# Patient Record
Sex: FEMALE | Race: WHITE | NOT HISPANIC OR LATINO | Employment: OTHER | URBAN - METROPOLITAN AREA
[De-identification: names, ages, dates, MRNs, and addresses within clinical notes are randomized per-mention and may not be internally consistent; named-entity substitution may affect disease eponyms.]

---

## 2017-01-17 ENCOUNTER — MYC MEDICAL ADVICE (OUTPATIENT)
Dept: FAMILY MEDICINE | Facility: CLINIC | Age: 67
End: 2017-01-17

## 2017-01-17 DIAGNOSIS — J98.01 COUGH DUE TO BRONCHOSPASM: Primary | ICD-10-CM

## 2017-01-19 ENCOUNTER — MYC MEDICAL ADVICE (OUTPATIENT)
Dept: FAMILY MEDICINE | Facility: CLINIC | Age: 67
End: 2017-01-19

## 2017-01-19 NOTE — PATIENT INSTRUCTIONS
Hand and Wrist Exercises: Forearm Roll  This exercise is designed to stretch and strengthen your hands and wrists. Before beginning, read through all the instructions. While exercising, breathe normally. If you feel any pain, stop the exercise. If pain persists, inform your healthcare provider.    Grasp a hammer or hand weight in your _____ hand. Place your wrist, palm down, over the end of your knee or on the edge of a table.    Keeping your forearm against your thigh or a table, rotate your hand until your palm is up. Hold for _____ seconds. Then return to starting position.    Repeat _____ times. Do _____ sets a day.    1606-3709 [employer]. [address] [place] [address]. All rights reserved. This information is not intended as a substitute for professional medical care. Always follow your healthcare professional's instructions.        Hand and Wrist Exercises: Wrist Flexion    This exercise is designed to stretch your hands and wrists. Before beginning, read through all the instructions. While exercising, breathe normally. If you feel any pain, stop the exercise. If pain persists, inform your healthcare provider.    Hold your _____ hand in front of you with your palm down and elbow bent.    Grasp the back of that hand with your other hand. Pull back so your fingers point down as you straighten your arm. Feel a stretch in your forearm and wrist. Hold for _____ seconds. Then relax.    Repeat _____ times. Do _____ sets a day.    8583-0855 [employer]. [address] [place] [address]. All rights reserved. This information is not intended as a substitute for professional medical care. Always follow your healthcare professional's instructions.

## 2017-01-31 ENCOUNTER — RADIANT APPOINTMENT (OUTPATIENT)
Dept: MAMMOGRAPHY | Facility: CLINIC | Age: 67
End: 2017-01-31
Attending: FAMILY MEDICINE
Payer: COMMERCIAL

## 2017-01-31 DIAGNOSIS — Z12.39 BREAST CANCER SCREENING: ICD-10-CM

## 2017-01-31 PROCEDURE — G0202 SCR MAMMO BI INCL CAD: HCPCS

## 2017-03-10 ENCOUNTER — MYC MEDICAL ADVICE (OUTPATIENT)
Dept: FAMILY MEDICINE | Facility: CLINIC | Age: 67
End: 2017-03-10

## 2017-03-10 DIAGNOSIS — G89.29 CHRONIC PAIN OF BOTH KNEES: Primary | ICD-10-CM

## 2017-03-10 DIAGNOSIS — M25.562 CHRONIC PAIN OF BOTH KNEES: Primary | ICD-10-CM

## 2017-03-10 DIAGNOSIS — M25.551 HIP PAIN, RIGHT: ICD-10-CM

## 2017-03-10 DIAGNOSIS — M25.561 CHRONIC PAIN OF BOTH KNEES: Primary | ICD-10-CM

## 2017-03-27 ENCOUNTER — PRE VISIT (OUTPATIENT)
Dept: ORTHOPEDICS | Facility: CLINIC | Age: 67
End: 2017-03-27

## 2017-03-27 DIAGNOSIS — M25.562 PAIN IN BOTH KNEES, UNSPECIFIED CHRONICITY: Primary | ICD-10-CM

## 2017-03-27 DIAGNOSIS — M25.561 PAIN IN BOTH KNEES, UNSPECIFIED CHRONICITY: Primary | ICD-10-CM

## 2017-03-27 NOTE — TELEPHONE ENCOUNTER
Pt reports being seen for : Bilateral knee pain, chronic  1. Do you have recent xrays (if not seen in EPIC)? No -  Patient informed that they will have x-rays done before appointment and to arrive at least 30 minutes before MD appointment. Xrays ordered per standing orders.  2. Do you have any MRI's (if not seen in EPIC)?No.   3. Have you had surgery in the past for the same issue. No.  4. Are you being referred by another provider? Yes: Dr. Hurtado  If yes-Records in Epic.  5. Is this work comp or MVA related. No.  Anat Rodriguez RN

## 2017-03-30 ENCOUNTER — RADIANT APPOINTMENT (OUTPATIENT)
Dept: GENERAL RADIOLOGY | Facility: CLINIC | Age: 67
End: 2017-03-30
Attending: ORTHOPAEDIC SURGERY
Payer: COMMERCIAL

## 2017-03-30 ENCOUNTER — OFFICE VISIT (OUTPATIENT)
Dept: ORTHOPEDICS | Facility: CLINIC | Age: 67
End: 2017-03-30
Payer: COMMERCIAL

## 2017-03-30 VITALS
OXYGEN SATURATION: 97 % | SYSTOLIC BLOOD PRESSURE: 145 MMHG | HEART RATE: 88 BPM | BODY MASS INDEX: 25.77 KG/M2 | HEIGHT: 69 IN | WEIGHT: 174 LBS | DIASTOLIC BLOOD PRESSURE: 89 MMHG

## 2017-03-30 DIAGNOSIS — M17.0 ARTHRITIS OF BOTH KNEES: Primary | ICD-10-CM

## 2017-03-30 DIAGNOSIS — M25.562 PAIN IN BOTH KNEES, UNSPECIFIED CHRONICITY: ICD-10-CM

## 2017-03-30 DIAGNOSIS — M25.561 PAIN IN BOTH KNEES, UNSPECIFIED CHRONICITY: ICD-10-CM

## 2017-03-30 PROCEDURE — 73562 X-RAY EXAM OF KNEE 3: CPT | Mod: RT | Performed by: RADIOLOGY

## 2017-03-30 PROCEDURE — 99213 OFFICE O/P EST LOW 20 MIN: CPT | Mod: GC | Performed by: ORTHOPAEDIC SURGERY

## 2017-03-30 ASSESSMENT — PAIN SCALES - GENERAL: PAINLEVEL: NO PAIN (0)

## 2017-03-30 NOTE — PROGRESS NOTES
REASON FOR VISIT:  Bilateral knee pain.      HISTORY OF PRESENT ILLNESS:  Mdadie Lala is a 67-year-old female seen back today in regards to her bilateral knee pain that has been ongoing for approximately 10 years.  There was no specific inciting event.  She has difficulty most notably with going up and down stairs and lunges while exercising.  Her symptoms are relieved with rest.  She describes her pain as aching.  On the left side, she has more medial-sided pain versus on the right which is more anterior knee pain.  Occasionally, she feels like her knee will have a sharp twinge of pain and give way.  She was seen by Dr. Xiong in 2011 at which point she had radiographic evidence of medial compartment left knee arthritis.  She was given a medial  brace, which she has been using and has helped her symptoms tremendously.  She returns today to discuss her current symptoms as well as to provide a new check point in time as she wants to make sure she is not doing any irreversible damage to her knees.  She retired within the last year and has been enjoying a more active lifestyle.  She denies any paresthesias, numbness or weakness in the extremities, although she feels slightly less conditioned with her quadriceps muscles and requests recommendations for exercises and other treatment modalities that she can perform so as to improve her symptoms.      PAST MEDICAL HISTORY:   1.  Allergic rhinitis.   2.  Meniere disease.      PAST SURGICAL HISTORY:  Cataract surgery, cervical cone procedure, C-sections x2.      MEDICATIONS:  Reviewed in the patient's electronic medical record.      ALLERGIES:  Sulfa drugs.      REVIEW OF SYSTEMS:  Reviewed on the patient's intake form including 14-point review of systems.      FAMILY HISTORY:  Reviewed on the patient's intake form.      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  She denies tobacco use.  She  lives at home with her spouse.  She drinks approximately 5 drinks per week.  She enjoys the Veeqo machine, yoga and hiking, specifically a yearly trip to the [facility].        PHYSICAL EXAMINATION:   GENERAL:  No acute distress, well-nourished female.   RESPIRATORY:  Nonlabored respirations on room air.   CARDIOVASCULAR:  Regular rate and rhythm.   EXTREMITIES:     LEFT LOWER EXTREMITY:  The patient is tender to palpation over the medial joint line.  She has knee range of motion from 0-130 degrees of flexion.  She is stable with varus and valgus stress at full extension and 30 degrees of flexion.  She has a grade 1A Lachman.  Negative anterior and posterior drawer.  She has mild crepitus with knee range of motion.  She has 5/5 strength with tib-ant, EHL and gastrocsoleus complex.  She has intact sensation to light touch over the sural, saphenous, deep peroneal, superficial peroneal and tibial nerve distributions.  She has palpable DP pulse.   RIGHT LOWER EXTREMITY:  The patient has no appreciable effusion.  She is tender to palpation with patellar compression.  She has a negative patellar apprehension test.  She has no tenderness to palpation over the medial or lateral joint line.  She has a knee range of motion from 0-130.  She is stable with varus and valgus stress at full extension and 30 degrees of flexion.  She has a grade 1A Lachman.  Negative anterior and posterior drawer.  She has 5/5 strength with tib-ant, EHL and gastrocsoleus complex.  She has sensation intact to sural, saphenous, deep peroneal, superficial peroneal and tibial nerve distributions.  She has palpable DP pulse.      IMAGING:  AP, lateral and sunrise views of bilateral knees obtained in clinic today and reviewed which demonstrate primarily the medial compartment wear of the left knee with osteophyte formation, subchondral sclerosis and joint space narrowing of the medial compartment.  On the right knee, there is evidence of  patellofemoral arthritic changes but relatively well-preserved medial and lateral joint spaces.  Findings consistent with osteoarthritis.      ASSESSMENT:  A 67-year-old female with bilateral knee osteoarthritis, primarily medial compartment on the left and patellofemoral compartment wear on the right.      PLAN:  We had a nice discussion with Maddie Lala today regarding the nature of her symptoms and correlated this with her history, physical and imaging studies.  At this point, we feel that she can continue to work on nonoperative measures to improve her symptoms with things such as physical therapy, icing, taking anti-inflammatories to aid in her arthritis-type symptoms.  Currently, she is not in significant pain with her current activities.  Some days are better than others, but between her medial  brace on her left and being mindful of activities such as avoiding deep lunges, she has been able to do most of her desired activities.  She is reassured by reviewing of her x-rays.  We also discussed that she could be a candidate for corticosteroid injections if she should so desire.  She can receive up to 3-4 of these per year.  At this point, she states she is not having significant enough pain to want an injection.  Should her symptoms progress to the point that she is having more bad days than good and she is unable to perform activities that she desires and if she has tried and failed conservative management, at some point, consideration for joint reconstruction could be considered, but at this point, she has further nonoperative management to maximize.      We wrote for a physical therapy to work on quad strengthening exercises as well as other modalities to aid in her knee symptoms.  She may continue to use ice and anti-inflammatories.  She will call and followup on an as-needed basis in the future.  She may also call if she wishes to schedule an injection, we would be happy to do this for her.       The patient was seen by and discussed with Dr. Xiong who is in agreement with the above assessment and plan.

## 2017-03-30 NOTE — PROGRESS NOTES
Patient seen and examined. Agree with history, physical and imaging as well as Assessment and Plan as detailed by Dr. Palacios.    Assesment: OA knee, Bilateral, medial compartment on Left, PF on Right    Plan: minimally symptomatic, education provided, PT prescription, injection if worsens.    F/u prn    I was present with the resident during the history and exam.  I discussed the case with the resident and agree with the findings as documented in the assessment and plan.

## 2017-03-30 NOTE — MR AVS SNAPSHOT
After Visit Summary   3/30/2017    Maddie Lala    MRN: [id number]           Patient Information     Date Of Birth          1950        Visit Information        Provider Department      3/30/2017 2:45 PM Chaparro Xiong MD [facility]        Today's Diagnoses     Arthritis of both knees    -  1      Care Instructions    Thanks for coming today.  Ortho/Sports Medicine Clinic  [address] [place] [address]    To schedule future appointments in Ortho Clinic, you may call [phone].    To schedule ordered imaging by your Provider: Call [facility] at [phone]    StreamLine Call available online at:   [email or web address]    Please call if any further questions or concerns [phone] and ask for the Orthopedic Department. Clinic hours 8 am to 5 pm.    Return to clinic if symptoms worsen.        Follow-ups after your visit        Additional Services     PHYSICAL THERAPY REFERRAL       *This therapy referral will be filtered to a centralized scheduling office at [facility] and the patient will receive a call to schedule an appointment at a [place] location most convenient for them. *     [facility] provides Physical Therapy evaluation and treatment and many specialty services across the [place] system.  If requesting a specialty program, please choose from the list below.    If you have not heard from the scheduling office within 2 business days, please call [phone] for all locations, with the exception of [place], please call [phone].  Treatment: Evaluation & Treatment  Special Instructions/Modalities: Eval and treat      Please be aware that coverage of these services is subject to the terms and limitations of your health insurance plan.  Call member services at your health plan with any benefit or coverage questions.      **Note to Provider:  If you are referring outside of [place] for the  therapy appointment, please list the name of the location in the  special instructions  above, print the referral and give to the patient to schedule the appointment.                  Your next 10 appointments already scheduled     Apr 04, 2017  1:30 PM CDT   SURINDER Extremity with Lizet Lopes, PT   [facility] Physical Therapy ([facility]  )    [address]  [place] [id number] [phone]              Who to contact     If you have questions or need follow up information about today's clinic visit or your schedule please contact [facility] directly at [phone].  Normal or non-critical lab and imaging results will be communicated to you by Hipcrickethart, letter or phone within 4 business days after the clinic has received the results. If you do not hear from us within 7 days, please contact the clinic through Hipcrickethart or phone. If you have a critical or abnormal lab result, we will notify you by phone as soon as possible.  Submit refill requests through [employer]le or call your pharmacy and they will forward the refill request to us. Please allow 3 business days for your refill to be completed.          Additional Information About Your Visit        HipcricketharJudobaby Information     Nixle gives you secure access to your electronic health record. If you see a primary care provider, you can also send messages to your care team and make appointments. If you have questions, please call your primary care clinic.  If you do not have a primary care provider, please call [phone] and they will assist you.      Nixle is an electronic gateway that provides easy, online access to your medical records. With Nixle, you can request a clinic appointment, read your test results, renew a prescription or communicate with your care team.     To access your existing account, please contact your [facility] or call [phone] for assistance.        Care  "EveryWhere ID     This is your Care EveryWhere ID. This could be used by other organizations to access your [place] medical records  [id number]        Your Vitals Were     Pulse Height Pulse Oximetry BMI (Body Mass Index)          88 1.74 m (5' 8.5\") 97% 26.07 kg/m2         Blood Pressure from Last 3 Encounters:   03/30/17 145/89   11/03/16 134/80   10/19/16 142/89    Weight from Last 3 Encounters:   03/30/17 78.9 kg (174 lb)   11/03/16 78 kg (172 lb)   10/12/16 74.8 kg (165 lb)              We Performed the Following     PHYSICAL THERAPY REFERRAL          Today's Medication Changes          These changes are accurate as of: 3/30/17 11:59 PM.  If you have any questions, ask your nurse or doctor.               These medicines have changed or have updated prescriptions.        Dose/Directions    albuterol 108 (90 BASE) MCG/ACT Inhaler   Commonly known as:  VENTOLIN HFA   This may have changed:  additional instructions   Used for:  Cough due to bronchospasm        INHALE TWO PUFFS BY MOUTH EVERY 6 HOURS AS NEEDED FOR SHORTNESS OF BREATH / DYSPNEA   Quantity:  18 g   Refills:  11       fluticasone 50 MCG/ACT spray   Commonly known as:  FLONASE   This may have changed:  additional instructions   Used for:  Chronic allergic conjunctivitis        USE ONE TO TWO SPRAYS IN EACH NOSTRIL EVERY DAY   Quantity:  48 g   Refills:  3                Primary Care Provider Office Phone # Fax #    Radha Hurtado [phone]       [address]  [facility] [address]        Thank you!     Thank you for choosing [facility]  for your care. Our goal is always to provide you with excellent care. Hearing back from our patients is one way we can continue to improve our services. Please take a few minutes to complete the written survey that you may receive in the mail after your visit with us. Thank you!             Your Updated Medication List - Protect others around you: Learn " how to safely use, store and throw away your medicines at www.disposemymeds.org.          This list is accurate as of: 3/30/17 11:59 PM.  Always use your most recent med list.                   Brand Name Dispense Instructions for use    ADVIL PO      Take 800 mg by mouth every 4 hours as needed for moderate pain       albuterol 108 (90 BASE) MCG/ACT Inhaler    VENTOLIN HFA    18 g    INHALE TWO PUFFS BY MOUTH EVERY 6 HOURS AS NEEDED FOR SHORTNESS OF BREATH / DYSPNEA       beclomethasone 40 MCG/ACT Inhaler    QVAR    3 Inhaler    INHALE 2 PUFFS BY MOUTH INTO THE LUNGS TWO TIMES A DAY       CALCIUM + D PO      One bid       CHLOR-TRIMETON ALLERGY 12 MG Tbcr   Generic drug:  chlorpheniramine maleate      Take  by mouth as needed.       fluocinonide 0.05 % solution    LIDEX    60 mL    Apply to scalp nightly for up to 1 week, then 2-3 times weekly as needed.       fluticasone 50 MCG/ACT spray    FLONASE    48 g    USE ONE TO TWO SPRAYS IN EACH NOSTRIL EVERY DAY       order for DME     1 Units    Equipment being ordered:    left knee brace-medial compartment  for osteoarthritis.       order for DME     1 each    Equipment being ordered: spacer       ranitidine 300 MG tablet    ZANTAC    90 tablet    Take 1 tablet (300 mg) by mouth daily       * SENIOR MULTIVITAMIN PLUS PO      One per day       * OPTIVITE PO      once a day       spironolactone 25 MG tablet    ALDACTONE    90 tablet    Take 1 tablet (25 mg) by mouth daily Prn       * Notice:  This list has 2 medication(s) that are the same as other medications prescribed for you. Read the directions carefully, and ask your doctor or other care provider to review them with you.

## 2017-03-30 NOTE — NURSING NOTE
"Maddie PUTNAM Dai's goals for this visit include: Bilateral knee pain  She requests these members of her care team be copied on today's visit information: yes    PCP: Radha Hurtado    Referring Provider:  No referring provider defined for this encounter.    Chief Complaint   Patient presents with     Knee Pain     Bilateral knee pain       Initial /89  Pulse 88  Ht 1.74 m (5' 8.5\")  Wt 78.9 kg (174 lb)  SpO2 97%  BMI 26.07 kg/m2 Estimated body mass index is 26.07 kg/(m^2) as calculated from the following:    Height as of this encounter: 1.74 m (5' 8.5\").    Weight as of this encounter: 78.9 kg (174 lb).  Medication Reconciliation: complete  "

## 2017-03-30 NOTE — PATIENT INSTRUCTIONS
Thanks for coming today.  Ortho/Sports Medicine Clinic  [address] [place] [address]    To schedule future appointments in Ortho Clinic, you may call [phone].    To schedule ordered imaging by your Provider: Call [facility] at [phone]    "[email or web address]" available online at:   [email or web address]    Please call if any further questions or concerns [phone] and ask for the Orthopedic Department. Clinic hours 8 am to 5 pm.    Return to clinic if symptoms worsen.

## 2017-04-04 ENCOUNTER — THERAPY VISIT (OUTPATIENT)
Dept: PHYSICAL THERAPY | Facility: CLINIC | Age: 67
End: 2017-04-04
Payer: COMMERCIAL

## 2017-04-04 DIAGNOSIS — M25.562 LEFT KNEE PAIN, UNSPECIFIED CHRONICITY: Primary | ICD-10-CM

## 2017-04-04 PROCEDURE — 97110 THERAPEUTIC EXERCISES: CPT | Mod: GP | Performed by: PHYSICAL THERAPIST

## 2017-04-04 PROCEDURE — 97161 PT EVAL LOW COMPLEX 20 MIN: CPT | Mod: GP | Performed by: PHYSICAL THERAPIST

## 2017-04-04 ASSESSMENT — ACTIVITIES OF DAILY LIVING (ADL)
AS_A_RESULT_OF_YOUR_KNEE_INJURY,_HOW_WOULD_YOU_RATE_YOUR_CURRENT_LEVEL_OF_DAILY_ACTIVITY?: NEARLY NORMAL
HOW_WOULD_YOU_RATE_THE_OVERALL_FUNCTION_OF_YOUR_KNEE_DURING_YOUR_USUAL_DAILY_ACTIVITIES?: NEARLY NORMAL
GO UP STAIRS: ACTIVITY IS MINIMALLY DIFFICULT
STAND: ACTIVITY IS NOT DIFFICULT
SWELLING: THE SYMPTOM AFFECTS MY ACTIVITY SLIGHTLY
GIVING WAY, BUCKLING OR SHIFTING OF KNEE: THE SYMPTOM AFFECTS MY ACTIVITY SLIGHTLY
SIT WITH YOUR KNEE BENT: ACTIVITY IS NOT DIFFICULT
PAIN: THE SYMPTOM AFFECTS MY ACTIVITY SLIGHTLY
RISE FROM A CHAIR: ACTIVITY IS NOT DIFFICULT
RAW_SCORE: 53
LIMPING: I HAVE THE SYMPTOM BUT IT DOES NOT AFFECT MY ACTIVITY
SQUAT: ACTIVITY IS SOMEWHAT DIFFICULT
WALK: ACTIVITY IS NOT DIFFICULT
KNEEL ON THE FRONT OF YOUR KNEE: ACTIVITY IS SOMEWHAT DIFFICULT
GO DOWN STAIRS: ACTIVITY IS NOT DIFFICULT
HOW_WOULD_YOU_RATE_THE_CURRENT_FUNCTION_OF_YOUR_KNEE_DURING_YOUR_USUAL_DAILY_ACTIVITIES_ON_A_SCALE_FROM_0_TO_100_WITH_100_BEING_YOUR_LEVEL_OF_KNEE_FUNCTION_PRIOR_TO_YOUR_INJURY_AND_0_BEING_THE_INABILITY_TO_PERFORM_ANY_OF_YOUR_USUAL_DAILY_ACTIVITIES?: 98
WEAKNESS: THE SYMPTOM AFFECTS MY ACTIVITY MODERATELY
STIFFNESS: THE SYMPTOM AFFECTS MY ACTIVITY SLIGHTLY
KNEE_ACTIVITY_OF_DAILY_LIVING_SCORE: 75.71
KNEE_ACTIVITY_OF_DAILY_LIVING_SUM: 53

## 2017-04-04 NOTE — MR AVS SNAPSHOT
After Visit Summary   4/4/2017    Maddie Lala    MRN: [id number]           Patient Information     Date Of Birth          1950        Visit Information        Provider Department      4/4/2017 1:30 PM Lizet Lopes, PT [facility] Physical Therapy        Today's Diagnoses     Left knee pain, unspecified chronicity    -  1       Follow-ups after your visit        Your next 10 appointments already scheduled     Apr 11, 2017 12:50 PM CDT   SURINDER Extremity with Lizet Lopes, PT   [facility] Physical Therapy ([facility]  )    [address]  [facility] [id number] [phone]            Apr 18, 2017  2:40 PM CDT   SURINDER Extremity with Elicia Snyder, PT   [facility] Physical Therapy ([facility]  )    [address]  [facility] [id number] [phone]              Who to contact     If you have questions or need follow up information about today's clinic visit or your schedule please contact [facility] PHYSICAL THERAPY directly at [phone].  Normal or non-critical lab and imaging results will be communicated to you by MyChart, letter or phone within 4 business days after the clinic has received the results. If you do not hear from us within 7 days, please contact the clinic through "[employer]hart or phone. If you have a critical or abnormal lab result, we will notify you by phone as soon as possible.  Submit refill requests through SoSocio or call your pharmacy and they will forward the refill request to us. Please allow 3 business days for your refill to be completed.          Additional Information About Your Visit        MyChart Information     [employer] gives you secure access to your electronic health record. If you see a primary care provider, you can also send messages to your care team and make appointments. If you have questions, please call your primary care clinic.  If you do not have  a primary care provider, please call [phone] and they will assist you.        Care EveryWhere ID     This is your Care EveryWhere ID. This could be used by other organizations to access your [place] medical records  [id number]         Blood Pressure from Last 3 Encounters:   03/30/17 145/89   11/03/16 134/80   10/19/16 142/89    Weight from Last 3 Encounters:   03/30/17 78.9 kg (174 lb)   11/03/16 78 kg (172 lb)   10/12/16 74.8 kg (165 lb)              We Performed the Following     HC PT EVAL, LOW COMPLEXITY     SURINDER INITIAL EVAL REPORT     THERAPEUTIC EXERCISES          Today's Medication Changes          These changes are accurate as of: 4/4/17  3:08 PM.  If you have any questions, ask your nurse or doctor.               These medicines have changed or have updated prescriptions.        Dose/Directions    albuterol 108 (90 BASE) MCG/ACT Inhaler   Commonly known as:  VENTOLIN HFA   This may have changed:  additional instructions   Used for:  Cough due to bronchospasm        INHALE TWO PUFFS BY MOUTH EVERY 6 HOURS AS NEEDED FOR SHORTNESS OF BREATH / DYSPNEA   Quantity:  18 g   Refills:  11       fluticasone 50 MCG/ACT spray   Commonly known as:  FLONASE   This may have changed:  additional instructions   Used for:  Chronic allergic conjunctivitis        USE ONE TO TWO SPRAYS IN EACH NOSTRIL EVERY DAY   Quantity:  48 g   Refills:  3                Primary Care Provider Office Phone # Fax #    Radha Hurtado [phone]       [address]  [facility] [address]        Thank you!     Thank you for choosing [facility] PHYSICAL THERAPY  for your care. Our goal is always to provide you with excellent care. Hearing back from our patients is one way we can continue to improve our services. Please take a few minutes to complete the written survey that you may receive in the mail after your visit with us. Thank you!             Your Updated Medication  List - Protect others around you: Learn how to safely use, store and throw away your medicines at www.disposemymeds.org.          This list is accurate as of: 4/4/17  3:08 PM.  Always use your most recent med list.                   Brand Name Dispense Instructions for use    ADVIL PO      Take 800 mg by mouth every 4 hours as needed for moderate pain       albuterol 108 (90 BASE) MCG/ACT Inhaler    VENTOLIN HFA    18 g    INHALE TWO PUFFS BY MOUTH EVERY 6 HOURS AS NEEDED FOR SHORTNESS OF BREATH / DYSPNEA       beclomethasone 40 MCG/ACT Inhaler    QVAR    3 Inhaler    INHALE 2 PUFFS BY MOUTH INTO THE LUNGS TWO TIMES A DAY       CALCIUM + D PO      One bid       CHLOR-TRIMETON ALLERGY 12 MG Tbcr   Generic drug:  chlorpheniramine maleate      Take  by mouth as needed.       fluocinonide 0.05 % solution    LIDEX    60 mL    Apply to scalp nightly for up to 1 week, then 2-3 times weekly as needed.       fluticasone 50 MCG/ACT spray    FLONASE    48 g    USE ONE TO TWO SPRAYS IN EACH NOSTRIL EVERY DAY       order for DME     1 Units    Equipment being ordered:    left knee brace-medial compartment  for osteoarthritis.       order for DME     1 each    Equipment being ordered: spacer       ranitidine 300 MG tablet    ZANTAC    90 tablet    Take 1 tablet (300 mg) by mouth daily       * SENIOR MULTIVITAMIN PLUS PO      One per day       * OPTIVITE PO      once a day       spironolactone 25 MG tablet    ALDACTONE    90 tablet    Take 1 tablet (25 mg) by mouth daily Prn       * Notice:  This list has 2 medication(s) that are the same as other medications prescribed for you. Read the directions carefully, and ask your doctor or other care provider to review them with you.

## 2017-04-04 NOTE — LETTER
[facility] PHYSICAL THERAPY  [address]  [facility] [address]  [phone]    2017    Re: Maddie Lala   :   1950  MRN:  [id number]   REFERRING PHYSICIAN:   Nash Palacios    [facility] PHYSICAL THERAPY  Date of Initial Evaluation:  17  Visits:  Rxs Used: 1  Reason for Referral:  Left knee pain, unspecified chronicity    EVALUATION SUMMARY    Subjective:  Maddie Lala is a 67 year old female with a left knee condition. Condition occurred with: Degenerative joint disease. Condition occurred: for unknown reasons. This is a chronic condition. Left knee pain has been present for 10+ years. Gradually the knee has become weaker so she is compensating more. Followed up with MD 3/30/17 to discuss knee which does show by x-ray of progressive degenerative changes.  She continues to wear an un  brace.      Patient reports pain:  In the joint. Pain is described as aching and is intermittent and reported as 5/10 (Can be 0/10 at rest). Pain is the same all the time. Symptoms are exacerbated by walking, bending/squatting, ascending stairs and descending stairs and relieved by NSAID's.  Since onset symptoms are unchanged.  Special tests:  X-ray.      General health as reported by patient is good.      Pertinent medical history includes:  Overweight, osteoarthritis and menopausal.  Medical allergies: yes (sulfa).  Other surgeries include:  Other (pelvic sling , cataracts  cancer cervic ).  Current medications:  None as reported by the patient.  Current occupation is Retired.        Barriers include:  None as reported by the patient.    Red flags:  None as reported by the patient.                  Objective:  Hip Evaluation  Hip Strength:    Extension:  Left: 4-/5  Pain:Right: 4-/5    Pain:    Abduction:  Left: 4/5     Pain:Right: 4/5    Pain:       Knee Evaluation:  ROM:  AROM  Hyperextension:  Left:  8    Right: 10  Flexion: Left: 140     Right: 145  Strength:   Extension:  Left: 4-/5   Pain:      Right: 5/5   Pain:  Flexion:  Left: 4-/5   Pain:      Right: 5/5   Pain:      Assessment/Plan:    Patient is a 67 year old female with left side knee complaints.    Patient has the following significant findings with corresponding treatment plan.                  Diagnosis 1:  Left knee pain  Pain -  manual therapy, self management and education  Decreased strength - therapeutic exercise and therapeutic activities  Impaired muscle performance - neuro re-education  Decreased function - therapeutic activities    Therapy Evaluation Codes:   1) History comprised of:   Personal factors that impact the plan of care:      None.    Comorbidity factors that impact the plan of care are:      None.     Medications impacting care: None.  2) Examination of Body Systems comprised of:   Body structures and functions that impact the plan of care:      Knee.   Activity limitations that impact the plan of care are:      Squatting/kneeling.  3) Clinical presentation characteristics are:   Stable/Uncomplicated.  4) Decision-Making    Low complexity using standardized patient assessment instrument and/or measureable assessment of functional outcome.  Cumulative Therapy Evaluation is: Low complexity.    Previous and current functional limitations:  (See Goal Flow Sheet for this information)    Short term and Long term goals: (See Goal Flow Sheet for this information)     Communication ability:  Patient appears to be able to clearly communicate and understand verbal and written communication and follow directions correctly.    Treatment Explanation - The following has been discussed with the patient:   RX ordered/plan of care  Anticipated outcomes  Possible risks and side effects    This patient would benefit from PT intervention to resume normal activities.   Rehab potential is good.  Frequency:  1 X week, once daily  Duration:  for 8 weeks        Discharge Plan:  Achieve all  LTG.  Independent in home treatment program.  Reach maximal therapeutic benefit.    Thank you for your referral.    INQUIRIES  Therapist: Lizet Lopes DPT, Cert. MDT  [facility] PHYSICAL THERAPY  [address]  [facility] [address]  Phone: [phone]  Fax: [phone]

## 2017-04-04 NOTE — PROGRESS NOTES
Subjective:    Maddie Lala is a 67 year old female with a left knee condition.  Condition occurred with:  Degenerative joint disease.  Condition occurred: for unknown reasons.  This is a chronic condition  Left knee pain has been present for 10+ years.  Gradually the knee has become weaker so she is compensating more. Followed up with MD 3/30/17 to discuss knee which does show by x-ray of progressive degenerative changes.  She continues to wear an un  brace..    Patient reports pain:  In the joint.    Pain is described as aching and is intermittent and reported as 5/10 (Can be 0/10 at rest).   Pain is the same all the time.  Symptoms are exacerbated by walking, bending/squatting, ascending stairs and descending stairs and relieved by NSAID's.  Since onset symptoms are unchanged.  Special tests:  X-ray.      General health as reported by patient is good.  Pertinent medical history includes:  Overweight, osteoarthritis and menopausal.  Medical allergies: yes (sulfa).  Other surgeries include:  Other (pelvic sling 2010, cataracts 2010 cancer cervic 1979).  Current medications:  None as reported by the patient.  Current occupation is Retired  .        Barriers include:  None as reported by the patient.    Red flags:  None as reported by the patient.                        Objective:    System                                           Hip Evaluation    Hip Strength:      Extension:  Left: 4-/5  Pain:Right: 4-/5    Pain:    Abduction:  Left: 4/5     Pain:Right: 4/5    Pain:                           Knee Evaluation:  ROM:    AROM    Hyperextension:  Left:  8    Right: 10    Flexion: Left: 140    Right: 145        Strength:     Extension:  Left: 4-/5   Pain:      Right: 5/5   Pain:  Flexion:  Left: 4-/5   Pain:      Right: 5/5   Pain:                        General     ROS    Assessment/Plan:      Patient is a 67 year old female with left side knee complaints.    Patient has the following significant findings with  corresponding treatment plan.                Diagnosis 1:  Left knee pain  Pain -  manual therapy, self management and education  Decreased strength - therapeutic exercise and therapeutic activities  Impaired muscle performance - neuro re-education  Decreased function - therapeutic activities    Therapy Evaluation Codes:   1) History comprised of:   Personal factors that impact the plan of care:      None.    Comorbidity factors that impact the plan of care are:      None.     Medications impacting care: None.  2) Examination of Body Systems comprised of:   Body structures and functions that impact the plan of care:      Knee.   Activity limitations that impact the plan of care are:      Squatting/kneeling.  3) Clinical presentation characteristics are:   Stable/Uncomplicated.  4) Decision-Making    Low complexity using standardized patient assessment instrument and/or measureable assessment of functional outcome.  Cumulative Therapy Evaluation is: Low complexity.    Previous and current functional limitations:  (See Goal Flow Sheet for this information)    Short term and Long term goals: (See Goal Flow Sheet for this information)     Communication ability:  Patient appears to be able to clearly communicate and understand verbal and written communication and follow directions correctly.  Treatment Explanation - The following has been discussed with the patient:   RX ordered/plan of care  Anticipated outcomes  Possible risks and side effects  This patient would benefit from PT intervention to resume normal activities.   Rehab potential is good.    Frequency:  1 X week, once daily  Duration:  for 8 weeks  Discharge Plan:  Achieve all LTG.  Independent in home treatment program.  Reach maximal therapeutic benefit.    Please refer to the daily flowsheet for treatment today, total treatment time and time spent performing 1:1 timed codes.

## 2017-04-11 ENCOUNTER — THERAPY VISIT (OUTPATIENT)
Dept: PHYSICAL THERAPY | Facility: CLINIC | Age: 67
End: 2017-04-11
Payer: COMMERCIAL

## 2017-04-11 DIAGNOSIS — M25.562 LEFT KNEE PAIN, UNSPECIFIED CHRONICITY: ICD-10-CM

## 2017-04-11 PROCEDURE — 97112 NEUROMUSCULAR REEDUCATION: CPT | Mod: GP | Performed by: PHYSICAL THERAPIST

## 2017-04-11 PROCEDURE — 97110 THERAPEUTIC EXERCISES: CPT | Mod: GP | Performed by: PHYSICAL THERAPIST

## 2017-04-18 ENCOUNTER — THERAPY VISIT (OUTPATIENT)
Dept: PHYSICAL THERAPY | Facility: CLINIC | Age: 67
End: 2017-04-18
Payer: COMMERCIAL

## 2017-04-18 DIAGNOSIS — M25.562 LEFT KNEE PAIN, UNSPECIFIED CHRONICITY: ICD-10-CM

## 2017-04-18 PROCEDURE — 97112 NEUROMUSCULAR REEDUCATION: CPT | Mod: GP | Performed by: PHYSICAL THERAPIST

## 2017-04-18 PROCEDURE — 97110 THERAPEUTIC EXERCISES: CPT | Mod: GP | Performed by: PHYSICAL THERAPIST

## 2017-04-24 ENCOUNTER — THERAPY VISIT (OUTPATIENT)
Dept: PHYSICAL THERAPY | Facility: CLINIC | Age: 67
End: 2017-04-24
Payer: COMMERCIAL

## 2017-04-24 DIAGNOSIS — M25.562 LEFT KNEE PAIN, UNSPECIFIED CHRONICITY: ICD-10-CM

## 2017-04-24 PROCEDURE — 97110 THERAPEUTIC EXERCISES: CPT | Mod: GP | Performed by: PHYSICAL THERAPIST

## 2017-04-24 PROCEDURE — 97112 NEUROMUSCULAR REEDUCATION: CPT | Mod: GP | Performed by: PHYSICAL THERAPIST

## 2017-05-01 ENCOUNTER — THERAPY VISIT (OUTPATIENT)
Dept: PHYSICAL THERAPY | Facility: CLINIC | Age: 67
End: 2017-05-01
Payer: COMMERCIAL

## 2017-05-01 DIAGNOSIS — M25.562 LEFT KNEE PAIN, UNSPECIFIED CHRONICITY: ICD-10-CM

## 2017-05-01 PROCEDURE — 97112 NEUROMUSCULAR REEDUCATION: CPT | Mod: GP | Performed by: PHYSICAL THERAPIST

## 2017-05-01 PROCEDURE — 97110 THERAPEUTIC EXERCISES: CPT | Mod: GP | Performed by: PHYSICAL THERAPIST

## 2017-05-13 ENCOUNTER — MYC MEDICAL ADVICE (OUTPATIENT)
Dept: PHYSICAL THERAPY | Facility: CLINIC | Age: 67
End: 2017-05-13

## 2017-05-13 DIAGNOSIS — H10.45 CHRONIC ALLERGIC CONJUNCTIVITIS: ICD-10-CM

## 2017-05-15 NOTE — TELEPHONE ENCOUNTER
fluticasone (FLONASE) 50 MCG/ACT nasal spray       Last Written Prescription Date: 11/3/16  Last Fill Quantity: 48g, # refills: 3    Last Office Visit with [facility] or [facility] prescribing provider:  3/30/17 Dr. Hurtado     Future Office Visit:       Date of Last Asthma Action Plan Letter:   There are no preventive care reminders to display for this patient.   Asthma Control Test: No flowsheet data found.    Date of Last Spirometry Test:   No results found for this or any previous visit.

## 2017-05-16 RX ORDER — FLUTICASONE PROPIONATE 50 MCG
SPRAY, SUSPENSION (ML) NASAL
Qty: 48 G | Refills: 0 | Status: SHIPPED | OUTPATIENT
Start: 2017-05-16 | End: 2017-11-16

## 2017-05-16 NOTE — TELEPHONE ENCOUNTER
Prescription approved per [facility] Refill Protocol.    Isabel Giang RN

## 2017-09-08 ENCOUNTER — MYC MEDICAL ADVICE (OUTPATIENT)
Dept: FAMILY MEDICINE | Facility: CLINIC | Age: 67
End: 2017-09-08

## 2017-09-08 DIAGNOSIS — M17.12 PRIMARY OSTEOARTHRITIS OF LEFT KNEE: Primary | ICD-10-CM

## 2017-10-15 ENCOUNTER — MYC MEDICAL ADVICE (OUTPATIENT)
Dept: FAMILY MEDICINE | Facility: CLINIC | Age: 67
End: 2017-10-15

## 2017-11-16 ENCOUNTER — MEDICAL CORRESPONDENCE (OUTPATIENT)
Dept: HEALTH INFORMATION MANAGEMENT | Facility: CLINIC | Age: 67
End: 2017-11-16

## 2017-11-16 ENCOUNTER — OFFICE VISIT (OUTPATIENT)
Dept: FAMILY MEDICINE | Facility: CLINIC | Age: 67
End: 2017-11-16
Payer: COMMERCIAL

## 2017-11-16 VITALS
BODY MASS INDEX: 26.26 KG/M2 | OXYGEN SATURATION: 100 % | DIASTOLIC BLOOD PRESSURE: 88 MMHG | HEART RATE: 66 BPM | WEIGHT: 167.3 LBS | TEMPERATURE: 98.6 F | SYSTOLIC BLOOD PRESSURE: 142 MMHG | HEIGHT: 67 IN

## 2017-11-16 DIAGNOSIS — Z23 NEED FOR PROPHYLACTIC VACCINATION AND INOCULATION AGAINST INFLUENZA: ICD-10-CM

## 2017-11-16 DIAGNOSIS — Z00.00 ROUTINE GENERAL MEDICAL EXAMINATION AT A HEALTH CARE FACILITY: Primary | ICD-10-CM

## 2017-11-16 DIAGNOSIS — M25.552 HIP PAIN, BILATERAL: ICD-10-CM

## 2017-11-16 DIAGNOSIS — N95.2 ATROPHIC VAGINITIS: ICD-10-CM

## 2017-11-16 DIAGNOSIS — H81.09 MENIERE'S DISEASE, UNSPECIFIED LATERALITY: ICD-10-CM

## 2017-11-16 DIAGNOSIS — L21.9 DERMATITIS, SEBORRHEIC: ICD-10-CM

## 2017-11-16 DIAGNOSIS — M25.551 HIP PAIN, BILATERAL: ICD-10-CM

## 2017-11-16 DIAGNOSIS — R05.3 CHRONIC COUGH: ICD-10-CM

## 2017-11-16 DIAGNOSIS — H10.45 CHRONIC ALLERGIC CONJUNCTIVITIS: ICD-10-CM

## 2017-11-16 DIAGNOSIS — J98.01 COUGH DUE TO BRONCHOSPASM: ICD-10-CM

## 2017-11-16 DIAGNOSIS — I10 BENIGN ESSENTIAL HYPERTENSION: ICD-10-CM

## 2017-11-16 LAB
ALBUMIN SERPL-MCNC: 3.8 G/DL (ref 3.4–5)
ALP SERPL-CCNC: 66 U/L (ref 40–150)
ALT SERPL W P-5'-P-CCNC: 36 U/L (ref 0–50)
ANION GAP SERPL CALCULATED.3IONS-SCNC: 5 MMOL/L (ref 3–14)
AST SERPL W P-5'-P-CCNC: 26 U/L (ref 0–45)
BILIRUB SERPL-MCNC: 0.5 MG/DL (ref 0.2–1.3)
BUN SERPL-MCNC: 13 MG/DL (ref 7–30)
CALCIUM SERPL-MCNC: 8.7 MG/DL (ref 8.5–10.1)
CHLORIDE SERPL-SCNC: 103 MMOL/L (ref 94–109)
CHOLEST SERPL-MCNC: 147 MG/DL
CO2 SERPL-SCNC: 31 MMOL/L (ref 20–32)
CREAT SERPL-MCNC: 0.61 MG/DL (ref 0.52–1.04)
CREAT UR-MCNC: 110 MG/DL
GFR SERPL CREATININE-BSD FRML MDRD: >90 ML/MIN/1.7M2
GLUCOSE SERPL-MCNC: 93 MG/DL (ref 70–99)
HDLC SERPL-MCNC: 75 MG/DL
HGB BLD-MCNC: 12.6 G/DL (ref 11.7–15.7)
LDLC SERPL CALC-MCNC: 63 MG/DL
MICROALBUMIN UR-MCNC: <5 MG/L
MICROALBUMIN/CREAT UR: NORMAL MG/G CR (ref 0–25)
NONHDLC SERPL-MCNC: 72 MG/DL
POTASSIUM SERPL-SCNC: 4.2 MMOL/L (ref 3.4–5.3)
PROT SERPL-MCNC: 6.9 G/DL (ref 6.8–8.8)
SODIUM SERPL-SCNC: 139 MMOL/L (ref 133–144)
TRIGL SERPL-MCNC: 43 MG/DL
TSH SERPL DL<=0.005 MIU/L-ACNC: 2.78 MU/L (ref 0.4–4)

## 2017-11-16 PROCEDURE — 36415 COLL VENOUS BLD VENIPUNCTURE: CPT | Performed by: FAMILY MEDICINE

## 2017-11-16 PROCEDURE — 80053 COMPREHEN METABOLIC PANEL: CPT | Performed by: FAMILY MEDICINE

## 2017-11-16 PROCEDURE — 82043 UR ALBUMIN QUANTITATIVE: CPT | Performed by: FAMILY MEDICINE

## 2017-11-16 PROCEDURE — 85018 HEMOGLOBIN: CPT | Performed by: FAMILY MEDICINE

## 2017-11-16 PROCEDURE — 80061 LIPID PANEL: CPT | Performed by: FAMILY MEDICINE

## 2017-11-16 PROCEDURE — 99213 OFFICE O/P EST LOW 20 MIN: CPT | Mod: 25 | Performed by: FAMILY MEDICINE

## 2017-11-16 PROCEDURE — 84443 ASSAY THYROID STIM HORMONE: CPT | Performed by: FAMILY MEDICINE

## 2017-11-16 PROCEDURE — 99397 PER PM REEVAL EST PAT 65+ YR: CPT | Mod: 25 | Performed by: FAMILY MEDICINE

## 2017-11-16 PROCEDURE — 90662 IIV NO PRSV INCREASED AG IM: CPT | Performed by: FAMILY MEDICINE

## 2017-11-16 PROCEDURE — 90471 IMMUNIZATION ADMIN: CPT | Performed by: FAMILY MEDICINE

## 2017-11-16 RX ORDER — FLUTICASONE PROPIONATE 50 MCG
SPRAY, SUSPENSION (ML) NASAL
Qty: 48 G | Refills: 3 | Status: SHIPPED | OUTPATIENT
Start: 2017-11-16 | End: 2019-02-27

## 2017-11-16 RX ORDER — LISINOPRIL 5 MG/1
5 TABLET ORAL DAILY
Qty: 30 TABLET | Refills: 1 | Status: SHIPPED | OUTPATIENT
Start: 2017-11-16 | End: 2017-12-13

## 2017-11-16 RX ORDER — FLUOCINONIDE TOPICAL SOLUTION USP, 0.05% 0.5 MG/ML
SOLUTION TOPICAL
Qty: 60 ML | Refills: 3 | Status: SHIPPED | OUTPATIENT
Start: 2017-11-16 | End: 2019-03-19

## 2017-11-16 RX ORDER — ALBUTEROL SULFATE 90 UG/1
AEROSOL, METERED RESPIRATORY (INHALATION)
Qty: 18 G | Refills: 11 | Status: SHIPPED | OUTPATIENT
Start: 2017-11-16 | End: 2020-01-30

## 2017-11-16 NOTE — MR AVS SNAPSHOT
After Visit Summary   11/16/2017    Maddie Lala    MRN: [id number]           Patient Information     Date Of Birth          1950        Visit Information        Provider Department      11/16/2017 8:00 AM Radha Hurtado MD [facility]        Today's Diagnoses     Routine general medical examination at a health care facility    -  1    Cough due to bronchospasm        Dermatitis, seborrheic        Chronic allergic conjunctivitis        Chronic cough        Benign essential hypertension        Meniere's disease, unspecified laterality        Hip pain, bilateral          Care Instructions    Flu shot today.     Refills sent.     Trial lisinopril . If coughing symptoms arise, contact me to change to an alternative beta blockers. Update me with blood pressure in three to four weeks.     You may use Replens over the counter for vaginal dryness.     Continue exercise and activity monitoring hip pain. Notify me if worsened and require additional treatment.       Preventive Health Recommendations  Female Ages 65 +    Yearly exam:     See your health care provider every year in order to  o Review health changes.   o Discuss preventive care.    o Review your medicines if your doctor has prescribed any.      You no longer need a yearly Pap test unless you've had an abnormal Pap test in the past 10 years. If you have vaginal symptoms, such as bleeding or discharge, be sure to talk with your provider about a Pap test.      Every 1 to 2 years, have a mammogram.  If you are over 69, talk with your health care provider about whether or not you want to continue having screening mammograms.      Every 10 years, have a colonoscopy. Or, have a yearly FIT test (stool test). These exams will check for colon cancer.       Have a cholesterol test every 5 years, or more often if your doctor advises it.       Have a diabetes test (fasting glucose) every three years. If you are at risk for diabetes, you  should have this test more often.       At age 65, have a bone density scan (DEXA) to check for osteoporosis (brittle bone disease).    Shots:    Get a flu shot each year.    Get a tetanus shot every 10 years.    Talk to your doctor about your pneumonia vaccines. There are now two you should receive - Pneumovax (PPSV 23) and Prevnar (PCV 13).    Talk to your doctor about the shingles vaccine.    Talk to your doctor about the hepatitis B vaccine.    Nutrition:     Eat at least 5 servings of fruits and vegetables each day.      Eat whole-grain bread, whole-wheat pasta and brown rice instead of white grains and rice.      Talk to your provider about Calcium and Vitamin D.     Lifestyle    Exercise at least 150 minutes a week (30 minutes a day, 5 days a week). This will help you control your weight and prevent disease.      Limit alcohol to one drink per day.      No smoking.       Wear sunscreen to prevent skin cancer.       See your dentist twice a year for an exam and cleaning.      See your eye doctor every 1 to 2 years to screen for conditions such as glaucoma, macular degeneration, cataracts, etc           Follow-ups after your visit        Who to contact     If you have questions or need follow up information about today's clinic visit or your schedule please contact [facility] directly at [phone].  Normal or non-critical lab and imaging results will be communicated to you by [employer], letter or phone within 4 business days after the clinic has received the results. If you do not hear from us within 7 days, please contact the clinic through [employer] or phone. If you have a critical or abnormal lab result, we will notify you by phone as soon as possible.  Submit refill requests through [employer] or call your pharmacy and they will forward the refill request to us. Please allow 3 business days for your refill to be completed.          Additional Information About Your Visit        [employer]  "Information     [employer] gives you secure access to your electronic health record. If you see a primary care provider, you can also send messages to your care team and make appointments. If you have questions, please call your primary care clinic.  If you do not have a primary care provider, please call [phone] and they will assist you.        Care EveryWhere ID     This is your Care EveryWhere ID. This could be used by other organizations to access your [facility] medical records  [id number]        Your Vitals Were     Pulse Temperature Height Pulse Oximetry Breastfeeding? BMI (Body Mass Index)    66 98.6  F (37  C) (Oral) 1.702 m (5' 7\") 100% No 26.2 kg/m2       Blood Pressure from Last 3 Encounters:   11/16/17 134/90   03/30/17 145/89   11/03/16 134/80    Weight from Last 3 Encounters:   11/16/17 75.9 kg (167 lb 4.8 oz)   03/30/17 78.9 kg (174 lb)   11/03/16 78 kg (172 lb)              We Performed the Following     Albumin Random Urine Quantitative with Creat Ratio     Comprehensive metabolic panel (BMP + Alb, Alk Phos, ALT, AST, Total. Bili, TP)     Hemoglobin     Lipid panel reflex to direct LDL Fasting     TSH with free T4 reflex          Today's Medication Changes          These changes are accurate as of: 11/16/17  8:52 AM.  If you have any questions, ask your nurse or doctor.               Start taking these medicines.        Dose/Directions    lisinopril 5 MG tablet   Commonly known as:  PRINIVIL/ZESTRIL   Used for:  Benign essential hypertension   Started by:  Radha Hurtado MD        Dose:  5 mg   Take 1 tablet (5 mg) by mouth daily   Quantity:  30 tablet   Refills:  1         These medicines have changed or have updated prescriptions.        Dose/Directions    albuterol 108 (90 BASE) MCG/ACT Inhaler   Commonly known as:  VENTOLIN HFA   This may have changed:  additional instructions   Used for:  Cough due to bronchospasm   Changed by:  Radha Hurtado MD        INHALE TWO PUFFS BY MOUTH EVERY " 6 HOURS AS NEEDED FOR SHORTNESS OF BREATH / DYSPNEA AND AS NEEDED BEFORE EXERCISE   Quantity:  18 g   Refills:  11       fluticasone 50 MCG/ACT spray   Commonly known as:  FLONASE   This may have changed:  See the new instructions.   Used for:  Chronic allergic conjunctivitis   Changed by:  Radha Hurtado MD        USE ONE TO TWO SPRAYS IN EACH NOSTRIL EVERY DAY   Quantity:  48 g   Refills:  3            Where to get your medicines      These medications were sent to [facility] [address]   [address] [facility] , [facility] [address]     Phone:  [phone]     albuterol 108 (90 BASE) MCG/ACT Inhaler    beclomethasone 40 MCG/ACT Inhaler    fluocinonide 0.05 % solution    fluticasone 50 MCG/ACT spray    lisinopril 5 MG tablet                Primary Care Provider Office Phone # Fax #    Radha Hurtado [phone] [address]  [facility] [address]        Equal Access to Services     [facility]: Hadii aad ku hadasho Soomaali, waaxda luqadaha, qaybta kaalmada adeegyada, waxay idiin hayaan adeeg kharash la'aan . So [facility] [phone].    ATENCIÓN: Si habla español, tiene a mayo disposición servicios gratuitos de asistencia lingüística. [facility] [phone].    We comply with applicable federal civil rights laws and [place] laws. We do not discriminate on the basis of race, color, national origin, age, disability, sex, sexual orientation, or gender identity.            Thank you!     Thank you for choosing [facility]  for your care. Our goal is always to provide you with excellent care. Hearing back from our patients is one way we can continue to improve our services. Please take a few minutes to complete the written survey that you may receive in the mail after your visit with us. Thank you!             Your Updated Medication List - Protect others around you: Learn how to safely use, store and throw away your medicines at  www.disposemymeds.org.          This list is accurate as of: 11/16/17  8:52 AM.  Always use your most recent med list.                   Brand Name Dispense Instructions for use Diagnosis    ADVIL PO      Take 800 mg by mouth every 4 hours as needed for moderate pain        albuterol 108 (90 BASE) MCG/ACT Inhaler    VENTOLIN HFA    18 g    INHALE TWO PUFFS BY MOUTH EVERY 6 HOURS AS NEEDED FOR SHORTNESS OF BREATH / DYSPNEA AND AS NEEDED BEFORE EXERCISE    Cough due to bronchospasm       beclomethasone 40 MCG/ACT Inhaler    QVAR    3 Inhaler    INHALE 2 PUFFS BY MOUTH INTO THE LUNGS TWO TIMES A DAY    Chronic cough       CALCIUM + D PO      One bid        CHLOR-TRIMETON ALLERGY 12 MG Tbcr   Generic drug:  chlorpheniramine maleate      Take  by mouth as needed.        fluocinonide 0.05 % solution    LIDEX    60 mL    Apply to scalp nightly for up to 1 week, then 2-3 times weekly as needed.    Dermatitis, seborrheic       fluticasone 50 MCG/ACT spray    FLONASE    48 g    USE ONE TO TWO SPRAYS IN EACH NOSTRIL EVERY DAY    Chronic allergic conjunctivitis       lisinopril 5 MG tablet    PRINIVIL/ZESTRIL    30 tablet    Take 1 tablet (5 mg) by mouth daily    Benign essential hypertension       order for DME     1 Units    Equipment being ordered:    left knee brace-medial compartment  for osteoarthritis.    Primary osteoarthritis of left knee       order for DME     1 each    Equipment being ordered: spacer    Cough due to bronchospasm       ranitidine 300 MG tablet    ZANTAC    90 tablet    Take 1 tablet (300 mg) by mouth daily    Gastroesophageal reflux disease without esophagitis       * SENIOR MULTIVITAMIN PLUS PO      One per day        * OPTIVITE PO      once a day        spironolactone 25 MG tablet    ALDACTONE    90 tablet    Take 1 tablet (25 mg) by mouth daily Prn    Meniere's disease, unspecified laterality       * Notice:  This list has 2 medication(s) that are the same as other medications prescribed  for you. Read the directions carefully, and ask your doctor or other care provider to review them with you.

## 2017-11-16 NOTE — PROGRESS NOTES
Injectable Influenza Immunization Documentation    1.  Is the person to be vaccinated sick today?   No    2. Does the person to be vaccinated have an allergy to a component   of the vaccine?   No  Egg Allergy Algorithm Link    3. Has the person to be vaccinated ever had a serious reaction   to influenza vaccine in the past?   No    4. Has the person to be vaccinated ever had Guillain-Barré syndrome?   No    Form completed by Patient         Answers for HPI/ROS submitted by the patient on 11/14/2017   Annual Exam:  Getting at least 3 servings of Calcium per day:: Yes  Bi-annual eye exam:: Yes  Dental care twice a year:: Yes  Sleep apnea or symptoms of sleep apnea:: None  Diet:: Regular (no restrictions)  Frequency of exercise:: 4-5 days/week  Taking medications regularly:: Yes  Medication side effects:: None  Additional concerns today:: YES  PHQ-2 Score: 0  Duration of exercise:: 45-60 minutes

## 2017-11-16 NOTE — PATIENT INSTRUCTIONS
Flu shot today.     Refills sent.     Trial lisinopril . If coughing symptoms arise, contact me to change to an alternative beta blockers. Update me with blood pressure in three to four weeks.     You may use Replens over the counter for vaginal dryness.     Continue exercise and activity monitoring hip pain. Notify me if worsened and require additional treatment.       Preventive Health Recommendations  Female Ages 65 +    Yearly exam:     See your health care provider every year in order to  o Review health changes.   o Discuss preventive care.    o Review your medicines if your doctor has prescribed any.      You no longer need a yearly Pap test unless you've had an abnormal Pap test in the past 10 years. If you have vaginal symptoms, such as bleeding or discharge, be sure to talk with your provider about a Pap test.      Every 1 to 2 years, have a mammogram.  If you are over 69, talk with your health care provider about whether or not you want to continue having screening mammograms.      Every 10 years, have a colonoscopy. Or, have a yearly FIT test (stool test). These exams will check for colon cancer.       Have a cholesterol test every 5 years, or more often if your doctor advises it.       Have a diabetes test (fasting glucose) every three years. If you are at risk for diabetes, you should have this test more often.       At age 65, have a bone density scan (DEXA) to check for osteoporosis (brittle bone disease).    Shots:    Get a flu shot each year.    Get a tetanus shot every 10 years.    Talk to your doctor about your pneumonia vaccines. There are now two you should receive - Pneumovax (PPSV 23) and Prevnar (PCV 13).    Talk to your doctor about the shingles vaccine.    Talk to your doctor about the hepatitis B vaccine.    Nutrition:     Eat at least 5 servings of fruits and vegetables each day.      Eat whole-grain bread, whole-wheat pasta and brown rice instead of white grains and rice.      Talk to  your provider about Calcium and Vitamin D.     Lifestyle    Exercise at least 150 minutes a week (30 minutes a day, 5 days a week). This will help you control your weight and prevent disease.      Limit alcohol to one drink per day.      No smoking.       Wear sunscreen to prevent skin cancer.       See your dentist twice a year for an exam and cleaning.      See your eye doctor every 1 to 2 years to screen for conditions such as glaucoma, macular degeneration, cataracts, etc

## 2017-11-16 NOTE — PROGRESS NOTES
Answers for HPI/ROS submitted by the patient on 11/14/2017   Annual Exam:  Getting at least 3 servings of Calcium per day:: Yes  Bi-annual eye exam:: Yes  Dental care twice a year:: Yes  Sleep apnea or symptoms of sleep apnea:: None  Diet:: Regular (no restrictions)  Frequency of exercise:: 4-5 days/week  Taking medications regularly:: Yes  Medication side effects:: None  Additional concerns today:: YES  PHQ-2 Score: 0  Duration of exercise:: 45-60 minutes    SUBJECTIVE:   Maddie Lala is a 67 year old female who presents for Preventive Visit.    Are you in the first 12 months of your Medicare Part B coverage?  No    Healthy Habits:    Do you get at least three servings of calcium containing foods daily (dairy, green leafy vegetables, etc.)? yes    Amount of exercise or daily activities, outside of work: 5 day(s) per week around 45-50 minutes a time    Problems taking medications regularly No    Medication side effects: No    Have you had an eye exam in the past two years? yes    Do you see a dentist twice per year? yes    Do you have sleep apnea, excessive snoring or daytime drowsiness?no    COGNITIVE SCREEN  1) Repeat 3 items (Banana, Sunrise, Chair)    2) Clock draw: NORMAL  3) 3 item recall: Recalls 3 objects  Results: 3 items recalled: COGNITIVE IMPAIRMENT LESS LIKELY    Mini-CogTM Copyright S Murtaza. Licensed by the author for use in [facility]; reprinted with permission (jakub@.[facility]). All rights reserved.        Reviewed and updated as needed this visit by clinical staffTobacco  Allergies  Meds  Med Hx  Surg Hx  Fam Hx  Soc Hx        Reviewed and updated as needed this visit by Provider  Tobacco  Allergies  Meds  Problems  Med Hx  Surg Hx  Fam Hx  Soc Hx         Social History   Substance Use Topics     Smoking status: Former Smoker     Packs/day: 1.00     Years: 10.00     Types: Cigarettes     Quit date: 1/1/1981     Smokeless tobacco: Never Used     Alcohol use Yes      Comment:  occ - Glass of white wine per night       The patient does not drink >3 drinks per day nor >7 drinks per week.     Today's PHQ-2 Score:   PHQ-2 ( 1999 [employer]) 11/14/2017 11/3/2016   Q1: Little interest or pleasure in doing things 0 0   Q2: Feeling down, depressed or hopeless 0 0   PHQ-2 Score 0 0   Q1: Little interest or pleasure in doing things Not at all -   Q2: Feeling down, depressed or hopeless Not at all -   PHQ-2 Score 0 -         Do you feel safe in your environment - Yes    Do you have a Health Care Directive?: No: Advance care planning reviewed with patient; information given to patient to review.      Current providers sharing in care for this patient include: Patient Care Team:  Radha Hurtado MD as PCP - General (Family Practice)      Hearing impairment: No    Ability to successfully perform activities of daily living: Yes, no assistance needed     Fall risk:  Fallen 2 or more times in the past year?: No  Any fall with injury in the past year?: No      Home safety:  none identified      The following health maintenance items are reviewed in Epic and correct as of today:Health Maintenance   Topic Date Due     INFLUENZA VACCINE (SYSTEM ASSIGNED)  09/01/2017     FALL RISK ASSESSMENT  11/03/2017     ADVANCE DIRECTIVE PLANNING Q5 YRS  11/21/2017     MAMMO SCREEN Q2 YR (SYSTEM ASSIGNED)  01/31/2019     COLONOSCOPY Q5 YR  10/19/2021     LIPID MONITORING Q5 YEARS  11/03/2021     TETANUS IMMUNIZATION (SYSTEM ASSIGNED)  11/06/2022     DEXA SCAN SCREENING (SYSTEM ASSIGNED)  Completed     PNEUMOCOCCAL  Completed     HEPATITIS C SCREENING  Completed     BP Readings from Last 3 Encounters:   11/16/17 134/90   03/30/17 145/89   11/03/16 134/80    Wt Readings from Last 3 Encounters:   11/16/17 75.9 kg (167 lb 4.8 oz)   03/30/17 78.9 kg (174 lb)   11/03/16 78 kg (172 lb)            Hypertension  Blood pressure has been elevated at home - brings in BP readings over the last 6 months.  frequently >140/90. She monitors  her salt intake and diet. She is not taking spirolactone due to negative side effects of irregularities in sodium levels. She denies associated fatigue.     Shortness of Breath  Patient gets frequent shortness of breath upon climbing stairs or climbing up a hill and uses Qvar for relief.  Uses albuterol for rescue. Denies associated pressure, tightness or pain. The shortness of breath feeling is absent while normal walking. She uses albuterol before physical activity    Knee Pain  Patient has prior history of knee pain bilaterally. She is currently wearing knee brace for relief.  Will likely need TKA on the left in future.      Hip Pain  Patient has pain in the hips bilaterally, more pain on the right then left today. Pain is on the lateral aspect on the hip rather on the groin area. Yoga seemed to help minimize discomfort. In the distant past patient had an injection for right hip bursitis. Current pain is not nearly as severe as previous.     Skin - seborrheic dermatitis scalp  Patient has an intermittent itch located in the back of scalp at hairline and feels scaly. She applied fluocinonide twice with recovery and relief. Requesting refill.    Tinnitus - meniere's   Patient has prior history of constant ringing in the ear, this has not changed since. Denies changes in tinnitus while taking  spirolactone.     Seasonal allergies   Patient uses Flonase with relief.     GERD  Patient has intermittent GERD and uses Zantac for relief. Occasional but intermittent dysphagia upon eating chicken. Not with other meats or breads.  Not consistent symptoms.       Genitalia   Vaginal dryness.      Mammogram Screening: Patient over age 50, mutual decision to screen reflected in health maintenance.      ROS:Constitutional, HEENT, cardiovascular, pulmonary, GI, , musculoskeletal, neuro, skin, endocrine and psych systems are negative, except as otherwise noted.    This document serves as a record of the services and decisions  "personally performed and made by Radha Hurtado MD. It was created on her behalf by Victoriano Díaz, a trained medical scribe. The creation of this document is based on the provider's statements to the medical scribe.  Victoriano Díaz 8:21 AM November 16, 2017    OBJECTIVE:   /90  Pulse 66  Temp 98.6  F (37  C) (Oral)  Ht 1.702 m (5' 7\")  Wt 75.9 kg (167 lb 4.8 oz)  SpO2 100%  Breastfeeding? No  BMI 26.2 kg/m2  Body mass index is 26.2 kg/(m^2).  GENERAL: healthy, alert and no distress  EYES: Eyes grossly normal to inspection, PERRL and conjunctivae and sclerae normal  HENT: ear canals and TM's normal, nose and mouth without ulcers or lesions  NECK: no adenopathy, no asymmetry, masses, or scars and thyroid normal to palpation  RESP: lungs clear to auscultation - no rales, rhonchi or wheezes  BREAST: normal without masses, tenderness or nipple discharge and no palpable axillary masses or adenopathy  CV: regular rate and rhythm, normal S1 S2, no S3 or S4, no murmur, click or rub, no peripheral edema and peripheral pulses strong  ABDOMEN: soft, nontender, no hepatosplenomegaly, no masses and bowel sounds normal   (female): normal female external genitalia, normal urethral meatus,Vaginal atrophy and normal cervix/adnexa/uterus without masses or discharge  MS: full range of motion bilateral hips. Mild tenderness over greater trochanters bilaterally. crepitous left knee with full range maintained.  no gross musculoskeletal defects noted, no edema  SKIN: no suspicious lesions or rashes  NEURO: Normal strength and tone, mentation intact and speech normal  PSYCH: mentation appears normal, affect normal/bright    Diagnostic Test Results:  Results for orders placed or performed in visit on 11/16/17 (from the past 24 hour(s))   Hemoglobin   Result Value Ref Range    Hemoglobin 12.6 11.7 - 15.7 g/dL       ASSESSMENT / PLAN:       BMI:   Estimated body mass index is 26.2 kg/(m^2) as calculated from the " "following:    Height as of this encounter: 1.702 m (5' 7\").    Weight as of this encounter: 75.9 kg (167 lb 4.8 oz).   Weight management plan: Discussed healthy diet and exercise guidelines and patient will follow up in 6 months in clinic to re-evaluate.        1. Routine general medical examination at a health care facility  Labs done today. I will notify patient of results.   - TSH with free T4 reflex  - Lipid panel reflex to direct LDL Fasting  - Hemoglobin  - Albumin Random Urine Quantitative with Creat Ratio  - Comprehensive metabolic panel (BMP + Alb, Alk Phos, ALT, AST, Total. Bili, TP)    2. Cough due to bronchospasm  Patient uses Albuterol before engaging in physical activity with residual shortness of breath upon completion activity.   - albuterol (VENTOLIN HFA) 108 (90 BASE) MCG/ACT Inhaler; INHALE TWO PUFFS BY MOUTH EVERY 6 HOURS AS NEEDED FOR SHORTNESS OF BREATH / DYSPNEA AND AS NEEDED BEFORE EXERCISE  Dispense: 18 g; Refill: 11  Continue QVar preventatively.     3. Dermatitis, seborrheic  Patient has intermittent itch on the scalp. Patient applies Lidex for relief and recovery.    - fluocinonide (LIDEX) 0.05 % solution; Apply to scalp nightly for up to 1 week, then 2-3 times weekly as needed.  Dispense: 60 mL; Refill: 3    4. Chronic allergic conjunctivitis  - fluticasone (FLONASE) 50 MCG/ACT spray; USE ONE TO TWO SPRAYS IN EACH NOSTRIL EVERY DAY  Dispense: 48 g; Refill: 3    5. Chronic cough  Patient uses Qvar for relief.   - beclomethasone (QVAR) 40 MCG/ACT Inhaler; INHALE 2 PUFFS BY MOUTH INTO THE LUNGS TWO TIMES A DAY  Dispense: 3 Inhaler; Refill: 3    6. Benign essential hypertension - new diagnosis  Patient will trial Lisinopril. She will contact me if contact symptoms arise. Patient will also contact me  with blood pressure results as she monitors it at home.   - lisinopril (PRINIVIL/ZESTRIL) 5 MG tablet; Take 1 tablet (5 mg) by mouth daily  Dispense: 30 tablet; Refill: 1    7. Meniere's " "disease, unspecified laterality  Controlled/chronic symptoms.    8. Hip pain, bilateral - exam consistent with muscular cause.  Pain is controlled. Patient has pain in the hips bilaterally, more pain on the right then left today. Yoga seemed to help minimize discomfort. In the distant past patient had an injection for right hip bursitis. Monitor symptoms and follow up if symptoms worsen.  Stretching exercises.      9. Need for prophylactic vaccination and inoculation against influenza  - ADMIN INFLUENZA (For MEDICARE Patients ONLY) []  - FLU VACCINE, INCREASED ANTIGEN, PRESV FREE, AGE 65+ [64614]    10.  Atrophic vaginitis - trial replens.        End of Life Planning:  Patient currently has an advanced directive: No.  I have verified the patient's ablity to prepare an advanced directive/make health care decisions.  Literature was provided to assist patient in preparing an advanced directive.    COUNSELING:  Reviewed preventive health counseling, as reflected in patient instructions    BP Screening:   Last 3 BP Readings:    BP Readings from Last 3 Encounters:   11/16/17 142/88   03/30/17 145/89   11/03/16 134/80       The following was recommended to the patient:  Anti-hypertensive phramacology therapy    Estimated body mass index is 26.07 kg/(m^2) as calculated from the following:    Height as of 3/30/17: 1.74 m (5' 8.5\").    Weight as of 3/30/17: 78.9 kg (174 lb).     reports that she quit smoking about 36 years ago. Her smoking use included Cigarettes. She has a 10.00 pack-year smoking history. She has never used smokeless tobacco.        Appropriate preventive services were discussed with this patient, including applicable screening as appropriate for cardiovascular disease, diabetes, osteopenia/osteoporosis, and glaucoma.  As appropriate for age/gender, discussed screening for colorectal cancer, prostate cancer, breast cancer, and cervical cancer. Checklist reviewing preventive services available has been " given to the patient.    Reviewed patients plan of care and provided an AVS. The Basic Care Plan (routine screening as documented in Health Maintenance) for Maddie meets the Care Plan requirement. This Care Plan has been established and reviewed with the Patient.    Patient Instructions   Flu shot today.     Refills sent.     Trial lisinopril . If coughing symptoms arise, contact me to change to an alternative beta blockers. Update me with blood pressure in three to four weeks.     You may use Replens over the counter for vaginal dryness.     Continue exercise and activity monitoring hip pain. Notify me if worsened and require additional treatment.       Preventive Health Recommendations  Female Ages 65 +    Yearly exam:     See your health care provider every year in order to  o Review health changes.   o Discuss preventive care.    o Review your medicines if your doctor has prescribed any.      You no longer need a yearly Pap test unless you've had an abnormal Pap test in the past 10 years. If you have vaginal symptoms, such as bleeding or discharge, be sure to talk with your provider about a Pap test.      Every 1 to 2 years, have a mammogram.  If you are over 69, talk with your health care provider about whether or not you want to continue having screening mammograms.      Every 10 years, have a colonoscopy. Or, have a yearly FIT test (stool test). These exams will check for colon cancer.       Have a cholesterol test every 5 years, or more often if your doctor advises it.       Have a diabetes test (fasting glucose) every three years. If you are at risk for diabetes, you should have this test more often.       At age 65, have a bone density scan (DEXA) to check for osteoporosis (brittle bone disease).    Shots:    Get a flu shot each year.    Get a tetanus shot every 10 years.    Talk to your doctor about your pneumonia vaccines. There are now two you should receive - Pneumovax (PPSV 23) and Prevnar (PCV  13).    Talk to your doctor about the shingles vaccine.    Talk to your doctor about the hepatitis B vaccine.    Nutrition:     Eat at least 5 servings of fruits and vegetables each day.      Eat whole-grain bread, whole-wheat pasta and brown rice instead of white grains and rice.      Talk to your provider about Calcium and Vitamin D.     Lifestyle    Exercise at least 150 minutes a week (30 minutes a day, 5 days a week). This will help you control your weight and prevent disease.      Limit alcohol to one drink per day.      No smoking.       Wear sunscreen to prevent skin cancer.       See your dentist twice a year for an exam and cleaning.      See your eye doctor every 1 to 2 years to screen for conditions such as glaucoma, macular degeneration, cataracts, etc       Counseling Resources:  ATP IV Guidelines  Pooled Cohorts Equation Calculator  Breast Cancer Risk Calculator  FRAX Risk Assessment  ICSI Preventive Guidelines  Dietary Guidelines for Americans, 2010  USDA's MyPlate  ASA Prophylaxis  Lung CA Screening    The information in this document, created by the medical scribe for me, accurately reflects the services I personally performed and the decisions made by me. I have reviewed and approved this document for accuracy prior to leaving the patient care area.  November 16, 2017 4:58 PM    Radha Hurtado MD  [facility]

## 2017-11-17 NOTE — PROGRESS NOTES
Your urine testing is normal.  Your thyroid testing is normal.   Cholesterol panel is normal as well.  Blood sugar, kidney and liver testing are all normal.  Your hemoglobin is normal.  It was a pleasure seeing you in the office yesterday.  Have a nice Thanksgiving.  Please call or MyChart message me if you have any questions.    PSK

## 2017-12-06 PROBLEM — M25.562 LEFT KNEE PAIN, UNSPECIFIED CHRONICITY: Status: RESOLVED | Noted: 2017-04-04 | Resolved: 2017-12-06

## 2017-12-06 NOTE — PROGRESS NOTES
Subjective:    HPI                    Objective:    System    Physical Exam    General     ROS    Assessment/Plan:      DISCHARGE REPORT    Progress reporting period is from 4-4-17 to 5-13-17.     SUBJECTIVE  Subjective: Patient notes that she was able to walk about for 10-11 hours at the [facility] without knee pain.  Her buttock/hips became tired but no knee pain.  HEP going well.  Does not feel that the pressup exercises are helping much for the R buttock symptoms.  Sitting bothers R hip pain.   Has good days/time of days when stairs feel pretty good.     Current Pain level: 1/10   Initial Pain level: 4/10   Changes in function: Yes, see goal flow sheet for change in function   Adverse reactions: None;   ,     The subjective and objective information are from the last SOAP note on this patient.    OBJECTIVE  Objective: good control with side stepping and hip abd.  Cues needed for prone glute med.   No lasting pain with knee bends nor power tower today      ASSESSMENT/PLAN  Updated problem list and treatment plan: Diagnosis 1:  R Knee pain   STG/LTGs have been met or progress has been made towards goals:  Yes (See Goal flow sheet completed today.)  Assessment of Progress: The patient has not returned to therapy. Current status is unknown.  Self Management Plans:  Patient has been instructed in a home treatment program.  Patient  has been instructed in self management of symptoms.  Maddie continues to require the following intervention to meet STG and LTG's: PT intervention is no longer required to meet STG/LTG.  The patient failed to complete scheduled/ordered appointments so current information is unknown.  We will discharge this patient from PT.    Recommendations:  This patient will be discharged from therapy and continue their home treatment program.    Please refer to the daily flowsheet for treatment today, total treatment time and time spent performing 1:1 timed codes.

## 2017-12-13 ENCOUNTER — MYC MEDICAL ADVICE (OUTPATIENT)
Dept: FAMILY MEDICINE | Facility: CLINIC | Age: 67
End: 2017-12-13

## 2017-12-13 DIAGNOSIS — I10 BENIGN ESSENTIAL HYPERTENSION: ICD-10-CM

## 2017-12-13 RX ORDER — LISINOPRIL 5 MG/1
5 TABLET ORAL DAILY
Qty: 90 TABLET | Refills: 1 | Status: SHIPPED | OUTPATIENT
Start: 2017-12-13 | End: 2018-04-16 | Stop reason: DRUGHIGH

## 2017-12-13 NOTE — TELEPHONE ENCOUNTER
Provider to review the BP readings and advise on the refill.    Lizet Mejía RN, [facility], [place]

## 2018-02-27 NOTE — TELEPHONE ENCOUNTER
APPT INFO    Date /Time: 3/20/18 at 9AM   Reason for Appt: Hearing loss   Ref Provider/Clinic: Self   Are there internal records? Yes/No?  IF YES, list clinic names: [facility]   Are there outside records? Yes/No? No - per appt notes - pt has no outside records   Patient Contact (Y/N) & Call Details: No   Action: Chart reviewed

## 2018-03-19 DIAGNOSIS — H90.8 MIXED HEARING LOSS: Primary | ICD-10-CM

## 2018-03-20 ENCOUNTER — OFFICE VISIT (OUTPATIENT)
Dept: AUDIOLOGY | Facility: CLINIC | Age: 68
End: 2018-03-20
Payer: COMMERCIAL

## 2018-03-20 ENCOUNTER — OFFICE VISIT (OUTPATIENT)
Dept: OTOLARYNGOLOGY | Facility: CLINIC | Age: 68
End: 2018-03-20
Payer: COMMERCIAL

## 2018-03-20 ENCOUNTER — PRE VISIT (OUTPATIENT)
Dept: OTOLARYNGOLOGY | Facility: CLINIC | Age: 68
End: 2018-03-20

## 2018-03-20 DIAGNOSIS — H61.23 EXCESSIVE CERUMEN IN BOTH EAR CANALS: ICD-10-CM

## 2018-03-20 DIAGNOSIS — H90.3 SENSORINEURAL HEARING LOSS, BILATERAL: Primary | ICD-10-CM

## 2018-03-20 DIAGNOSIS — H90.3 BILATERAL SENSORINEURAL HEARING LOSS: Primary | ICD-10-CM

## 2018-03-20 ASSESSMENT — PAIN SCALES - GENERAL: PAINLEVEL: NO PAIN (0)

## 2018-03-20 NOTE — PROGRESS NOTES
AUDIOLOGY REPORT    SUMMARY: Audiology visit completed. See audiogram for results.      RECOMMENDATIONS: Follow-up with ENT.    Ernst Hirsch, [facility]  Licensed Audiologist  [place] License #[id number]

## 2018-03-20 NOTE — PROGRESS NOTES
Dear Radha Salmeron:    I had the pleasure of meeting Maddie Lala in consultation today at the [facility] at your request.    HISTORY OF PRESENT ILLNESS: Patient is a 68-year-old in today for assessment of her years in hearing. She feels her hearing is symmetrical, questions whether there is some loss are not as they've noticed problems around home. She does have bilateral tinnitus. She's been diagnosed with Ménière's in the past, in 2002 and has not had any problems since then. She was on a diuretic for a short time. There is no significant noise exposure. She further denies any dysphagia, hoarseness, facial paresthesias.    ALLERGIES:    Allergies   Allergen Reactions     Sulfa Drugs Rash       HABITS:   Alcohol use Yes   Comment: occ - Glass of white wine per night    History   Smoking Status     Former Smoker     Packs/day: 1.00     Years: 10.00     Types: Cigarettes     Quit date: 1/1/1981   Smokeless Tobacco     Never Used         PAST MEDICAL HISTORY: Please see today's intake form (for the remainder of the PMH) which I reviewed and signed.  Past Medical History:   Diagnosis Date     Allergic rhinitis 1979     Arthritis      Cervical dysplasia with cone in 1979    nl pap ever since      Dyspareunia      Hearing problem 2015     Meniere's disease 2002     Nonsenile cataract      Plantar fasciitis      Stress incontinence      SVT (supraventricular tachycardia) (H)     resolved     Tinnitus 2002       FAMILY HISTORY/SOCIAL HISTORY:   Family History   Problem Relation Age of Onset     HEART DISEASE Mother      older      OSTEOPOROSIS Mother      C.A.D. Mother      60's      Macular Degeneration Mother      Respiratory Father      farmers lung     Alzheimer Disease Maternal Grandmother      HEART DISEASE Maternal Grandfather      HEART DISEASE Brother      Afib     HEART DISEASE Brother      Asthma No family hx of      DIABETES No family hx of      Hypertension No family  hx of      Breast Cancer No family hx of      Cancer - colorectal No family hx of      CANCER No family hx of      Thyroid Disease No family hx of      Glaucoma No family hx of      Colon Cancer No family hx of     Social History     Social History     Marital status:      Spouse name: N/A     Number of children: N/A     Years of education: N/A     Occupational History     Not on file.     Social History Main Topics     Smoking status: Former Smoker     Packs/day: 1.00     Years: 10.00     Types: Cigarettes     Quit date: 1/1/1981     Smokeless tobacco: Never Used     Alcohol use Yes      Comment: occ - Glass of white wine per night     Drug use: No     Sexual activity: Not Currently     Partners: Male     Birth control/ protection: Post-menopausal     Other Topics Concern      Service No     Blood Transfusions No     Caffeine Concern No     Occupational Exposure Yes     Hobby Hazards No     Sleep Concern No     Stress Concern No     Weight Concern Yes     Special Diet No     Back Care Yes     PT     Exercise Yes     Bike Helmet No     Seat Belt Yes     Self-Exams Yes     Parent/Sibling W/ Cabg, Mi Or Angioplasty Before 65f 55m? No     Social History Narrative       REVIEW OF SYSTEMS: Please see today's intake form (for the remainder of the ROS) which I have reviewed and signed.    PHYSICIAL EXAMINATION:  Constitutional: The patient was well-groomed and in no acute distress.   Skin: Warm and pink.  Psychiatric: The patient's affect was calm, cooperative, and appropriate.   Respiratory: Breathing comfortably without stridor or exertion of accessory muscles.  Eyes: Pupils were equal and reactive. Extraocular movement intact.   Head: Normocephalic and atraumatic. No lesions or scars.  Ears: Both ears examined with a microscope for microscopic assessment and cleaning of cerumen. Right side was cleaned with curettes. Following cleaning, came and middle ear looked normal. The opposite ear was cleaned and  examined using a microscope, curet, and similar techniques. TM and middle ear looked normal after cleaning.  Nose: Sinuses were nontender. Anterior rhinoscopy revealed midline septum and absence of purulence or polyps.  Oral Cavity: Normal tongue, floor of moth, buccal mucosa, and palate. No lesions or masses on inspection or palpation. No abnormal lymph tissue in the oropharynx.   Neck: The parotid is soft without masses. Supple with normal laryngeal and tracheal landmarks.   Lymphatic: There is no palpable lymphadenopathy or other masses in the neck.   Neurologic: Alert and oriented x 3. Cranial nerves III-XI within normal limits. Voice quality normal.  Cerebellar Function Tests:  Grossly normal    Audiogram:  Audiogram performed shows a mild high frequency sensorineural hearing loss above 2000 Hz. It is symmetrical. Maintains excellent discrimination at 100% at 96%.    IMPRESSION AND PLAN: Discussed the sensorineural hearing loss she has, as well as her normal exam today. We discussed protecting the ears from noise. Potential for hearing aids if she's having difficulty but she feels at this time we will just watch and monitor. Recommend a return for follow-up in 2 years, sooner if any change or problems.    Thank you very much for the opportunity to participate in the care of your patient.    Rick L Nissen MD

## 2018-03-20 NOTE — MR AVS SNAPSHOT
After Visit Summary   3/20/2018    Maddie Lala    MRN: [id number]           Patient Information     Date Of Birth          1950        Visit Information        Provider Department      3/20/2018 8:00 AM Susy Pepe AuD M [facility] Audiology        Today's Diagnoses     Sensorineural hearing loss, bilateral    -  1       Follow-ups after your visit        Your next 10 appointments already scheduled     Mar 22, 2018  8:00 AM CDT   Adeel Short with Radha Hurtado MD   [facility] ([facility])    [address] [address] [phone]              Who to contact     Please call your clinic at [phone] to:    Ask questions about your health    Make or cancel appointments    Discuss your medicines    Learn about your test results    Speak to your doctor            Additional Information About Your Visit        MyChart Information     [employer] gives you secure access to your electronic health record. If you see a primary care provider, you can also send messages to your care team and make appointments. If you have questions, please call your primary care clinic.  If you do not have a primary care provider, please call [phone] and they will assist you.      [employer] is an electronic gateway that provides easy, online access to your medical records. With [employer], you can request a clinic appointment, read your test results, renew a prescription or communicate with your care team.     To access your existing account, please contact your [facility] or call [phone] for assistance.        Care EveryWhere ID     This is your Care EveryWhere ID. This could be used by other organizations to access your [place] medical records  [id number]         Blood Pressure from Last 3 Encounters:   11/16/17 142/88   03/30/17 145/89   11/03/16 134/80    Weight from Last 3 Encounters:   11/16/17 75.9 kg (167 lb 4.8  oz)   03/30/17 78.9 kg (174 lb)   11/03/16 78 kg (172 lb)              We Performed the Following     AUDIOGRAM/TYMPANOGRAM - INTERFACE     [facility] Eval [facility] Speech Recog (42084)     Tymps / Reflex   ([id number])        Primary Care Provider Office Phone # Fax #    Radha Hurtado [phone] [address]  [place] [address]        Equal Access to Services     [facility]: Hadii aad ku hadasho Soomaali, waaxda luqadaha, qaybta kaalmada adeegyada, waxay idiin hayaan adeeg kharash la'aan . So [facility] [phone].    ATENCIÓN: Si habla español, tiene a mayo disposición servicios gratuitos de asistencia lingüística. [facility] [phone].    We comply with applicable federal civil rights laws and [place] laws. We do not discriminate on the basis of race, color, national origin, age, disability, sex, sexual orientation, or gender identity.            Thank you!     Thank you for choosing [facility] AUDIOLOGY  for your care. Our goal is always to provide you with excellent care. Hearing back from our patients is one way we can continue to improve our services. Please take a few minutes to complete the written survey that you may receive in the mail after your visit with us. Thank you!             Your Updated Medication List - Protect others around you: Learn how to safely use, store and throw away your medicines at www.disposemymeds.org.          This list is accurate as of 3/20/18  3:02 PM.  Always use your most recent med list.                   Brand Name Dispense Instructions for use Diagnosis    ADVIL PO      Take 800 mg by mouth every 4 hours as needed for moderate pain        albuterol 108 (90 BASE) MCG/ACT Inhaler    VENTOLIN HFA    18 g    INHALE TWO PUFFS BY MOUTH EVERY 6 HOURS AS NEEDED FOR SHORTNESS OF BREATH / DYSPNEA AND AS NEEDED BEFORE EXERCISE    Cough due to bronchospasm       beclomethasone 40 MCG/ACT Inhaler    QVAR    3 Inhaler    INHALE 2 PUFFS BY MOUTH INTO  THE LUNGS TWO TIMES A DAY    Chronic cough       CALCIUM + D PO      One bid        CHLOR-TRIMETON ALLERGY 12 MG Tbcr   Generic drug:  chlorpheniramine maleate      Take  by mouth as needed.        fluocinonide 0.05 % solution    LIDEX    60 mL    Apply to scalp nightly for up to 1 week, then 2-3 times weekly as needed.    Dermatitis, seborrheic       fluticasone 50 MCG/ACT spray    FLONASE    48 g    USE ONE TO TWO SPRAYS IN EACH NOSTRIL EVERY DAY    Chronic allergic conjunctivitis       lisinopril 5 MG tablet    PRINIVIL/ZESTRIL    90 tablet    Take 1 tablet (5 mg) by mouth daily    Benign essential hypertension       order for DME     1 Units    Equipment being ordered:    left knee brace-medial compartment  for osteoarthritis.    Primary osteoarthritis of left knee       order for DME     1 each    Equipment being ordered: spacer    Cough due to bronchospasm       ranitidine 300 MG tablet    ZANTAC    90 tablet    Take 1 tablet (300 mg) by mouth daily    Gastroesophageal reflux disease without esophagitis       * SENIOR MULTIVITAMIN PLUS PO      One per day        * OPTIVITE PO      once a day        * Notice:  This list has 2 medication(s) that are the same as other medications prescribed for you. Read the directions carefully, and ask your doctor or other care provider to review them with you.

## 2018-03-20 NOTE — LETTER
3/20/2018       RE: Maddie Lala  [address]  [place] [address]     Dear Colleague,    Thank you for referring your patient, Maddie Lala, to the [facility] EAR NOSE AND THROAT at [facility]. Please see a copy of my visit note below.    Dear Radha Salmeron:    I had the pleasure of meeting Maddie Lala in consultation today at the [facility] at your request.    HISTORY OF PRESENT ILLNESS: Patient is a 68-year-old in today for assessment of her years in hearing. She feels her hearing is symmetrical, questions whether there is some loss are not as they've noticed problems around home. She does have bilateral tinnitus. She's been diagnosed with Ménière's in the past, in 2002 and has not had any problems since then. She was on a diuretic for a short time. There is no significant noise exposure. She further denies any dysphagia, hoarseness, facial paresthesias.    ALLERGIES:    Allergies   Allergen Reactions     Sulfa Drugs Rash       HABITS:   Alcohol use Yes   Comment: occ - Glass of white wine per night    History   Smoking Status     Former Smoker     Packs/day: 1.00     Years: 10.00     Types: Cigarettes     Quit date: 1/1/1981   Smokeless Tobacco     Never Used         PAST MEDICAL HISTORY: Please see today's intake form (for the remainder of the PMH) which I reviewed and signed.  Past Medical History:   Diagnosis Date     Allergic rhinitis 1979     Arthritis      Cervical dysplasia with cone in 1979    nl pap ever since      Dyspareunia      Hearing problem 2015     Meniere's disease 2002     Nonsenile cataract      Plantar fasciitis      Stress incontinence      SVT (supraventricular tachycardia) (H)     resolved     Tinnitus 2002       FAMILY HISTORY/SOCIAL HISTORY:   Family History   Problem Relation Age of Onset     HEART DISEASE Mother      older      OSTEOPOROSIS Mother      C.A.D. Mother      60's      Macular  Degeneration Mother      Respiratory Father      farmers lung     Alzheimer Disease Maternal Grandmother      HEART DISEASE Maternal Grandfather      HEART DISEASE Brother      Afib     HEART DISEASE Brother      Asthma No family hx of      DIABETES No family hx of      Hypertension No family hx of      Breast Cancer No family hx of      Cancer - colorectal No family hx of      CANCER No family hx of      Thyroid Disease No family hx of      Glaucoma No family hx of      Colon Cancer No family hx of     Social History     Social History     Marital status:      Spouse name: N/A     Number of children: N/A     Years of education: N/A     Occupational History     Not on file.     Social History Main Topics     Smoking status: Former Smoker     Packs/day: 1.00     Years: 10.00     Types: Cigarettes     Quit date: 1/1/1981     Smokeless tobacco: Never Used     Alcohol use Yes      Comment: occ - Glass of white wine per night     Drug use: No     Sexual activity: Not Currently     Partners: Male     Birth control/ protection: Post-menopausal     Other Topics Concern      Service No     Blood Transfusions No     Caffeine Concern No     Occupational Exposure Yes     Hobby Hazards No     Sleep Concern No     Stress Concern No     Weight Concern Yes     Special Diet No     Back Care Yes     PT     Exercise Yes     Bike Helmet No     Seat Belt Yes     Self-Exams Yes     Parent/Sibling W/ Cabg, Mi Or Angioplasty Before 65f 55m? No     Social History Narrative       REVIEW OF SYSTEMS: Please see today's intake form (for the remainder of the ROS) which I have reviewed and signed.    PHYSICIAL EXAMINATION:  Constitutional: The patient was well-groomed and in no acute distress.   Skin: Warm and pink.  Psychiatric: The patient's affect was calm, cooperative, and appropriate.   Respiratory: Breathing comfortably without stridor or exertion of accessory muscles.  Eyes: Pupils were equal and reactive. Extraocular  movement intact.   Head: Normocephalic and atraumatic. No lesions or scars.  Ears: Both ears examined with a microscope for microscopic assessment and cleaning of cerumen. Right side was cleaned with curettes. Following cleaning, came and middle ear looked normal. The opposite ear was cleaned and examined using a microscope, curet, and similar techniques. TM and middle ear looked normal after cleaning.  Nose: Sinuses were nontender. Anterior rhinoscopy revealed midline septum and absence of purulence or polyps.  Oral Cavity: Normal tongue, floor of moth, buccal mucosa, and palate. No lesions or masses on inspection or palpation. No abnormal lymph tissue in the oropharynx.   Neck: The parotid is soft without masses. Supple with normal laryngeal and tracheal landmarks.   Lymphatic: There is no palpable lymphadenopathy or other masses in the neck.   Neurologic: Alert and oriented x 3. Cranial nerves III-XI within normal limits. Voice quality normal.  Cerebellar Function Tests:  Grossly normal    Audiogram:  Audiogram performed shows a mild high frequency sensorineural hearing loss above 2000 Hz. It is symmetrical. Maintains excellent discrimination at 100% at 96%.    IMPRESSION AND PLAN: Discussed the sensorineural hearing loss she has, as well as her normal exam today. We discussed protecting the ears from noise. Potential for hearing aids if she's having difficulty but she feels at this time we will just watch and monitor. Recommend a return for follow-up in 2 years, sooner if any change or problems.    Thank you very much for the opportunity to participate in the care of your patient.    Rick L Nissen MD

## 2018-03-20 NOTE — MR AVS SNAPSHOT
After Visit Summary   3/20/2018    Maddie Lala    MRN: [id number]           Patient Information     Date Of Birth          1950        Visit Information        Provider Department      3/20/2018 9:00 AM Nissen, Rick L, MD [facility] Th[facility]        Today's Diagnoses     Bilateral sensorineural hearing loss    -  1    Excessive cerumen in both ear canals           Follow-ups after your visit        Your next 10 appointments already scheduled     Mar 22, 2018  8:00 AM CDT   Adeel Short with Radha Hurtado MD   [facility] ([facility])    [address] [facility] [address] [phone]              Who to contact     Please call your clinic at [phone] to:    Ask questions about your health    Make or cancel appointments    Discuss your medicines    Learn about your test results    Speak to your doctor            Additional Information About Your Visit        MyChart Information     [employer] gives you secure access to your electronic health record. If you see a primary care provider, you can also send messages to your care team and make appointments. If you have questions, please call your primary care clinic.  If you do not have a primary care provider, please call [phone] and they will assist you.      [employer] is an electronic gateway that provides easy, online access to your medical records. With [employer], you can request a clinic appointment, read your test results, renew a prescription or communicate with your care team.     To access your existing account, please contact your [facility] or call [phone] for assistance.        Care EveryWhere ID     This is your Care EveryWhere ID. This could be used by other organizations to access your [place] medical records  [id number]         Blood Pressure from Last 3 Encounters:   11/16/17 142/88   03/30/17 145/89   11/03/16 134/80    Weight from  Last 3 Encounters:   11/16/17 75.9 kg (167 lb 4.8 oz)   03/30/17 78.9 kg (174 lb)   11/03/16 78 kg (172 lb)              We Performed the Following     BINOCULAR MICROSCOPY        Primary Care Provider Office Phone # Fax #    Radha Hurtado [phone] [address]  [place] [address]        Equal Access to Services     [facility]: Hadii aad ku hadasho Soomaali, waaxda luqadaha, qaybta kaalmada adeegyada, waxay idiin hayaan adeeg khsallysh la'aan . So [facility] [phone].    ATENCIÓN: Si dannyla espsheeba, tiene a mayo disposición servicios gratuitos de asistencia lingüística. Llame al [phone].    We comply with applicable federal civil rights laws and [place] laws. We do not discriminate on the basis of race, color, national origin, age, disability, sex, sexual orientation, or gender identity.            Thank you!     Thank you for choosing [facility] EAR NOSE AND THROAT  for your care. Our goal is always to provide you with excellent care. Hearing back from our patients is one way we can continue to improve our services. Please take a few minutes to complete the written survey that you may receive in the mail after your visit with us. Thank you!             Your Updated Medication List - Protect others around you: Learn how to safely use, store and throw away your medicines at www.disposemymeds.org.          This list is accurate as of 3/20/18 12:38 PM.  Always use your most recent med list.                   Brand Name Dispense Instructions for use Diagnosis    ADVIL PO      Take 800 mg by mouth every 4 hours as needed for moderate pain        albuterol 108 (90 BASE) MCG/ACT Inhaler    VENTOLIN HFA    18 g    INHALE TWO PUFFS BY MOUTH EVERY 6 HOURS AS NEEDED FOR SHORTNESS OF BREATH / DYSPNEA AND AS NEEDED BEFORE EXERCISE    Cough due to bronchospasm       beclomethasone 40 MCG/ACT Inhaler    QVAR    3 Inhaler    INHALE 2 PUFFS BY MOUTH INTO THE LUNGS TWO TIMES A DAY    Chronic  cough       CALCIUM + D PO      One bid        CHLOR-TRIMETON ALLERGY 12 MG Tbcr   Generic drug:  chlorpheniramine maleate      Take  by mouth as needed.        fluocinonide 0.05 % solution    LIDEX    60 mL    Apply to scalp nightly for up to 1 week, then 2-3 times weekly as needed.    Dermatitis, seborrheic       fluticasone 50 MCG/ACT spray    FLONASE    48 g    USE ONE TO TWO SPRAYS IN EACH NOSTRIL EVERY DAY    Chronic allergic conjunctivitis       lisinopril 5 MG tablet    PRINIVIL/ZESTRIL    90 tablet    Take 1 tablet (5 mg) by mouth daily    Benign essential hypertension       order for DME     1 Units    Equipment being ordered:    left knee brace-medial compartment  for osteoarthritis.    Primary osteoarthritis of left knee       order for DME     1 each    Equipment being ordered: spacer    Cough due to bronchospasm       ranitidine 300 MG tablet    ZANTAC    90 tablet    Take 1 tablet (300 mg) by mouth daily    Gastroesophageal reflux disease without esophagitis       * SENIOR MULTIVITAMIN PLUS PO      One per day        * OPTIVITE PO      once a day        * Notice:  This list has 2 medication(s) that are the same as other medications prescribed for you. Read the directions carefully, and ask your doctor or other care provider to review them with you.

## 2018-03-20 NOTE — NURSING NOTE
Chief Complaint   Patient presents with     Consult     hearing loss. Declined height and weight.       Ankush Pettit LPN

## 2018-03-22 ENCOUNTER — OFFICE VISIT (OUTPATIENT)
Dept: FAMILY MEDICINE | Facility: CLINIC | Age: 68
End: 2018-03-22
Payer: COMMERCIAL

## 2018-03-22 VITALS
OXYGEN SATURATION: 98 % | HEART RATE: 75 BPM | DIASTOLIC BLOOD PRESSURE: 74 MMHG | SYSTOLIC BLOOD PRESSURE: 130 MMHG | TEMPERATURE: 97.6 F

## 2018-03-22 DIAGNOSIS — M18.12 DEGENERATIVE ARTHRITIS OF THUMB, LEFT: Primary | ICD-10-CM

## 2018-03-22 DIAGNOSIS — L98.9 SKIN LESION: ICD-10-CM

## 2018-03-22 DIAGNOSIS — I10 BENIGN ESSENTIAL HYPERTENSION: ICD-10-CM

## 2018-03-22 PROCEDURE — 99214 OFFICE O/P EST MOD 30 MIN: CPT | Performed by: FAMILY MEDICINE

## 2018-03-22 NOTE — MR AVS SNAPSHOT
After Visit Summary   3/22/2018    Maddie Lala    MRN: [id number]           Patient Information     Date Of Birth          1950        Visit Information        Provider Department      3/22/2018 8:00 AM Radha Hurtado MD [facility]        Today's Diagnoses     Skin lesion    -  1    Degenerative arthritis of thumb, left          Care Instructions    Continue with thumb brace for support. Consider Physical Therapy or hand specialist referral if hand symptoms are not controlled or worsen.     Stress ball is recommended to tone muscles in the hand     Referral for dermatology has been made.  You can call [phone] to schedule this at the [facility] in [place] (formerly the [facility]).             Follow-ups after your visit        Additional Services     DERMATOLOGY REFERRAL       Your provider has referred you to: [facility]: [facility] [phone]   [email or web address]    Please be aware that coverage of these services is subject to the terms and limitations of your health insurance plan.  Call member services at your health plan with any benefit or coverage questions.      Please bring the following with you to your appointment:    (1) Any X-Rays, CTs or MRIs which have been performed.  Contact the facility where they were done to arrange for  prior to your scheduled appointment.    (2) List of current medications  (3) This referral request   (4) Any documents/labs given to you for this referral                  Who to contact     If you have questions or need follow up information about today's clinic visit or your schedule please contact [facility] directly at [phone].  Normal or non-critical lab and imaging results will be communicated to you by MyChart, letter or phone within 4 business days after the clinic has received  the results. If you do not hear from us within 7 days, please contact the clinic through [employer] or phone. If you have a critical or abnormal lab result, we will notify you by phone as soon as possible.  Submit refill requests through [employer] or call your pharmacy and they will forward the refill request to us. Please allow 3 business days for your refill to be completed.          Additional Information About Your Visit        [employer] Information     [employer] gives you secure access to your electronic health record. If you see a primary care provider, you can also send messages to your care team and make appointments. If you have questions, please call your primary care clinic.  If you do not have a primary care provider, please call [phone] and they will assist you.        Care EveryWhere ID     This is your Care EveryWhere ID. This could be used by other organizations to access your [place] medical records  [id number]        Your Vitals Were     Pulse Temperature Pulse Oximetry             75 97.6  F (36.4  C) (Oral) 98%          Blood Pressure from Last 3 Encounters:   03/22/18 130/74   11/16/17 142/88   03/30/17 145/89    Weight from Last 3 Encounters:   11/16/17 75.9 kg (167 lb 4.8 oz)   03/30/17 78.9 kg (174 lb)   11/03/16 78 kg (172 lb)              We Performed the Following     DERMATOLOGY REFERRAL        Primary Care Provider Office Phone # Fax #    Radha Hurtado [phone] [address]  [place] [address]        Equal Access to Services     [facility] : Hadii aad ku hadasho Soomaali, waaxda luqadaha, qaybta kaalmada adeegyada, nereyda talbert . So [facility] [phone].    ATENCIÓN: Si habla español, tiene a mayo disposición servicios gratuitos de asistencia lingüística. Llame al [phone].    We comply with applicable federal civil rights laws and [place] laws. We do not discriminate on the basis of race, color, national origin, age,  disability, sex, sexual orientation, or gender identity.            Thank you!     Thank you for choosing [facility]  for your care. Our goal is always to provide you with excellent care. Hearing back from our patients is one way we can continue to improve our services. Please take a few minutes to complete the written survey that you may receive in the mail after your visit with us. Thank you!             Your Updated Medication List - Protect others around you: Learn how to safely use, store and throw away your medicines at www.disposemymeds.org.          This list is accurate as of 3/22/18  8:29 AM.  Always use your most recent med list.                   Brand Name Dispense Instructions for use Diagnosis    ADVIL PO      Take 800 mg by mouth every 4 hours as needed for moderate pain        albuterol 108 (90 BASE) MCG/ACT Inhaler    VENTOLIN HFA    18 g    INHALE TWO PUFFS BY MOUTH EVERY 6 HOURS AS NEEDED FOR SHORTNESS OF BREATH / DYSPNEA AND AS NEEDED BEFORE EXERCISE    Cough due to bronchospasm       beclomethasone 40 MCG/ACT Inhaler    QVAR    3 Inhaler    INHALE 2 PUFFS BY MOUTH INTO THE LUNGS TWO TIMES A DAY    Chronic cough       CALCIUM + D PO      One bid        CHLOR-TRIMETON ALLERGY 12 MG Tbcr   Generic drug:  chlorpheniramine maleate      Take  by mouth as needed.        fluocinonide 0.05 % solution    LIDEX    60 mL    Apply to scalp nightly for up to 1 week, then 2-3 times weekly as needed.    Dermatitis, seborrheic       fluticasone 50 MCG/ACT spray    FLONASE    48 g    USE ONE TO TWO SPRAYS IN EACH NOSTRIL EVERY DAY    Chronic allergic conjunctivitis       lisinopril 5 MG tablet    PRINIVIL/ZESTRIL    90 tablet    Take 1 tablet (5 mg) by mouth daily    Benign essential hypertension       order for DME     1 Units    Equipment being ordered:    left knee brace-medial compartment  for osteoarthritis.    Primary osteoarthritis of left knee       order for DME     1 each     Equipment being ordered: spacer    Cough due to bronchospasm       ranitidine 300 MG tablet    ZANTAC    90 tablet    Take 1 tablet (300 mg) by mouth daily    Gastroesophageal reflux disease without esophagitis       * SENIOR MULTIVITAMIN PLUS PO      One per day        * OPTIVITE PO      once a day        * Notice:  This list has 2 medication(s) that are the same as other medications prescribed for you. Read the directions carefully, and ask your doctor or other care provider to review them with you.

## 2018-03-22 NOTE — PATIENT INSTRUCTIONS
Continue with thumb brace for support. Consider Physical Therapy or hand specialist referral if hand symptoms are not controlled or worsen.     Stress ball is recommended to tone muscles in the hand     Referral for dermatology has been made.  You can call [phone] to schedule this at the [facility] in [place] (formerly the [facility]).

## 2018-03-22 NOTE — PROGRESS NOTES
SUBJECTIVE:   Maddie Lala is a 68 year old female who presents to clinic today for the following health issues:  Joint Pain    Onset: 6 months intermittent    Description:   Location: left hand 1st digit  Character: Sharp when using that hand, when not using doesn't hurt    Intensity: when using that joint 5/10    Progression of Symptoms: worsened,  intermittent    Accompanying Signs & Symptoms:  Other symptoms: none    History:   Previous similar pain: no       Precipitating factors:   Trauma or overuse: no     Alleviating factors:  Improved by: rest/inactivity, elastic brace  Therapies Tried and outcome: elastic brace, inactivity    Onset of joint pain in the thumb was 6 months ago, with no improvement to symptoms. She wears a brace around the thumb that is supportive and not restrictive that helps relieve pain. Patient lifts weight and does yoga.        Concern: 1 year- red, flat lesion, no pain, not itching  Spot on nose  While other areas of irritation on the face seems to have resolved, a spot located on the nose seems to be lingering. Denies shooting pain. Denies redness or open areas. Nonpruritic.      Problem list and histories reviewed & adjusted, as indicated.  Additional history: as documented    BP Readings from Last 3 Encounters:   03/22/18 130/74   11/16/17 142/88   03/30/17 145/89    Wt Readings from Last 3 Encounters:   11/16/17 75.9 kg (167 lb 4.8 oz)   03/30/17 78.9 kg (174 lb)   11/03/16 78 kg (172 lb)            Blood Pressure   At times blood pressures has been noted to be elevated. She is prescribed lisinopril 5mg. She will send me recordings of blood pressures on MyChart.     Reviewed and updated as needed this visit by clinical staff  Tobacco  Allergies  Meds  Med Hx  Surg Hx  Fam Hx  Soc Hx      Reviewed and updated as needed this visit by Provider  Tobacco  Allergies  Meds  Med Hx  Surg Hx  Fam Hx  Soc Hx        ROS:  Constitutional, HEENT, cardiovascular, pulmonary, GI,  ", musculoskeletal, neuro, skin, endocrine and psych systems are negative, except as otherwise noted.    This document serves as a record of the services and decisions personally performed and made by Radha Hurtado MD. It was created on her behalf by Victoriano Díaz, a trained medical scribe. The creation of this document is based on the provider's statements to the medical scribe.  Victoriano Díaz 8:14 AM March 22, 2018      OBJECTIVE:     /74 (BP Location: Right arm, Patient Position: Chair, Cuff Size: Adult Regular)  Pulse 75  Temp 97.6  F (36.4  C) (Oral)  SpO2 98%  There is no height or weight on file to calculate BMI.  GENERAL: healthy, alert and no distress  NECK: no adenopathy, no asymmetry, masses, or scars and thyroid normal to palpation  RESP: lungs clear to auscultation - no rales, rhonchi or wheezes  CV: regular rate and rhythm, normal S1 S2, no S3 or S4, no murmur, click or rub, no peripheral edema and peripheral pulses strong  MS: LUE exam shows normal strength and muscle mass, no evidence of joint effusion, ROM of all joints is normal, no evidence of joint instability.  There is tenderness mild at the 1st MCP joint. Mild discomfort with moving this joint. No tenderness up in the arm or signs of tendinitis.    SKIN: no suspicious rashes and erythematous patch - bridge of nose running vertically. Slight roughness to touch no advancing margin or skin breakdown noted.     Diagnostic Test Results:  none     ASSESSMENT/PLAN:       Tobacco Cessation:   reports that she quit smoking about 37 years ago. Her smoking use included Cigarettes. She has a 10.00 pack-year smoking history. She has never used smokeless tobacco.      BMI:   Estimated body mass index is 26.2 kg/(m^2) as calculated from the following:    Height as of 11/16/17: 1.702 m (5' 7\").    Weight as of 11/16/17: 75.9 kg (167 lb 4.8 oz).         1. Skin lesion  Dermatology referral made.   - DERMATOLOGY REFERRAL    2. Degenerative " arthritis of thumb, left  Patient will continue with thumb brace for support. Physical Therapy or hand specialist was recommended.       Patient Instructions   Continue with thumb brace for support. Consider Physical Therapy or hand specialist referral if hand symptoms are not controlled or worsen.     Stress ball is recommended to tone muscles in the hand     Referral for dermatology has been made.  You can call [phone] to schedule this at the [facility] in [place] (formerly the [facility]).       The information in this document, created by the medical scribe for me, accurately reflects the services I personally performed and the decisions made by me. I have reviewed and approved this document for accuracy prior to leaving the patient care area.  March 22, 2018 9:05 AM    Radha Hurtado MD  [facility]

## 2018-03-23 PROBLEM — I10 BENIGN ESSENTIAL HYPERTENSION: Status: ACTIVE | Noted: 2018-03-23

## 2018-03-25 DIAGNOSIS — K21.9 GASTROESOPHAGEAL REFLUX DISEASE WITHOUT ESOPHAGITIS: ICD-10-CM

## 2018-03-26 NOTE — TELEPHONE ENCOUNTER
"Requested Prescriptions   Pending Prescriptions Disp Refills     ranitidine (ZANTAC) 300 MG tablet [Pharmacy Med Name: RANITIDINE HCL 300MG TABS] 90 tablet 2     Sig: TAKE ONE TABLET BY MOUTH EVERY DAY    H2 Blockers Protocol Passed    3/25/2018  6:03 PM       Passed - Patient is age 12 or older       Passed - Recent (12 mo) or future (30 days) visit within the authorizing provider's specialty    Patient had office visit in the last 12 months or has a visit in the next 30 days with authorizing provider or within the authorizing provider's specialty.  See \"Patient Info\" tab in inbasket, or \"Choose Columns\" in Meds & Orders section of the refill encounter.            ranitidine (ZANTAC) 300 MG tablet  Last Written Prescription Date:  11/3/16  Last Fill Quantity: 90,  # refills: 2   Last office visit: 3/22/2018 with prescribing provider:  Dr. Hurtado   Future Office Visit:      "

## 2018-03-27 NOTE — TELEPHONE ENCOUNTER
Prescription approved per [facility] Refill Protocol.    Susanne Beaver RN, BSN

## 2018-04-11 ENCOUNTER — MYC MEDICAL ADVICE (OUTPATIENT)
Dept: FAMILY MEDICINE | Facility: CLINIC | Age: 68
End: 2018-04-11

## 2018-04-11 DIAGNOSIS — I10 BENIGN ESSENTIAL HYPERTENSION: ICD-10-CM

## 2018-04-16 RX ORDER — LISINOPRIL 10 MG/1
10 TABLET ORAL DAILY
Qty: 90 TABLET | Refills: 1 | Status: SHIPPED | OUTPATIENT
Start: 2018-04-16 | End: 2018-11-11

## 2018-04-26 ENCOUNTER — MYC MEDICAL ADVICE (OUTPATIENT)
Dept: FAMILY MEDICINE | Facility: CLINIC | Age: 68
End: 2018-04-26

## 2018-04-26 NOTE — TELEPHONE ENCOUNTER
Please call and advise that I have not seen this with increasing dose of lisinopril but certainly be contributory.    2 options-   1.cut back to previous dose of lisinopril and see if symptoms improve or   2. Continue with current dose and follow up with us in clinic next week  If symptoms not improved with the omeprazole.   I feel either option is reasonable.

## 2018-04-26 NOTE — TELEPHONE ENCOUNTER
...Reason for Call:   call back    Detailed comments: Patient was returning a call she said she would like a call back.    Phone Number Patient can be reached at: Cell number on file:    Telephone Information:   Mobile [phone]       Best Time: anytime    Can we leave a detailed message on this number? YES    Call taken on 4/26/2018 at 1:45 PM by Wendy Maciel

## 2018-04-26 NOTE — TELEPHONE ENCOUNTER
Maddie Lala is a 68 year old female who calls with epigastric tenderness/bloating.    NURSING ASSESSMENT:  Description:  Patient reports that since Saturday 4/21/18 she has begun to experience epigastric tenderness and general bloating. Patient wondering if it could be side effects from her Lisinopril dose increase that began on 4/11/18.   Onset/duration:  6 days  Precip. factors:  Potentially increase of Lisinopril dose, patient also does have seasonal allergies.  Associated symptoms:  Epigastric tenderness, primarily in the upper area of her abdomen, general. She denies any pain, just tenderness when pressure applied. General bloating occurring. She is having flatulence.   Improves/worsens symptoms:  Patient tried taking Ranitidine PRN but reports this did not seem to be helping. She then was using omeprazole for the last 3 days and since starting that symptom severity has decreased but symptoms are still present. She uses Flonase daily for seasonal allergies and takes an antihistamine very PRN if allergy symptoms get bad.   Pain scale (0-10)   0/10  Last exam/Treatment:  3/22/18  Allergies:   Allergies   Allergen Reactions     Sulfa Drugs Rash       MEDICATIONS:   Taking medication(s) as prescribed? Yes  Taking over the counter medication(s?) Yes  Any medication side effects? Patient is wondering if Lisinopril dose increase is causing symptoms    Any barriers to taking medication(s) as prescribed?  No  Medication(s) improving/managing symptoms?  N/A  Medication reconciliation completed: Yes      NURSING PLAN: Routed to provider Yes    RECOMMENDED DISPOSITION:  Home care advice - Educated patient on resting, continuing with Omeprazole OTC as this appears to be helping symptoms to some degree, avoid alcohol, caffeine, and greasy/spicy foods. Writer reviewed possible side effects of Lisiniopril in Nursing 2009 Drug Handbook 29th Edition from Aly Kluwer and possible side effects noted were diarrhea, nausea,  dyspepsia. Patient denies any diarrhea/constipation, denies pain, denies fever, denies nausea and vomiting, or any other symptoms. Patient's main concern is whether or not this could be from increased dose of lisinopril and if she should continue with this. Her last two BP readings since dose change have been 4/13: 125/74 pulse 99 (30 min after exercise) and on 4/25: 129/75 pulse 83.   Writer educated patient on signs and symptoms that would warrant an office visit or going to ED for evaluation such as severe pain greater than 2 hours, fever, persisting vomiting/diarrhea, pain that worsens with heat/acitivity, unusually firm/hard abodomen, fainting/lightheadedness, or bloody/black stools or emesis. She states understanding.  Will comply with recommendation: Yes  If further questions/concerns or if symptoms do not improve, worsen or new symptoms develop, call your PCP or [facility] as soon as possible.      Guideline used:  Telephone Triage Protocols for Nurses, Fifth Edition, Brit Peguero    Routing to provider to review and advise on patient symptoms and if patient should continue with Lisinopril dosing.    Saray Watts RN

## 2018-04-26 NOTE — TELEPHONE ENCOUNTER
Noted. Called and spoke with patient to relay provider message. Patient is going to continue with current dose of lisinopril and will schedule an appointment for next week and if symptoms resolve by then she will cancel the appointment. She has no further questions or concerns. Writer instructed patient to call back clinic if questions or new/worsening symptoms arise, she states understanding. Closing encounter.     Saray Watts RN

## 2018-04-26 NOTE — TELEPHONE ENCOUNTER
This writer attempted to contact Maddie on 04/26/18      Reason for call triage symptoms and left message.      If patient calls back:   Patient contacted by a Registered Nurse. Inform patient that someone from the RN group will contact them, document that pt called and route to P DYAD 3 RN POOL [[id number]] [facility] RN IF RED FLAG SYMPTOM        Saray Watts RN    Routing to provider to review and advise, while RN's attempting to contact patient to address in the meantime.

## 2018-05-15 ENCOUNTER — OFFICE VISIT (OUTPATIENT)
Dept: FAMILY MEDICINE | Facility: CLINIC | Age: 68
End: 2018-05-15
Payer: COMMERCIAL

## 2018-05-15 VITALS
OXYGEN SATURATION: 100 % | SYSTOLIC BLOOD PRESSURE: 132 MMHG | RESPIRATION RATE: 18 BRPM | TEMPERATURE: 98.3 F | HEART RATE: 86 BPM | DIASTOLIC BLOOD PRESSURE: 78 MMHG

## 2018-05-15 DIAGNOSIS — N30.00 ACUTE CYSTITIS WITHOUT HEMATURIA: Primary | ICD-10-CM

## 2018-05-15 DIAGNOSIS — I10 BENIGN ESSENTIAL HYPERTENSION: ICD-10-CM

## 2018-05-15 DIAGNOSIS — N95.2 VAGINAL ATROPHY: ICD-10-CM

## 2018-05-15 DIAGNOSIS — R30.0 DYSURIA: ICD-10-CM

## 2018-05-15 LAB
ALBUMIN UR-MCNC: NEGATIVE MG/DL
APPEARANCE UR: CLEAR
BACTERIA #/AREA URNS HPF: ABNORMAL /HPF
BILIRUB UR QL STRIP: NEGATIVE
COLOR UR AUTO: YELLOW
GLUCOSE UR STRIP-MCNC: NEGATIVE MG/DL
HGB UR QL STRIP: NEGATIVE
KETONES UR STRIP-MCNC: NEGATIVE MG/DL
LEUKOCYTE ESTERASE UR QL STRIP: ABNORMAL
NITRATE UR QL: NEGATIVE
NON-SQ EPI CELLS #/AREA URNS LPF: ABNORMAL /LPF
PH UR STRIP: 6 PH (ref 5–7)
RBC #/AREA URNS AUTO: ABNORMAL /HPF
SOURCE: ABNORMAL
SP GR UR STRIP: <=1.005 (ref 1–1.03)
UROBILINOGEN UR STRIP-ACNC: 0.2 EU/DL (ref 0.2–1)
WBC #/AREA URNS AUTO: ABNORMAL /HPF

## 2018-05-15 PROCEDURE — 81001 URINALYSIS AUTO W/SCOPE: CPT | Performed by: PHYSICIAN ASSISTANT

## 2018-05-15 PROCEDURE — 87086 URINE CULTURE/COLONY COUNT: CPT | Performed by: PHYSICIAN ASSISTANT

## 2018-05-15 PROCEDURE — 99213 OFFICE O/P EST LOW 20 MIN: CPT | Performed by: PHYSICIAN ASSISTANT

## 2018-05-15 PROCEDURE — 87088 URINE BACTERIA CULTURE: CPT | Performed by: PHYSICIAN ASSISTANT

## 2018-05-15 PROCEDURE — 87186 SC STD MICRODIL/AGAR DIL: CPT | Performed by: PHYSICIAN ASSISTANT

## 2018-05-15 RX ORDER — ESTRADIOL 10 UG/1
10 INSERT VAGINAL DAILY
Qty: 30 TABLET | Refills: 1 | Status: SHIPPED | OUTPATIENT
Start: 2018-05-15 | End: 2018-09-04

## 2018-05-15 RX ORDER — CEPHALEXIN 500 MG/1
500 CAPSULE ORAL 3 TIMES DAILY
Qty: 30 CAPSULE | Refills: 0 | Status: SHIPPED | OUTPATIENT
Start: 2018-05-15 | End: 2018-08-12

## 2018-05-15 ASSESSMENT — PAIN SCALES - GENERAL: PAINLEVEL: NO PAIN (0)

## 2018-05-15 NOTE — MR AVS SNAPSHOT
After Visit Summary   5/15/2018    Maddie Lala    MRN: [id number]           Patient Information     Date Of Birth          1950        Visit Information        Provider Department      5/15/2018 1:40 PM Elicia Richards PA-C [facility]        Today's Diagnoses     Dysuria    -  1    Benign essential hypertension        Vaginal atrophy        Acute cystitis without hematuria          Care Instructions    Take keflex 500mg three times a day for 10 days  Try vagifem daily for 2 weeks then once a week. Follow up with gynecology if no improvement in symptoms.     At [facility], we strive to deliver an exceptional experience to you, every time we see you.  If you receive a survey in the mail, please send us back your thoughts. We really do value your feedback.    Suggested websites for health information:  Www.[facility] [employer]LXSN : Up to date and easily searchable information on multiple topics.  Www.[employer].gov : medication info, interactive tutorials, watch real surgeries online  Www.familydoctor.org : good info from the Academy of Family Physicians  Www.cdc.gov : public health info, travel advisories, epidemics (H1N1)  Www.aap.org : children's health info, normal development, vaccinations  Www.health.[facility].[place].us : [place] dept of health, public health issues in [place], N1N1    Your care team:     Family Medicine   MAGDALENO Rosas MD Emily Bunt, QUEENIE MENDOZA   S. MD Radha Aquino MD Angela Wermerskirchen, MD         Clinic hours: Monday - Wednesday 7 am-7 pm   Thursdays and Fridays 7 am-5 pm.     [facility]: Monday - Friday 11 am-9 pm,   Saturday and Sunday 9 am-5 pm.    [facility]: Monday -Thursday 8 am-8 pm; Friday 8 am-6 pm; Saturday and Sunday 9 am-5 pm.     [facility]: Monday - Thursday 8 am - 7 pm; Friday 8 am - 6 pm    Clinic: [phone]   [facility]: [phone]   [facility]: [phone]                  Follow-ups after your visit        Your next 10 appointments already scheduled     Jun 28, 2018 12:00 PM CDT   (Arrive by 11:45 AM)   New Patient Visit with Aaron Roy MD   [facility] ([facility] [place])    [address]  [address] [facility] [address] [phone]              Future tests that were ordered for you today     Open Future Orders        Priority Expected Expires Ordered    Basic metabolic panel Routine  5/15/2019 5/15/2018            Who to contact     If you have questions or need follow up information about today's clinic visit or your schedule please contact [facility] directly at [phone].  Normal or non-critical lab and imaging results will be communicated to you by MyChart, letter or phone within 4 business days after the clinic has received the results. If you do not hear from us within 7 days, please contact the clinic through MyChart or phone. If you have a critical or abnormal lab result, we will notify you by phone as soon as possible.  Submit refill requests through [employer] or call your pharmacy and they will forward the refill request to us. Please allow 3 business days for your refill to be completed.          Additional Information About Your Visit        MyChart Information     [employer] gives you secure access to your electronic health record. If you see a primary care provider, you can also send messages to your care team and make appointments. If you have questions, please call your primary care clinic.  If you do not have a primary care provider, please call [phone] and they will assist you.        Care EveryWhere ID     This is your Care EveryWhere ID. This could be used by other organizations to access your [facility] medical records  [id number]        Your Vitals Were     Pulse Temperature Respirations Pulse Oximetry Breastfeeding?        86 98.3  F (36.8  C) (Oral) 18 100% No        Blood Pressure from Last 3 Encounters:   05/15/18 132/78   03/22/18 130/74   11/16/17 142/88    Weight from Last 3 Encounters:   11/16/17 75.9 kg (167 lb 4.8 oz)   03/30/17 78.9 kg (174 lb)   11/03/16 78 kg (172 lb)              We Performed the Following     *UA reflex to Microscopic and Culture ([place] [facility] (except [place] and [place])     Urine Culture Aerobic Bacterial     Urine Microscopic          Today's Medication Changes          These changes are accurate as of 5/15/18  2:02 PM.  If you have any questions, ask your nurse or doctor.               Start taking these medicines.        Dose/Directions    cephALEXin 500 MG capsule   Commonly known as:  KEFLEX   Used for:  Acute cystitis without hematuria   Started by:  Elicia Richards PA-C        Dose:  500 mg   Take 1 capsule (500 mg) by mouth 3 times daily   Quantity:  30 capsule   Refills:  0       estradiol 10 MCG Tabs vaginal tablet   Commonly known as:  VAGIFEM   Used for:  Vaginal atrophy   Started by:  Elicia Richards PA-C        Dose:  10 mcg   Place 1 tablet (10 mcg) vaginally daily   Quantity:  30 tablet   Refills:  1            Where to get your medicines      These medications were sent to [facility] [address]   [address] , [facility] [address]     Phone:  [phone]     cephALEXin 500 MG capsule    estradiol 10 MCG Tabs vaginal tablet                Primary Care Provider Office Phone # Fax #    Radha Hurtado [phone] [address]  [facility] [address]        Equal Access to Services     [facility] AH: Hadii aad ku hadasho Soomaali, waaxda luqadaha, qaybta kaalmada adeegyada, nereyda guy. So [facility] [phone].    ATENCIÓN: Si habla español, tiene a mayo disposición servicios gratuitos de asistencia lingüística. Llame al [phone].    We comply with  applicable federal civil rights laws and [place] laws. We do not discriminate on the basis of race, color, national origin, age, disability, sex, sexual orientation, or gender identity.            Thank you!     Thank you for choosing [facility]  for your care. Our goal is always to provide you with excellent care. Hearing back from our patients is one way we can continue to improve our services. Please take a few minutes to complete the written survey that you may receive in the mail after your visit with us. Thank you!             Your Updated Medication List - Protect others around you: Learn how to safely use, store and throw away your medicines at www.disposemymeds.org.          This list is accurate as of 5/15/18  2:02 PM.  Always use your most recent med list.                   Brand Name Dispense Instructions for use Diagnosis    ADVIL PO      Take 800 mg by mouth every 4 hours as needed for moderate pain        albuterol 108 (90 Base) MCG/ACT Inhaler    VENTOLIN HFA    18 g    INHALE TWO PUFFS BY MOUTH EVERY 6 HOURS AS NEEDED FOR SHORTNESS OF BREATH / DYSPNEA AND AS NEEDED BEFORE EXERCISE    Cough due to bronchospasm       beclomethasone 40 MCG/ACT Inhaler    QVAR    3 Inhaler    INHALE 2 PUFFS BY MOUTH INTO THE LUNGS TWO TIMES A DAY    Chronic cough       CALCIUM + D PO      One bid        cephALEXin 500 MG capsule    KEFLEX    30 capsule    Take 1 capsule (500 mg) by mouth 3 times daily    Acute cystitis without hematuria       CHLOR-TRIMETON ALLERGY 12 MG Tbcr   Generic drug:  chlorpheniramine maleate      Take  by mouth as needed.        estradiol 10 MCG Tabs vaginal tablet    VAGIFEM    30 tablet    Place 1 tablet (10 mcg) vaginally daily    Vaginal atrophy       fluocinonide 0.05 % solution    LIDEX    60 mL    Apply to scalp nightly for up to 1 week, then 2-3 times weekly as needed.    Dermatitis, seborrheic       fluticasone 50 MCG/ACT spray    FLONASE    48 g    USE ONE TO TWO  SPRAYS IN EACH NOSTRIL EVERY DAY    Chronic allergic conjunctivitis       lisinopril 10 MG tablet    PRINIVIL/ZESTRIL    90 tablet    Take 1 tablet (10 mg) by mouth daily    Benign essential hypertension       order for DME     1 Units    Equipment being ordered:    left knee brace-medial compartment  for osteoarthritis.    Primary osteoarthritis of left knee       order for DME     1 each    Equipment being ordered: spacer    Cough due to bronchospasm       ranitidine 300 MG tablet    ZANTAC    90 tablet    TAKE ONE TABLET BY MOUTH EVERY DAY    Gastroesophageal reflux disease without esophagitis       * SENIOR MULTIVITAMIN PLUS PO      One per day        * OPTIVITE PO      once a day        * Notice:  This list has 2 medication(s) that are the same as other medications prescribed for you. Read the directions carefully, and ask your doctor or other care provider to review them with you.

## 2018-05-15 NOTE — Clinical Note
Not sure when you would like her to repeat her basic metabolic panel- future order was placed by me- initially planned to get it at visit but I forgot before discharging her Blood pressure seems controlled on 10 mg lisinopril

## 2018-05-15 NOTE — PROGRESS NOTES
SUBJECTIVE:   Maddie Lala is a 68 year old female who presents to clinic today for the following health issues:      URINARY TRACT SYMPTOMS  Onset: 2 weeks    Description:   Painful urination (Dysuria): YES  Blood in urine (Hematuria): no   Delay in urine (Hesitency): YES    Intensity: mild    Progression of Symptoms:  worsening    Accompanying Signs & Symptoms:  Fever/chills: no   Flank pain no   Nausea and vomiting: no   Any vaginal symptoms: none  Abdominal/Pelvic Pain: no     History:   History of frequent UTI's: no   History of kidney stones: no   Sexually Active: no   Possibility of pregnancy: No    Precipitating factors:   none    Therapies Tried and outcome: pyridium and Increase fluid intake    Had had some bloating which she thought may have have been side effect from lisinopril but took 14 day course of omeprazole and bloating resolved.    constatntly feeling irritaion around urethra and dysuria.  Had thought was from drinking diet pepsi without water and flushed water and better for awhile then shopping one day and didn't drink her usual 1/2 gallon water and symptoms worse again    Severe vaginal dryness- tried Cream and oral preparations without improvement- painful to have intercourse.  No intercourse for about a year  No nausea or vomiting.  No flank pain or hematuria.      Problem list and histories reviewed & adjusted, as indicated.  Additional history: as documented    Patient Active Problem List   Diagnosis     Urinary incontinence     Cataract     CARDIOVASCULAR SCREENING; LDL GOAL LESS THAN 160     Tendonitis of shoulder, right     Chronic cough     Brachial neuritis or radiculitis     Seasonal allergic rhinitis     Chronic allergic conjunctivitis     GERD (gastroesophageal reflux disease)     Latent tuberculosis     Meniere's disease     Menopause     Cervical nerve root impingement     Advanced directives, counseling/discussion     Atrophic vaginitis     Hyponatremia     Neck pain      Muscle spasms of neck     Benign essential hypertension     Past Surgical History:   Procedure Laterality Date     BLADDER SURGERY       CATARACT IOL, RT/LT  2010     COLONOSCOPY WITH CO2 INSUFFLATION N/A 10/19/2016    Procedure: COLONOSCOPY WITH CO2 INSUFFLATION;  Surgeon: Duane, William Charles, MD;  Location: [facility]     GENITOURINARY SURGERY      bladder     Midurethral sling with cystoscopy.  2010     TUBAL LIGATION         Social History   Substance Use Topics     Smoking status: Former Smoker     Packs/day: 1.00     Years: 10.00     Types: Cigarettes     Quit date: 1/1/1981     Smokeless tobacco: Never Used     Alcohol use Yes      Comment: occ - Glass of white wine per night     Family History   Problem Relation Age of Onset     HEART DISEASE Mother      older      OSTEOPOROSIS Mother      C.A.D. Mother      60's      Macular Degeneration Mother      Respiratory Father      farmers lung     Alzheimer Disease Maternal Grandmother      HEART DISEASE Maternal Grandfather      HEART DISEASE Brother      Afib     HEART DISEASE Brother      Asthma No family hx of      DIABETES No family hx of      Hypertension No family hx of      Breast Cancer No family hx of      Cancer - colorectal No family hx of      CANCER No family hx of      Thyroid Disease No family hx of      Glaucoma No family hx of      Colon Cancer No family hx of          Current Outpatient Prescriptions   Medication Sig Dispense Refill     albuterol (VENTOLIN HFA) 108 (90 BASE) MCG/ACT Inhaler INHALE TWO PUFFS BY MOUTH EVERY 6 HOURS AS NEEDED FOR SHORTNESS OF BREATH / DYSPNEA AND AS NEEDED BEFORE EXERCISE 18 g 11     beclomethasone (QVAR) 40 MCG/ACT Inhaler INHALE 2 PUFFS BY MOUTH INTO THE LUNGS TWO TIMES A DAY 3 Inhaler 3     CALCIUM + D OR One bid       cephALEXin (KEFLEX) 500 MG capsule Take 1 capsule (500 mg) by mouth 3 times daily 30 capsule 0     Chlorpheniramine Maleate (CHLOR-TRIMETON ALLERGY) 12 MG TBCR Take  by mouth as needed.        estradiol (VAGIFEM) 10 MCG TABS vaginal tablet Place 1 tablet (10 mcg) vaginally daily 30 tablet 1     fluocinonide (LIDEX) 0.05 % solution Apply to scalp nightly for up to 1 week, then 2-3 times weekly as needed. 60 mL 3     fluticasone (FLONASE) 50 MCG/ACT spray USE ONE TO TWO SPRAYS IN EACH NOSTRIL EVERY DAY 48 g 3     Ibuprofen (ADVIL PO) Take 800 mg by mouth every 4 hours as needed for moderate pain       lisinopril (PRINIVIL/ZESTRIL) 10 MG tablet Take 1 tablet (10 mg) by mouth daily 90 tablet 1     OPTIVITE OR once a day       order for DME Equipment being ordered:    left knee brace-medial compartment  for osteoarthritis. 1 Units 0     order for DME Equipment being ordered: spacer 1 each 0     ranitidine (ZANTAC) 300 MG tablet TAKE ONE TABLET BY MOUTH EVERY DAY 90 tablet 2     SENIOR MULTIVITAMIN PLUS OR One per day       Allergies   Allergen Reactions     Sulfa Drugs Rash       Reviewed and updated as needed this visit by clinical staff  Tobacco  Allergies  Meds  Problems  Med Hx  Surg Hx  Fam Hx  Soc Hx        Reviewed and updated as needed this visit by Provider  Tobacco  Allergies  Meds  Problems  Med Hx  Surg Hx  Fam Hx  Soc Hx          ROS:  Constitutional, HEENT, cardiovascular, pulmonary, gi and gu systems are negative, except as otherwise noted.    OBJECTIVE:     /78 (BP Location: Right arm, Patient Position: Chair, Cuff Size: Adult Regular)  Pulse 86  Temp 98.3  F (36.8  C) (Oral)  Resp 18  SpO2 100%  Breastfeeding? No  There is no height or weight on file to calculate BMI.  GENERAL: healthy, alert and no distress  NECK: no adenopathy, no asymmetry, masses, or scars and thyroid normal to palpation  RESP: lungs clear to auscultation - no rales, rhonchi or wheezes  CV: regular rate and rhythm, normal S1 S2, no S3 or S4, no murmur, click or rub, no peripheral edema and peripheral pulses strong  ABDOMEN: soft, nontender, no hepatosplenomegaly, no masses and bowel sounds  normal  BACK: no CVA tenderness, no paralumbar tenderness    Diagnostic Test Results:  Results for orders placed or performed in visit on 05/15/18   *UA reflex to Microscopic and Culture ([facility], [place], operated [facility] (except [place] and [place])   Result Value Ref Range    Color Urine Yellow     Appearance Urine Clear     Glucose Urine Negative NEG^Negative mg/dL    Bilirubin Urine Negative NEG^Negative    Ketones Urine Negative NEG^Negative mg/dL    Specific Gravity Urine <=1.005 1.003 - 1.035    Blood Urine Negative NEG^Negative    pH Urine 6.0 5.0 - 7.0 pH    Protein Albumin Urine Negative NEG^Negative mg/dL    Urobilinogen Urine 0.2 0.2 - 1.0 EU/dL    Nitrite Urine Negative NEG^Negative    Leukocyte Esterase Urine Moderate (A) NEG^Negative    Source Midstream Urine    Urine Microscopic   Result Value Ref Range    WBC Urine 0 - 5 OTO5^0 - 5 /HPF    RBC Urine O - 2 OTO2^O - 2 /HPF    Squamous Epithelial /LPF Urine Few FEW^Few /LPF    Bacteria Urine Few (A) NEG^Negative /HPF       ASSESSMENT/PLAN:             1. Acute cystitis without hematuria  Given leukocyte esterease will treat with keflex.  Warning signs and symptoms warranting urgent evaluation reviewed.  Awaiting culture  - cephALEXin (KEFLEX) 500 MG capsule; Take 1 capsule (500 mg) by mouth 3 times daily  Dispense: 30 capsule; Refill: 0    2. Vaginal atrophy  Trial of vagifem.  Follow up with gyn if no improvement  - estradiol (VAGIFEM) 10 MCG TABS vaginal tablet; Place 1 tablet (10 mcg) vaginally daily  Dispense: 30 tablet; Refill: 1    3. Benign essential hypertension  Blood pressure at goal.        4. Dysuria    - *UA reflex to Microscopic and Culture ([facility], [place], [facility] (except [place] and [place])  - Urine Microscopic  - Urine Culture Aerobic Bacterial    Patient Instructions     Take keflex 500mg three times a day for 10 days  Try vagifem daily for 2 weeks then once a week. Follow up with gynecology if no improvement in symptoms.     At  [facility], we strive to deliver an exceptional experience to you, every time we see you.  If you receive a survey in the mail, please send us back your thoughts. We really do value your feedback.    Suggested websites for health information:  Www.[place].org : Up to date and easily searchable information on multiple topics.  Www.medlineplus.gov : medication info, interactive tutorials, watch real surgeries online  Www.familydoctor.org : good info from the Academy of Family Physicians  Www.cdc.gov : public health info, travel advisories, epidemics (H1N1)  Www.aap.org : children's health info, normal development, vaccinations  Www.health.[facility].[place].us : [place] dept of health, public health issues in [place], N1N1    Your care team:     Family Medicine   MAGDALENO Rosas MD Emily Bunt, [facility]   S. MD Radha Aquino MD Angela Wermerskirchen, MD         Clinic hours: Monday - Wednesday 7 am-7 pm   Thursdays and Fridays 7 am-5 pm.     [facility]: Monday - Friday 11 am-9 pm,   Saturday and Sunday 9 am-5 pm.    [facility]: Monday -Thursday 8 am-8 pm; Friday 8 am-6 pm; Saturday and Sunday 9 am-5 pm.     [facility]: Monday - Thursday 8 am - 7 pm; Friday 8 am - 6 pm    Clinic: [phone]   [facility]: [phone]   [facility]: [phone]               Elicia Richards PA-C  [facility]

## 2018-05-15 NOTE — PATIENT INSTRUCTIONS
Take keflex 500mg three times a day for 10 days  Try vagifem daily for 2 weeks then once a week. Follow up with gynecology if no improvement in symptoms.     At [facility], we strive to deliver an exceptional experience to you, every time we see you.  If you receive a survey in the mail, please send us back your thoughts. We really do value your feedback.    Suggested websites for health information:  Www.[facility].org : Up to date and easily searchable information on multiple topics.  Www.medlineplus.gov : medication info, interactive tutorials, watch real surgeries online  Www.familydoctor.org : good info from the Academy of Family Physicians  Www.cdc.gov : public health info, travel advisories, epidemics (H1N1)  Www.aap.org : children's health info, normal development, vaccinations  Www.health.[facility].[place].us : [place] dept of health, public health issues in [place], N1N1    Your care team:     Family Medicine   MAGDALENO Rosas MD Emily Bunt, APRN [facility]   S. MD Radha Aquino MD Angela Wermerskirchen, MD         Clinic hours: Monday - Wednesday 7 am-7 pm   Thursdays and Fridays 7 am-5 pm.     [facility]: Monday - Friday 11 am-9 pm,   Saturday and Sunday 9 am-5 pm.    [facility]: Monday -Thursday 8 am-8 pm; Friday 8 am-6 pm; Saturday and Sunday 9 am-5 pm.     [facility]: Monday - Thursday 8 am - 7 pm; Friday 8 am - 6 pm    Clinic: [phone]   [facility]: [phone]   [facility]: [phone]

## 2018-05-16 NOTE — PROGRESS NOTES
Juan David Perkins  Your urine culture is still pending but it does appear to be growing bacteria.  I will let you know the final results as they become available.   Please call or MyChart my office with any questions or concerns.    Elicia Richards, PAC

## 2018-05-17 LAB
BACTERIA SPEC CULT: ABNORMAL
SPECIMEN SOURCE: ABNORMAL

## 2018-05-17 NOTE — PROGRESS NOTES
Juan David Perkins  Your urine culture grew out [phone] colonies of ecoli that looks like it is sensitive to everything.   Follow up with us if symptoms not improving.   Return urgently if any change in symptoms.    Please call or MyChart my office with any questions or concerns.    Elicia Richards, PAC

## 2018-06-22 ENCOUNTER — TELEPHONE (OUTPATIENT)
Dept: DERMATOLOGY | Facility: CLINIC | Age: 68
End: 2018-06-22

## 2018-06-22 NOTE — TELEPHONE ENCOUNTER
Called patient, no answer. LVM reminding patient of appointment date and time.  Libby Bolivar LPN

## 2018-06-28 ENCOUNTER — OFFICE VISIT (OUTPATIENT)
Dept: DERMATOLOGY | Facility: CLINIC | Age: 68
End: 2018-06-28
Payer: COMMERCIAL

## 2018-06-28 DIAGNOSIS — L57.0 AK (ACTINIC KERATOSIS): Primary | ICD-10-CM

## 2018-06-28 DIAGNOSIS — D22.9 MULTIPLE MELANOCYTIC NEVI: ICD-10-CM

## 2018-06-28 DIAGNOSIS — L82.0 SEBORRHEIC KERATOSES, INFLAMED: ICD-10-CM

## 2018-06-28 ASSESSMENT — PAIN SCALES - GENERAL: PAINLEVEL: NO PAIN (0)

## 2018-06-28 NOTE — NURSING NOTE
Dermatology Rooming Note    Maddie Lala's goals for this visit include:   Chief Complaint   Patient presents with     Skin Check     Maddie is here today to be seen for a lesion on her nose- been there for about 6 months.      Yvette Trinh MA

## 2018-06-28 NOTE — MR AVS SNAPSHOT
After Visit Summary   6/28/2018    Maddie Lala    MRN: [id number]           Patient Information     Date Of Birth          1950        Visit Information        Provider Department      6/28/2018 12:00 PM Aaron Roy MD [facility]        Care Instructions    -If you are out in the sun for prolonged periods, use sunscreen SPF30 or greater and reapply if out for >2 hours  -Sun protective clothing is also a great option, Coolibar is a brand that we recommend: https://www.ExactFlat/    Cryotherapy    What is it?    Use of a very cold liquid, such as liquid nitrogen, to freeze and destroy abnormal skin cells that need to be removed    What should I expect?    Tenderness and redness    A small blister that might grow and fill with dark purple blood. There may be crusting.    More than one treatment may be needed if the lesions do not go away.    How do I care for the treated area?    Gently wash the area with your hands when bathing.    Use a thin layer of Vaseline to help with healing. You may use a Band-Aid.     The area should heal within 7-10 days and may leave behind a pink or lighter color.     Do not use an antibiotic or Neosporin ointment.     You may take acetaminophen (Tylenol) for pain.     Call your Doctor if you have:    Severe pain    Signs of infection (warmth, redness, cloudy yellow drainage, and or a bad smell)    Questions or concerns    Who should I call with questions?       [facility]: [phone]       [facility]: [phone]       For urgent needs outside of business hours call the [facility] at [phone]        and ask for the dermatology resident on call          Follow-ups after your visit        Follow-up notes from your care team     Return in about 1 year (around 6/28/2019).      Who to contact     Please call your clinic at [phone] to:    Ask questions about your  health    Make or cancel appointments    Discuss your medicines    Learn about your test results    Speak to your doctor            Additional Information About Your Visit        [employer] Information     [employer] gives you secure access to your electronic health record. If you see a primary care provider, you can also send messages to your care team and make appointments. If you have questions, please call your primary care clinic.  If you do not have a primary care provider, please call [phone] and they will assist you.      [employer] is an electronic gateway that provides easy, online access to your medical records. With [employer], you can request a clinic appointment, read your test results, renew a prescription or communicate with your care team.     To access your existing account, please contact your [facility] or call [phone] for assistance.        Care EveryWhere ID     This is your Care EveryWhere ID. This could be used by other organizations to access your [facility] medical records  [id number]         Blood Pressure from Last 3 Encounters:   05/15/18 132/78   03/22/18 130/74   11/16/17 142/88    Weight from Last 3 Encounters:   11/16/17 75.9 kg (167 lb 4.8 oz)   03/30/17 78.9 kg (174 lb)   11/03/16 78 kg (172 lb)              Today, you had the following     No orders found for display       Primary Care Provider Office Phone # Fax #    Radha Hurtado [phone] [address] [facility] [address]        Equal Access to Services     ALYSON DAVIS : Hadii rizwan ku ronelo Soaidee, waaxda luqadaha, qaybta kaalmada adeegyada, nereyda guy. So [facility] [place] [facility] [phone].    ATENCIÓN: Si habla español, tiene a mayo disposición servicios gratuitos de asistencia lingüística. Llame al [phone].    We comply with applicable federal civil rights laws and [place] laws. We do not discriminate on the basis of race, color, national  origin, age, disability, sex, sexual orientation, or gender identity.            Thank you!     Thank you for choosing [facility]  for your care. Our goal is always to provide you with excellent care. Hearing back from our patients is one way we can continue to improve our services. Please take a few minutes to complete the written survey that you may receive in the mail after your visit with us. Thank you!             Your Updated Medication List - Protect others around you: Learn how to safely use, store and throw away your medicines at www.disposemymeds.org.          This list is accurate as of 6/28/18  1:16 PM.  Always use your most recent med list.                   Brand Name Dispense Instructions for use Diagnosis    ADVIL PO      Take 800 mg by mouth every 4 hours as needed for moderate pain        albuterol 108 (90 Base) MCG/ACT Inhaler    VENTOLIN HFA    18 g    INHALE TWO PUFFS BY MOUTH EVERY 6 HOURS AS NEEDED FOR SHORTNESS OF BREATH / DYSPNEA AND AS NEEDED BEFORE EXERCISE    Cough due to bronchospasm       beclomethasone 40 MCG/ACT Inhaler    QVAR    3 Inhaler    INHALE 2 PUFFS BY MOUTH INTO THE LUNGS TWO TIMES A DAY    Chronic cough       CALCIUM + D PO      One bid        cephALEXin 500 MG capsule    KEFLEX    30 capsule    Take 1 capsule (500 mg) by mouth 3 times daily    Acute cystitis without hematuria       CHLOR-TRIMETON ALLERGY 12 MG Tbcr   Generic drug:  chlorpheniramine maleate      Take  by mouth as needed.        estradiol 10 MCG Tabs vaginal tablet    VAGIFEM    30 tablet    Place 1 tablet (10 mcg) vaginally daily    Vaginal atrophy       fluocinonide 0.05 % solution    LIDEX    60 mL    Apply to scalp nightly for up to 1 week, then 2-3 times weekly as needed.    Dermatitis, seborrheic       fluticasone 50 MCG/ACT spray    FLONASE    48 g    USE ONE TO TWO SPRAYS IN EACH NOSTRIL EVERY DAY    Chronic allergic conjunctivitis       lisinopril 10 MG tablet    PRINIVIL/ZESTRIL    90  tablet    Take 1 tablet (10 mg) by mouth daily    Benign essential hypertension       order for DME     1 Units    Equipment being ordered:    left knee brace-medial compartment  for osteoarthritis.    Primary osteoarthritis of left knee       order for DME     1 each    Equipment being ordered: spacer    Cough due to bronchospasm       ranitidine 300 MG tablet    ZANTAC    90 tablet    TAKE ONE TABLET BY MOUTH EVERY DAY    Gastroesophageal reflux disease without esophagitis       * SENIOR MULTIVITAMIN PLUS PO      One per day        * OPTIVITE PO      once a day        * Notice:  This list has 2 medication(s) that are the same as other medications prescribed for you. Read the directions carefully, and ask your doctor or other care provider to review them with you.

## 2018-06-28 NOTE — LETTER
6/28/2018       RE: Maddie Lala  [address] [place] [id number]     Dear Colleague,    Thank you for referring your patient, Maddie Lala, to the [facility] DERMATOLOGY at [facility]. Please see a copy of my visit note below.    [facility] Dermatology Note      Dermatology Problem List:  1.Actinic keratosis  -S/p cryotherapy 6/28  2.Seborrheic dermatitis  3.Eczematous dermatitis, resolved    Encounter Date: Jun 28, 2018    CC:  Chief Complaint   Patient presents with     Skin Check     Maddie is here today to be seen for a lesion on her nose- been there for about 6 months.          History of Present Illness:  Ms. Maddie Lala is a 68 year old female who presents for evaluation of a spot on the nose. Her PCP noticed it and recommended she have it examined by a dermatologist. It has been present for about 6 months, it is not itchy, painful, bleeding, or otherwise bothersome. She denies other new, itching, changing, bleeding or nonhealing spots. She has not personal history of skin cancer. Does spend a lot of time out in her garden during the summer months and does not usually wear sunscreen. No other concerns today.     Past Medical History:   Patient Active Problem List   Diagnosis     Urinary incontinence     Cataract     CARDIOVASCULAR SCREENING; LDL GOAL LESS THAN 160     Tendonitis of shoulder, right     Chronic cough     Brachial neuritis or radiculitis     Seasonal allergic rhinitis     Chronic allergic conjunctivitis     GERD (gastroesophageal reflux disease)     Latent tuberculosis     Meniere's disease     Menopause     Cervical nerve root impingement     Advanced directives, counseling/discussion     Atrophic vaginitis     Hyponatremia     Neck pain     Muscle spasms of neck     Benign essential hypertension     Past Medical History:   Diagnosis Date     Allergic rhinitis 1979     Arthritis      Benign essential hypertension 3/23/2018      Cervical dysplasia with cone in 1979    nl pap ever since      Dyspareunia      Hearing problem 2015     Meniere's disease 2002     Nonsenile cataract      Plantar fasciitis      Stress incontinence      SVT (supraventricular tachycardia) (H)     resolved     Tinnitus 2002     Past Surgical History:   Procedure Laterality Date     BLADDER SURGERY       CATARACT IOL, RT/LT  2010     COLONOSCOPY WITH CO2 INSUFFLATION N/A 10/19/2016    Procedure: COLONOSCOPY WITH CO2 INSUFFLATION;  Surgeon: Duane, William Charles, MD;  Location: [facility] OR     GENITOURINARY SURGERY      bladder     Midurethral sling with cystoscopy.  2010     TUBAL LIGATION         Social History:  The patient is now retired, worked as a nurse for many years.     Family History:  Father had skin cancer later in life, was a farmer. No other skin cancers or skin problems.     Medications:  Current Outpatient Prescriptions   Medication Sig Dispense Refill     albuterol (VENTOLIN HFA) 108 (90 BASE) MCG/ACT Inhaler INHALE TWO PUFFS BY MOUTH EVERY 6 HOURS AS NEEDED FOR SHORTNESS OF BREATH / DYSPNEA AND AS NEEDED BEFORE EXERCISE 18 g 11     beclomethasone (QVAR) 40 MCG/ACT Inhaler INHALE 2 PUFFS BY MOUTH INTO THE LUNGS TWO TIMES A DAY 3 Inhaler 3     CALCIUM + D OR One bid       cephALEXin (KEFLEX) 500 MG capsule Take 1 capsule (500 mg) by mouth 3 times daily 30 capsule 0     Chlorpheniramine Maleate (CHLOR-TRIMETON ALLERGY) 12 MG TBCR Take  by mouth as needed.       estradiol (VAGIFEM) 10 MCG TABS vaginal tablet Place 1 tablet (10 mcg) vaginally daily 30 tablet 1     fluocinonide (LIDEX) 0.05 % solution Apply to scalp nightly for up to 1 week, then 2-3 times weekly as needed. 60 mL 3     fluticasone (FLONASE) 50 MCG/ACT spray USE ONE TO TWO SPRAYS IN EACH NOSTRIL EVERY DAY 48 g 3     Ibuprofen (ADVIL PO) Take 800 mg by mouth every 4 hours as needed for moderate pain       lisinopril (PRINIVIL/ZESTRIL) 10 MG tablet Take 1 tablet (10 mg) by mouth daily 90  tablet 1     OPTIVITE OR once a day       order for DME Equipment being ordered: spacer 1 each 0     order for DME Equipment being ordered:    left knee brace-medial compartment  for osteoarthritis. 1 Units 0     ranitidine (ZANTAC) 300 MG tablet TAKE ONE TABLET BY MOUTH EVERY DAY 90 tablet 2     SENIOR MULTIVITAMIN PLUS OR One per day       Allergies   Allergen Reactions     Sulfa Drugs Rash         Review of Systems:  -As per HPI  -Constitutional: The patient denies fatigue, fevers, chills, unintended weight loss, and night sweats.  -Skin: As above in HPI. No additional skin concerns.    Physical exam:  Vitals: There were no vitals taken for this visit.  GEN: This is a well developed, well-nourished female in no acute distress, in a pleasant mood.    SKIN: Total skin excluding the undergarment areas was performed. The exam included the head/face, neck, both arms, chest, back, abdomen, both legs, digits and/or nails.   -Sun exposed areas with diffuse tan pigmentation   -5mm pink scaly flat topped papule with mildly verrucous surface on nasal bridge, with 2mm area of looser scale near lateral border   -7mm flesh colored papule with xerotic verrucous surface on R posterior calf   -3-5mm light to mid brown macules and papules on upper back, chest, extremities, dermoscopy without concerning features  -No other lesions of concern on areas examined.     Impression/Plan:  1. Actinic keratosis - Lesion on the nasal bridge is consistent with AK, although there are features of seborrheic keratosis. Will treat with cryotherapy today.     Cryotherapy procedure note (performed by faculty): After verbal consent and discussion of risks and benefits including but no limited to dyspigmentation/scar, blister, infection, recurrence, 1 lesion was(were) treated with 1-2mm freeze border for 2 cycles with liquid nitrogen. Post cryotherapy instructions were provided.     2. Seborrheic keratosis,inflamed/ symptomatic, on R  posterior calf - Reports that this lesion has been treated twice by PCP with cryotherapy, it is occasionally pruritic and bothersome, would like to have it treated today.     Cryotherapy procedure note (performed by faculty): After verbal consent and discussion of risks and benefits including but no limited to dyspigmentation/scar, blister, infection, recurrence,1 lesion was(were) treated with 1-2mm freeze border for 2 cycles with liquid nitrogen. Post cryotherapy instructions were provided.     3. Multiple clinically benign nevi on the upper back, chest, extremities     Sun precaution was advised including the use of sun screens of SPF 30 or higher, sun protective clothing, and avoidance of tanning beds.    Follow-up in 1 year, earlier for new or changing lesions.     Staff Involved:  Scribed by Julissa Feldman, MS4 for Dr. Roy.      The medical student acted as a scribe with respect to this patient.  I have performed all the key elements of the history and physical, as well as all procedures.    Aaron Roy MD  Dermatology Attending

## 2018-06-28 NOTE — PROGRESS NOTES
[facility] Dermatology Note      Dermatology Problem List:  1.Actinic keratosis  -S/p cryotherapy 6/28  2.Seborrheic dermatitis  3.Eczematous dermatitis, resolved    Encounter Date: Jun 28, 2018    CC:  Chief Complaint   Patient presents with     Skin Check     Maddie is here today to be seen for a lesion on her nose- been there for about 6 months.          History of Present Illness:  Ms. Maddie Lala is a 68 year old female who presents for evaluation of a spot on the nose. Her PCP noticed it and recommended she have it examined by a dermatologist. It has been present for about 6 months, it is not itchy, painful, bleeding, or otherwise bothersome. She denies other new, itching, changing, bleeding or nonhealing spots. She has not personal history of skin cancer. Does spend a lot of time out in her garden during the summer months and does not usually wear sunscreen. No other concerns today.     Past Medical History:   Patient Active Problem List   Diagnosis     Urinary incontinence     Cataract     CARDIOVASCULAR SCREENING; LDL GOAL LESS THAN 160     Tendonitis of shoulder, right     Chronic cough     Brachial neuritis or radiculitis     Seasonal allergic rhinitis     Chronic allergic conjunctivitis     GERD (gastroesophageal reflux disease)     Latent tuberculosis     Meniere's disease     Menopause     Cervical nerve root impingement     Advanced directives, counseling/discussion     Atrophic vaginitis     Hyponatremia     Neck pain     Muscle spasms of neck     Benign essential hypertension     Past Medical History:   Diagnosis Date     Allergic rhinitis 1979     Arthritis      Benign essential hypertension 3/23/2018     Cervical dysplasia with cone in 1979    nl pap ever since      Dyspareunia      Hearing problem 2015     Meniere's disease 2002     Nonsenile cataract      Plantar fasciitis      Stress incontinence      SVT (supraventricular tachycardia) (H)     resolved     Tinnitus 2002      Past Surgical History:   Procedure Laterality Date     BLADDER SURGERY       CATARACT IOL, RT/LT  2010     COLONOSCOPY WITH CO2 INSUFFLATION N/A 10/19/2016    Procedure: COLONOSCOPY WITH CO2 INSUFFLATION;  Surgeon: Duane, William Charles, MD;  Location: [facility] OR     GENITOURINARY SURGERY      bladder     Midurethral sling with cystoscopy.  2010     TUBAL LIGATION         Social History:  The patient is now retired, worked as a nurse for many years.     Family History:  Father had skin cancer later in life, was a farmer. No other skin cancers or skin problems.     Medications:  Current Outpatient Prescriptions   Medication Sig Dispense Refill     albuterol (VENTOLIN HFA) 108 (90 BASE) MCG/ACT Inhaler INHALE TWO PUFFS BY MOUTH EVERY 6 HOURS AS NEEDED FOR SHORTNESS OF BREATH / DYSPNEA AND AS NEEDED BEFORE EXERCISE 18 g 11     beclomethasone (QVAR) 40 MCG/ACT Inhaler INHALE 2 PUFFS BY MOUTH INTO THE LUNGS TWO TIMES A DAY 3 Inhaler 3     CALCIUM + D OR One bid       cephALEXin (KEFLEX) 500 MG capsule Take 1 capsule (500 mg) by mouth 3 times daily 30 capsule 0     Chlorpheniramine Maleate (CHLOR-TRIMETON ALLERGY) 12 MG TBCR Take  by mouth as needed.       estradiol (VAGIFEM) 10 MCG TABS vaginal tablet Place 1 tablet (10 mcg) vaginally daily 30 tablet 1     fluocinonide (LIDEX) 0.05 % solution Apply to scalp nightly for up to 1 week, then 2-3 times weekly as needed. 60 mL 3     fluticasone (FLONASE) 50 MCG/ACT spray USE ONE TO TWO SPRAYS IN EACH NOSTRIL EVERY DAY 48 g 3     Ibuprofen (ADVIL PO) Take 800 mg by mouth every 4 hours as needed for moderate pain       lisinopril (PRINIVIL/ZESTRIL) 10 MG tablet Take 1 tablet (10 mg) by mouth daily 90 tablet 1     OPTIVITE OR once a day       order for DME Equipment being ordered: spacer 1 each 0     order for DME Equipment being ordered:    left knee brace-medial compartment  for osteoarthritis. 1 Units 0     ranitidine (ZANTAC) 300 MG tablet TAKE ONE TABLET BY MOUTH  EVERY DAY 90 tablet 2     SENIOR MULTIVITAMIN PLUS OR One per day       Allergies   Allergen Reactions     Sulfa Drugs Rash         Review of Systems:  -As per HPI  -Constitutional: The patient denies fatigue, fevers, chills, unintended weight loss, and night sweats.  -Skin: As above in HPI. No additional skin concerns.    Physical exam:  Vitals: There were no vitals taken for this visit.  GEN: This is a well developed, well-nourished female in no acute distress, in a pleasant mood.    SKIN: Total skin excluding the undergarment areas was performed. The exam included the head/face, neck, both arms, chest, back, abdomen, both legs, digits and/or nails.   -Sun exposed areas with diffuse tan pigmentation   -5mm pink scaly flat topped papule with mildly verrucous surface on nasal bridge, with 2mm area of looser scale near lateral border   -7mm flesh colored papule with xerotic verrucous surface on R posterior calf   -3-5mm light to mid brown macules and papules on upper back, chest, extremities, dermoscopy without concerning features  -No other lesions of concern on areas examined.     Impression/Plan:  1. Actinic keratosis - Lesion on the nasal bridge is consistent with AK, although there are features of seborrheic keratosis. Will treat with cryotherapy today.     Cryotherapy procedure note (performed by faculty): After verbal consent and discussion of risks and benefits including but no limited to dyspigmentation/scar, blister, infection, recurrence, 1 lesion was(were) treated with 1-2mm freeze border for 2 cycles with liquid nitrogen. Post cryotherapy instructions were provided.     2. Seborrheic keratosis,inflamed/ symptomatic, on R posterior calf - Reports that this lesion has been treated twice by PCP with cryotherapy, it is occasionally pruritic and bothersome, would like to have it treated today.     Cryotherapy procedure note (performed by faculty): After verbal consent and discussion of risks and benefits  including but no limited to dyspigmentation/scar, blister, infection, recurrence,1 lesion was(were) treated with 1-2mm freeze border for 2 cycles with liquid nitrogen. Post cryotherapy instructions were provided.     3. Multiple clinically benign nevi on the upper back, chest, extremities     Sun precaution was advised including the use of sun screens of SPF 30 or higher, sun protective clothing, and avoidance of tanning beds.    Follow-up in 1 year, earlier for new or changing lesions.     Staff Involved:  Scribed by Julissa Feldman, MS4 for Dr. Roy.      The medical student acted as a scribe with respect to this patient.  I have performed all the key elements of the history and physical, as well as all procedures.    Aaorn Roy MD  Dermatology Attending

## 2018-06-28 NOTE — PATIENT INSTRUCTIONS
-If you are out in the sun for prolonged periods, use sunscreen SPF30 or greater and reapply if out for >2 hours  -Sun protective clothing is also a great option, Cool[employer]ar is a brand that we recommend: [email or web address].[email or web address]    Cryotherapy    What is it?    Use of a very cold liquid, such as liquid nitrogen, to freeze and destroy abnormal skin cells that need to be removed    What should I expect?    Tenderness and redness    A small blister that might grow and fill with dark purple blood. There may be crusting.    More than one treatment may be needed if the lesions do not go away.    How do I care for the treated area?    Gently wash the area with your hands when bathing.    Use a thin layer of Vaseline to help with healing. You may use a Band-Aid.     The area should heal within 7-10 days and may leave behind a pink or lighter color.     Do not use an antibiotic or Neosporin ointment.     You may take acetaminophen (Tylenol) for pain.     Call your Doctor if you have:    Severe pain    Signs of infection (warmth, redness, cloudy yellow drainage, and or a bad smell)    Questions or concerns    Who should I call with questions?       [facility]: [phone]       [facility]: [phone]       For urgent needs outside of business hours call the [facility] at [phone]        and ask for the dermatology resident on call

## 2018-07-12 PROBLEM — L57.0 AK (ACTINIC KERATOSIS): Status: ACTIVE | Noted: 2018-07-12

## 2018-07-12 PROBLEM — L82.0 SEBORRHEIC KERATOSES, INFLAMED: Status: ACTIVE | Noted: 2018-07-12

## 2018-07-12 PROBLEM — D22.9 MULTIPLE MELANOCYTIC NEVI: Status: ACTIVE | Noted: 2018-07-12

## 2018-08-02 ENCOUNTER — OFFICE VISIT (OUTPATIENT)
Dept: ORTHOPEDICS | Facility: CLINIC | Age: 68
End: 2018-08-02
Payer: COMMERCIAL

## 2018-08-02 VITALS — OXYGEN SATURATION: 96 % | HEART RATE: 90 BPM | DIASTOLIC BLOOD PRESSURE: 80 MMHG | SYSTOLIC BLOOD PRESSURE: 126 MMHG

## 2018-08-02 DIAGNOSIS — I10 BENIGN ESSENTIAL HYPERTENSION: ICD-10-CM

## 2018-08-02 DIAGNOSIS — G89.29 CHRONIC PAIN OF LEFT KNEE: Primary | ICD-10-CM

## 2018-08-02 DIAGNOSIS — M25.562 CHRONIC PAIN OF LEFT KNEE: Primary | ICD-10-CM

## 2018-08-02 LAB
ANION GAP SERPL CALCULATED.3IONS-SCNC: 5 MMOL/L (ref 3–14)
BUN SERPL-MCNC: 13 MG/DL (ref 7–30)
CALCIUM SERPL-MCNC: 8.6 MG/DL (ref 8.5–10.1)
CHLORIDE SERPL-SCNC: 102 MMOL/L (ref 94–109)
CO2 SERPL-SCNC: 29 MMOL/L (ref 20–32)
CREAT SERPL-MCNC: 0.66 MG/DL (ref 0.52–1.04)
GFR SERPL CREATININE-BSD FRML MDRD: 89 ML/MIN/1.7M2
GLUCOSE SERPL-MCNC: 91 MG/DL (ref 70–99)
POTASSIUM SERPL-SCNC: 4.3 MMOL/L (ref 3.4–5.3)
SODIUM SERPL-SCNC: 136 MMOL/L (ref 133–144)

## 2018-08-02 PROCEDURE — 99207 ZZC NO CHARGE LOS: CPT | Performed by: ORTHOPAEDIC SURGERY

## 2018-08-02 PROCEDURE — 20610 DRAIN/INJ JOINT/BURSA W/O US: CPT | Mod: LT | Performed by: ORTHOPAEDIC SURGERY

## 2018-08-02 PROCEDURE — 36415 COLL VENOUS BLD VENIPUNCTURE: CPT | Performed by: PHYSICIAN ASSISTANT

## 2018-08-02 PROCEDURE — 80048 BASIC METABOLIC PNL TOTAL CA: CPT | Performed by: PHYSICIAN ASSISTANT

## 2018-08-02 ASSESSMENT — PAIN SCALES - GENERAL: PAINLEVEL: NO PAIN (0)

## 2018-08-02 NOTE — LETTER
8/2/2018         RE: Maddie Lala  [address] [place] [id number]        Dear Colleague,    Thank you for referring your patient, Maddie Lala, to the [facility]. Please see a copy of my visit note below.    Maddie returns to my clinic today for interval follow-up I did chance to see her in March 2017 that time I diagnosed her with medial compartment osteoarthritis I offered a steroid injection which she declined.  She used an  brace as well as a therapy program this was helpful to her.  Her pain is worsened.  She returns to clinic today interested in discussing injection.    Examination shows medial joint line tenderness about her left knee.  Ligaments stable.  Neurovascularly intact.    Imaging shows medial compartment osteoarthritis.    Clinical assessment osteoarthritis medial compartment left knee    Plan: Long discussion with Maddie this time reviewed with her diagnosis potential treatment options we elected to proceed with a corticosteroid injection.    After written informed consent obtained and signed, after sufficient prepping and sterile technique, 40 mg of kenalog and , 8 cc of 1% lidocaine were injected without complication into the left knee. The patient tolerated the injection well and a sterile dressing was applied.         Again, thank you for allowing me to participate in the care of your patient.        Sincerely,        Chaparro Xiong MD

## 2018-08-02 NOTE — PROGRESS NOTES
Maddie returns to my clinic today for interval follow-up I did chance to see her in March 2017 that time I diagnosed her with medial compartment osteoarthritis I offered a steroid injection which she declined.  She used an  brace as well as a therapy program this was helpful to her.  Her pain is worsened.  She returns to clinic today interested in discussing injection.    Examination shows medial joint line tenderness about her left knee.  Ligaments stable.  Neurovascularly intact.    Imaging shows medial compartment osteoarthritis.    Clinical assessment osteoarthritis medial compartment left knee    Plan: Long discussion with Maddie this time reviewed with her diagnosis potential treatment options we elected to proceed with a corticosteroid injection.    After written informed consent obtained and signed, after sufficient prepping and sterile technique, 40 mg of kenalog and , 8 cc of 1% lidocaine were injected without complication into the left knee. The patient tolerated the injection well and a sterile dressing was applied.

## 2018-08-02 NOTE — PATIENT INSTRUCTIONS
Thanks for coming today.  Ortho/Sports Medicine Clinic  [address] [place] [address]    To schedule future appointments in Ortho Clinic, you may call [phone].    To schedule ordered imaging by your Provider: Call [facility] at [phone]    [email or web address] ([employer]) available online at:   [email or web address]    Please call if any further questions or concerns [phone] and ask for the Orthopedic Department. Clinic hours 8 am to 5 pm.    Return to clinic if symptoms worsen.

## 2018-08-02 NOTE — MR AVS SNAPSHOT
After Visit Summary   8/2/2018    Maddie Lala    MRN: [id number]           Patient Information     Date Of Birth          1950        Visit Information        Provider Department      8/2/2018 1:00 PM Chaparro Xiong MD [facility]        Today's Diagnoses     Chronic pain of left knee    -  1      Care Instructions    Thanks for coming today.  Ortho/[facility] Medicine Clinic  [address] [place] [address]    To schedule future appointments in Ortho Clinic, you may call [phone].    To schedule ordered imaging by your Provider: Call [facility] at [phone]    [facility] available online at:   [employer]    Please call if any further questions or concerns [phone] and ask for the Orthopedic Department. Clinic hours 8 am to 5 pm.    Return to clinic if symptoms worsen.            Follow-ups after your visit        Who to contact     If you have questions or need follow up information about today's clinic visit or your schedule please contact [facility] directly at [phone].  Normal or non-critical lab and imaging results will be communicated to you by [employer], letter or phone within 4 business days after the clinic has received the results. If you do not hear from us within 7 days, please contact the clinic through [employer] or phone. If you have a critical or abnormal lab result, we will notify you by phone as soon as possible.  Submit refill requests through [facility] or call your pharmacy and they will forward the refill request to us. Please allow 3 business days for your refill to be completed.          Additional Information About Your Visit        [facility] Information     [facility] gives you secure access to your electronic health record. If you see a primary care provider, you can also send messages to your care team and make appointments. If you have questions, please call your primary care clinic.  If  you do not have a primary care provider, please call [phone] and they will assist you.      [employer] is an electronic gateway that provides easy, online access to your medical records. With [employer], you can request a clinic appointment, read your test results, renew a prescription or communicate with your care team.     To access your existing account, please contact your [facility] or call [phone] for assistance.        Care EveryWhere ID     This is your Care EveryWhere ID. This could be used by other organizations to access your [facility] records  [id number]        Your Vitals Were     Pulse Pulse Oximetry                90 96%           Blood Pressure from Last 3 Encounters:   08/02/18 126/80   05/15/18 132/78   03/22/18 130/74    Weight from Last 3 Encounters:   11/16/17 75.9 kg (167 lb 4.8 oz)   03/30/17 78.9 kg (174 lb)   11/03/16 78 kg (172 lb)              We Performed the Following     DRAIN/INJECT LARGE JOINT/BURSA     TRIAMCINOLONE ACET INJ NOS        Primary Care Provider Office Phone # Fax #    Radha Hurtado [phone] [address]  [facility] [address]        Equal Access to Services     ALYSON DAVIS : Hadii aad ku hadasho Soomaali, waaxda luqadaha, qaybta kaalmada adeegyada, waxay idiin hayradhan dakota guy. So [facility] [phone].    ATENCIÓN: Si habla español, tiene a mayo disposición servicios gratuitos de asistencia lingüística. Llame al [phone].    We comply with applicable federal civil rights laws and [place] laws. We do not discriminate on the basis of race, color, national origin, age, disability, sex, sexual orientation, or gender identity.            Thank you!     Thank you for choosing [facility]  for your care. Our goal is always to provide you with excellent care. Hearing back from our patients is one way we can continue to improve our services. Please take a few minutes to  complete the written survey that you may receive in the mail after your visit with us. Thank you!             Your Updated Medication List - Protect others around you: Learn how to safely use, store and throw away your medicines at www.disposemymeds.org.          This list is accurate as of 8/2/18  1:34 PM.  Always use your most recent med list.                   Brand Name Dispense Instructions for use Diagnosis    ADVIL PO      Take 800 mg by mouth every 4 hours as needed for moderate pain        albuterol 108 (90 Base) MCG/ACT Inhaler    VENTOLIN HFA    18 g    INHALE TWO PUFFS BY MOUTH EVERY 6 HOURS AS NEEDED FOR SHORTNESS OF BREATH / DYSPNEA AND AS NEEDED BEFORE EXERCISE    Cough due to bronchospasm       beclomethasone 40 MCG/ACT Inhaler    QVAR    3 Inhaler    INHALE 2 PUFFS BY MOUTH INTO THE LUNGS TWO TIMES A DAY    Chronic cough       CALCIUM + D PO      One bid        cephALEXin 500 MG capsule    KEFLEX    30 capsule    Take 1 capsule (500 mg) by mouth 3 times daily    Acute cystitis without hematuria       CHLOR-TRIMETON ALLERGY 12 MG Tbcr   Generic drug:  chlorpheniramine maleate      Take  by mouth as needed.        estradiol 10 MCG Tabs vaginal tablet    VAGIFEM    30 tablet    Place 1 tablet (10 mcg) vaginally daily    Vaginal atrophy       fluocinonide 0.05 % solution    LIDEX    60 mL    Apply to scalp nightly for up to 1 week, then 2-3 times weekly as needed.    Dermatitis, seborrheic       fluticasone 50 MCG/ACT spray    FLONASE    48 g    USE ONE TO TWO SPRAYS IN EACH NOSTRIL EVERY DAY    Chronic allergic conjunctivitis       lisinopril 10 MG tablet    PRINIVIL/ZESTRIL    90 tablet    Take 1 tablet (10 mg) by mouth daily    Benign essential hypertension       order for DME     1 Units    Equipment being ordered:    left knee brace-medial compartment  for osteoarthritis.    Primary osteoarthritis of left knee       order for DME     1 each    Equipment being ordered: spacer    Cough due to  bronchospasm       ranitidine 300 MG tablet    ZANTAC    90 tablet    TAKE ONE TABLET BY MOUTH EVERY DAY    Gastroesophageal reflux disease without esophagitis       * SENIOR MULTIVITAMIN PLUS PO      One per day        * OPTIVITE PO      once a day        * Notice:  This list has 2 medication(s) that are the same as other medications prescribed for you. Read the directions carefully, and ask your doctor or other care provider to review them with you.

## 2018-08-02 NOTE — PROGRESS NOTES
Juan Davdi Perkins  I hope you are doing well.   Your electrolytes, blood sugar, and kidney function were normal.   I will forward this message on to Dr. Hurtado (your PCP)  Please call or MyChart my office with any questions or concerns.    Elicia Richards, PAC

## 2018-08-02 NOTE — NURSING NOTE
Maddie Lala's chief complaint for this visit includes:  Chief Complaint   Patient presents with     RECHECK     left knee follow up     PCP: Radha Hurtado    Referring Provider:  No referring provider defined for this encounter.    /80  Pulse 90  SpO2 96%  Data Unavailable     Do you need any medication refills at today's visit? no

## 2018-08-09 ENCOUNTER — MYC MEDICAL ADVICE (OUTPATIENT)
Dept: ORTHOPEDICS | Facility: CLINIC | Age: 68
End: 2018-08-09

## 2018-08-12 ENCOUNTER — APPOINTMENT (OUTPATIENT)
Dept: CT IMAGING | Facility: CLINIC | Age: 68
End: 2018-08-12
Attending: NURSE PRACTITIONER
Payer: COMMERCIAL

## 2018-08-12 ENCOUNTER — HOSPITAL ENCOUNTER (EMERGENCY)
Facility: CLINIC | Age: 68
Discharge: HOME | End: 2018-08-12
Attending: NURSE PRACTITIONER | Admitting: NURSE PRACTITIONER
Payer: COMMERCIAL

## 2018-08-12 VITALS
TEMPERATURE: 98.1 F | WEIGHT: 177.1 LBS | DIASTOLIC BLOOD PRESSURE: 80 MMHG | OXYGEN SATURATION: 97 % | BODY MASS INDEX: 27.74 KG/M2 | SYSTOLIC BLOOD PRESSURE: 146 MMHG | RESPIRATION RATE: 20 BRPM

## 2018-08-12 DIAGNOSIS — K11.20 SIALOADENITIS OF SUBMANDIBULAR GLAND: ICD-10-CM

## 2018-08-12 LAB
ALBUMIN SERPL-MCNC: 3.5 G/DL (ref 3.4–5)
ALP SERPL-CCNC: 67 U/L (ref 40–150)
ALT SERPL W P-5'-P-CCNC: 34 U/L (ref 0–50)
ANION GAP SERPL CALCULATED.3IONS-SCNC: 5 MMOL/L (ref 3–14)
AST SERPL W P-5'-P-CCNC: 23 U/L (ref 0–45)
BASOPHILS # BLD AUTO: 0 10E9/L (ref 0–0.2)
BASOPHILS NFR BLD AUTO: 0.7 %
BILIRUB SERPL-MCNC: 0.2 MG/DL (ref 0.2–1.3)
BUN SERPL-MCNC: 11 MG/DL (ref 7–30)
CALCIUM SERPL-MCNC: 8.5 MG/DL (ref 8.5–10.1)
CHLORIDE SERPL-SCNC: 100 MMOL/L (ref 94–109)
CO2 SERPL-SCNC: 30 MMOL/L (ref 20–32)
CREAT SERPL-MCNC: 0.79 MG/DL (ref 0.52–1.04)
DIFFERENTIAL METHOD BLD: ABNORMAL
EOSINOPHIL NFR BLD AUTO: 0.9 %
ERYTHROCYTE [DISTWIDTH] IN BLOOD BY AUTOMATED COUNT: 13.1 % (ref 10–15)
GFR SERPL CREATININE-BSD FRML MDRD: 72 ML/MIN/1.7M2
GLUCOSE SERPL-MCNC: 104 MG/DL (ref 70–99)
HCT VFR BLD AUTO: 34.6 % (ref 35–47)
HGB BLD-MCNC: 11.9 G/DL (ref 11.7–15.7)
IMM GRANULOCYTES # BLD: 0 10E9/L (ref 0–0.4)
IMM GRANULOCYTES NFR BLD: 0 %
LYMPHOCYTES # BLD AUTO: 1.7 10E9/L (ref 0.8–5.3)
LYMPHOCYTES NFR BLD AUTO: 28.7 %
MCH RBC QN AUTO: 32.5 PG (ref 26.5–33)
MCHC RBC AUTO-ENTMCNC: 34.4 G/DL (ref 31.5–36.5)
MCV RBC AUTO: 95 FL (ref 78–100)
MONOCYTES # BLD AUTO: 0.5 10E9/L (ref 0–1.3)
MONOCYTES NFR BLD AUTO: 8.9 %
NEUTROPHILS # BLD AUTO: 3.5 10E9/L (ref 1.6–8.3)
NEUTROPHILS NFR BLD AUTO: 60.8 %
NRBC # BLD AUTO: 0 10*3/UL
NRBC BLD AUTO-RTO: 0 /100
PLATELET # BLD AUTO: 299 10E9/L (ref 150–450)
POTASSIUM SERPL-SCNC: 4.4 MMOL/L (ref 3.4–5.3)
PROT SERPL-MCNC: 6.3 G/DL (ref 6.8–8.8)
RBC # BLD AUTO: 3.66 10E12/L (ref 3.8–5.2)
SODIUM SERPL-SCNC: 135 MMOL/L (ref 133–144)
WBC # BLD AUTO: 5.8 10E9/L (ref 4–11)

## 2018-08-12 PROCEDURE — 96361 HYDRATE IV INFUSION ADD-ON: CPT | Performed by: NURSE PRACTITIONER

## 2018-08-12 PROCEDURE — 25000125 ZZHC RX 250: Performed by: NURSE PRACTITIONER

## 2018-08-12 PROCEDURE — 96374 THER/PROPH/DIAG INJ IV PUSH: CPT | Mod: 59 | Performed by: NURSE PRACTITIONER

## 2018-08-12 PROCEDURE — 96372 THER/PROPH/DIAG INJ SC/IM: CPT | Mod: 59 | Performed by: NURSE PRACTITIONER

## 2018-08-12 PROCEDURE — 70491 CT SOFT TISSUE NECK W/DYE: CPT

## 2018-08-12 PROCEDURE — 25000128 H RX IP 250 OP 636: Performed by: NURSE PRACTITIONER

## 2018-08-12 PROCEDURE — 99285 EMERGENCY DEPT VISIT HI MDM: CPT | Mod: 25 | Performed by: NURSE PRACTITIONER

## 2018-08-12 PROCEDURE — 80053 COMPREHEN METABOLIC PANEL: CPT | Performed by: NURSE PRACTITIONER

## 2018-08-12 PROCEDURE — 96375 TX/PRO/DX INJ NEW DRUG ADDON: CPT | Performed by: NURSE PRACTITIONER

## 2018-08-12 PROCEDURE — 85025 COMPLETE CBC W/AUTO DIFF WBC: CPT | Performed by: NURSE PRACTITIONER

## 2018-08-12 PROCEDURE — 99284 EMERGENCY DEPT VISIT MOD MDM: CPT | Mod: Z6 | Performed by: NURSE PRACTITIONER

## 2018-08-12 RX ORDER — EPINEPHRINE 1 MG/ML
0.3 INJECTION, SOLUTION, CONCENTRATE INTRAVENOUS ONCE
Status: COMPLETED | OUTPATIENT
Start: 2018-08-12 | End: 2018-08-12

## 2018-08-12 RX ORDER — IOPAMIDOL 755 MG/ML
500 INJECTION, SOLUTION INTRAVASCULAR ONCE
Status: COMPLETED | OUTPATIENT
Start: 2018-08-12 | End: 2018-08-12

## 2018-08-12 RX ORDER — METHYLPREDNISOLONE SODIUM SUCCINATE 125 MG/2ML
125 INJECTION, POWDER, LYOPHILIZED, FOR SOLUTION INTRAMUSCULAR; INTRAVENOUS ONCE
Status: COMPLETED | OUTPATIENT
Start: 2018-08-12 | End: 2018-08-12

## 2018-08-12 RX ORDER — CEPHALEXIN 500 MG/1
500 CAPSULE ORAL 4 TIMES DAILY
Qty: 40 CAPSULE | Refills: 0 | Status: SHIPPED | OUTPATIENT
Start: 2018-08-12 | End: 2018-08-22

## 2018-08-12 RX ADMIN — SODIUM CHLORIDE 1000 ML: 9 INJECTION, SOLUTION INTRAVENOUS at 12:55

## 2018-08-12 RX ADMIN — EPINEPHRINE 0.3 MG: 1 INJECTION INTRAMUSCULAR; INTRAVENOUS; SUBCUTANEOUS at 12:49

## 2018-08-12 RX ADMIN — METHYLPREDNISOLONE SODIUM SUCCINATE 125 MG: 125 INJECTION, POWDER, FOR SOLUTION INTRAMUSCULAR; INTRAVENOUS at 12:56

## 2018-08-12 RX ADMIN — SODIUM CHLORIDE 68 ML: 9 INJECTION, SOLUTION INTRAVENOUS at 13:59

## 2018-08-12 RX ADMIN — RANITIDINE HYDROCHLORIDE 50 MG: 25 INJECTION INTRAMUSCULAR; INTRAVENOUS at 13:07

## 2018-08-12 RX ADMIN — IOPAMIDOL 80 ML: 755 INJECTION, SOLUTION INTRAVENOUS at 13:59

## 2018-08-12 ASSESSMENT — ENCOUNTER SYMPTOMS: TROUBLE SWALLOWING: 1

## 2018-08-12 NOTE — DISCHARGE INSTRUCTIONS
"  Salivary Gland Infection  Salivary glands make saliva. Saliva is mostly water. It also has minerals and proteins that help break down food and keep the mouth and teeth healthy. There are 3 pairs of salivary glands:    Parotid glands (in front of the ear)    Submandibular glands (below the jaw)    Sublingual glands (below the tongue)  A salivary gland can become infected by bacteria (germs). Things that make this more likely include dehydration and taking medicines that affect saliva flow. Infection is also more likely when the tube (duct) that carries saliva from the gland to the mouth is blocked. It may be blocked by a salivary gland \"stone.\" This is a collection of minerals that forms in the salivary gland.  Signs of infection include fever, severe pain in the gland, and redness and swelling over the gland. It may hurt to open the mouth. Symptoms may be worse when the flow of saliva is stimulated, such as by the smell of food.   Antibiotics are used to treat the infection. Drainage of the infection with a simple surgery may be needed. If you have a salivary gland stone, a procedure may be done to remove it.  Home Care:    Take antibiotics as directed until they are finished. Do this even if you start to feel better after only a few days.    Unless another medicine was prescribed, take over-the-counter medicines, such as acetaminophen or ibuprofen, to help relieve pain.    Moist heat can also help relieve swelling and pain. Wet a cloth with warm water and put it over the sore gland for 10-15 minutes several times a day.    Gently massage the gland a few times a day.     Suck on lemon or other tart hard candies to cause flow of saliva.  To help prevent stones and infections:    Drink 6-8 glasses of fluid per day (such as water, tea, and clear soup) to keep well hydrated.    If you smoke, ask your healthcare provider for help to quit. Smoking makes salivary gland stones more likely.    Keep good dental hygiene. " Brush and floss your teeth daily. See your dentist for regular cleanings.  Follow-up care  Follow up with your healthcare provider or as advised.   When to seek medical advice  Call your healthcare provider if any of the following occur:    Fever over 100.4 F (38 C) after 2 days of taking antibiotics    Symptoms that get worse or don't get better in a few days    Trouble breathing or swallowing  Date Last Reviewed: 10/1/2017    7549-0276 [employer]. [address] [place] [address]. All rights reserved. This information is not intended as a substitute for professional medical care. Always follow your healthcare professional's instructions.

## 2018-08-12 NOTE — ED AVS SNAPSHOT
[facility]    [address] [place] [address]    Phone:  [phone]    Fax:  [phone]                                       Maddie Lala   MRN: [id number]    Department:  [facility]   Date of Visit:  8/12/2018           After Visit Summary Signature Page     I have received my discharge instructions, and my questions have been answered. I have discussed any challenges I see with this plan with the nurse or doctor.    ..........................................................................................................................................  Patient/Patient Representative Signature      ..........................................................................................................................................  Patient Representative Print Name and Relationship to Patient    ..................................................               ................................................  Date                                            Time    ..........................................................................................................................................  Reviewed by Signature/Title    ...................................................              ..............................................  Date                                                            Time

## 2018-08-12 NOTE — ED AVS SNAPSHOT
" [facility]    [address] [place] [address]    Phone:  [phone]    Fax:  [phone]                                       Maddie Lala   MRN: [id number]    Department:  [facility]   Date of Visit:  8/12/2018           Patient Information     Date Of Birth          1950        Your diagnoses for this visit were:     Sialoadenitis of submandibular gland left       You were seen by Cherise Longoria, QUEENIE CNP.      Follow-up Information     Follow up with [facility].    Specialty:  EMERGENCY MEDICINE    Why:  If symptoms worsen    Contact information:    [address] [place] [address] [phone]    Additional information:    From [address]: Exit at [address] on [place] side of [place]. Turn right on [address]. Turn left at stoplight on [address]. [facility] will be in view two blocks ahead        Follow up with Gregory Denny MD.    Specialty:  Otolaryngology    Why:  Call [phone] Specialty Clinic to Schedule on Monday morning.     Contact information:    [address] [place] [address] [phone]          Discharge Instructions         Salivary Gland Infection  Salivary glands make saliva. Saliva is mostly water. It also has minerals and proteins that help break down food and keep the mouth and teeth healthy. There are 3 pairs of salivary glands:    Parotid glands (in front of the ear)    Submandibular glands (below the jaw)    Sublingual glands (below the tongue)  A salivary gland can become infected by bacteria (germs). Things that make this more likely include dehydration and taking medicines that affect saliva flow. Infection is also more likely when the tube (duct) that carries saliva from the gland to the mouth is blocked. It may be blocked by a salivary gland \"stone.\" This is a collection of minerals that forms in the salivary " gland.  Signs of infection include fever, severe pain in the gland, and redness and swelling over the gland. It may hurt to open the mouth. Symptoms may be worse when the flow of saliva is stimulated, such as by the smell of food.   Antibiotics are used to treat the infection. Drainage of the infection with a simple surgery may be needed. If you have a salivary gland stone, a procedure may be done to remove it.  Home Care:    Take antibiotics as directed until they are finished. Do this even if you start to feel better after only a few days.    Unless another medicine was prescribed, take over-the-counter medicines, such as acetaminophen or ibuprofen, to help relieve pain.    Moist heat can also help relieve swelling and pain. Wet a cloth with warm water and put it over the sore gland for 10-15 minutes several times a day.    Gently massage the gland a few times a day.     Suck on lemon or other tart hard candies to cause flow of saliva.  To help prevent stones and infections:    Drink 6-8 glasses of fluid per day (such as water, tea, and clear soup) to keep well hydrated.    If you smoke, ask your healthcare provider for help to quit. Smoking makes salivary gland stones more likely.    Keep good dental hygiene. Brush and floss your teeth daily. See your dentist for regular cleanings.  Follow-up care  Follow up with your healthcare provider or as advised.   When to seek medical advice  Call your healthcare provider if any of the following occur:    Fever over 100.4 F (38 C) after 2 days of taking antibiotics    Symptoms that get worse or don't get better in a few days    Trouble breathing or swallowing  Date Last Reviewed: 10/1/2017    7478-0871 [employer]. [address] [place] [address], [place] [address]. All rights reserved. This information is not intended as a substitute for professional medical care. Always follow your healthcare professional's instructions.          24 Hour Appointment Hotline       To  make an appointment at any [facility] clinic, call [phone] ([phone]). If you don't have a family doctor or clinic, we will help you find one. [facility] clinics are conveniently located to serve the needs of you and your family.          ED Discharge Orders     OTOLARYNGOLOGY REFERRAL       Your provider has referred you to: [facility]  [place].    Please be aware that coverage of these services is subject to the terms and limitations of your health insurance plan.  Call member services at your health plan with any benefit or coverage questions.      Please bring the following with you to your appointment:    (1) Any X-Rays, CTs or MRIs which have been performed.  Contact the facility where they were done to arrange for  prior to your scheduled appointment.   (2) List of current medications  (3) This referral request   (4) Any documents/labs given to you for this referral                     Review of your medicines      START taking        Dose / Directions Last dose taken    cephALEXin 500 MG capsule   Commonly known as:  KEFLEX   Dose:  500 mg   Quantity:  40 capsule        Take 1 capsule (500 mg) by mouth 4 times daily for 10 days   Refills:  0          Our records show that you are taking the medicines listed below. If these are incorrect, please call your family doctor or clinic.        Dose / Directions Last dose taken    ADVIL PO   Dose:  800 mg        Take 800 mg by mouth every 4 hours as needed for moderate pain   Refills:  0        albuterol 108 (90 Base) MCG/ACT inhaler   Commonly known as:  VENTOLIN HFA   Quantity:  18 g        INHALE TWO PUFFS BY MOUTH EVERY 6 HOURS AS NEEDED FOR SHORTNESS OF BREATH / DYSPNEA AND AS NEEDED BEFORE EXERCISE   Refills:  11        beclomethasone 40 MCG/ACT Inhaler   Commonly known as:  QVAR   Quantity:  3 Inhaler        INHALE 2 PUFFS BY MOUTH INTO THE LUNGS TWO TIMES A DAY   Refills:  3        CALCIUM + D PO        One bid   Refills:  0         CHLOR-TRIMETON ALLERGY 12 MG Tbcr   Generic drug:  chlorpheniramine maleate        Take  by mouth as needed.   Refills:  0        estradiol 10 MCG Tabs vaginal tablet   Commonly known as:  VAGIFEM   Dose:  10 mcg   Quantity:  30 tablet        Place 1 tablet (10 mcg) vaginally daily   Refills:  1        fluocinonide 0.05 % solution   Commonly known as:  LIDEX   Quantity:  60 mL        Apply to scalp nightly for up to 1 week, then 2-3 times weekly as needed.   Refills:  3        fluticasone 50 MCG/ACT spray   Commonly known as:  FLONASE   Quantity:  48 g        USE ONE TO TWO SPRAYS IN EACH NOSTRIL EVERY DAY   Refills:  3        lisinopril 10 MG tablet   Commonly known as:  PRINIVIL/ZESTRIL   Dose:  10 mg   Quantity:  90 tablet        Take 1 tablet (10 mg) by mouth daily   Refills:  1        order for DME   Quantity:  1 Units        Equipment being ordered:    left knee brace-medial compartment  for osteoarthritis.   Refills:  0        order for DME   Quantity:  1 each        Equipment being ordered: spacer   Refills:  0        ranitidine 300 MG tablet   Commonly known as:  ZANTAC   Quantity:  90 tablet        TAKE ONE TABLET BY MOUTH EVERY DAY   Refills:  2        * SENIOR MULTIVITAMIN PLUS PO        One per day   Refills:  0        * OPTIVITE PO        once a day   Refills:  0        * Notice:  This list has 2 medication(s) that are the same as other medications prescribed for you. Read the directions carefully, and ask your doctor or other care provider to review them with you.            Prescriptions were sent or printed at these locations (1 Prescription)                   [facility] [place] [address]   [address], [facility] [address]    Telephone:  [phone]   Fax:  [phone]   Hours:                  E-Prescribed (1 of 1)         cephALEXin (KEFLEX) 500 MG capsule                Procedures and tests performed during your visit     CBC with platelets  differential    Comprehensive metabolic panel    Peripheral IV catheter    Soft tissue neck CT w contrast      Orders Needing Specimen Collection     None      Pending Results     No orders found from 8/10/2018 to 8/13/2018.            Pending Culture Results     No orders found from 8/10/2018 to 8/13/2018.            Pending Results Instructions     If you had any lab results that were not finalized at the time of your Discharge, you can call the ED Lab Result RN at [phone]. You will be contacted by this team for any positive Lab results or changes in treatment. The nurses are available 7 days a week from 10A to 6:30P.  You can leave a message 24 hours per day and they will return your call.        Thank you for choosing [facility]       Thank you for choosing [facility] for your care. Our goal is always to provide you with excellent care. Hearing back from our patients is one way we can continue to improve our services. Please take a few minutes to complete the written survey that you may receive in the mail after you visit with us. Thank you!        [employer] Information     [facility] gives you secure access to your electronic health record. If you see a primary care provider, you can also send messages to your care team and make appointments. If you have questions, please call your primary care clinic.  If you do not have a primary care provider, please call [phone] and they will assist you.        Care EveryWhere ID     This is your Care EveryWhere ID. This could be used by other organizations to access your [facility] medical records  [id number]        Equal Access to Services     ALYSON DAVIS : Hadii rizwan Gresham, janet spaulding, qaybta edmondalnereyda serrano . So [facility] [phone].    ATENCIÓN: Si habla español, tiene a mayo disposición servicios gratuitos de asistencia lingüística. Llame al [phone].    We comply with applicable federal civil rights laws  and [place] laws. We do not discriminate on the basis of race, color, national origin, age, disability, sex, sexual orientation, or gender identity.            After Visit Summary       This is your record. Keep this with you and show to your community pharmacist(s) and doctor(s) at your next visit.

## 2018-08-12 NOTE — ED TRIAGE NOTES
Started having swelling in her left side of her neck and felt like it was going into her throat. States had bbq and stir saleh before this came on which she made at home. Took benadryl 50mg at 1315

## 2018-08-13 ENCOUNTER — TELEPHONE (OUTPATIENT)
Dept: OTOLARYNGOLOGY | Facility: OTHER | Age: 68
End: 2018-08-13

## 2018-08-13 ENCOUNTER — MYC MEDICAL ADVICE (OUTPATIENT)
Dept: ORTHOPEDICS | Facility: CLINIC | Age: 68
End: 2018-08-13

## 2018-08-13 NOTE — TELEPHONE ENCOUNTER
Reason for Call:  Same Day Appointment, Requested Provider:   Gregory Denny    PCP: Radha Hurtado    Reason for visit: Patient was seen in the ER and was told to be seen with Eduin this week sometime for a salivary infection.. Patient says she is doing well now since she is on medications, but she went to the ER because it was hard to swallow and difficult to breathe. Again, she is feeling better now. Should she be worked in on Wednesday? Please call her    Duration of symptoms: yesterday    Have you been treated for this in the past? Yes    Additional comments: none    Can we leave a detailed message on this number? YES    Phone number patient can be reached at: Home number on file [phone] (home)    Best Time: any    Call taken on 8/13/2018 at 12:51 PM by Katina Roberts

## 2018-08-15 ENCOUNTER — OFFICE VISIT (OUTPATIENT)
Dept: OTOLARYNGOLOGY | Facility: OTHER | Age: 68
End: 2018-08-15
Payer: COMMERCIAL

## 2018-08-15 VITALS
DIASTOLIC BLOOD PRESSURE: 76 MMHG | OXYGEN SATURATION: 96 % | TEMPERATURE: 97.2 F | HEART RATE: 80 BPM | WEIGHT: 177 LBS | BODY MASS INDEX: 26.83 KG/M2 | SYSTOLIC BLOOD PRESSURE: 130 MMHG | HEIGHT: 68 IN

## 2018-08-15 DIAGNOSIS — K11.20 SIALADENITIS: Primary | ICD-10-CM

## 2018-08-15 PROCEDURE — 99203 OFFICE O/P NEW LOW 30 MIN: CPT | Performed by: OTOLARYNGOLOGY

## 2018-08-15 ASSESSMENT — PAIN SCALES - GENERAL: PAINLEVEL: NO PAIN (0)

## 2018-08-15 NOTE — LETTER
8/15/2018         RE: Maddie Lala  [address] [place] [id number]        Dear Colleague,    Thank you for referring your patient, Maddie Lala, to the [facility]. Please see a copy of my visit note below.    ENT Consultation    Maddie Lala who is a 68 year old female seen in consultation at the request of self.      History of Present Illness - Maddie Lala is a 68 year old female who presents to the clinic after being seen in the emergency department by JONATHAN Bourgeois. Patient was diagnosed with sialadenitis of the left submandibular gland. She was placed on Keflex 500 mg 4 times daily for 10 days. Patient reports that she was eating and started experiencing swelling of the left submandibular area right away.  Was tender but there was no fever no chills no redness.  She was started on Keflex and told to drink a lot of liquids and also suck on sour candy to produce flow saliva.  Heat application locally was not discussed.  She is feeling significantly better now.  She never had any issues like this in the past.        CT SCAN OF THE NECK WITH CONTRAST August 12, 2018 2:10 PM      HISTORY: Swelling on left side of neck, lymphadenopathy, sudden onset.        TECHNIQUE: Axial CT images of the neck following administration of  intravenous contrast with reformations. Radiation dose for this scan  was reduced using automated exposure control, adjustment of the mA  and/or kV according to patient size, or iterative reconstruction  technique. 80 mL Isovue 370  IV.      COMPARISON: None.      FINDINGS: A marker is placed over the left submandibular region. There  is moderate inflammation and fat stranding in the anterior left neck  centered around the submandibular gland. The left submandibular gland  appears enlarged and demonstrates increased enhancement. The  submandibular gland ducts are also moderately enlarged. No stone is  identified within the submandibular gland or  along the course of  Eamon's duct. No rim-enhancing fluid collection or abscess is  appreciated.     Right submandibular gland has a normal appearance by CT. The parotid  glands are relatively symmetric and unremarkable by CT. No abnormal  thyroid gland nodules.     No other suspicious masses within the neck. No suspicious cervical  lymph nodes.     Visualized lung apices are clear. Multilevel degenerative changes in  the spine.         IMPRESSION: Sialadenitis of the left submandibular gland which is  enlarged and demonstrates ductal dilatation. Moderate surrounding  inflammation in the anterior left neck in this region. No loculated  fluid collection or abscess appreciated. No submandibular gland stone  is identified.            CAITLIN BRYAN MD      Body mass index is 26.91 kg/(m^2).    Weight management plan: Patient was referred to their PCP to discuss a diet and exercise plan.    BP Readings from Last 1 Encounters:   08/15/18 130/76     BP noted to be well controlled today in office.     Maddie IS NOT a smoker/uses chewing tobacco.     Past Medical History -   Past Medical History:   Diagnosis Date     Allergic rhinitis 1979     Arthritis      Benign essential hypertension 3/23/2018     Cervical dysplasia with cone in 1979    nl pap ever since      Dyspareunia      Hearing problem 2015     Meniere's disease 2002     Nonsenile cataract      Plantar fasciitis      Stress incontinence      SVT (supraventricular tachycardia) (H)     resolved     Tinnitus 2002       Current Medications -   Current Outpatient Prescriptions:      albuterol (VENTOLIN HFA) 108 (90 BASE) MCG/ACT Inhaler, INHALE TWO PUFFS BY MOUTH EVERY 6 HOURS AS NEEDED FOR SHORTNESS OF BREATH / DYSPNEA AND AS NEEDED BEFORE EXERCISE, Disp: 18 g, Rfl: 11     beclomethasone (QVAR) 40 MCG/ACT Inhaler, INHALE 2 PUFFS BY MOUTH INTO THE LUNGS TWO TIMES A DAY, Disp: 3 Inhaler, Rfl: 3     CALCIUM + D OR, One bid, Disp: , Rfl:      cephALEXin (KEFLEX) 500 MG  capsule, Take 1 capsule (500 mg) by mouth 4 times daily for 10 days, Disp: 40 capsule, Rfl: 0     Chlorpheniramine Maleate (CHLOR-TRIMETON ALLERGY) 12 MG TBCR, Take  by mouth as needed., Disp: , Rfl:      estradiol (VAGIFEM) 10 MCG TABS vaginal tablet, Place 1 tablet (10 mcg) vaginally daily, Disp: 30 tablet, Rfl: 1     fluocinonide (LIDEX) 0.05 % solution, Apply to scalp nightly for up to 1 week, then 2-3 times weekly as needed., Disp: 60 mL, Rfl: 3     fluticasone (FLONASE) 50 MCG/ACT spray, USE ONE TO TWO SPRAYS IN EACH NOSTRIL EVERY DAY, Disp: 48 g, Rfl: 3     Ibuprofen (ADVIL PO), Take 800 mg by mouth every 4 hours as needed for moderate pain, Disp: , Rfl:      lisinopril (PRINIVIL/ZESTRIL) 10 MG tablet, Take 1 tablet (10 mg) by mouth daily, Disp: 90 tablet, Rfl: 1     OPTIVITE OR, once a day, Disp: , Rfl:      order for DME, Equipment being ordered: spacer, Disp: 1 each, Rfl: 0     order for DME, Equipment being ordered:    left knee brace-medial compartment  for osteoarthritis., Disp: 1 Units, Rfl: 0     ranitidine (ZANTAC) 300 MG tablet, TAKE ONE TABLET BY MOUTH EVERY DAY, Disp: 90 tablet, Rfl: 2     SENIOR MULTIVITAMIN PLUS OR, One per day, Disp: , Rfl:     Allergies -   Allergies   Allergen Reactions     Sulfa Drugs Rash       Social History -   Social History     Social History     Marital status:      Spouse name: N/A     Number of children: N/A     Years of education: N/A     Social History Main Topics     Smoking status: Former Smoker     Packs/day: 1.00     Years: 10.00     Types: Cigarettes     Quit date: 1/1/1981     Smokeless tobacco: Never Used     Alcohol use Yes      Comment: occ - Glass of white wine per night     Drug use: No     Sexual activity: Not Currently     Partners: Male     Birth control/ protection: Post-menopausal     Other Topics Concern      Service No     Blood Transfusions No     Caffeine Concern No     Occupational Exposure Yes     Hobby Hazards No      "Sleep Concern No     Stress Concern No     Weight Concern Yes     Special Diet No     Back Care Yes     PT     Exercise Yes     Bike Helmet No     Seat Belt Yes     Self-Exams Yes     Parent/Sibling W/ Cabg, Mi Or Angioplasty Before 65f 55m? No     Social History Narrative       Family History -   Family History   Problem Relation Age of Onset     HEART DISEASE Mother      older      Osteoperosis Mother      C.A.D. Mother      60's      Macular Degeneration Mother      Respiratory Father      farmers lung     Skin Cancer Father      Alzheimer Disease Maternal Grandmother      HEART DISEASE Maternal Grandfather      HEART DISEASE Brother      Afib     HEART DISEASE Brother      Asthma No family hx of      Diabetes No family hx of      Hypertension No family hx of      Breast Cancer No family hx of      Cancer - colorectal No family hx of      Cancer No family hx of      Thyroid Disease No family hx of      Glaucoma No family hx of      Colon Cancer No family hx of      Melanoma No family hx of        Review of Systems - As per HPI and PMHx, otherwise review of system review of the head and neck negative.    Physical Exam  /76 (BP Location: Right arm, Patient Position: Sitting, Cuff Size: Adult Regular)  Pulse 80  Temp 97.2  F (36.2  C) (Temporal)  Ht 1.727 m (5' 8\")  Wt 80.3 kg (177 lb)  SpO2 96%  BMI 26.91 kg/m2  BMI: Body mass index is 26.91 kg/(m^2).    General - The patient is well nourished and well developed, and appears to have good nutritional status.  Alert and oriented to person and place, answers questions and cooperates with examination appropriately.    SKIN - No suspicious lesions or rashes.  Respiration - No respiratory distress.  Head and Face - Normocephalic and atraumatic, with no gross asymmetry noted of the contour of the facial features.  The facial nerve is intact, with strong symmetric movements.    Voice and Breathing - The patient was breathing comfortably without the use of " accessory muscles. The patients voice was clear and strong, and had appropriate pitch and quality.    Ears - Bilateral pinna and EACs with normal appearing overlying skin. Tympanic membrane intact with good mobility on pneumatic otoscopy bilaterally. Bony landmarks of the ossicular chain are normal. The tympanic membranes are normal in appearance. No retraction, perforation, or masses.  No fluid or purulence was seen in the external canal or the middle ear.     Eyes - Extraocular movements intact.  Sclera were not icteric or injected, conjunctiva were pink and moist.    Mouth - Examination of the oral cavity showed pink, healthy oral mucosa. No lesions or ulcerations noted.  The tongue was mobile and midline, and the dentition were in good condition.      Throat - The walls of the oropharynx were smooth, pink, moist, symmetric, and had no lesions or ulcerations.  The tonsillar pillars and soft palate were symmetric.  The uvula was midline on elevation.    Neck - Normal midline excursion of the laryngotracheal complex during swallowing.  Full range of motion on passive movement.  Palpation of the occipital, submental, internal jugular chain, and supraclavicular nodes did not demonstrate any abnormal lymph nodes or masses.  The carotid pulse was palpable bilaterally.  Palpation of the thyroid was soft and smooth, with no nodules or goiter appreciated.  The trachea was mobile and midline. Slightly enlarged submandibular gland on the left that is tender to palpation.     Nose - External contour is symmetric, no gross deflection or scars.  Nasal mucosa is pink and moist with no abnormal mucus.  The septum was midline and non-obstructive, turbinates of normal size and position.  No polyps, masses, or purulence noted on examination.    Neuro - Nonfocal neuro exam is normal, CN 2 through 12 intact, normal gait and muscle tone.      Performed in clinic today:  No procedures preformed in clinic today      A/P - Maddie Lala  is a 68 year old female with acute submandibular gland sialoadenitis that has appears to be resolving.  At this point patient continue conservative therapy initial course of antibiotics.  She will start using self-retaining topical heat massage and ibuprofen if it starts again if this keeps on recurring feels disappear she will see us back and we shall address other potential options.      Gregory Denny MD      Again, thank you for allowing me to participate in the care of your patient.        Sincerely,        Gregory Denny MD, MD

## 2018-08-15 NOTE — MR AVS SNAPSHOT
After Visit Summary   8/15/2018    Maddie Lala    MRN: [id number]           Patient Information     Date Of Birth          1950        Visit Information        Provider Department      8/15/2018 10:15 AM Gregory Denny MD [facility]        Today's Diagnoses     Sialadenitis    -  1       Follow-ups after your visit        Your next 10 appointments already scheduled     Aug 21, 2018  3:00 PM CDT   New Visit with James Amaya MD   [facility]    [address] [place] [id number] [phone]              Who to contact     If you have questions or need follow up information about today's clinic visit or your schedule please contact [facility] directly at [phone].  Normal or non-critical lab and imaging results will be communicated to you by MyChart, letter or phone within 4 business days after the clinic has received the results. If you do not hear from us within 7 days, please contact the clinic through MyChart or phone. If you have a critical or abnormal lab result, we will notify you by phone as soon as possible.  Submit refill requests through [employer] or call your pharmacy and they will forward the refill request to us. Please allow 3 business days for your refill to be completed.          Additional Information About Your Visit        MyChart Information     [employer] gives you secure access to your electronic health record. If you see a primary care provider, you can also send messages to your care team and make appointments. If you have questions, please call your primary care clinic.  If you do not have a primary care provider, please call [phone] and they will assist you.        Care EveryWhere ID     This is your Care EveryWhere ID. This could be used by other organizations to access your [facility] medical records  [id number]        Your Vitals Were     Pulse Temperature Height  "Pulse Oximetry BMI (Body Mass Index)       80 97.2  F (36.2  C) (Temporal) 1.727 m (5' 8\") 96% 26.91 kg/m2        Blood Pressure from Last 3 Encounters:   08/15/18 130/76   08/12/18 146/80   08/02/18 126/80    Weight from Last 3 Encounters:   08/15/18 80.3 kg (177 lb)   08/12/18 80.3 kg (177 lb 1.6 oz)   11/16/17 75.9 kg (167 lb 4.8 oz)              Today, you had the following     No orders found for display       Primary Care Provider Office Phone # Fax #    Radha Hurtado [phone] [address]  [facility] [address]        Equal Access to Services     [facility] SUSAN : Hadpamela johnson Soaidee, waaxda luqadaha, qaybta kaalmada adeegyada, nereyda talbert . So [facility] [phone].    ATENCIÓN: Si habla español, tiene a mayo disposición servicios gratuitos de asistencia lingüística. Dav[place] al [phone].    We comply with applicable federal civil rights laws and [place] laws. We do not discriminate on the basis of race, color, national origin, age, disability, sex, sexual orientation, or gender identity.            Thank you!     Thank you for choosing [facility]  for your care. Our goal is always to provide you with excellent care. Hearing back from our patients is one way we can continue to improve our services. Please take a few minutes to complete the written survey that you may receive in the mail after your visit with us. Thank you!             Your Updated Medication List - Protect others around you: Learn how to safely use, store and throw away your medicines at www.disposemymeds.org.          This list is accurate as of 8/15/18  3:14 PM.  Always use your most recent med list.                   Brand Name Dispense Instructions for use Diagnosis    ADVIL PO      Take 800 mg by mouth every 4 hours as needed for moderate pain        albuterol 108 (90 Base) MCG/ACT inhaler    VENTOLIN HFA    18 g    INHALE TWO PUFFS BY MOUTH EVERY 6 HOURS " AS NEEDED FOR SHORTNESS OF BREATH / DYSPNEA AND AS NEEDED BEFORE EXERCISE    Cough due to bronchospasm       beclomethasone 40 MCG/ACT Inhaler    QVAR    3 Inhaler    INHALE 2 PUFFS BY MOUTH INTO THE LUNGS TWO TIMES A DAY    Chronic cough       CALCIUM + D PO      One bid        cephALEXin 500 MG capsule    KEFLEX    40 capsule    Take 1 capsule (500 mg) by mouth 4 times daily for 10 days        CHLOR-TRIMETON ALLERGY 12 MG Tbcr   Generic drug:  chlorpheniramine maleate      Take  by mouth as needed.        estradiol 10 MCG Tabs vaginal tablet    VAGIFEM    30 tablet    Place 1 tablet (10 mcg) vaginally daily    Vaginal atrophy       fluocinonide 0.05 % solution    LIDEX    60 mL    Apply to scalp nightly for up to 1 week, then 2-3 times weekly as needed.    Dermatitis, seborrheic       fluticasone 50 MCG/ACT spray    FLONASE    48 g    USE ONE TO TWO SPRAYS IN EACH NOSTRIL EVERY DAY    Chronic allergic conjunctivitis       lisinopril 10 MG tablet    PRINIVIL/ZESTRIL    90 tablet    Take 1 tablet (10 mg) by mouth daily    Benign essential hypertension       order for DME     1 Units    Equipment being ordered:    left knee brace-medial compartment  for osteoarthritis.    Primary osteoarthritis of left knee       order for DME     1 each    Equipment being ordered: spacer    Cough due to bronchospasm       ranitidine 300 MG tablet    ZANTAC    90 tablet    TAKE ONE TABLET BY MOUTH EVERY DAY    Gastroesophageal reflux disease without esophagitis       * SENIOR MULTIVITAMIN PLUS PO      One per day        * OPTIVITE PO      once a day        * Notice:  This list has 2 medication(s) that are the same as other medications prescribed for you. Read the directions carefully, and ask your doctor or other care provider to review them with you.

## 2018-08-15 NOTE — PROGRESS NOTES
ENT Consultation    Maddie Lala who is a 68 year old female seen in consultation at the request of self.      History of Present Illness - Maddie Lala is a 68 year old female who presents to the clinic after being seen in the emergency department by JONATHAN Bourgeois. Patient was diagnosed with sialadenitis of the left submandibular gland. She was placed on Keflex 500 mg 4 times daily for 10 days. Patient reports that she was eating and started experiencing swelling of the left submandibular area right away.  Was tender but there was no fever no chills no redness.  She was started on Keflex and told to drink a lot of liquids and also suck on sour candy to produce flow saliva.  Heat application locally was not discussed.  She is feeling significantly better now.  She never had any issues like this in the past.        CT SCAN OF THE NECK WITH CONTRAST August 12, 2018 2:10 PM      HISTORY: Swelling on left side of neck, lymphadenopathy, sudden onset.        TECHNIQUE: Axial CT images of the neck following administration of  intravenous contrast with reformations. Radiation dose for this scan  was reduced using automated exposure control, adjustment of the mA  and/or kV according to patient size, or iterative reconstruction  technique. 80 mL Isovue 370  IV.      COMPARISON: None.      FINDINGS: A marker is placed over the left submandibular region. There  is moderate inflammation and fat stranding in the anterior left neck  centered around the submandibular gland. The left submandibular gland  appears enlarged and demonstrates increased enhancement. The  submandibular gland ducts are also moderately enlarged. No stone is  identified within the submandibular gland or along the course of  [place]'s duct. No rim-enhancing fluid collection or abscess is  appreciated.     Right submandibular gland has a normal appearance by CT. The parotid  glands are relatively symmetric and unremarkable by CT. No  abnormal  thyroid gland nodules.     No other suspicious masses within the neck. No suspicious cervical  lymph nodes.     Visualized lung apices are clear. Multilevel degenerative changes in  the spine.         IMPRESSION: Sialadenitis of the left submandibular gland which is  enlarged and demonstrates ductal dilatation. Moderate surrounding  inflammation in the anterior left neck in this region. No loculated  fluid collection or abscess appreciated. No submandibular gland stone  is identified.            CAITLIN BRYAN MD      Body mass index is 26.91 kg/(m^2).    Weight management plan: Patient was referred to their PCP to discuss a diet and exercise plan.    BP Readings from Last 1 Encounters:   08/15/18 130/76     BP noted to be well controlled today in office.     Maddie IS NOT a smoker/uses chewing tobacco.     Past Medical History -   Past Medical History:   Diagnosis Date     Allergic rhinitis 1979     Arthritis      Benign essential hypertension 3/23/2018     Cervical dysplasia with cone in 1979    nl pap ever since      Dyspareunia      Hearing problem 2015     Meniere's disease 2002     Nonsenile cataract      Plantar fasciitis      Stress incontinence      SVT (supraventricular tachycardia) (H)     resolved     Tinnitus 2002       Current Medications -   Current Outpatient Prescriptions:      albuterol (VENTOLIN HFA) 108 (90 BASE) MCG/ACT Inhaler, INHALE TWO PUFFS BY MOUTH EVERY 6 HOURS AS NEEDED FOR SHORTNESS OF BREATH / DYSPNEA AND AS NEEDED BEFORE EXERCISE, Disp: 18 g, Rfl: 11     beclomethasone (QVAR) 40 MCG/ACT Inhaler, INHALE 2 PUFFS BY MOUTH INTO THE LUNGS TWO TIMES A DAY, Disp: 3 Inhaler, Rfl: 3     CALCIUM + D OR, One bid, Disp: , Rfl:      cephALEXin (KEFLEX) 500 MG capsule, Take 1 capsule (500 mg) by mouth 4 times daily for 10 days, Disp: 40 capsule, Rfl: 0     Chlorpheniramine Maleate (CHLOR-TRIMETON ALLERGY) 12 MG TBCR, Take  by mouth as needed., Disp: , Rfl:      estradiol (VAGIFEM) 10 MCG  TABS vaginal tablet, Place 1 tablet (10 mcg) vaginally daily, Disp: 30 tablet, Rfl: 1     fluocinonide (LIDEX) 0.05 % solution, Apply to scalp nightly for up to 1 week, then 2-3 times weekly as needed., Disp: 60 mL, Rfl: 3     fluticasone (FLONASE) 50 MCG/ACT spray, USE ONE TO TWO SPRAYS IN EACH NOSTRIL EVERY DAY, Disp: 48 g, Rfl: 3     Ibuprofen (ADVIL PO), Take 800 mg by mouth every 4 hours as needed for moderate pain, Disp: , Rfl:      lisinopril (PRINIVIL/ZESTRIL) 10 MG tablet, Take 1 tablet (10 mg) by mouth daily, Disp: 90 tablet, Rfl: 1     OPTIVITE OR, once a day, Disp: , Rfl:      order for DME, Equipment being ordered: spacer, Disp: 1 each, Rfl: 0     order for DME, Equipment being ordered:    left knee brace-medial compartment  for osteoarthritis., Disp: 1 Units, Rfl: 0     ranitidine (ZANTAC) 300 MG tablet, TAKE ONE TABLET BY MOUTH EVERY DAY, Disp: 90 tablet, Rfl: 2     SENIOR MULTIVITAMIN PLUS OR, One per day, Disp: , Rfl:     Allergies -   Allergies   Allergen Reactions     Sulfa Drugs Rash       Social History -   Social History     Social History     Marital status:      Spouse name: N/A     Number of children: N/A     Years of education: N/A     Social History Main Topics     Smoking status: Former Smoker     Packs/day: 1.00     Years: 10.00     Types: Cigarettes     Quit date: 1/1/1981     Smokeless tobacco: Never Used     Alcohol use Yes      Comment: occ - Glass of white wine per night     Drug use: No     Sexual activity: Not Currently     Partners: Male     Birth control/ protection: Post-menopausal     Other Topics Concern      Service No     Blood Transfusions No     Caffeine Concern No     Occupational Exposure Yes     Hobby Hazards No     Sleep Concern No     Stress Concern No     Weight Concern Yes     Special Diet No     Back Care Yes     PT     Exercise Yes     Bike Helmet No     Seat Belt Yes     Self-Exams Yes     Parent/Sibling W/ Cabg, Mi Or Angioplasty Before  "65f 55m? No     Social History Narrative       Family History -   Family History   Problem Relation Age of Onset     HEART DISEASE Mother      older      Osteoperosis Mother      C.A.D. Mother      60's      Macular Degeneration Mother      Respiratory Father      farmers lung     Skin Cancer Father      Alzheimer Disease Maternal Grandmother      HEART DISEASE Maternal Grandfather      HEART DISEASE Brother      Afib     HEART DISEASE Brother      Asthma No family hx of      Diabetes No family hx of      Hypertension No family hx of      Breast Cancer No family hx of      Cancer - colorectal No family hx of      Cancer No family hx of      Thyroid Disease No family hx of      Glaucoma No family hx of      Colon Cancer No family hx of      Melanoma No family hx of        Review of Systems - As per HPI and PMHx, otherwise review of system review of the head and neck negative.    Physical Exam  /76 (BP Location: Right arm, Patient Position: Sitting, Cuff Size: Adult Regular)  Pulse 80  Temp 97.2  F (36.2  C) (Temporal)  Ht 1.727 m (5' 8\")  Wt 80.3 kg (177 lb)  SpO2 96%  BMI 26.91 kg/m2  BMI: Body mass index is 26.91 kg/(m^2).    General - The patient is well nourished and well developed, and appears to have good nutritional status.  Alert and oriented to person and place, answers questions and cooperates with examination appropriately.    SKIN - No suspicious lesions or rashes.  Respiration - No respiratory distress.  Head and Face - Normocephalic and atraumatic, with no gross asymmetry noted of the contour of the facial features.  The facial nerve is intact, with strong symmetric movements.    Voice and Breathing - The patient was breathing comfortably without the use of accessory muscles. The patients voice was clear and strong, and had appropriate pitch and quality.    Ears - Bilateral pinna and EACs with normal appearing overlying skin. Tympanic membrane intact with good mobility on pneumatic otoscopy " bilaterally. Bony landmarks of the ossicular chain are normal. The tympanic membranes are normal in appearance. No retraction, perforation, or masses.  No fluid or purulence was seen in the external canal or the middle ear.     Eyes - Extraocular movements intact.  Sclera were not icteric or injected, conjunctiva were pink and moist.    Mouth - Examination of the oral cavity showed pink, healthy oral mucosa. No lesions or ulcerations noted.  The tongue was mobile and midline, and the dentition were in good condition.      Throat - The walls of the oropharynx were smooth, pink, moist, symmetric, and had no lesions or ulcerations.  The tonsillar pillars and soft palate were symmetric.  The uvula was midline on elevation.    Neck - Normal midline excursion of the laryngotracheal complex during swallowing.  Full range of motion on passive movement.  Palpation of the occipital, submental, internal jugular chain, and supraclavicular nodes did not demonstrate any abnormal lymph nodes or masses.  The carotid pulse was palpable bilaterally.  Palpation of the thyroid was soft and smooth, with no nodules or goiter appreciated.  The trachea was mobile and midline. Slightly enlarged submandibular gland on the left that is tender to palpation.     Nose - External contour is symmetric, no gross deflection or scars.  Nasal mucosa is pink and moist with no abnormal mucus.  The septum was midline and non-obstructive, turbinates of normal size and position.  No polyps, masses, or purulence noted on examination.    Neuro - Nonfocal neuro exam is normal, CN 2 through 12 intact, normal gait and muscle tone.      Performed in clinic today:  No procedures preformed in clinic today      A/P - Maddie Lala is a 68 year old female with acute submandibular gland sialoadenitis that has appears to be resolving.  At this point patient continue conservative therapy initial course of antibiotics.  She will start using self-retaining topical heat  massage and ibuprofen if it starts again if this keeps on recurring feels disappear she will see us back and we shall address other potential options.      Gregory Denny MD

## 2018-08-21 ENCOUNTER — OFFICE VISIT (OUTPATIENT)
Dept: ORTHOPEDICS | Facility: CLINIC | Age: 68
End: 2018-08-21
Payer: COMMERCIAL

## 2018-08-21 VITALS — SYSTOLIC BLOOD PRESSURE: 126 MMHG | OXYGEN SATURATION: 97 % | HEART RATE: 84 BPM | DIASTOLIC BLOOD PRESSURE: 78 MMHG

## 2018-08-21 DIAGNOSIS — G89.29 CHRONIC PAIN OF LEFT KNEE: Primary | ICD-10-CM

## 2018-08-21 DIAGNOSIS — M25.562 CHRONIC PAIN OF LEFT KNEE: Primary | ICD-10-CM

## 2018-08-21 PROCEDURE — 99213 OFFICE O/P EST LOW 20 MIN: CPT | Performed by: ORTHOPAEDIC SURGERY

## 2018-08-21 NOTE — PATIENT INSTRUCTIONS
[facility]  [address] [place] [id number]  Phone [phone]  Fax [phone]    SURGICAL INFORMATION & INSTRUCTIONS  Dr. James Amaya  Name of Surgery: Left total knee arthroplasty    Date of Surgery:     If you need to reschedule/schedule your surgery, please contact Riri, our surgery scheduler at [facility], at [phone].    Arrival Time:     Time of Surgery:      Please arrive early so that we can prepare you for surgery. If you arrive later than your scheduled arrival time, your surgery may be cancelled.  Please note that scheduled times may change, but you will always be notified if there is a change.     Location of Surgery:     ? [facility], [facility]  [address], 3rd floor for check-in  Phone [phone]  Fax [phone]  Bring parking ticket with you for validation and reduced parking rate  [email or web address]    Prior to surgery    ? Call your insurance company  Ask if you need pre-approval for your surgery.  If pre-approval is needed, please call our surgery scheduler for assistance with the pre-approval process.   If you do not have insurance, please let us know.     ? [place] for Surgery (if on [facility])  10 days before surgery, call [phone] to register with the surgery center. Have your insurance card ready.     Total Joint Replacement:  - Joint Replacement Class:  If you are scheduled to have a joint replacement, you (and any family/friends that will be helping to care for you) are expected to attend an educational class at least two weeks prior to your surgery.  To register for the class please call the Patient Learning Center at [phone] or register online at [email or web address]. Classes are held at multiple locations as well as online.  You must know the date of your surgery before you can register for a class  (approximate date OK). If registering online, please select hip or knee as surgery type as shoulder has not been made an option at this time.     o This is also a good opportunity to think about where you would like to have physical therapy after your surgery. You may also be able to set up appointments in advance so that everything is ready to go after surgery.    - Antibiotics Prophylaxis:  Certain procedures such as dental cleaning/work, colonoscopies and other surgeries can cause bacteria to enter the bloodstream.  These bacteria can attach to joint replacement devices.  To reduce this risk, you will need to take antibiotics before you have invasive procedures or dental work.  This is known as antibiotic prophylaxis.    - Dental Visit:  You may want to schedule an appointment with your dentist for a cleaning or needed work before your surgery.  We advise no dental work or cleaning until 6 months after your surgery with implants to hip(s), knee(s), or shoulder(s).  Once you are 6 months past your surgery, you will need to take prophylactic (preventative) antibiotics for the rest of your life before having any dental cleaning or work.  If dental work is needed before surgery, make sure everything is completely healed prior to surgery.  It may cause delays or cancellation of surgery if not healed completely. This is also for any other invasive procedures you may have such as a colonoscopy.  Please notify the clinic if you have any questions.    ? Schedule an appointment with a Primary Care Provider for a Pre-Op Physical.  This should be done within 30 days of surgery  If you do not have a primary care provider, you may call [facility] at [phone], for an appointment.  Please have your office note and any labs or tests faxed to the facility where you are having surgery. Please be sure to request a copy of your pre-op physical and bring it with you on the day of surgery.      Tell your provider if you  have any of the following:   - IF you have a pacemaker or an ICD (implanted cardiac defibrillator). If you do, please bring the ID card with you on the day of surgery  - IF you're a smoker. People who smoke have a higher risk of infection after surgery. Ask your provider how you can quit smoking.  - If you have diabetes, work with your provider to control your blood sugar. If its not well-controlled, we may need to delay surgery (or you may have problems healing afterward).  - If your surgeon asks you to see your dentist: You'll need to complete any dental work before surgery. Your dentist must send a letter to your surgery center saying it's ok to do the surgery.    ? Blood Bank Pre-Admission order (total joint replacement only):    You will need to have a Blood Type and Screen drawn at a [facility] location before your surgery.  Please check your form included in your packet to determine if it needs to be done 1-30 days before surgery or 1-3 days before surgery.    ? Pre-Op Phone Call  You will receive a pre-op phone call 1-3 days before surgery to review your eating and drinking restrictions, review medications, and confirm surgery times.      ? 7-10 days BEFORE surgery  ? Stop taking aspirin, Plavix or aspirin products 10 days before surgery or as directed by your doctor.  ? Stop taking non-steroidal anti-inflammatory medications (naproxen/Aleve, ibuprofen/Advil/Motrin, celecoxib/Celebrex, meloxicam/Mobic) 1 week before surgery or as directed by your surgeon.  This will reduce the risk of bleeding during surgery.  ? Stop taking fish oil (Omega-3-fatty acid) 1 week before surgery.  ? It is OK to take acetaminophen (Tylenol) up until 2 hours prior to surgery.  ? Take prescription medications as directed by your doctor.  Discuss which medications to take or hold prior to your surgery, with your primary care doctor.   ? If you have diabetes, ask your primary care doctor or endocrinologist how you should  take your medication(s).    ? 24 hours BEFORE surgery  Stop drinking alcohol (beer, wine, liquor) at least 24-hours before and after your surgery.     ? Evening BEFORE surgery  - You may eat a normal meal the night before surgery, but eat nothing after midnight.     - Take a shower - to help wash away bacteria (germs) from your skin.  It s normal to have bacteria on your skin and skin protects us from these germs.  When you have surgery, we cut the skin.  Sometimes germs get into the cuts and cause infection (illness caused by germs).  By following the showering instructions and using the special soap, you will lower the number of germs on your skin.  This decreases your chance of an infection.    - Buy or get 8 ounces of antiseptic surgical soap called 4% CHG.  Common brands of this soap are Hibiclens and Exidine.    - You can find it this soap at your local pharmacy, clinic or retail store.  If you have trouble finding it, ask your pharmacist to help you find the right substitute.    - Do not shave within 12 inches of your incision (surgical cut) area for at least 3 days before surgery.  Shaving can make small cuts in the skin. This puts you at a higher risk of infection.    Items you will need for each shower:   - Newly washed towel  - 4 ounces of one of the recommended soaps    Follow these instructions, the evening before surgery  - Wash your hair and body with your regular shampoo and soap. Make sure you rinse the shampoo and soap from your hair and body.  - Using clean hands, apply about 2 ounces of soap gently on your skin from the neck to your toes. Use on your groin area last. Do not use this soap on your face or head. If you get any soap in your eyes, ears or mouth, rinse right away.  - Once the soap has been on your skin for at least 1 minute, rinse off completely and repeat washing with the surgical soap one more time.  - Rinse well and dry off using a clean towel.  If you feel any tingling, itching or  other irritation, rinse right away. It is normal to feel some coolness on the skin after using the antiseptic soap. Your skin may feel a bit dry after the shower, but do not use any lotions, creams or moisturizers. Do not use hair spray or other products in your hair.  - Dress in freshly washed clothes or pajamas. Use fresh pillowcases and sheets on your bed.    ? Day of Surgery  - You may drink clear liquids up to 2 hours before surgery or as directed by your surgeon.  Clear liquids include: Water, Pedialyte, Gatorade, apple juice and liquids you can read through. Please avoid liquids that are red or orange in color.   - Do NOT drink: milk, coffee, liquids that have pulp, orange juice, and lemonade or tomato juice.   - Do NOT chew gum, chew tobacco or suck on hard candy.    - Take another shower          Follow these instructions:      - Wash your hair and body with your regular shampoo and soap. Make sure you rinse the shampoo and soap from your hair and body.  - Using clean hands, apply about 2 ounces of soap gently on your skin from the neck to your toes. Use on your groin area last. Do not use this soap on your face or head. If you get any soap in your eyes, ears or mouth, rinse right away.  - Once the soap has been on your skin for at least 1 minute, rinse off completely and repeat washing with the surgical soap one more time.  - Rinse well and dry off using a clean towel.  If you feel any tingling, itching or other irritation, rinse right away. It is normal to feel some coolness on the skin after using the antiseptic soap. Your skin may feel a bit dry after the shower, but do not use any lotions, creams or moisturizers. Do not use hair spray or other products in your hair.  - Dress in freshly washed clothes.  - Do not wear deodorant, cologne, lotion, makeup, nail polish or jewelry to surgery.    ? If there is any change in your health PRIOR to your surgery, please contact your surgeon's office.  Such as a  fever, body aches, fatigue, any flu-like symptoms, rash, or any new injury to related body part.    ? Arrange for someone to drive you home after discharge.    will need to be a responsible adult (18 years or older) that will provide transportation to and from surgery and stay in the waiting room during your surgery. You may not drive yourself or take public transportation to and from surgery.    ? Arrange for someone to stay with you for 24 hours after you go home.   This person must be a responsible adult (18 years or older).    ? Bring these items to the hospital/surgery center:   ? Insurance card(s)  ? Money for parking and co-pays, if needed  ? A list of all the medications you take, including vitamins, minerals, herbs and over the counter medications.    ? A copy of your Advance Health Care Directive, if you have one.  This tells us what treatment(s) you would or would not want, and who would make health care decisions, if you could no longer speak for yourself.    ? A case for glasses, contact lenses, hearing aids or dentures.   ? Your inhaler or CPAP machine, if you use these at home    ? Leave extra cash, jewelry and other valuables at home.       ? Other information:   Sleep Apnea: Let your nurse know if you have a history of sleep apnea, only if you are having surgery at the [facility].    When you arrive for surgery  When you get to the surgery center/hospital, you will:  - Check in. If you are under age 18, you must be with a parent or legal guardian.  - Sign consent forms, if you haven t already. These forms state that you know the risks and benefits of surgery. When you sign the forms, you give us permission to do the surgery. Do not sign them unless you understand what will happen during and after your surgery. If you have any questions about your surgery, ask to speak with your doctor before you sign the forms. If you don t understand the answers, ask again.  - Receive a copy  of the Patient s Bill of Rights. If you do not receive a copy, please ask for one.  - Change into hospital clothes. Your belongings will be placed in a bag. We will return them to you after surgery.  - Meet with the anesthesia provider. He or she will tell you what kind of anesthesia (medicine) will be used to keep you comfortable during surgery.  - Remember: it s okay to remind doctors and nurses to wash their hands before touching you.  - In most cases, your surgeon will use a marker to write his or her initials on the surgery site. This ensures that the exact site is operated on.  - For safety reasons, we will ask you the same questions many times. For example, we may ask your name and birth date over and over again.  - Friends and family can stay with you until it s time for surgery. While you re in surgery, they will be in the waiting area. Please note that cell phones are not allowed in some patient care areas.  - If you have questions about what will happen in the operating room, talk to your care team.  - You will meet with an anesthesiologist, before your surgery.  He or she may reference types of anesthesia commonly used for surgeries:   o General:  This involves the use of an IV for injection of medication and anesthesia. You are put into a sleep and have a breathing tube to assist you with breathing.  o Sedation:  You are asleep, but not so deply that you need a breathing tube.   o Local or Regional: a nerve is injected to numb the surgical area.  o Spinal: you are numbed from the waist down with medicine injected into your back.  o Femoral Nerve Block:  Anesthesia injected into the groin of leg which you are having surgery on.      After surgery  We will move you to a recovery room, where we will watch you closely. If you have any pain or discomfort, tell your nurse. He or she will try to make you comfortable.    - We will move you to your hospital room after you are awake and stable. If you having any  pain or discomfort, please let your nursing staff know.  - Please wash your hands every time you touch the wound or change bandages or dressings.  - Do not submerge the wound in water.  You may not use a bathtub, hot tub, pool, or lake until 3 months after surgery. Any open area can be a source of incoming bacteria, which can lead to infection. Keep all dressings clean and dry.   - Elevate your leg(s) as much as possible to help reduce swelling. You will also receive compression stockings for the surgical leg. Please wear these during the day and fully remove them at night. Continue to wear these for approximately 4 weeks after surgery or until your swelling has resolved.  - If you had a total knee replacement, you may be discharged with a Continuous Passive Motion (CPM) machine. You will use this for 2-6 weeks at home.   - You may put ice on the surgical area for comfort, keeping ice on area for up to 20 minutes then off for 40 minutes.  You may do this the first few days after surgery to help reduce pain and swelling.    - Drink at least 8-10 glasses of liquid each day to help your body heal.  - Keep your lungs clear by coughing and taking deep breaths every couple hours.  This is especially important the first 48 hours after surgery.  - Typically, you will be able to start driving once you are fully off narcotics and able to do a the quick stop test (slamming on the brake) with your right leg without experiencing much pain.  - You will start physical therapy while in the hospital. Once you leave the hospital, you will need to continue going to physical therapy to maintain and improve your range of motion and strength. Please call the physical therapy clinic of your choice to set up an appointment within 1 week of being discharged from the hospital.    - Notify your doctor if you have any of the following:   o Fever of 101 F or higher  o Numbness and/or tingling  o Increased pain, redness or swelling  o Drainage  from wound  o Prolonged or uncontrolled bleeding  o Difficulty with movement    Follow-up Appointments, in Clinic  If you don't already have an appointment scheduled, please call to set up an appointment at [phone].      ? Nurse Only Appointment (2 weeks post op)  ? Post-Op appointments with provider (6 weeks post op)    Dealing with pain  A nurse will check your comfort level often during your stay. He or she will work with you to manage your pain.  It s expected that you will have pain after surgery.  Our goal is to reduce or minimize pain by:   - Remember pain is real. There are many ways to control pain. We can help you decide what works best for you.  - Ask for pain medicine when you need it.  Don t try to  tough it out --this can make you feel worse. Always take your medicine as ordered.  - Medicine doesn t work the same for everyone. If your medicine isn t working, tell your nurse. There may be other medicines or treatments we can try.  - Medication Refills.  If you need refills on your pain medication, please call the clinic as soon as possible.  It may take 72-business hours to obtain a refill.  Refills must be picked up at check-in 2, [facility] or mailed to a pharmacy of your choice.    - It is expected that you will wean off the pain medications in a timely manner.   - Constipation is a common side effect of pain medication, decreased activity and anesthesia from surgery.  Take a stool softener as prescribed by your doctor at the time of discharge.  You may also use over the counter medications as needed.  Be sure to increase your fiber (fruits and vegetables) and your water intake.      Please call the clinic at [phone], if you experience any problems or have questions.  If you are having an emergency, always call 201 or seek immediate evaluation at the Emergency Room.    Thank you for selecting the [facility] for your  care!  ---------------------------------------------

## 2018-08-21 NOTE — MR AVS SNAPSHOT
After Visit Summary   8/21/2018    Maddie Lala    MRN: [id number]           Patient Information     Date Of Birth          1950        Visit Information        Provider Department      8/21/2018 3:00 PM James Amaya MD [facility]        Care Instructions      [facility]  [address] [place] [id number]  Phone [phone]  Fax [phone]    SURGICAL INFORMATION & INSTRUCTIONS  Dr. James Amaya  Name of Surgery: Left total knee arthroplasty    Date of Surgery:     If you need to reschedule/schedule your surgery, please contact Riri, our surgery scheduler at [place], at [phone].    Arrival Time:     Time of Surgery:      Please arrive early so that we can prepare you for surgery. If you arrive later than your scheduled arrival time, your surgery may be cancelled.  Please note that scheduled times may change, but you will always be notified if there is a change.     Location of Surgery:     ? [facility], [address]. [address], 3rd floor for check-in  Phone [phone]  Fax [phone]  Bring parking ticket with you for validation and reduced parking rate  [email or web address]    Prior to surgery    ? Call your insurance company  Ask if you need pre-approval for your surgery.  If pre-approval is needed, please call our surgery scheduler for assistance with the pre-approval process.   If you do not have insurance, please let us know.     ? [place] for Surgery (if on [facility])  10 days before surgery, call [phone] to register with the surgery center. Have your insurance card ready.     Total Joint Replacement:  - Joint Replacement Class:  If you are scheduled to have a joint replacement, you (and any family/friends that will be helping to care for you) are expected to attend an educational class at least two  weeks prior to your surgery.  To register for the class please call the Patient Learning Center at [phone] or register online at [email or web address]. Classes are held at multiple locations as well as online.  You must know the date of your surgery before you can register for a class (approximate date OK). If registering online, please select hip or knee as surgery type as shoulder has not been made an option at this time.     o This is also a good opportunity to think about where you would like to have physical therapy after your surgery. You may also be able to set up appointments in advance so that everything is ready to go after surgery.    - Antibiotics Prophylaxis:  Certain procedures such as dental cleaning/work, colonoscopies and other surgeries can cause bacteria to enter the bloodstream.  These bacteria can attach to joint replacement devices.  To reduce this risk, you will need to take antibiotics before you have invasive procedures or dental work.  This is known as antibiotic prophylaxis.    - Dental Visit:  You may want to schedule an appointment with your dentist for a cleaning or needed work before your surgery.  We advise no dental work or cleaning until 6 months after your surgery with implants to hip(s), knee(s), or shoulder(s).  Once you are 6 months past your surgery, you will need to take prophylactic (preventative) antibiotics for the rest of your life before having any dental cleaning or work.  If dental work is needed before surgery, make sure everything is completely healed prior to surgery.  It may cause delays or cancellation of surgery if not healed completely. This is also for any other invasive procedures you may have such as a colonoscopy.  Please notify the clinic if you have any questions.    ? Schedule an appointment with a Primary Care Provider for a Pre-Op Physical.  This should be done within 30 days of surgery  If you do not have a primary care provider, you may  call [facility] at [phone], for an appointment.  Please have your office note and any labs or tests faxed to the facility where you are having surgery. Please be sure to request a copy of your pre-op physical and bring it with you on the day of surgery.      Tell your provider if you have any of the following:   - IF you have a pacemaker or an ICD (implanted cardiac defibrillator). If you do, please bring the ID card with you on the day of surgery  - IF you're a smoker. People who smoke have a higher risk of infection after surgery. Ask your provider how you can quit smoking.  - If you have diabetes, work with your provider to control your blood sugar. If its not well-controlled, we may need to delay surgery (or you may have problems healing afterward).  - If your surgeon asks you to see your dentist: You'll need to complete any dental work before surgery. Your dentist must send a letter to your surgery center saying it's ok to do the surgery.    ? Blood Bank Pre-Admission order (total joint replacement only):    You will need to have a Blood Type and Screen drawn at a [facility] location before your surgery.  Please check your form included in your packet to determine if it needs to be done 1-30 days before surgery or 1-3 days before surgery.    ? Pre-Op Phone Call  You will receive a pre-op phone call 1-3 days before surgery to review your eating and drinking restrictions, review medications, and confirm surgery times.      ? 7-10 days BEFORE surgery  ? Stop taking aspirin, Plavix or aspirin products 10 days before surgery or as directed by your doctor.  ? Stop taking non-steroidal anti-inflammatory medications (naproxen/Aleve, ibuprofen/Advil/Motrin, celecoxib/Celebrex, meloxicam/Mobic) 1 week before surgery or as directed by your surgeon.  This will reduce the risk of bleeding during surgery.  ? Stop taking fish oil (Omega-3-fatty acid) 1 week before surgery.  ? It is OK to take  acetaminophen (Tylenol) up until 2 hours prior to surgery.  ? Take prescription medications as directed by your doctor.  Discuss which medications to take or hold prior to your surgery, with your primary care doctor.   ? If you have diabetes, ask your primary care doctor or endocrinologist how you should take your medication(s).    ? 24 hours BEFORE surgery  Stop drinking alcohol (beer, wine, liquor) at least 24-hours before and after your surgery.     ? Evening BEFORE surgery  - You may eat a normal meal the night before surgery, but eat nothing after midnight.     - Take a shower - to help wash away bacteria (germs) from your skin.  It s normal to have bacteria on your skin and skin protects us from these germs.  When you have surgery, we cut the skin.  Sometimes germs get into the cuts and cause infection (illness caused by germs).  By following the showering instructions and using the special soap, you will lower the number of germs on your skin.  This decreases your chance of an infection.    - Buy or get 8 ounces of antiseptic surgical soap called 4% CHG.  Common brands of this soap are Hibiclens and Exidine.    - You can find it this soap at your local pharmacy, clinic or retail store.  If you have trouble finding it, ask your pharmacist to help you find the right substitute.    - Do not shave within 12 inches of your incision (surgical cut) area for at least 3 days before surgery.  Shaving can make small cuts in the skin. This puts you at a higher risk of infection.    Items you will need for each shower:   - Newly washed towel  - 4 ounces of one of the recommended soaps    Follow these instructions, the evening before surgery  - Wash your hair and body with your regular shampoo and soap. Make sure you rinse the shampoo and soap from your hair and body.  - Using clean hands, apply about 2 ounces of soap gently on your skin from the neck to your toes. Use on your groin area last. Do not use this soap on your  face or head. If you get any soap in your eyes, ears or mouth, rinse right away.  - Once the soap has been on your skin for at least 1 minute, rinse off completely and repeat washing with the surgical soap one more time.  - Rinse well and dry off using a clean towel.  If you feel any tingling, itching or other irritation, rinse right away. It is normal to feel some coolness on the skin after using the antiseptic soap. Your skin may feel a bit dry after the shower, but do not use any lotions, creams or moisturizers. Do not use hair spray or other products in your hair.  - Dress in freshly washed clothes or pajamas. Use fresh pillowcases and sheets on your bed.    ? Day of Surgery  - You may drink clear liquids up to 2 hours before surgery or as directed by your surgeon.  Clear liquids include: Water, Pedialyte, Gatorade, apple juice and liquids you can read through. Please avoid liquids that are red or orange in color.   - Do NOT drink: milk, coffee, liquids that have pulp, orange juice, and lemonade or tomato juice.   - Do NOT chew gum, chew tobacco or suck on hard candy.    - Take another shower          Follow these instructions:      - Wash your hair and body with your regular shampoo and soap. Make sure you rinse the shampoo and soap from your hair and body.  - Using clean hands, apply about 2 ounces of soap gently on your skin from the neck to your toes. Use on your groin area last. Do not use this soap on your face or head. If you get any soap in your eyes, ears or mouth, rinse right away.  - Once the soap has been on your skin for at least 1 minute, rinse off completely and repeat washing with the surgical soap one more time.  - Rinse well and dry off using a clean towel.  If you feel any tingling, itching or other irritation, rinse right away. It is normal to feel some coolness on the skin after using the antiseptic soap. Your skin may feel a bit dry after the shower, but do not use any lotions, creams or  moisturizers. Do not use hair spray or other products in your hair.  - Dress in freshly washed clothes.  - Do not wear deodorant, cologne, lotion, makeup, nail polish or jewelry to surgery.    ? If there is any change in your health PRIOR to your surgery, please contact your surgeon's office.  Such as a fever, body aches, fatigue, any flu-like symptoms, rash, or any new injury to related body part.    ? Arrange for someone to drive you home after discharge.    will need to be a responsible adult (18 years or older) that will provide transportation to and from surgery and stay in the waiting room during your surgery. You may not drive yourself or take public transportation to and from surgery.    ? Arrange for someone to stay with you for 24 hours after you go home.   This person must be a responsible adult (18 years or older).    ? Bring these items to the hospital/surgery center:   ? Insurance card(s)  ? Money for parking and co-pays, if needed  ? A list of all the medications you take, including vitamins, minerals, herbs and over the counter medications.    ? A copy of your Advance Health Care Directive, if you have one.  This tells us what treatment(s) you would or would not want, and who would make health care decisions, if you could no longer speak for yourself.    ? A case for glasses, contact lenses, hearing aids or dentures.   ? Your inhaler or CPAP machine, if you use these at home    ? Leave extra cash, jewelry and other valuables at home.       ? Other information:   Sleep Apnea: Let your nurse know if you have a history of sleep apnea, only if you are having surgery at the [place].    When you arrive for surgery  When you get to the surgery center/hospital, you will:  - Check in. If you are under age 18, you must be with a parent or legal guardian.  - Sign consent forms, if you haven t already. These forms state that you know the risks and benefits of surgery. When you sign the  forms, you give us permission to do the surgery. Do not sign them unless you understand what will happen during and after your surgery. If you have any questions about your surgery, ask to speak with your doctor before you sign the forms. If you don t understand the answers, ask again.  - Receive a copy of the Patient s Bill of Rights. If you do not receive a copy, please ask for one.  - Change into hospital clothes. Your belongings will be placed in a bag. We will return them to you after surgery.  - Meet with the anesthesia provider. He or she will tell you what kind of anesthesia (medicine) will be used to keep you comfortable during surgery.  - Remember: it s okay to remind doctors and nurses to wash their hands before touching you.  - In most cases, your surgeon will use a marker to write his or her initials on the surgery site. This ensures that the exact site is operated on.  - For safety reasons, we will ask you the same questions many times. For example, we may ask your name and birth date over and over again.  - Friends and family can stay with you until it s time for surgery. While you re in surgery, they will be in the waiting area. Please note that cell phones are not allowed in some patient care areas.  - If you have questions about what will happen in the operating room, talk to your care team.  - You will meet with an anesthesiologist, before your surgery.  He or she may reference types of anesthesia commonly used for surgeries:   o General:  This involves the use of an IV for injection of medication and anesthesia. You are put into a sleep and have a breathing tube to assist you with breathing.  o Sedation:  You are asleep, but not so deply that you need a breathing tube.   o Local or Regional: a nerve is injected to numb the surgical area.  o Spinal: you are numbed from the waist down with medicine injected into your back.  o Femoral Nerve Block:  Anesthesia injected into the groin of leg which you  are having surgery on.      After surgery  We will move you to a recovery room, where we will watch you closely. If you have any pain or discomfort, tell your nurse. He or she will try to make you comfortable.    - We will move you to your hospital room after you are awake and stable. If you having any pain or discomfort, please let your nursing staff know.  - Please wash your hands every time you touch the wound or change bandages or dressings.  - Do not submerge the wound in water.  You may not use a bathtub, hot tub, pool, or lake until 3 months after surgery. Any open area can be a source of incoming bacteria, which can lead to infection. Keep all dressings clean and dry.   - Elevate your leg(s) as much as possible to help reduce swelling. You will also receive compression stockings for the surgical leg. Please wear these during the day and fully remove them at night. Continue to wear these for approximately 4 weeks after surgery or until your swelling has resolved.  - If you had a total knee replacement, you may be discharged with a Continuous Passive Motion (CPM) machine. You will use this for 2-6 weeks at home.   - You may put ice on the surgical area for comfort, keeping ice on area for up to 20 minutes then off for 40 minutes.  You may do this the first few days after surgery to help reduce pain and swelling.    - Drink at least 8-10 glasses of liquid each day to help your body heal.  - Keep your lungs clear by coughing and taking deep breaths every couple hours.  This is especially important the first 48 hours after surgery.  - Typically, you will be able to start driving once you are fully off narcotics and able to do a the quick stop test (slamming on the brake) with your right leg without experiencing much pain.  - You will start physical therapy while in the hospital. Once you leave the hospital, you will need to continue going to physical therapy to maintain and improve your range of motion and  strength. Please call the physical therapy clinic of your choice to set up an appointment within 1 week of being discharged from the hospital.    - Notify your doctor if you have any of the following:   o Fever of 101 F or higher  o Numbness and/or tingling  o Increased pain, redness or swelling  o Drainage from wound  o Prolonged or uncontrolled bleeding  o Difficulty with movement    Follow-up Appointments, in Clinic  If you don't already have an appointment scheduled, please call to set up an appointment at [phone].      ? Nurse Only Appointment (2 weeks post op)  ? Post-Op appointments with provider (6 weeks post op)    Dealing with pain  A nurse will check your comfort level often during your stay. He or she will work with you to manage your pain.  It s expected that you will have pain after surgery.  Our goal is to reduce or minimize pain by:   - Remember pain is real. There are many ways to control pain. We can help you decide what works best for you.  - Ask for pain medicine when you need it.  Don t try to  tough it out --this can make you feel worse. Always take your medicine as ordered.  - Medicine doesn t work the same for everyone. If your medicine isn t working, tell your nurse. There may be other medicines or treatments we can try.  - Medication Refills.  If you need refills on your pain medication, please call the clinic as soon as possible.  It may take 72-business hours to obtain a refill.  Refills must be picked up at check-in 2, [facility] or mailed to a pharmacy of your choice.    - It is expected that you will wean off the pain medications in a timely manner.   - Constipation is a common side effect of pain medication, decreased activity and anesthesia from surgery.  Take a stool softener as prescribed by your doctor at the time of discharge.  You may also use over the counter medications as needed.  Be sure to increase your fiber (fruits and vegetables) and your water  intake.      Please call the clinic at [phone], if you experience any problems or have questions.  If you are having an emergency, always call 911 or seek immediate evaluation at the Emergency Room.    Thank you for selecting the [facility] for your care!  ---------------------------------------------            Follow-ups after your visit        Who to contact     If you have questions or need follow up information about today's clinic visit or your schedule please contact [facility] directly at [phone].  Normal or non-critical lab and imaging results will be communicated to you by Spazzleshart, letter or phone within 4 business days after the clinic has received the results. If you do not hear from us within 7 days, please contact the clinic through Spazzleshart or phone. If you have a critical or abnormal lab result, we will notify you by phone as soon as possible.  Submit refill requests through [employer] or call your pharmacy and they will forward the refill request to us. Please allow 3 business days for your refill to be completed.          Additional Information About Your Visit        Spazzleshart Information     M[employer]ovivo gives you secure access to your electronic health record. If you see a primary care provider, you can also send messages to your care team and make appointments. If you have questions, please call your primary care clinic.  If you do not have a primary care provider, please call [phone] and they will assist you.      Mobovivo is an electronic gateway that provides easy, online access to your medical records. With Mobovivo, you can request a clinic appointment, read your test results, renew a prescription or communicate with your care team.     To access your existing account, please contact your [facility] or call [phone] for assistance.        Care EveryWhere ID     This is your Care EveryWhere ID. This could be used by  other organizations to access your [facility] medical records  [id number]        Your Vitals Were     Pulse Pulse Oximetry                84 97%           Blood Pressure from Last 3 Encounters:   08/21/18 126/78   08/15/18 130/76   08/12/18 146/80    Weight from Last 3 Encounters:   08/21/18 (P) 80.3 kg (177 lb)   08/15/18 80.3 kg (177 lb)   08/12/18 80.3 kg (177 lb 1.6 oz)              Today, you had the following     No orders found for display       Primary Care Provider Office Phone # Fax #    Radha Hurtado [phone] [address]  [facility] [address]        Equal Access to Services     ALYSON DAVIS : Hadii rizwan ventura hadasho Soedithali, waaxda luqadaha, qaybta kaalmada adeegyada, nereyda talbert . So [facility] [phone].    ATENCIÓN: Si habla español, tiene a mayo disposición servicios gratuitos de asistencia lingüística. [facility] [phone].    We comply with applicable federal civil rights laws and [place] laws. We do not discriminate on the basis of race, color, national origin, age, disability, sex, sexual orientation, or gender identity.            Thank you!     Thank you for choosing [facility]  for your care. Our goal is always to provide you with excellent care. Hearing back from our patients is one way we can continue to improve our services. Please take a few minutes to complete the written survey that you may receive in the mail after your visit with us. Thank you!             Your Updated Medication List - Protect others around you: Learn how to safely use, store and throw away your medicines at www.disposemymeds.org.          This list is accurate as of 8/21/18  3:30 PM.  Always use your most recent med list.                   Brand Name Dispense Instructions for use Diagnosis    ADVIL PO      Take 800 mg by mouth every 4 hours as needed for moderate pain        albuterol 108 (90 Base) MCG/ACT inhaler    VENTOLIN HFA    18 g     INHALE TWO PUFFS BY MOUTH EVERY 6 HOURS AS NEEDED FOR SHORTNESS OF BREATH / DYSPNEA AND AS NEEDED BEFORE EXERCISE    Cough due to bronchospasm       beclomethasone 40 MCG/ACT Inhaler    QVAR    3 Inhaler    INHALE 2 PUFFS BY MOUTH INTO THE LUNGS TWO TIMES A DAY    Chronic cough       CALCIUM + D PO      One bid        cephALEXin 500 MG capsule    KEFLEX    40 capsule    Take 1 capsule (500 mg) by mouth 4 times daily for 10 days        CHLOR-TRIMETON ALLERGY 12 MG Tbcr   Generic drug:  chlorpheniramine maleate      Take  by mouth as needed.        estradiol 10 MCG Tabs vaginal tablet    VAGIFEM    30 tablet    Place 1 tablet (10 mcg) vaginally daily    Vaginal atrophy       fluocinonide 0.05 % solution    LIDEX    60 mL    Apply to scalp nightly for up to 1 week, then 2-3 times weekly as needed.    Dermatitis, seborrheic       fluticasone 50 MCG/ACT spray    FLONASE    48 g    USE ONE TO TWO SPRAYS IN EACH NOSTRIL EVERY DAY    Chronic allergic conjunctivitis       lisinopril 10 MG tablet    PRINIVIL/ZESTRIL    90 tablet    Take 1 tablet (10 mg) by mouth daily    Benign essential hypertension       order for DME     1 Units    Equipment being ordered:    left knee brace-medial compartment  for osteoarthritis.    Primary osteoarthritis of left knee       order for DME     1 each    Equipment being ordered: spacer    Cough due to bronchospasm       ranitidine 300 MG tablet    ZANTAC    90 tablet    TAKE ONE TABLET BY MOUTH EVERY DAY    Gastroesophageal reflux disease without esophagitis       * SENIOR MULTIVITAMIN PLUS PO      One per day        * OPTIVITE PO      once a day        * Notice:  This list has 2 medication(s) that are the same as other medications prescribed for you. Read the directions carefully, and ask your doctor or other care provider to review them with you.

## 2018-08-21 NOTE — LETTER
8/21/2018         RE: Maddie Lala  [address] [place] [id number]        Dear Colleague,    Thank you for referring your patient, Maddie Lala, to the [facility]. Please see a copy of my visit note below.    Chief Complaint: Pain of the Left Knee    Physician:  No ref. provider found    HPI: Maddie Lala is a 68 year old female who presents today for evaluation of her left knee     Symptom Profile  Location of symptoms:  Anterior knee and joint line medial greater then lateral   Onset: insidious  Duration of symptoms: 7-8 years   Quality of symptoms: aching and burning  Severity: moderate to severe   Alleviate:activity modification   Exacerbating:  Activities, walking, elliptical machine  Previous Treatments: Previous treatments include activity modification, oral pain medication, physical therapy, intra-articular injection,  brace.     Current Status:  Results of the patient s Hip Disability and Osteoarthritis Outcome Score (HOOS)  are as follows (0-100 scales with 100 being the theoretical best):  Pain: 78  Symptoms: 54  ADLs:88  Sports/Recreation: 45  Quality of Life:50  (http://koos.nu/)  [facility] Activity Score: 7    MEDICAL HISTORY:   Past Medical History:   Diagnosis Date     Allergic rhinitis 1979     Arthritis      Benign essential hypertension 3/23/2018     Cervical dysplasia with cone in 1979    nl pap ever since      Dyspareunia      Hearing problem 2015     Meniere's disease 2002     Nonsenile cataract      Plantar fasciitis      Stress incontinence      SVT (supraventricular tachycardia) (H)     resolved     Tinnitus 2002       Pertinent negatives:  Patient has no history of DVT or PE. Discussed risk factors.    Medications:     Current Outpatient Prescriptions:      albuterol (VENTOLIN HFA) 108 (90 BASE) MCG/ACT Inhaler, INHALE TWO PUFFS BY MOUTH EVERY 6 HOURS AS NEEDED FOR SHORTNESS OF BREATH / DYSPNEA AND AS NEEDED BEFORE EXERCISE, Disp: 18 g, Rfl: 11      beclomethasone (QVAR) 40 MCG/ACT Inhaler, INHALE 2 PUFFS BY MOUTH INTO THE LUNGS TWO TIMES A DAY, Disp: 3 Inhaler, Rfl: 3     CALCIUM + D OR, One bid, Disp: , Rfl:      cephALEXin (KEFLEX) 500 MG capsule, Take 1 capsule (500 mg) by mouth 4 times daily for 10 days, Disp: 40 capsule, Rfl: 0     Chlorpheniramine Maleate (CHLOR-TRIMETON ALLERGY) 12 MG TBCR, Take  by mouth as needed., Disp: , Rfl:      estradiol (VAGIFEM) 10 MCG TABS vaginal tablet, Place 1 tablet (10 mcg) vaginally daily, Disp: 30 tablet, Rfl: 1     fluocinonide (LIDEX) 0.05 % solution, Apply to scalp nightly for up to 1 week, then 2-3 times weekly as needed., Disp: 60 mL, Rfl: 3     fluticasone (FLONASE) 50 MCG/ACT spray, USE ONE TO TWO SPRAYS IN EACH NOSTRIL EVERY DAY, Disp: 48 g, Rfl: 3     Ibuprofen (ADVIL PO), Take 800 mg by mouth every 4 hours as needed for moderate pain, Disp: , Rfl:      lisinopril (PRINIVIL/ZESTRIL) 10 MG tablet, Take 1 tablet (10 mg) by mouth daily, Disp: 90 tablet, Rfl: 1     OPTIVITE OR, once a day, Disp: , Rfl:      order for DME, Equipment being ordered: spacer, Disp: 1 each, Rfl: 0     order for DME, Equipment being ordered:    left knee brace-medial compartment  for osteoarthritis., Disp: 1 Units, Rfl: 0     ranitidine (ZANTAC) 300 MG tablet, TAKE ONE TABLET BY MOUTH EVERY DAY, Disp: 90 tablet, Rfl: 2     SENIOR MULTIVITAMIN PLUS OR, One per day, Disp: , Rfl:     Allergies: Sulfa drugs    SURGICAL HISTORY:   Past Surgical History:   Procedure Laterality Date     BLADDER SURGERY       CATARACT IOL, RT/LT  2010     COLONOSCOPY WITH CO2 INSUFFLATION N/A 10/19/2016    Procedure: COLONOSCOPY WITH CO2 INSUFFLATION;  Surgeon: Duane, William Charles, MD;  Location: [facility] OR     GENITOURINARY SURGERY      bladder     Midurethral sling with cystoscopy.  2010     TUBAL LIGATION         FAMILY HISTORY:   Family History   Problem Relation Age of Onset     HEART DISEASE Mother      older      Osteoperosis Mother      C.A.D.  "Mother      60's      Macular Degeneration Mother      Respiratory Father      farmers lung     Skin Cancer Father      Alzheimer Disease Maternal Grandmother      HEART DISEASE Maternal Grandfather      HEART DISEASE Brother      Afib     HEART DISEASE Brother      Asthma No family hx of      Diabetes No family hx of      Hypertension No family hx of      Breast Cancer No family hx of      Cancer - colorectal No family hx of      Cancer No family hx of      Thyroid Disease No family hx of      Glaucoma No family hx of      Colon Cancer No family hx of      Melanoma No family hx of        SOCIAL HISTORY:   Social History   Substance Use Topics     Smoking status: Former Smoker     Packs/day: 1.00     Years: 10.00     Types: Cigarettes     Quit date: 1/1/1981     Smokeless tobacco: Never Used     Alcohol use Yes      Comment: occ - Glass of white wine per night     REVIEW OF SYSTEMS:  The comprehensive review of systems from the intake form was reviewed with the patient.  No fever, weight change or fatigue. No dry eyes. No oral ulcers, sore throat or voice change. No palpitations, syncope, angina or edema.  No chest pain, excessive sleepiness, shortness of breath or hemoptysis.   No abdominal pain, nausea, vomiting, diarrhea or heartburn.  No skin rash. No focal weakness or numbness. No bleeding or lymphadenopathy. No rhinitis or hives.     Exam:  On physical examination the patient appears the stated age, is in no acute distress, affectThe is appropriate, and breathing is non-labored.  Vitals are documented in the EMR and have been reviewed:    /78  Pulse 84  Ht (P) 1.727 m (5' 8\")  Wt (P) 80.3 kg (177 lb)  SpO2 97%  BMI (P) 26.91 kg/m2  Data Unavailable  Body mass index is 26.91 kg/(m^2) (pended).    Rises from chair: easily   Gait: moderate limp favoring the left knee     LEFT knee subjective   ROM is a little irritated, symptoms are reproduced with palpation at the medial > lateral joint line. ROM " shows a 10 degree flexion contracture and a further 115 degrees of flexion. Ligament examination is grossly intact within the confines of examining an arthritic knee.     Distally, the circulatory, motor, and sensation exam is intact with 5/5 EHL, gastroc-soleus, and tibialis anterior.  Sensation to light touch is intact.  Dorsalis pedis and posterior tibialis pulses are palpable.  There are no sores on the feet, no bruising, and no lymphedema.    X-rays:   PACS Images   Show images for XR Knee Bilateral 3 Views   Study Result   EXAMINATION: XR KNEE BILATERAL 3 VW  3/30/2017 2:24 PM      CLINICAL HISTORY:  Pain in right knee, Pain in left knee      COMPARISON: 2/1/2011     FINDINGS: AP, lateral and axial views of bilateral knees were  obtained. Radiographs were compared to the left knee dated 2/1/2011.     Left knee: There is moderate to high-grade narrowing of the medial  femoral tibial joint compartment that has progressed since the prior  examination. Is patella baja in the patella is mildly laterally  subluxed.     Right knee: The medial and lateral joint spaces appear preserved.  There is patella baja. The patella is mildly laterally subluxed.         IMPRESSION:   1. Progressed moderate to high-grade medial femorotibial joint  compartment narrowing on the left.  2. Mildly subluxed medial and lateral patellae with associated patella  baja.  3. Preserved medial and lateral femorotibial joint compartments on the  right.     JUTTA ELLERMANN, MD     Assessment: This is a 68 year old with severe left knee pain despite comprehensive non-operative management. We discussed the treatment options including activity modification, oral pain medication, intra-articular injection, and the use of an  . She reports that this is no longer adequate. We discussed total knee. We spent approximately 20 minutes discussing the risks and benefits of total knee arthroplasty.  We reviewed the proposed surgical plan.  The  discussion included but was not limited to the following:  Blood clots, blood clots to the lungs, injury to blood vessels and nerves, the literature as it pertains to known problems with leg length discrepancy, stiffness, anterior knee pain, and infection. We discussed the post-operative recovery for the typical patient, return to driving, and return to normal activities.  All the patient's questions were answered to the best of my ability and would like to proceed.    Plan:  Left total knee      Again, thank you for allowing me to participate in the care of your patient.        Sincerely,        James Amaya MD

## 2018-08-21 NOTE — NURSING NOTE
"Maddie Lala's chief complaint for this visit includes:  Chief Complaint   Patient presents with     Left Knee - Pain     PCP: Radha Hurtado    Referring Provider:  No referring provider defined for this encounter.    /78  Pulse 84  Ht (P) 1.727 m (5' 8\")  Wt (P) 80.3 kg (177 lb)  SpO2 97%  BMI (P) 26.91 kg/m2  Data Unavailable     Do you need any medication refills at today's visit? no      "

## 2018-08-22 ENCOUNTER — TELEPHONE (OUTPATIENT)
Dept: ORTHOPEDICS | Facility: CLINIC | Age: 68
End: 2018-08-22

## 2018-08-22 DIAGNOSIS — M25.562 LEFT KNEE PAIN, UNSPECIFIED CHRONICITY: Primary | ICD-10-CM

## 2018-08-22 NOTE — PROGRESS NOTES
Chief Complaint: Pain of the Left Knee    Physician:  No ref. provider found    HPI: Maddie Lala is a 68 year old female who presents today for evaluation of her left knee     Symptom Profile  Location of symptoms:  Anterior knee and joint line medial greater then lateral   Onset: insidious  Duration of symptoms: 7-8 years   Quality of symptoms: aching and burning  Severity: moderate to severe   Alleviate:activity modification   Exacerbating:  Activities, walking, elliptical machine  Previous Treatments: Previous treatments include activity modification, oral pain medication, physical therapy, intra-articular injection,  brace.     Current Status:  Results of the patient s Hip Disability and Osteoarthritis Outcome Score (HOOS)  are as follows (0-100 scales with 100 being the theoretical best):  Pain: 78  Symptoms: 54  ADLs:88  Sports/Recreation: 45  Quality of Life:50  ([email or web address].nu/)  [facility] Activity Score: 7    MEDICAL HISTORY:   Past Medical History:   Diagnosis Date     Allergic rhinitis 1979     Arthritis      Benign essential hypertension 3/23/2018     Cervical dysplasia with cone in 1979    nl pap ever since      Dyspareunia      Hearing problem 2015     Meniere's disease 2002     Nonsenile cataract      Plantar fasciitis      Stress incontinence      SVT (supraventricular tachycardia) (H)     resolved     Tinnitus 2002       Pertinent negatives:  Patient has no history of DVT or PE. Discussed risk factors.    Medications:     Current Outpatient Prescriptions:      albuterol (VENTOLIN HFA) 108 (90 BASE) MCG/ACT Inhaler, INHALE TWO PUFFS BY MOUTH EVERY 6 HOURS AS NEEDED FOR SHORTNESS OF BREATH / DYSPNEA AND AS NEEDED BEFORE EXERCISE, Disp: 18 g, Rfl: 11     beclomethasone (QVAR) 40 MCG/ACT Inhaler, INHALE 2 PUFFS BY MOUTH INTO THE LUNGS TWO TIMES A DAY, Disp: 3 Inhaler, Rfl: 3     CALCIUM + D OR, One bid, Disp: , Rfl:      cephALEXin (KEFLEX) 500 MG capsule, Take 1 capsule (500 mg) by mouth 4 times  daily for 10 days, Disp: 40 capsule, Rfl: 0     Chlorpheniramine Maleate (CHLOR-TRIMETON ALLERGY) 12 MG TBCR, Take  by mouth as needed., Disp: , Rfl:      estradiol (VAGIFEM) 10 MCG TABS vaginal tablet, Place 1 tablet (10 mcg) vaginally daily, Disp: 30 tablet, Rfl: 1     fluocinonide (LIDEX) 0.05 % solution, Apply to scalp nightly for up to 1 week, then 2-3 times weekly as needed., Disp: 60 mL, Rfl: 3     fluticasone (FLONASE) 50 MCG/ACT spray, USE ONE TO TWO SPRAYS IN EACH NOSTRIL EVERY DAY, Disp: 48 g, Rfl: 3     Ibuprofen (ADVIL PO), Take 800 mg by mouth every 4 hours as needed for moderate pain, Disp: , Rfl:      lisinopril (PRINIVIL/ZESTRIL) 10 MG tablet, Take 1 tablet (10 mg) by mouth daily, Disp: 90 tablet, Rfl: 1     OPTIVITE OR, once a day, Disp: , Rfl:      order for DME, Equipment being ordered: spacer, Disp: 1 each, Rfl: 0     order for DME, Equipment being ordered:    left knee brace-medial compartment  for osteoarthritis., Disp: 1 Units, Rfl: 0     ranitidine (ZANTAC) 300 MG tablet, TAKE ONE TABLET BY MOUTH EVERY DAY, Disp: 90 tablet, Rfl: 2     SENIOR MULTIVITAMIN PLUS OR, One per day, Disp: , Rfl:     Allergies: Sulfa drugs    SURGICAL HISTORY:   Past Surgical History:   Procedure Laterality Date     BLADDER SURGERY       CATARACT IOL, RT/LT  2010     COLONOSCOPY WITH CO2 INSUFFLATION N/A 10/19/2016    Procedure: COLONOSCOPY WITH CO2 INSUFFLATION;  Surgeon: Duane, William Charles, MD;  Location: [facility] OR     GENITOURINARY SURGERY      bladder     Midurethral sling with cystoscopy.  2010     TUBAL LIGATION         FAMILY HISTORY:   Family History   Problem Relation Age of Onset     HEART DISEASE Mother      older      Osteoperosis Mother      C.A.D. Mother      60's      Macular Degeneration Mother      Respiratory Father      farmers lung     Skin Cancer Father      Alzheimer Disease Maternal Grandmother      HEART DISEASE Maternal Grandfather      HEART DISEASE Brother      Afib     HEART  "DISEASE Brother      Asthma No family hx of      Diabetes No family hx of      Hypertension No family hx of      Breast Cancer No family hx of      Cancer - colorectal No family hx of      Cancer No family hx of      Thyroid Disease No family hx of      Glaucoma No family hx of      Colon Cancer No family hx of      Melanoma No family hx of        SOCIAL HISTORY:   Social History   Substance Use Topics     Smoking status: Former Smoker     Packs/day: 1.00     Years: 10.00     Types: Cigarettes     Quit date: 1/1/1981     Smokeless tobacco: Never Used     Alcohol use Yes      Comment: occ - Glass of white wine per night     REVIEW OF SYSTEMS:  The comprehensive review of systems from the intake form was reviewed with the patient.  No fever, weight change or fatigue. No dry eyes. No oral ulcers, sore throat or voice change. No palpitations, syncope, angina or edema.  No chest pain, excessive sleepiness, shortness of breath or hemoptysis.   No abdominal pain, nausea, vomiting, diarrhea or heartburn.  No skin rash. No focal weakness or numbness. No bleeding or lymphadenopathy. No rhinitis or hives.     Exam:  On physical examination the patient appears the stated age, is in no acute distress, affectThe is appropriate, and breathing is non-labored.  Vitals are documented in the EMR and have been reviewed:    /78  Pulse 84  Ht (P) 1.727 m (5' 8\")  Wt (P) 80.3 kg (177 lb)  SpO2 97%  BMI (P) 26.91 kg/m2  Data Unavailable  Body mass index is 26.91 kg/(m^2) (pended).    Rises from chair: easily   Gait: moderate limp favoring the left knee     LEFT knee subjective   ROM is a little irritated, symptoms are reproduced with palpation at the medial > lateral joint line. ROM shows a 10 degree flexion contracture and a further 115 degrees of flexion. Ligament examination is grossly intact within the confines of examining an arthritic knee.     Distally, the circulatory, motor, and sensation exam is intact with 5/5 EHL, " gastroc-soleus, and tibialis anterior.  Sensation to light touch is intact.  Dorsalis pedis and posterior tibialis pulses are palpable.  There are no sores on the feet, no bruising, and no lymphedema.    X-rays:   PACS Images   Show images for XR Knee Bilateral 3 Views   Study Result   EXAMINATION: XR KNEE BILATERAL 3 VW  3/30/2017 2:24 PM      CLINICAL HISTORY:  Pain in right knee, Pain in left knee      COMPARISON: 2/1/2011     FINDINGS: AP, lateral and axial views of bilateral knees were  obtained. Radiographs were compared to the left knee dated 2/1/2011.     Left knee: There is moderate to high-grade narrowing of the medial  femoral tibial joint compartment that has progressed since the prior  examination. Is patella baja in the patella is mildly laterally  subluxed.     Right knee: The medial and lateral joint spaces appear preserved.  There is patella baja. The patella is mildly laterally subluxed.         IMPRESSION:   1. Progressed moderate to high-grade medial femorotibial joint  compartment narrowing on the left.  2. Mildly subluxed medial and lateral patellae with associated patella  baja.  3. Preserved medial and lateral femorotibial joint compartments on the  right.     JUTTA ELLERMANN, MD     Assessment: This is a 68 year old with severe left knee pain despite comprehensive non-operative management. We discussed the treatment options including activity modification, oral pain medication, intra-articular injection, and the use of an  . She reports that this is no longer adequate. We discussed total knee. We spent approximately 20 minutes discussing the risks and benefits of total knee arthroplasty.  We reviewed the proposed surgical plan.  The discussion included but was not limited to the following:  Blood clots, blood clots to the lungs, injury to blood vessels and nerves, the literature as it pertains to known problems with leg length discrepancy, stiffness, anterior knee pain, and  infection. We discussed the post-operative recovery for the typical patient, return to driving, and return to normal activities.  All the patient's questions were answered to the best of my ability and would like to proceed.    Plan:  Left total knee

## 2018-08-22 NOTE — TELEPHONE ENCOUNTER
Procedure: Left knee pain  Facility: [facility]  Length: 2 hour(s)  Surgeon: Jose Luis RITCHIE  Anesthesia: Choice  BMI: There is no height or weight on file to calculate BMI. (If over 43 for general or 45 for MAC cannot be scheduled at [facility])   Pre-op Appointments needed: H&P within 30 days of surgery,   Post-op appointments needed: 2 weeks nurse only, 6 weeks provider only   needed:  No  Surgery packet/instructions given to patient?  Yes     Anat Rodriguez RN

## 2018-08-23 NOTE — TELEPHONE ENCOUNTER
Date Scheduled: 10-10-18  Facility: [facility]  Surgeon: Dr. Amaya   Post-op appointment scheduled:   Next 5 appointments (look out 90 days)     Oct 23, 2018  8:30 AM CDT   Nurse Only with [facility] NURSE ONLY ORTHO   [facility])    [id number] [place] [id number] [phone]            Nov 13, 2018  8:30 AM CST   (Arrive by 8:00 AM)   Return Visit with James Amaya MD   [facility] ([facility])    [address] [place] [id number] [phone]                   scheduled?: No  Surgery packet/instructions confirmed received?  Yes  Special Considerations:   Riri Wilson, Surgery Scheduling Coordinator

## 2018-08-27 DIAGNOSIS — Z96.652 STATUS POST TOTAL LEFT KNEE REPLACEMENT: Primary | ICD-10-CM

## 2018-09-04 DIAGNOSIS — N95.2 VAGINAL ATROPHY: ICD-10-CM

## 2018-09-04 NOTE — TELEPHONE ENCOUNTER
"Requested Prescriptions   Pending Prescriptions Disp Refills     estradiol (VAGIFEM) 10 MCG TABS vaginal tablet 30 tablet 1     Sig: Place 1 tablet (10 mcg) vaginally daily    Hormone Replacement Therapy Passed    9/4/2018 12:40 PM       Passed - Blood pressure under 140/90 in past 12 months    BP Readings from Last 3 Encounters:   08/21/18 126/78   08/15/18 130/76   08/12/18 146/80                Passed - Recent (12 mo) or future (30 days) visit within the authorizing provider's specialty    Patient had office visit in the last 12 months or has a visit in the next 30 days with authorizing provider or within the authorizing provider's specialty.  See \"Patient Info\" tab in inbasket, or \"Choose Columns\" in Meds & Orders section of the refill encounter.           Passed - Patient has mammogram in past 2 years on file if age 50-75       Passed - Patient is 18 years of age or older       Passed - No active pregnancy on record       Passed - No positive pregnancy test on record in past 12 months        Last Written Prescription Date:  5/15/18  Last Fill Quantity: 30,  # refills: 1   Last office visit: 5/15/2018 with prescribing provider:  Dr. Hurtado   Future Office Visit:   Next 5 appointments (look out 90 days)     Sep 27, 2018  1:20 PM CDT   Dejat Cruz with Radha Hurtado MD   [facility]    [address] [place] [id number]   [phone]            Oct 23, 2018  8:30 AM CDT   Nurse Only with [facility] NURSE ONLY ORTHO   [facility])    [address] [place] [id number] [phone]            Nov 13, 2018  8:30 AM CST   (Arrive by 8:00 AM)   Return Visit with James Amaya MD   [facility] ([facility])    [address] [place] [id number] [phone]                   "

## 2018-09-10 RX ORDER — ESTRADIOL 10 UG/1
10 INSERT VAGINAL
Qty: 24 TABLET | Refills: 1 | Status: SHIPPED | OUTPATIENT
Start: 2018-09-10 | End: 2019-03-19

## 2018-09-10 NOTE — TELEPHONE ENCOUNTER
Routing refill request to provider for review/approval because:  Warning: dose exceeds recommended amount  Provider to please review    Maira Longo RN  [facility]

## 2018-09-20 NOTE — ED PROVIDER NOTES
History   No chief complaint on file.    The history is provided by the patient.     Maddie Lala is a 68 year old female who presents to the ED complaining of neck swelling. Patient states she was eating a stir saleh 25 minutes ago when she noticed that the left side of her anterior neck started to swell. Patient confirms that the area of swelling is tender. She reports she is just now started to have difficulties swallowing. Her breathing is ok. Something like this has never happened before. She has eaten that type of stir saleh with the same ingredients in the past. Patient took Benadryl 20 minutes ago. She denies ear or dental pain. She has had an allergic reaction to Sulfa where she then developed a rash. Patient points out she has intermittent left knee pain and is using a brace. Maddie admits she is due for a knee replacement. She had a steroid injection to the knee 2 weeks ago. Patient was recently diagnosed with hypertension but has no other issues medically.    Problem List:    Patient Active Problem List    Diagnosis Date Noted     AK (actinic keratosis) 07/12/2018     Priority: Medium     Multiple melanocytic nevi 07/12/2018     Priority: Medium     Benign essential hypertension 03/23/2018     Priority: Medium     Neck pain 02/18/2016     Priority: Medium     Muscle spasms of neck 02/18/2016     Priority: Medium     Hyponatremia 01/29/2015     Priority: Medium     Atrophic vaginitis 11/22/2013     Priority: Medium     Advanced directives, counseling/discussion 11/21/2012     Priority: Medium     Discussed advance care planning with patient; information given to patient to review. 11/21/2012          Cervical nerve root impingement 03/28/2012     Priority: Medium     Seasonal allergic rhinitis 05/13/2011     Priority: Medium     Chronic allergic conjunctivitis 05/13/2011     Priority: Medium     GERD (gastroesophageal reflux disease) 05/13/2011     Priority: Medium     Latent tuberculosis 05/13/2011      Priority: Medium     Meniere's disease 05/13/2011     Priority: Medium     Menopause 05/13/2011     Priority: Medium     Tendonitis of shoulder, right 02/01/2011     Priority: Medium     Chronic cough 02/01/2011     Priority: Medium     Brachial neuritis or radiculitis 02/01/2011     Priority: Medium     Problem list name updated by automated process. Provider to review       CARDIOVASCULAR SCREENING; LDL GOAL LESS THAN 160 10/31/2010     Priority: Medium     Urinary incontinence 01/21/2010     Priority: Medium     Cataract 01/21/2010     Priority: Medium     Utility update for deleted IMO code  Imo Update utility          Past Medical History:    Past Medical History:   Diagnosis Date     Allergic rhinitis 1979     Arthritis      Benign essential hypertension 3/23/2018     Cervical dysplasia with cone in 1979     Dyspareunia      Hearing problem 2015     Meniere's disease 2002     Nonsenile cataract      Plantar fasciitis      Stress incontinence      SVT (supraventricular tachycardia) (H)      Tinnitus 2002       Past Surgical History:    Past Surgical History:   Procedure Laterality Date     BLADDER SURGERY       CATARACT IOL, RT/LT  2010     COLONOSCOPY WITH CO2 INSUFFLATION N/A 10/19/2016    Procedure: COLONOSCOPY WITH CO2 INSUFFLATION;  Surgeon: Duane, William Charles, MD;  Location: [facility] OR     GENITOURINARY SURGERY      bladder     Midurethral sling with cystoscopy.  2010     TUBAL LIGATION         Family History:    Family History   Problem Relation Age of Onset     HEART DISEASE Mother      older      Osteoperosis Mother      C.A.D. Mother      60's      Macular Degeneration Mother      Respiratory Father      farmers lung     Skin Cancer Father      Alzheimer Disease Maternal Grandmother      HEART DISEASE Maternal Grandfather      HEART DISEASE Brother      Afib     HEART DISEASE Brother      Asthma No family hx of      Diabetes No family hx of      Hypertension No family hx of      Breast Cancer No family  hx of      Cancer - colorectal No family hx of      Cancer No family hx of      Thyroid Disease No family hx of      Glaucoma No family hx of      Colon Cancer No family hx of      Melanoma No family hx of        Social History:  Marital Status:   [2]  Social History   Substance Use Topics     Smoking status: Former Smoker     Packs/day: 1.00     Years: 10.00     Types: Cigarettes     Quit date: 1/1/1981     Smokeless tobacco: Never Used     Alcohol use Yes      Comment: occ - Glass of white wine per night        Medications:      albuterol (VENTOLIN HFA) 108 (90 BASE) MCG/ACT Inhaler   beclomethasone (QVAR) 40 MCG/ACT Inhaler   CALCIUM + D OR   Chlorpheniramine Maleate (CHLOR-TRIMETON ALLERGY) 12 MG TBCR   estradiol (VAGIFEM) 10 MCG TABS vaginal tablet   fluocinonide (LIDEX) 0.05 % solution   fluticasone (FLONASE) 50 MCG/ACT spray   Ibuprofen (ADVIL PO)   lisinopril (PRINIVIL/ZESTRIL) 10 MG tablet   OPTIVITE OR   ranitidine (ZANTAC) 300 MG tablet   SENIOR MULTIVITAMIN PLUS OR   order for DME   order for DME         Review of Systems   HENT: Positive for trouble swallowing.         Left anterior neck swelling and tenderness.   Musculoskeletal:        Left knee pain.   All other systems reviewed and are negative.      Physical Exam   BP: (!) 165/96  Heart Rate: 78  Temp: 98.1  F (36.7  C)  Resp: 20  Weight: 80.3 kg (177 lb 1.6 oz)  SpO2: 98 %      Physical Exam   Constitutional: No distress.   HENT:   Head: Atraumatic.   Mouth/Throat: No posterior oropharyngeal edema.   Eyes: Pupils are equal, round, and reactive to light. No scleral icterus.   Neck: No tracheal deviation present.   Left lateral neck swelling. Left neck lymphadenopathy +3.   Cardiovascular: Normal heart sounds and intact distal pulses.    Pulmonary/Chest: Breath sounds normal. No stridor. No respiratory distress.   Abdominal: Soft. Bowel sounds are normal. There is no tenderness.   Musculoskeletal: She exhibits no edema or tenderness.   Skin:  Skin is warm. No rash noted. She is not diaphoretic.       ED Course     ED Course     Procedures      Results for orders placed or performed during the hospital encounter of 08/12/18 (from the past 24 hour(s))   CBC with platelets differential   Result Value Ref Range    WBC 5.8 4.0 - 11.0 10e9/L    RBC Count 3.66 (L) 3.8 - 5.2 10e12/L    Hemoglobin 11.9 11.7 - 15.7 g/dL    Hematocrit 34.6 (L) 35.0 - 47.0 %    MCV 95 78 - 100 fl    MCH 32.5 26.5 - 33.0 pg    MCHC 34.4 31.5 - 36.5 g/dL    RDW 13.1 10.0 - 15.0 %    Platelet Count 299 150 - 450 10e9/L    Diff Method Automated Method     % Neutrophils 60.8 %    % Lymphocytes 28.7 %    % Monocytes 8.9 %    % Eosinophils 0.9 %    % Basophils 0.7 %    % Immature Granulocytes 0.0 %    Nucleated RBCs 0 0 /100    Absolute Neutrophil 3.5 1.6 - 8.3 10e9/L    Absolute Lymphocytes 1.7 0.8 - 5.3 10e9/L    Absolute Monocytes 0.5 0.0 - 1.3 10e9/L    Absolute Basophils 0.0 0.0 - 0.2 10e9/L    Abs Immature Granulocytes 0.0 0 - 0.4 10e9/L    Absolute Nucleated RBC 0.0    Comprehensive metabolic panel   Result Value Ref Range    Sodium 135 133 - 144 mmol/L    Potassium 4.4 3.4 - 5.3 mmol/L    Chloride 100 94 - 109 mmol/L    Carbon Dioxide 30 20 - 32 mmol/L    Anion Gap 5 3 - 14 mmol/L    Glucose 104 (H) 70 - 99 mg/dL    Urea Nitrogen 11 7 - 30 mg/dL    Creatinine 0.79 0.52 - 1.04 mg/dL    GFR Estimate 72 >60 mL/min/1.7m2    GFR Estimate If Black 87 >60 mL/min/1.7m2    Calcium 8.5 8.5 - 10.1 mg/dL    Bilirubin Total 0.2 0.2 - 1.3 mg/dL    Albumin 3.5 3.4 - 5.0 g/dL    Protein Total 6.3 (L) 6.8 - 8.8 g/dL    Alkaline Phosphatase 67 40 - 150 U/L    ALT 34 0 - 50 U/L    AST 23 0 - 45 U/L   Soft tissue neck CT w contrast    Narrative    CT SCAN OF THE NECK WITH CONTRAST August 12, 2018 2:10 PM     HISTORY: Swelling on left side of neck, lymphadenopathy, sudden onset.      TECHNIQUE: Axial CT images of the neck following administration of  intravenous contrast with reformations. Radiation  dose for this scan  was reduced using automated exposure control, adjustment of the mA  and/or kV according to patient size, or iterative reconstruction  technique. 80 mL Isovue 370  IV.     COMPARISON: None.     FINDINGS: A marker is placed over the left submandibular region. There  is moderate inflammation and fat stranding in the anterior left neck  centered around the submandibular gland. The left submandibular gland  appears enlarged and demonstrates increased enhancement. The  submandibular gland ducts are also moderately enlarged. No stone is  identified within the submandibular gland or along the course of  [place]'s duct. No rim-enhancing fluid collection or abscess is  appreciated.    Right submandibular gland has a normal appearance by CT. The parotid  glands are relatively symmetric and unremarkable by CT. No abnormal  thyroid gland nodules.    No other suspicious masses within the neck. No suspicious cervical  lymph nodes.    Visualized lung apices are clear. Multilevel degenerative changes in  the spine.      Impression    IMPRESSION: Sialadenitis of the left submandibular gland which is  enlarged and demonstrates ductal dilatation. Moderate surrounding  inflammation in the anterior left neck in this region. No loculated  fluid collection or abscess appreciated. No submandibular gland stone  is identified.         CAITLIN BRYAN MD       Medications   0.9% sodium chloride BOLUS (0 mLs Intravenous Stopped 8/12/18 1416)   methylPREDNISolone sodium succinate (solu-MEDROL) injection 125 mg (125 mg Intravenous Given 8/12/18 1256)   EPINEPHrine PF (ADRENALIN) injection 0.3 mg (0.3 mg Intramuscular Given 8/12/18 1249)   ranitidine (ZANTAC) 50 mg in sodium chloride 0.9 % intermittent infusion (50 mg Intravenous Given 8/12/18 1307)   sodium chloride (PF) 0.9% PF flush 3 mL (3 mLs Intravenous Given 8/12/18 1359)   Saline Bag 100mL  CT  flush use only (68 mLs Intravenous Given 8/12/18 1359)   iopamidol  (ISOVUE-370) solution 500 mL (80 mLs Intravenous Given 8/12/18 5703)       Assessments & Plan (with Medical Decision Making)  Maddie Lala is a 68 year old female who presents to the ED with concerns of left anterior throat swelling and tenderness. This event occurred 25 minutes ago. She took an oral Benadryl 20 minutes ago but has now noticed that it takes more effort to swallow. Breathing is ok. Upon examination, she shows left lateral neck swelling with left neck lymphadenopathy (+3). No stridor or tracheal deviation is present. No posterior oropharyngeal edema. A CBC and CMP was obtained and are essentially unremarkable.  Patient's presentation excludes her parotid gland/face.  ? Sialadenitis of left submandibular gland.  Possibly an allergic reaction.  Given the location of the swelling and patient's reports of having to concentrate on swallowing initially treated this as an allergic reaction and she was given subcu epi, IV Solu-Medrol, Zantac, and a liter of fluid.  Patient swelling did improve with this.  She was feeling better, although there was still significant swelling on the left side around the submandibular gland.  CT scan of neck was obtained and does confirm a sialoadenitis of submandibular gland.  There is ductal dilatation surrounding inflammation in the anterior left neck with no loculated fluid collection or abscess appreciated, there is no evidence of a stone.  I discussed findings of today's exam and test results with patient, she continues to feel better here.  At this time patient has no evidence of an acute infection, her white count is normal, she has no fever, there is no redness or warmth to the area of swelling, however, given the proximity to her airway I did elect to treat her with Keflex.  Patient was encouraged to suck on lemon drops or other hard candy that would produce increased salivary gland production.  ENT referral was placed for follow-up next week.  Patient was encouraged  to avoid medications that would decrease mucus production such as Benadryl, Claritin, Zyrtec if she is able to do so as this may prolong the healing process.  Warm packs were encouraged.  Reasons to return to the emergency department were discussed in detail.  I did discuss with patient that given the location of this next to her airway I do have a low threshold for her return.  Patient verbalizes understanding of plan of care and is agreeable.  Patient was discharged home in stable condition with her  driving.     I have reviewed the nursing notes.    I have reviewed the findings, diagnosis, plan and need for follow up with the patient.    Discharge Medication List as of 8/12/2018  2:54 PM      START taking these medications    Details   cephALEXin (KEFLEX) 500 MG capsule Take 1 capsule (500 mg) by mouth 4 times daily for 10 days, Disp-40 capsule, R-0, E-Prescribe             Final diagnoses:   Sialoadenitis of submandibular gland - left     This document serves as a record of services personally performed by Cherise Longoria AP-NP. It was created on their behalf by Vikas Blair, a trained medical scribe. The creation of this record is based on the provider's personal observations and the statements of the patient. This document has been checked and approved by the attending provider.    Note: Chart documentation done in part with Dragon Voice Recognition software. Although reviewed after completion, some word and grammatical errors may remain.    8/12/2018   [facility] EMERGENCY DEPARTMENT     Cherise Longoria APRN CNP  08/12/18 2864     38 yo female presents to the ER s/p mechanical fall while rollerblading 4 days ago on Sunday afternoon. Pt denies syncopal episode and states I fell with my right hand straight out.  I thought the pain would get better but it has not". Pt reports dull moderate pain to right wrist, forearm and elbow with soft tissue swelling to wrist. Skin intact. Pt denies neck and back pain and denies head injury.

## 2018-09-24 ENCOUNTER — MYC MEDICAL ADVICE (OUTPATIENT)
Dept: ORTHOPEDICS | Facility: CLINIC | Age: 68
End: 2018-09-24

## 2018-09-25 ENCOUNTER — E-VISIT (OUTPATIENT)
Dept: FAMILY MEDICINE | Facility: CLINIC | Age: 68
End: 2018-09-25
Payer: COMMERCIAL

## 2018-09-25 DIAGNOSIS — R30.0 DYSURIA: ICD-10-CM

## 2018-09-25 DIAGNOSIS — R30.0 DYSURIA: Primary | ICD-10-CM

## 2018-09-25 LAB
ALBUMIN UR-MCNC: NEGATIVE MG/DL
APPEARANCE UR: CLEAR
BACTERIA #/AREA URNS HPF: ABNORMAL /HPF
BILIRUB UR QL STRIP: NEGATIVE
COLOR UR AUTO: ABNORMAL
GLUCOSE UR STRIP-MCNC: NEGATIVE MG/DL
HGB UR QL STRIP: NEGATIVE
KETONES UR STRIP-MCNC: NEGATIVE MG/DL
LEUKOCYTE ESTERASE UR QL STRIP: NEGATIVE
MUCOUS THREADS #/AREA URNS LPF: PRESENT /LPF
NITRATE UR QL: POSITIVE
PH UR STRIP: 5 PH (ref 5–7)
RBC #/AREA URNS AUTO: 3 /HPF (ref 0–2)
SOURCE: ABNORMAL
SP GR UR STRIP: 1 (ref 1–1.03)
UROBILINOGEN UR STRIP-MCNC: 4 MG/DL (ref 0–2)
WBC #/AREA URNS AUTO: 1 /HPF (ref 0–5)

## 2018-09-25 PROCEDURE — 87088 URINE BACTERIA CULTURE: CPT | Performed by: FAMILY MEDICINE

## 2018-09-25 PROCEDURE — 99444 ZZC PHYSICIAN ONLINE EVALUATION & MANAGEMENT SERVICE: CPT | Performed by: PHYSICIAN ASSISTANT

## 2018-09-25 PROCEDURE — 87186 SC STD MICRODIL/AGAR DIL: CPT | Performed by: FAMILY MEDICINE

## 2018-09-25 PROCEDURE — 81001 URINALYSIS AUTO W/SCOPE: CPT | Performed by: PHYSICIAN ASSISTANT

## 2018-09-25 PROCEDURE — 87086 URINE CULTURE/COLONY COUNT: CPT | Performed by: PHYSICIAN ASSISTANT

## 2018-09-25 RX ORDER — CIPROFLOXACIN 250 MG/1
250 TABLET, FILM COATED ORAL 2 TIMES DAILY
Qty: 14 TABLET | Refills: 0 | Status: SHIPPED | OUTPATIENT
Start: 2018-09-25 | End: 2019-03-19

## 2018-09-25 NOTE — PROGRESS NOTES
Juan David Perkins  I hope you received my message.   I sent over a prescription for cipro  Let us know if symptoms not improving or if you do not hear back from us in the next 3 days  Return urgently if any change in symptoms.    Please call or MyChart my office with any questions or concerns.    Elicia Richards, PAC

## 2018-09-25 NOTE — MR AVS SNAPSHOT
After Visit Summary   9/25/2018    Maddie Lala    MRN: [id number]           Patient Information     Date Of Birth          1950        Visit Information        Provider Department      9/25/2018 8:37 AM Elicia Richards PA-C [facility]        Today's Diagnoses     Dysuria    -  1      Care Instructions      Thank you for choosing us for your care. Given your symptoms, I would like you to do a lab-only visit to determine what is causing them.  I have placed the orders.  Please schedule an appointment with the lab [facility], or call [phone].  I will let you know when the results are back and next steps to take.          Follow-ups after your visit        Your next 10 appointments already scheduled     Sep 27, 2018  1:20 PM CDT   Adele Arroyo with Radha Hurtado MD   [facility] ([facility])    [address] [place] [id number] [phone]            Oct 10, 2018   Procedure with James Amaya MD   [facility], Same Day Surgery (--)    [address]  [place] [address] [phone]            Oct 23, 2018  8:30 AM CDT   Nurse Only with [facility] NURSE ONLY ORTHO   [facility])    [address] [place] [id number] [phone]            Nov 13, 2018  8:00 AM CST   XR KNEE LEFT 1/2 VIEWS with MGXR2   [facility])    [address] [place] [id number] [phone]           How do I prepare for my exam? (Food and drink instructions) No Food and Drink Restrictions.  How do I prepare for my exam? (Other instructions) You do not need to do anything special for this exam.  What should I wear: Wear comfortable clothes.  How long does the exam take: Most scans take less than 5 minutes.  What should I bring: Bring a list of your medicines, including vitamins, minerals and over-the-counter  drugs. It is safest to leave personal items at home.  Do I need a :  No  is needed.  What do I need to tell my doctor: Tell your doctor if there s any chance you are pregnant.  What should I do after the exam: No restrictions, You may resume normal activities.  What is this test: An image of a specific body part shown in shades of black and white.  Who should I call with questions: If you have any questions, please call the Imaging Department where you will have your exam. Directions, parking instructions, and other information is available on our website, [email or web address].            Nov 13, 2018  8:30 AM CST   (Arrive by 8:00 AM)   Return Visit with James Amaya MD   [facility] ([facility])    [address] [place] [id number] [phone]              Who to contact     If you have questions or need follow up information about today's clinic visit or your schedule please contact [facility] directly at [phone].  Normal or non-critical lab and imaging results will be communicated to you by Home-Acc[employer]art, letter or phone within 4 business days after the clinic has received the results. If you do not hear from us within 7 days, please contact the clinic through Home-Acc[employer] or phone. If you have a critical or abnormal lab result, we will notify you by phone as soon as possible.  Submit refill requests through Xspand or call your pharmacy and they will forward the refill request to us. Please allow 3 business days for your refill to be completed.          Additional Information About Your Visit        Xspand Information     Xspand gives you secure access to your electronic health record. If you see a primary care provider, you can also send messages to your care team and make appointments. If you have questions, please call your primary care clinic.  If you do not have a primary care provider, please call [phone] and they will  assist you.        Care EveryWhere ID     This is your Care EveryWhere ID. This could be used by other organizations to access your [facility] medical records  [id number]         Blood Pressure from Last 3 Encounters:   08/21/18 126/78   08/15/18 130/76   08/12/18 146/80    Weight from Last 3 Encounters:   08/21/18 (P) 80.3 kg (177 lb)   08/15/18 80.3 kg (177 lb)   08/12/18 80.3 kg (177 lb 1.6 oz)                 Today's Medication Changes          These changes are accurate as of 9/25/18  2:31 PM.  If you have any questions, ask your nurse or doctor.               Start taking these medicines.        Dose/Directions    ciprofloxacin 250 MG tablet   Commonly known as:  CIPRO   Used for:  Dysuria   Started by:  Elicia Richards PA-C        Dose:  250 mg   Take 1 tablet (250 mg) by mouth 2 times daily for 7 days   Quantity:  14 tablet   Refills:  0            Where to get your medicines      These medications were sent to [facility] [address]   [address] [place] [address] , [facility] [address]     Phone:  [phone]     ciprofloxacin 250 MG tablet                Primary Care Provider Office Phone # Fax #    Radha Hurtado [phone] [address]  [facility] [address]        Equal Access to Services     ALYSON DAVIS AH: Hadii rizwan ku hadasho Soomaali, waaxda luqadaha, qaybta kaalmada adeegyada, nereyda guy. So [facility] [phone].    ATENCIÓN: Si habla español, tiene a mayo disposición servicios gratuitos de asistencia lingüística. Davie nguyen [phone].    We comply with applicable federal civil rights laws and [place] laws. We do not discriminate on the basis of race, color, national origin, age, disability, sex, sexual orientation, or gender identity.            Thank you!     Thank you for choosing [facility]  for your care. Our goal is always to provide you with excellent care. Hearing back from our patients  is one way we can continue to improve our services. Please take a few minutes to complete the written survey that you may receive in the mail after your visit with us. Thank you!             Your Updated Medication List - Protect others around you: Learn how to safely use, store and throw away your medicines at www.disposemymeds.org.          This list is accurate as of 9/25/18  2:31 PM.  Always use your most recent med list.                   Brand Name Dispense Instructions for use Diagnosis    ADVIL PO      Take 800 mg by mouth every 4 hours as needed for moderate pain        albuterol 108 (90 Base) MCG/ACT inhaler    VENTOLIN HFA    18 g    INHALE TWO PUFFS BY MOUTH EVERY 6 HOURS AS NEEDED FOR SHORTNESS OF BREATH / DYSPNEA AND AS NEEDED BEFORE EXERCISE    Cough due to bronchospasm       beclomethasone 40 MCG/ACT Inhaler    QVAR    3 Inhaler    INHALE 2 PUFFS BY MOUTH INTO THE LUNGS TWO TIMES A DAY    Chronic cough       CALCIUM + D PO      One bid        CHLOR-TRIMETON ALLERGY 12 MG Tbcr   Generic drug:  chlorpheniramine maleate      Take  by mouth as needed.        ciprofloxacin 250 MG tablet    CIPRO    14 tablet    Take 1 tablet (250 mg) by mouth 2 times daily for 7 days    Dysuria       estradiol 10 MCG Tabs vaginal tablet    VAGIFEM    24 tablet    Place 1 tablet (10 mcg) vaginally twice a week    Vaginal atrophy       fluocinonide 0.05 % solution    LIDEX    60 mL    Apply to scalp nightly for up to 1 week, then 2-3 times weekly as needed.    Dermatitis, seborrheic       fluticasone 50 MCG/ACT spray    FLONASE    48 g    USE ONE TO TWO SPRAYS IN EACH NOSTRIL EVERY DAY    Chronic allergic conjunctivitis       lisinopril 10 MG tablet    PRINIVIL/ZESTRIL    90 tablet    Take 1 tablet (10 mg) by mouth daily    Benign essential hypertension       order for DME     1 Units    Equipment being ordered:    left knee brace-medial compartment  for osteoarthritis.    Primary osteoarthritis of left knee        order for DME     1 each    Equipment being ordered: spacer    Cough due to bronchospasm       ranitidine 300 MG tablet    ZANTAC    90 tablet    TAKE ONE TABLET BY MOUTH EVERY DAY    Gastroesophageal reflux disease without esophagitis       * SENIOR MULTIVITAMIN PLUS PO      One per day        * OPTIVITE PO      once a day        * Notice:  This list has 2 medication(s) that are the same as other medications prescribed for you. Read the directions carefully, and ask your doctor or other care provider to review them with you.

## 2018-09-25 NOTE — PATIENT INSTRUCTIONS
Thank you for choosing us for your care. Given your symptoms, I would like you to do a lab-only visit to determine what is causing them.  I have placed the orders.  Please schedule an appointment with the lab right here in [facility], or call [phone].  I will let you know when the results are back and next steps to take.

## 2018-09-26 LAB
BACTERIA SPEC CULT: ABNORMAL
Lab: ABNORMAL
SPECIMEN SOURCE: ABNORMAL

## 2018-09-27 ENCOUNTER — OFFICE VISIT (OUTPATIENT)
Dept: FAMILY MEDICINE | Facility: CLINIC | Age: 68
End: 2018-09-27
Payer: COMMERCIAL

## 2018-09-27 VITALS
TEMPERATURE: 97.9 F | OXYGEN SATURATION: 97 % | RESPIRATION RATE: 18 BRPM | WEIGHT: 176.2 LBS | HEIGHT: 68 IN | SYSTOLIC BLOOD PRESSURE: 124 MMHG | HEART RATE: 99 BPM | BODY MASS INDEX: 26.7 KG/M2 | DIASTOLIC BLOOD PRESSURE: 76 MMHG

## 2018-09-27 DIAGNOSIS — I10 BENIGN ESSENTIAL HYPERTENSION: ICD-10-CM

## 2018-09-27 DIAGNOSIS — M17.12 PRIMARY OSTEOARTHRITIS OF LEFT KNEE: ICD-10-CM

## 2018-09-27 DIAGNOSIS — R06.02 SOB (SHORTNESS OF BREATH): ICD-10-CM

## 2018-09-27 DIAGNOSIS — Z23 NEED FOR PROPHYLACTIC VACCINATION AND INOCULATION AGAINST INFLUENZA: ICD-10-CM

## 2018-09-27 DIAGNOSIS — Z01.818 PREOP GENERAL PHYSICAL EXAM: Primary | ICD-10-CM

## 2018-09-27 DIAGNOSIS — M25.562 LEFT KNEE PAIN, UNSPECIFIED CHRONICITY: ICD-10-CM

## 2018-09-27 LAB
ALBUMIN SERPL-MCNC: 3.7 G/DL (ref 3.4–5)
ALP SERPL-CCNC: 76 U/L (ref 40–150)
ALT SERPL W P-5'-P-CCNC: 41 U/L (ref 0–50)
ANION GAP SERPL CALCULATED.3IONS-SCNC: 6 MMOL/L (ref 3–14)
AST SERPL W P-5'-P-CCNC: 30 U/L (ref 0–45)
BILIRUB SERPL-MCNC: 0.2 MG/DL (ref 0.2–1.3)
BUN SERPL-MCNC: 13 MG/DL (ref 7–30)
CALCIUM SERPL-MCNC: 8.9 MG/DL (ref 8.5–10.1)
CHLORIDE SERPL-SCNC: 101 MMOL/L (ref 94–109)
CO2 SERPL-SCNC: 31 MMOL/L (ref 20–32)
CREAT SERPL-MCNC: 0.61 MG/DL (ref 0.52–1.04)
ERYTHROCYTE [DISTWIDTH] IN BLOOD BY AUTOMATED COUNT: 12.6 % (ref 10–15)
GFR SERPL CREATININE-BSD FRML MDRD: >90 ML/MIN/1.7M2
GLUCOSE SERPL-MCNC: 87 MG/DL (ref 70–99)
HCT VFR BLD AUTO: 35.6 % (ref 35–47)
HGB BLD-MCNC: 12.5 G/DL (ref 11.7–15.7)
INR PPP: 0.95 (ref 0.86–1.14)
MCH RBC QN AUTO: 32.9 PG (ref 26.5–33)
MCHC RBC AUTO-ENTMCNC: 35.1 G/DL (ref 31.5–36.5)
MCV RBC AUTO: 94 FL (ref 78–100)
PLATELET # BLD AUTO: 309 10E9/L (ref 150–450)
POTASSIUM SERPL-SCNC: 4.2 MMOL/L (ref 3.4–5.3)
PROT SERPL-MCNC: 7 G/DL (ref 6.8–8.8)
RBC # BLD AUTO: 3.8 10E12/L (ref 3.8–5.2)
SODIUM SERPL-SCNC: 138 MMOL/L (ref 133–144)
WBC # BLD AUTO: 5.9 10E9/L (ref 4–11)

## 2018-09-27 PROCEDURE — 90686 IIV4 VACC NO PRSV 0.5 ML IM: CPT | Performed by: FAMILY MEDICINE

## 2018-09-27 PROCEDURE — 85027 COMPLETE CBC AUTOMATED: CPT | Performed by: FAMILY MEDICINE

## 2018-09-27 PROCEDURE — 86901 BLOOD TYPING SEROLOGIC RH(D): CPT | Performed by: ORTHOPAEDIC SURGERY

## 2018-09-27 PROCEDURE — 36415 COLL VENOUS BLD VENIPUNCTURE: CPT | Performed by: FAMILY MEDICINE

## 2018-09-27 PROCEDURE — 86850 RBC ANTIBODY SCREEN: CPT | Performed by: ORTHOPAEDIC SURGERY

## 2018-09-27 PROCEDURE — 80053 COMPREHEN METABOLIC PANEL: CPT | Performed by: FAMILY MEDICINE

## 2018-09-27 PROCEDURE — 99214 OFFICE O/P EST MOD 30 MIN: CPT | Mod: 25 | Performed by: FAMILY MEDICINE

## 2018-09-27 PROCEDURE — 90471 IMMUNIZATION ADMIN: CPT | Performed by: FAMILY MEDICINE

## 2018-09-27 PROCEDURE — 85610 PROTHROMBIN TIME: CPT | Performed by: FAMILY MEDICINE

## 2018-09-27 PROCEDURE — 86900 BLOOD TYPING SEROLOGIC ABO: CPT | Performed by: ORTHOPAEDIC SURGERY

## 2018-09-27 PROCEDURE — 93000 ELECTROCARDIOGRAM COMPLETE: CPT | Performed by: FAMILY MEDICINE

## 2018-09-27 ASSESSMENT — PAIN SCALES - GENERAL: PAINLEVEL: NO PAIN (0)

## 2018-09-27 NOTE — NURSING NOTE
Huddle with provider of the Injections of Flu. Instead of High dose give Quad. Pt notify also of the injection. Per Huddle with Camryn ESPARZA, should be fine.    Meredith Pearl MA

## 2018-09-27 NOTE — PATIENT INSTRUCTIONS
Hold Ibuprofen three days before procedure.   Labs today. I will notify you of results when I receive them.   Repeat EKG today.   Please complete pulmonary function testing.  You can call [phone] to schedule this at the [facility] in [place] (formerly the [facility]).    Flu shot today.   Complete antibiotic course for UTI.     Do not take lisinopril on the day before or day of surgery.          Before Your Surgery      Call your surgeon if there is any change in your health. This includes signs of a cold or flu (such as a sore throat, runny nose, cough, rash or fever).    Do not smoke, drink alcohol or take over the counter medicine (unless your surgeon or primary care doctor tells you to) for the 24 hours before and after surgery.    If you take prescribed drugs: Follow your doctor s orders about which medicines to take and which to stop until after surgery.    Eating and drinking prior to surgery: follow the instructions from your surgeon    Take a shower or bath the night before surgery. Use the soap your surgeon gave you to gently clean your skin. If you do not have soap from your surgeon, use your regular soap. Do not shave or scrub the surgery site.  Wear clean pajamas and have clean sheets on your bed.

## 2018-09-27 NOTE — Clinical Note
Can you make sure patient is only billed for 1 EKG instead of 2, I am unable to cancel on my end.  Thanks,PSK

## 2018-09-27 NOTE — PROGRESS NOTES
[address] [address]  [phone]  Dept: [phone]    PRE-OP EVALUATION:  Today's date: 2018    Maddie Lala (: 1950) presents for pre-operative evaluation assessment as requested by James Ramirez MD.  She requires evaluation and anesthesia risk assessment prior to undergoing surgery/procedure for treatment of ARTHROPLASTY KNEE .    Proposed Surgery/ Procedure: Left Total Knee Arthroplasty     Date of Surgery/ Procedure: 10/10/2018  Time of Surgery/ Procedure: 10:55 AM  Hospital/Surgical Facility: [facility]  Fax number for surgical facility: n/a  Primary Physician: Radha Hurtado  Type of Anesthesia Anticipated: General    Patient has a Health Care Directive or Living Will:  NO    1. NO - Do you have a history of heart attack, stroke, stent, bypass or surgery on an artery in the head, neck, heart or legs?  2. NO - Do you ever have any pain or discomfort in your chest?  3. NO - Do you have a history of  Heart Failure?  4. NO - Are you troubled by shortness of breath when: walking on the level, up a slight hill or at night?  5. NO - Do you currently have a cold, bronchitis or other respiratory infection?  6. NO - Do you have a cough, shortness of breath or wheezing?  7. NO - Do you sometimes get pains in the calves of your legs when you walk?  8. NO - Do you or anyone in your family have previous history of blood clots?  9. NO - Do you or does anyone in your family have a serious bleeding problem such as prolonged bleeding following surgeries or cuts?  10. NO - Have you ever had problems with anemia or been told to take iron pills?  11. NO - Have you had any abnormal blood loss such as black, tarry or bloody stools, or abnormal vaginal bleeding?  12. NO - Have you ever had a blood transfusion?  13. NO - Have you or any of your relatives ever had problems with anesthesia?  14. NO - Do you have sleep apnea,  excessive snoring or daytime drowsiness?  15. NO - Do you have any prosthetic heart valves?  16. NO - Do you have prosthetic joints?  17. NO - Is there any chance that you may be pregnant?      HPI:     HPI related to upcoming procedure: patient is present to the clinic today for procedure clearance for left total knee arthroplasty.  Has long standing arthritis of the left knee that has failed conservative measures.  Plan to proceed with TKA.  Takes ibuprofen for pain intermittently and wears left knee brace constantly for relief to pain.         See problem list for active medical problems.  Problems all longstanding and stable, except as noted/documented.  See ROS for pertinent symptoms related to these conditions.                                                                                                                                                          .    MEDICAL HISTORY:     Patient Active Problem List    Diagnosis Date Noted     AK (actinic keratosis) 07/12/2018     Priority: Medium     Multiple melanocytic nevi 07/12/2018     Priority: Medium     Benign essential hypertension 03/23/2018     Priority: Medium     Neck pain 02/18/2016     Priority: Medium     Muscle spasms of neck 02/18/2016     Priority: Medium     Hyponatremia 01/29/2015     Priority: Medium     Atrophic vaginitis 11/22/2013     Priority: Medium     Advanced directives, counseling/discussion 11/21/2012     Priority: Medium     Discussed advance care planning with patient; information given to patient to review. 11/21/2012          Cervical nerve root impingement 03/28/2012     Priority: Medium     Seasonal allergic rhinitis 05/13/2011     Priority: Medium     Chronic allergic conjunctivitis 05/13/2011     Priority: Medium     GERD (gastroesophageal reflux disease) 05/13/2011     Priority: Medium     Latent tuberculosis 05/13/2011     Priority: Medium     Meniere's disease 05/13/2011     Priority: Medium     Menopause 05/13/2011      Priority: Medium     Tendonitis of shoulder, right 02/01/2011     Priority: Medium     Chronic cough 02/01/2011     Priority: Medium     Brachial neuritis or radiculitis 02/01/2011     Priority: Medium     Problem list name updated by automated process. Provider to review       CARDIOVASCULAR SCREENING; LDL GOAL LESS THAN 160 10/31/2010     Priority: Medium     Urinary incontinence 01/21/2010     Priority: Medium     Cataract 01/21/2010     Priority: Medium     Utility update for deleted IMO code  Imo Update utility        Past Medical History:   Diagnosis Date     Allergic rhinitis 1979     Arthritis      Benign essential hypertension 3/23/2018     Cervical dysplasia with cone in 1979    nl pap ever since      Dyspareunia      Hearing problem 2015     Meniere's disease 2002     Nonsenile cataract      Plantar fasciitis      Stress incontinence      SVT (supraventricular tachycardia) (H)     resolved     Tinnitus 2002     Past Surgical History:   Procedure Laterality Date     BLADDER SURGERY       CATARACT IOL, RT/LT  2010     COLONOSCOPY WITH CO2 INSUFFLATION N/A 10/19/2016    Procedure: COLONOSCOPY WITH CO2 INSUFFLATION;  Surgeon: Duane, William Charles, MD;  Location: [facility] OR     GENITOURINARY SURGERY      bladder     Midurethral sling with cystoscopy.  2010     TUBAL LIGATION       Current Outpatient Prescriptions   Medication Sig Dispense Refill     albuterol (VENTOLIN HFA) 108 (90 BASE) MCG/ACT Inhaler INHALE TWO PUFFS BY MOUTH EVERY 6 HOURS AS NEEDED FOR SHORTNESS OF BREATH / DYSPNEA AND AS NEEDED BEFORE EXERCISE 18 g 11     beclomethasone (QVAR) 40 MCG/ACT Inhaler INHALE 2 PUFFS BY MOUTH INTO THE LUNGS TWO TIMES A DAY 3 Inhaler 3     CALCIUM + D OR One bid       Chlorpheniramine Maleate (CHLOR-TRIMETON ALLERGY) 12 MG TBCR Take  by mouth as needed.       ciprofloxacin (CIPRO) 250 MG tablet Take 1 tablet (250 mg) by mouth 2 times daily for 7 days 14 tablet 0     estradiol (VAGIFEM) 10 MCG TABS vaginal tablet  "Place 1 tablet (10 mcg) vaginally twice a week 24 tablet 1     fluocinonide (LIDEX) 0.05 % solution Apply to scalp nightly for up to 1 week, then 2-3 times weekly as needed. 60 mL 3     fluticasone (FLONASE) 50 MCG/ACT spray USE ONE TO TWO SPRAYS IN EACH NOSTRIL EVERY DAY 48 g 3     Ibuprofen (ADVIL PO) Take 800 mg by mouth every 4 hours as needed for moderate pain       lisinopril (PRINIVIL/ZESTRIL) 10 MG tablet Take 1 tablet (10 mg) by mouth daily 90 tablet 1     OPTIVITE OR once a day       order for DME Equipment being ordered: spacer 1 each 0     order for DME Equipment being ordered:    left knee brace-medial compartment  for osteoarthritis. 1 Units 0     ranitidine (ZANTAC) 300 MG tablet TAKE ONE TABLET BY MOUTH EVERY DAY 90 tablet 2     SENIOR MULTIVITAMIN PLUS OR One per day       OTC products: None, except as noted above    Allergies   Allergen Reactions     Sulfa Drugs Rash      Latex Allergy: NO    Social History   Substance Use Topics     Smoking status: Former Smoker     Packs/day: 1.00     Years: 10.00     Types: Cigarettes     Quit date: 1/1/1981     Smokeless tobacco: Never Used     Alcohol use Yes      Comment: occ - Glass of white wine per night     History   Drug Use No       REVIEW OF SYSTEMS:   10 point ROS of systems including Constitutional, Eyes, Respiratory, Cardiovascular, Gastroenterology, Genitourinary, Integumentary, Muscularskeletal, Psychiatric were all negative except for pertinent positives noted in my HPI.    EYE: Vision changes attributed to \"age\". Follows eye care yearly.     RESP/ Coughs- She attributes a little cough to lisinopril, and not infectious type coughs.    She reports that Ventolin is not helping and as a result tries not not use it. She reported that she experiences shortness of breath with exercise and feels winded the whole time without no discomfort with it. She thinks it maybe due to history of smoking. she uses elliptical.   She is not using Qvar " "regularly, but wants to start to take regularly.    Spirometry completed on 1/30/13.     GI: GERD- Takes Zantac as needed.     ENT- Uses Flonase seasonally. And tries to not to use too much and has cut back due to loss in smell. However Flonase is noted to work well however.    She reports that she only takes chlorpheniramine for as needed use and tries not  to take it due to experiencing a ruptured salivary gland and went to the Emergency Room.   Tinnitus once in awhile due to meniere's disease. Not worsening.           -She reports that she never had UTI's but all the suddenly had two UTI's since Spring. She cannot recall if most current UTI episode had more pronounced symptoms than last episode in May.   She stated that she was doing hormone therapy a couple times for brief periods and was doing vagifem suppository daily for three months. She reports that she is not sure if she is emptying bladder completely and will monitor this.  Denies constipation.    HEME: She reports that she has a history of \"spontaneous blood vessels\" in the right hand that \"ruptures\". Recent episode was couple weeks ago and this is noted to be different with respect to occurring with respect to the whole finger. Prior episode was noted to be over one year ago.  Her finger was noted to turn blue and black more back of finger than front of finger. Also appeared to be swollen. No associated pain with symptoms.   OF NOTE she also reports that she bruises and bleeds easily with trauma.        This document serves as a record of the services and decisions personally performed and made by Radha Hurtado MD. It was created on her behalf by Victoriano Díaz, a trained medical scribe. The creation of this document is based on the provider's statements to the medical scribe.  Victoriano Díaz September 27, 2018 1:43 PM      EXAM:   /76  Pulse 99  Temp 97.9  F (36.6  C) (Oral)  Resp 18  Ht 1.727 m (5' 8\")  Wt 79.9 kg (176 lb 3.2 oz)  SpO2 " 97%  Breastfeeding? No  BMI 26.79 kg/m2    GENERAL APPEARANCE: healthy, alert, active and no distress     EYES: EOMI, PERRL     HENT: ear canals and TM's normal and nose and mouth without ulcers or lesions     NECK: no adenopathy, no asymmetry, masses, or scars and thyroid normal to palpation     RESP: lungs clear to auscultation - no rales, rhonchi or wheezes     CV: regular rates and rhythm, normal S1 S2, no S3 or S4 and no murmur, click or rub     ABDOMEN:  soft, nontender, no HSM or masses and bowel sounds normal     MS: crepitous left knee without swelling. Pain with flexion and extension. Tenderness along medial joint line.     SKIN: no suspicious lesions or rashes     NEURO: Normal strength and tone, sensory exam grossly normal, mentation intact and speech normal     PSYCH: mentation appears normal. and affect normal/bright     LYMPHATICS: No cervical adenopathy    DIAGNOSTICS:     EKG: appears normal, NSR, R wave in V1, normal intervals, no acute ST/T changes c/w ischemia, no LVH by voltage criteria  Labs Resulted Today:   Results for orders placed or performed in visit on 09/27/18   CBC with platelets   Result Value Ref Range    WBC 5.9 4.0 - 11.0 10e9/L    RBC Count 3.80 3.8 - 5.2 10e12/L    Hemoglobin 12.5 11.7 - 15.7 g/dL    Hematocrit 35.6 35.0 - 47.0 %    MCV 94 78 - 100 fl    MCH 32.9 26.5 - 33.0 pg    MCHC 35.1 31.5 - 36.5 g/dL    RDW 12.6 10.0 - 15.0 %    Platelet Count 309 150 - 450 10e9/L   INR   Result Value Ref Range    INR 0.95 0.86 - 1.14   Comprehensive metabolic panel   Result Value Ref Range    Sodium 138 133 - 144 mmol/L    Potassium 4.2 3.4 - 5.3 mmol/L    Chloride 101 94 - 109 mmol/L    Carbon Dioxide 31 20 - 32 mmol/L    Anion Gap 6 3 - 14 mmol/L    Glucose 87 70 - 99 mg/dL    Urea Nitrogen 13 7 - 30 mg/dL    Creatinine 0.61 0.52 - 1.04 mg/dL    GFR Estimate >90 >60 mL/min/1.7m2    GFR Estimate If Black >90 >60 mL/min/1.7m2    Calcium 8.9 8.5 - 10.1 mg/dL    Bilirubin Total 0.2 0.2 -  1.3 mg/dL    Albumin 3.7 3.4 - 5.0 g/dL    Protein Total 7.0 6.8 - 8.8 g/dL    Alkaline Phosphatase 76 40 - 150 U/L    ALT 41 0 - 50 U/L    AST 30 0 - 45 U/L       Recent Labs   Lab Test  08/12/18   1255  08/02/18   1234  11/16/17   0859   03/04/15   1014   HGB  11.9   --   12.6   < >  13.2   PLT  299   --    --    --   263   NA  135  136  139   < >  131*   POTASSIUM  4.4  4.3  4.2   < >  4.2   CR  0.79  0.66  0.61   < >  0.68    < > = values in this interval not displayed.        IMPRESSION:   Reason for surgery/procedure: left knee osteoarthritis// left total knee arthroplasty  Diagnosis/reason for consult: procedure clearance.     The proposed surgical procedure is considered INTERMEDIATE risk.    REVISED CARDIAC RISK INDEX  The patient has the following serious cardiovascular risks for perioperative complications such as (MI, PE, VFib and 3  AV Block):  No serious cardiac risks  INTERPRETATION: 0 risks: Class I (very low risk - 0.4% complication rate)    The patient has the following additional risks for perioperative complications:  No identified additional risks      ICD-10-CM    1. Preop general physical exam Z01.818 EKG 12-lead complete w/read - Clinics     CBC with platelets     INR     Comprehensive metabolic panel     EKG 12-lead complete w/read - Clinics   2. Primary osteoarthritis of left knee M17.12    3. SOB (shortness of breath) R06.02 General PFT Lab (Please always keep checked)     Pulmonary Function Test   4. Benign essential hypertension I10    5. Need for prophylactic vaccination and inoculation against influenza Z23 Vaccine Administration, Initial [96366]     FLU VAC, SPLIT VIRUS IM > 3 YO (QUADRIVALENT) [31431]     CANCELED: FLU VACCINE, INCREASED ANTIGEN, PRESV FREE, AGE 65+ [99619]       RECOMMENDATIONS:     --Consult hospital rounder / IM to assist post-op medical management    Pulmonary Risk  PFT's prior to surgery planned.  Scheduled for 10/3/18.  Optimize lung treatment based on this  evaluation.  Resume Qvar planned.  As needed albuterol inhaler use.      --Patient is to take all scheduled medications on the day of surgery EXCEPT for modifications listed below.    Anticoagulant or Antiplatelet Medication Use  NSAIDS: Ibuprofen (Motrin):         Stop one - three days prior to surgery        ACE Inhibitor or Angiotensin Receptor Blocker (ARB) Use  Ace inhibitor or Angiotensin Receptor Blocker (ARB) and should HOLD this medication for the 24 hours prior to surgery.      APPROVAL GIVEN to proceed with proposed procedure, without further diagnostic evaluation.  Pulmonary function testing to optimize lung management.       The information in this document, created by the medical scribe for me, accurately reflects the services I personally performed and the decisions made by me. I have reviewed and approved this document for accuracy prior to leaving the patient care area.  September 27, 2018 2:52 PM      Signed Electronically by: Radha Hurtado MD    Copy of this evaluation report is provided to requesting physician.    [place] Preop Guidelines    Revised Cardiac Risk Index

## 2018-09-27 NOTE — MR AVS SNAPSHOT
After Visit Summary   9/27/2018    Maddie Lala    MRN: [id number]           Patient Information     Date Of Birth          1950        Visit Information        Provider Department      9/27/2018 1:20 PM Radha Hurtado MD [facility]        Today's Diagnoses     Preop general physical exam    -  1    Primary osteoarthritis of left knee        Need for prophylactic vaccination and inoculation against influenza        Benign essential hypertension        SOB (shortness of breath)          Care Instructions    Hold Ibuprofen three days before procedure.   Labs today. I will notify you of results when I receive them.   Repeat EKG today.   Please complete pulmonary function testing.  You can call [phone] to schedule this at the [facility] in [place] (formerly the [facility]).    Flu shot today.   Complete antibiotic course for UTI.     Do not take lisinopril on the day before or day of surgery.          Before Your Surgery      Call your surgeon if there is any change in your health. This includes signs of a cold or flu (such as a sore throat, runny nose, cough, rash or fever).    Do not smoke, drink alcohol or take over the counter medicine (unless your surgeon or primary care doctor tells you to) for the 24 hours before and after surgery.    If you take prescribed drugs: Follow your doctor s orders about which medicines to take and which to stop until after surgery.    Eating and drinking prior to surgery: follow the instructions from your surgeon    Take a shower or bath the night before surgery. Use the soap your surgeon gave you to gently clean your skin. If you do not have soap from your surgeon, use your regular soap. Do not shave or scrub the surgery site.  Wear clean pajamas and have clean sheets on your bed.           Follow-ups after your visit        Your next 10 appointments already scheduled     Oct 10, 2018   Procedure  with James Amaya MD   [facility], [place], Same Day Surgery (--)    [address]  [place] [address]   [phone]            Oct 23, 2018  8:30 AM CDT   Nurse Only with MG NURSE ONLY ORTHO   [facility])    [address] [place] [id number] [phone]            Nov 13, 2018  8:00 AM CST   XR KNEE LEFT 1/2 VIEWS with MGXR2   [facility])    [address] [place] [id number] [phone]           How do I prepare for my exam? (Food and drink instructions) No Food and Drink Restrictions.  How do I prepare for my exam? (Other instructions) You do not need to do anything special for this exam.  What should I wear: Wear comfortable clothes.  How long does the exam take: Most scans take less than 5 minutes.  What should I bring: Bring a list of your medicines, including vitamins, minerals and over-the-counter drugs. It is safest to leave personal items at home.  Do I need a :  No  is needed.  What do I need to tell my doctor: Tell your doctor if there s any chance you are pregnant.  What should I do after the exam: No restrictions, You may resume normal activities.  What is this test: An image of a specific body part shown in shades of black and white.  Who should I call with questions: If you have any questions, please call the Imaging Department where you will have your exam. Directions, parking instructions, and other information is available on our website, [email or web address].            Nov 13, 2018  8:30 AM CST   (Arrive by 8:00 AM)   Return Visit with James Amaya MD   [facility])    [address] [place] [id number] [phone]              Future tests that were ordered for you today     Open Future Orders        Priority Expected Expires Ordered    General PFT Lab (Please always keep checked)  "Routine  9/27/2019 9/27/2018    Pulmonary Function Test Routine  9/27/2019 9/27/2018            Who to contact     If you have questions or need follow up information about today's clinic visit or your schedule please contact [facility] directly at [phone].  Normal or non-critical lab and imaging results will be communicated to you by MyChart, letter or phone within 4 business days after the clinic has received the results. If you do not hear from us within 7 days, please contact the clinic through [employer] or phone. If you have a critical or abnormal lab result, we will notify you by phone as soon as possible.  Submit refill requests through [employer] or call your pharmacy and they will forward the refill request to us. Please allow 3 business days for your refill to be completed.          Additional Information About Your Visit        MyChart Information     [employer] gives you secure access to your electronic health record. If you see a primary care provider, you can also send messages to your care team and make appointments. If you have questions, please call your primary care clinic.  If you do not have a primary care provider, please call [phone] and they will assist you.        Care EveryWhere ID     This is your Care EveryWhere ID. This could be used by other organizations to access your [facility] medical records  [id number]        Your Vitals Were     Pulse Temperature Respirations Height Pulse Oximetry Breastfeeding?    99 97.9  F (36.6  C) (Oral) 18 1.727 m (5' 8\") 97% No    BMI (Body Mass Index)                   26.79 kg/m2            Blood Pressure from Last 3 Encounters:   09/27/18 124/76   08/21/18 126/78   08/15/18 130/76    Weight from Last 3 Encounters:   09/27/18 79.9 kg (176 lb 3.2 oz)   08/21/18 (P) 80.3 kg (177 lb)   08/15/18 80.3 kg (177 lb)              We Performed the Following     CBC with platelets     Comprehensive metabolic panel     EKG 12-lead complete w/read - " Clinics     EKG 12-lead complete w/read - Clinics     FLU VAC, SPLIT VIRUS IM > 3 YO (QUADRIVALENT) [46613]     INR     Vaccine Administration, Initial [66582]        Primary Care Provider Office Phone # Fax #    Radha Hurtado [phone] [address]  [facility] [address]        Equal Access to Services     [facility]: Hadii aad ku hadasho Soomaali, waaxda luqadaha, qaybta kaalmada adeegyada, waxay idiin hayaan adeeg kharash la'aan ah. So [facility] [phone].    ATENCIÓN: Si habla español, tiene a mayo disposición servicios gratuitos de asistencia lingüística. Davie al [phone].    We comply with applicable federal civil rights laws and [place] laws. We do not discriminate on the basis of race, color, national origin, age, disability, sex, sexual orientation, or gender identity.            Thank you!     Thank you for choosing [facility]  for your care. Our goal is always to provide you with excellent care. Hearing back from our patients is one way we can continue to improve our services. Please take a few minutes to complete the written survey that you may receive in the mail after your visit with us. Thank you!             Your Updated Medication List - Protect others around you: Learn how to safely use, store and throw away your medicines at www.disposemymeds.org.          This list is accurate as of 9/27/18  2:50 PM.  Always use your most recent med list.                   Brand Name Dispense Instructions for use Diagnosis    ADVIL PO      Take 800 mg by mouth every 4 hours as needed for moderate pain        albuterol 108 (90 Base) MCG/ACT inhaler    VENTOLIN HFA    18 g    INHALE TWO PUFFS BY MOUTH EVERY 6 HOURS AS NEEDED FOR SHORTNESS OF BREATH / DYSPNEA AND AS NEEDED BEFORE EXERCISE    Cough due to bronchospasm       beclomethasone 40 MCG/ACT Inhaler    QVAR    3 Inhaler    INHALE 2 PUFFS BY MOUTH INTO THE LUNGS TWO TIMES A DAY    Chronic cough        CALCIUM + D PO      One bid        CHLOR-TRIMETON ALLERGY 12 MG Tbcr   Generic drug:  chlorpheniramine maleate      Take  by mouth as needed.        ciprofloxacin 250 MG tablet    CIPRO    14 tablet    Take 1 tablet (250 mg) by mouth 2 times daily for 7 days    Dysuria       estradiol 10 MCG Tabs vaginal tablet    VAGIFEM    24 tablet    Place 1 tablet (10 mcg) vaginally twice a week    Vaginal atrophy       fluocinonide 0.05 % solution    LIDEX    60 mL    Apply to scalp nightly for up to 1 week, then 2-3 times weekly as needed.    Dermatitis, seborrheic       fluticasone 50 MCG/ACT spray    FLONASE    48 g    USE ONE TO TWO SPRAYS IN EACH NOSTRIL EVERY DAY    Chronic allergic conjunctivitis       lisinopril 10 MG tablet    PRINIVIL/ZESTRIL    90 tablet    Take 1 tablet (10 mg) by mouth daily    Benign essential hypertension       order for DME     1 Units    Equipment being ordered:    left knee brace-medial compartment  for osteoarthritis.    Primary osteoarthritis of left knee       order for DME     1 each    Equipment being ordered: spacer    Cough due to bronchospasm       ranitidine 300 MG tablet    ZANTAC    90 tablet    TAKE ONE TABLET BY MOUTH EVERY DAY    Gastroesophageal reflux disease without esophagitis       * SENIOR MULTIVITAMIN PLUS PO      One per day        * OPTIVITE PO      once a day        * Notice:  This list has 2 medication(s) that are the same as other medications prescribed for you. Read the directions carefully, and ask your doctor or other care provider to review them with you.

## 2018-09-28 LAB
ABO + RH BLD: NORMAL
ABO + RH BLD: NORMAL
BLD GP AB SCN SERPL QL: NORMAL
BLOOD BANK CMNT PATIENT-IMP: NORMAL
SPECIMEN EXP DATE BLD: NORMAL

## 2018-09-29 NOTE — PROGRESS NOTES
Injectable Influenza Immunization Documentation    1.  Is the person to be vaccinated sick today?   No    2. Does the person to be vaccinated have an allergy to a component   of the vaccine?   No  Egg Allergy Algorithm Link    3. Has the person to be vaccinated ever had a serious reaction   to influenza vaccine in the past?   No    4. Has the person to be vaccinated ever had Guillain-Barré syndrome?   No    Form completed by Meredith Pearl MA    Prior to injection verified patient identity using patient's name and date of birth.  Due to injection administration, patient instructed to remain in clinic for 15 minutes  afterwards, and to report any adverse reaction to me immediately.

## 2018-09-29 NOTE — PROGRESS NOTES
Your blood sugar, kidney and liver testing are all normal.   The blood clotting testing (INR) is normal.  Your blood cell counts are normal with normal platelets as well.  I see you have an appointment for the pulmonary function testing as discussed.  Please call or MyChart message me if you have any questions.    CHRISTINA

## 2018-10-03 ENCOUNTER — OFFICE VISIT (OUTPATIENT)
Dept: NURSING | Facility: CLINIC | Age: 68
End: 2018-10-03
Attending: FAMILY MEDICINE
Payer: COMMERCIAL

## 2018-10-03 DIAGNOSIS — R06.02 SOB (SHORTNESS OF BREATH): ICD-10-CM

## 2018-10-03 LAB
DLCOUNC-%PRED-PRE: 104 %
DLCOUNC-PRE: 23.31 ML/MIN/MMHG
DLCOUNC-PRED: 22.29 ML/MIN/MMHG
ERV-%PRED-PRE: 170 %
ERV-PRE: 1.35 L
ERV-PRED: 0.79 L
EXPTIME-PRE: 6.63 SEC
FEF2575-%PRED-PRE: 105 %
FEF2575-PRE: 2.18 L/SEC
FEF2575-PRED: 2.06 L/SEC
FEFMAX-%PRED-PRE: 107 %
FEFMAX-PRE: 6.83 L/SEC
FEFMAX-PRED: 6.35 L/SEC
FEV1-%PRED-PRE: 100 %
FEV1-PRE: 2.54 L
FEV1FEV6-PRE: 78 %
FEV1FEV6-PRED: 79 %
FEV1FVC-PRE: 78 %
FEV1FVC-PRED: 76 %
FEV1SVC-PRE: 72 %
FEV1SVC-PRED: 70 %
FIFMAX-PRE: 5.11 L/SEC
FRCPLETH-%PRED-PRE: 101 %
FRCPLETH-PRE: 3 L
FRCPLETH-PRED: 2.95 L
FVC-%PRED-PRE: 99 %
FVC-PRE: 3.28 L
FVC-PRED: 3.31 L
IC-%PRED-PRE: 78 %
IC-PRE: 2.21 L
IC-PRED: 2.82 L
RVPLETH-%PRED-PRE: 73 %
RVPLETH-PRE: 1.65 L
RVPLETH-PRED: 2.25 L
TLCPLETH-%PRED-PRE: 92 %
TLCPLETH-PRE: 5.21 L
TLCPLETH-PRED: 5.65 L
VA-%PRED-PRE: 89 %
VA-PRE: 5.17 L
VC-%PRED-PRE: 98 %
VC-PRE: 3.55 L
VC-PRED: 3.61 L

## 2018-10-03 PROCEDURE — 94726 PLETHYSMOGRAPHY LUNG VOLUMES: CPT | Performed by: INTERNAL MEDICINE

## 2018-10-03 PROCEDURE — 99207 ZZC DROP WITH A PROCEDURE: CPT | Performed by: INTERNAL MEDICINE

## 2018-10-03 PROCEDURE — 94375 RESPIRATORY FLOW VOLUME LOOP: CPT | Performed by: INTERNAL MEDICINE

## 2018-10-03 PROCEDURE — 94729 DIFFUSING CAPACITY: CPT | Performed by: INTERNAL MEDICINE

## 2018-10-03 NOTE — MR AVS SNAPSHOT
After Visit Summary   10/3/2018    Maddie Lala    MRN: [id number]           Patient Information     Date Of Birth          1950        Visit Information        Provider Department      10/3/2018 1:00 PM PFT LAB [facility]        Today's Diagnoses     SOB (shortness of breath)           Follow-ups after your visit        Your next 10 appointments already scheduled     Oct 10, 2018   Procedure with James Amaya MD   [facility], [place], Same Day Surgery (--)    [address]  [place] [address] [phone]            Oct 23, 2018  8:30 AM CDT   Nurse Only with MG NURSE ONLY ORTHO   [facility]    [address] [place] [address] [phone]            Nov 13, 2018  8:00 AM CST   XR KNEE LEFT 1/2 VIEWS with MGXR2   [facility] ([facility])    [address] [place] [address] [phone]           How do I prepare for my exam? (Food and drink instructions) No Food and Drink Restrictions.  How do I prepare for my exam? (Other instructions) You do not need to do anything special for this exam.  What should I wear: Wear comfortable clothes.  How long does the exam take: Most scans take less than 5 minutes.  What should I bring: Bring a list of your medicines, including vitamins, minerals and over-the-counter drugs. It is safest to leave personal items at home.  Do I need a :  No  is needed.  What do I need to tell my doctor: Tell your doctor if there s any chance you are pregnant.  What should I do after the exam: No restrictions, You may resume normal activities.  What is this test: An image of a specific body part shown in shades of black and white.  Who should I call with questions: If you have any questions, please call the Imaging Department where you will have your exam. Directions, parking instructions, and other information is available on  our website, [email or web address].            Nov 13, 2018  8:30 AM CST   (Arrive by 8:00 AM)   Return Visit with James Am[facility] MD   [facility]    [address] [place] [address] [phone]              Who to contact     If you have questions or need follow up information about today's clinic visit or your schedule please contact [facility] directly at [phone].  Normal or non-critical lab and imaging results will be communicated to you by [employer]hart, letter or phone within 4 business days after the clinic has received the results. If you do not hear from us within 7 days, please contact the clinic through [employer] or phone. If you have a critical or abnormal lab result, we will notify you by phone as soon as possible.  Submit refill requests through [employer] or call your pharmacy and they will forward the refill request to us. Please allow 3 business days for your refill to be completed.          Additional Information About Your Visit        [employer] Information     [employer] gives you secure access to your electronic health record. If you see a primary care provider, you can also send messages to your care team and make appointments. If you have questions, please call your primary care clinic.  If you do not have a primary care provider, please call [phone] and they will assist you.      [employer] is an electronic gateway that provides easy, online access to your medical records. With [employer], you can request a clinic appointment, read your test results, renew a prescription or communicate with your care team.     To access your existing account, please contact your [facility] or call [phone] for assistance.        Care EveryWhere ID     This is your Care EveryWhere ID. This could be used by other organizations to access your [place] medical records  [id number]         Blood Pressure  from Last 3 Encounters:   09/27/18 124/76   08/21/18 126/78   08/15/18 130/76    Weight from Last 3 Encounters:   09/27/18 79.9 kg (176 lb 3.2 oz)   08/21/18 (P) 80.3 kg (177 lb)   08/15/18 80.3 kg (177 lb)              We Performed the Following     General PFT Lab (Please always keep checked)     HC DIFFUSING CAPACITY     HC PLETHYSMOGRAPHY LUNG VOLUMES W/WO AIRWAY RESIST     Pulmonary Function Test     RESPIRATORY FLOW VOLUME LOOP        Primary Care Provider Office Phone # Fax #    Radha Hurtado [phone] [address]  [facility] [address]        Equal Access to Services     ALYSON DAVIS : Hadii rizwan rodneyo Soaidee, waaxda luqadaha, qaybta kaalmada adeegyada, nereyda guy. So [facility] [phone].    ATENCIÓN: Si habla español, tiene a mayo disposición servicios gratuitos de asistencia lingüística. Llame al [phone].    We comply with applicable federal civil rights laws and [place] laws. We do not discriminate on the basis of race, color, national origin, age, disability, sex, sexual orientation, or gender identity.            Thank you!     Thank you for choosing [facility]  for your care. Our goal is always to provide you with excellent care. Hearing back from our patients is one way we can continue to improve our services. Please take a few minutes to complete the written survey that you may receive in the mail after your visit with us. Thank you!             Your Updated Medication List - Protect others around you: Learn how to safely use, store and throw away your medicines at www.disposemymeds.org.          This list is accurate as of 10/3/18  1:42 PM.  Always use your most recent med list.                   Brand Name Dispense Instructions for use Diagnosis    ADVIL PO      Take 800 mg by mouth every 4 hours as needed for moderate pain        albuterol 108 (90 Base) MCG/ACT inhaler    VENTOLIN HFA    18 g    INHALE TWO  PUFFS BY MOUTH EVERY 6 HOURS AS NEEDED FOR SHORTNESS OF BREATH / DYSPNEA AND AS NEEDED BEFORE EXERCISE    Cough due to bronchospasm       beclomethasone 40 MCG/ACT Inhaler    QVAR    3 Inhaler    INHALE 2 PUFFS BY MOUTH INTO THE LUNGS TWO TIMES A DAY    Chronic cough       CALCIUM + D PO      One bid        CHLOR-TRIMETON ALLERGY 12 MG Tbcr   Generic drug:  chlorpheniramine maleate      Take  by mouth as needed.        estradiol 10 MCG Tabs vaginal tablet    VAGIFEM    24 tablet    Place 1 tablet (10 mcg) vaginally twice a week    Vaginal atrophy       fluocinonide 0.05 % solution    LIDEX    60 mL    Apply to scalp nightly for up to 1 week, then 2-3 times weekly as needed.    Dermatitis, seborrheic       fluticasone 50 MCG/ACT spray    FLONASE    48 g    USE ONE TO TWO SPRAYS IN EACH NOSTRIL EVERY DAY    Chronic allergic conjunctivitis       lisinopril 10 MG tablet    PRINIVIL/ZESTRIL    90 tablet    Take 1 tablet (10 mg) by mouth daily    Benign essential hypertension       order for DME     1 Units    Equipment being ordered:    left knee brace-medial compartment  for osteoarthritis.    Primary osteoarthritis of left knee       order for DME     1 each    Equipment being ordered: spacer    Cough due to bronchospasm       ranitidine 300 MG tablet    ZANTAC    90 tablet    TAKE ONE TABLET BY MOUTH EVERY DAY    Gastroesophageal reflux disease without esophagitis       * SENIOR MULTIVITAMIN PLUS PO      One per day        * OPTIVITE PO      once a day        * Notice:  This list has 2 medication(s) that are the same as other medications prescribed for you. Read the directions carefully, and ask your doctor or other care provider to review them with you.

## 2018-10-03 NOTE — Clinical Note
Just finished this patient's PFT but I wanted to let you know that we do not do Methacholine challenges here, so that is why one wasn't done.  Thanks, Rosana Goodwin RRT

## 2018-10-05 ENCOUNTER — MYC MEDICAL ADVICE (OUTPATIENT)
Dept: ORTHOPEDICS | Facility: CLINIC | Age: 68
End: 2018-10-05

## 2018-10-09 ENCOUNTER — ANESTHESIA EVENT (OUTPATIENT)
Dept: SURGERY | Facility: CLINIC | Age: 68
DRG: 470 | End: 2018-10-09
Payer: COMMERCIAL

## 2018-10-10 ENCOUNTER — ANESTHESIA (OUTPATIENT)
Dept: SURGERY | Facility: CLINIC | Age: 68
DRG: 470 | End: 2018-10-10
Payer: COMMERCIAL

## 2018-10-10 ENCOUNTER — HOSPITAL ENCOUNTER (INPATIENT)
Facility: CLINIC | Age: 68
LOS: 2 days | Discharge: HOME | DRG: 470 | End: 2018-10-12
Attending: ORTHOPAEDIC SURGERY | Admitting: ORTHOPAEDIC SURGERY
Payer: COMMERCIAL

## 2018-10-10 ENCOUNTER — APPOINTMENT (OUTPATIENT)
Dept: GENERAL RADIOLOGY | Facility: CLINIC | Age: 68
DRG: 470 | End: 2018-10-10
Attending: STUDENT IN AN ORGANIZED HEALTH CARE EDUCATION/TRAINING PROGRAM
Payer: COMMERCIAL

## 2018-10-10 DIAGNOSIS — Z98.890 STATUS POST KNEE SURGERY: Primary | ICD-10-CM

## 2018-10-10 PROBLEM — J42 CHRONIC BRONCHITIS (H): Chronic | Status: ACTIVE | Noted: 2018-10-10

## 2018-10-10 LAB
ABO + RH BLD: NORMAL
ABO + RH BLD: NORMAL
ALBUMIN UR-MCNC: NEGATIVE MG/DL
APPEARANCE UR: CLEAR
BACTERIA #/AREA URNS HPF: ABNORMAL /HPF
BILIRUB UR QL STRIP: NEGATIVE
BLD GP AB SCN SERPL QL: NORMAL
BLOOD BANK CMNT PATIENT-IMP: NORMAL
COLOR UR AUTO: ABNORMAL
CREAT SERPL-MCNC: 0.59 MG/DL (ref 0.52–1.04)
CREAT SERPL-MCNC: 0.62 MG/DL (ref 0.52–1.04)
GFR SERPL CREATININE-BSD FRML MDRD: >90 ML/MIN/1.7M2
GFR SERPL CREATININE-BSD FRML MDRD: >90 ML/MIN/1.7M2
GLUCOSE BLDC GLUCOMTR-MCNC: 97 MG/DL (ref 70–99)
GLUCOSE UR STRIP-MCNC: NEGATIVE MG/DL
HGB UR QL STRIP: NEGATIVE
KETONES UR STRIP-MCNC: NEGATIVE MG/DL
LEUKOCYTE ESTERASE UR QL STRIP: NEGATIVE
NITRATE UR QL: NEGATIVE
PH UR STRIP: 6.5 PH (ref 5–7)
PLATELET # BLD AUTO: 257 10E9/L (ref 150–450)
POTASSIUM SERPL-SCNC: 4.4 MMOL/L (ref 3.4–5.3)
RBC #/AREA URNS AUTO: <1 /HPF (ref 0–2)
SOURCE: ABNORMAL
SP GR UR STRIP: 1 (ref 1–1.03)
SPECIMEN EXP DATE BLD: NORMAL
UROBILINOGEN UR STRIP-MCNC: NORMAL MG/DL (ref 0–2)
WBC #/AREA URNS AUTO: <1 /HPF (ref 0–5)

## 2018-10-10 PROCEDURE — 82565 ASSAY OF CREATININE: CPT | Performed by: ANESTHESIOLOGY

## 2018-10-10 PROCEDURE — 86850 RBC ANTIBODY SCREEN: CPT | Performed by: ORTHOPAEDIC SURGERY

## 2018-10-10 PROCEDURE — 25000128 H RX IP 250 OP 636: Performed by: ANESTHESIOLOGY

## 2018-10-10 PROCEDURE — 40000986 XR KNEE PORT LT 1/2 VW: Mod: LT

## 2018-10-10 PROCEDURE — 37000008 ZZH ANESTHESIA TECHNICAL FEE, 1ST 30 MIN: Performed by: ORTHOPAEDIC SURGERY

## 2018-10-10 PROCEDURE — 25000128 H RX IP 250 OP 636: Performed by: ORTHOPAEDIC SURGERY

## 2018-10-10 PROCEDURE — 25000125 ZZHC RX 250: Performed by: NURSE ANESTHETIST, CERTIFIED REGISTERED

## 2018-10-10 PROCEDURE — 36000064 ZZH SURGERY LEVEL 4 EA 15 ADDTL MIN - UMMC: Performed by: ORTHOPAEDIC SURGERY

## 2018-10-10 PROCEDURE — 86901 BLOOD TYPING SEROLOGIC RH(D): CPT | Performed by: ORTHOPAEDIC SURGERY

## 2018-10-10 PROCEDURE — 25000125 ZZHC RX 250: Performed by: ORTHOPAEDIC SURGERY

## 2018-10-10 PROCEDURE — 71000015 ZZH RECOVERY PHASE 1 LEVEL 2 EA ADDTL HR: Performed by: ORTHOPAEDIC SURGERY

## 2018-10-10 PROCEDURE — 25800025 ZZH RX 258: Performed by: ORTHOPAEDIC SURGERY

## 2018-10-10 PROCEDURE — 71000014 ZZH RECOVERY PHASE 1 LEVEL 2 FIRST HR: Performed by: ORTHOPAEDIC SURGERY

## 2018-10-10 PROCEDURE — 36415 COLL VENOUS BLD VENIPUNCTURE: CPT | Performed by: ORTHOPAEDIC SURGERY

## 2018-10-10 PROCEDURE — 36415 COLL VENOUS BLD VENIPUNCTURE: CPT | Performed by: STUDENT IN AN ORGANIZED HEALTH CARE EDUCATION/TRAINING PROGRAM

## 2018-10-10 PROCEDURE — 81001 URINALYSIS AUTO W/SCOPE: CPT | Performed by: ORTHOPAEDIC SURGERY

## 2018-10-10 PROCEDURE — 84132 ASSAY OF SERUM POTASSIUM: CPT | Performed by: ANESTHESIOLOGY

## 2018-10-10 PROCEDURE — 25000128 H RX IP 250 OP 636: Performed by: NURSE ANESTHETIST, CERTIFIED REGISTERED

## 2018-10-10 PROCEDURE — 12000001 ZZH R&B MED SURG/OB UMMC

## 2018-10-10 PROCEDURE — 00000146 ZZHCL STATISTIC GLUCOSE BY METER IP

## 2018-10-10 PROCEDURE — 25000132 ZZH RX MED GY IP 250 OP 250 PS 637: Performed by: ANESTHESIOLOGY

## 2018-10-10 PROCEDURE — 86900 BLOOD TYPING SEROLOGIC ABO: CPT | Performed by: ORTHOPAEDIC SURGERY

## 2018-10-10 PROCEDURE — C9290 INJ, BUPIVACAINE LIPOSOME: HCPCS | Performed by: ANESTHESIOLOGY

## 2018-10-10 PROCEDURE — C1776 JOINT DEVICE (IMPLANTABLE): HCPCS | Performed by: ORTHOPAEDIC SURGERY

## 2018-10-10 PROCEDURE — 27210794 ZZH OR GENERAL SUPPLY STERILE: Performed by: ORTHOPAEDIC SURGERY

## 2018-10-10 PROCEDURE — 25000131 ZZH RX MED GY IP 250 OP 636 PS 637: Performed by: STUDENT IN AN ORGANIZED HEALTH CARE EDUCATION/TRAINING PROGRAM

## 2018-10-10 PROCEDURE — 25000132 ZZH RX MED GY IP 250 OP 250 PS 637: Performed by: STUDENT IN AN ORGANIZED HEALTH CARE EDUCATION/TRAINING PROGRAM

## 2018-10-10 PROCEDURE — 82565 ASSAY OF CREATININE: CPT | Performed by: STUDENT IN AN ORGANIZED HEALTH CARE EDUCATION/TRAINING PROGRAM

## 2018-10-10 PROCEDURE — 0SRD0J9 REPLACEMENT OF LEFT KNEE JOINT WITH SYNTHETIC SUBSTITUTE, CEMENTED, OPEN APPROACH: ICD-10-PCS | Performed by: ORTHOPAEDIC SURGERY

## 2018-10-10 PROCEDURE — 25000128 H RX IP 250 OP 636: Performed by: STUDENT IN AN ORGANIZED HEALTH CARE EDUCATION/TRAINING PROGRAM

## 2018-10-10 PROCEDURE — 36000062 ZZH SURGERY LEVEL 4 1ST 30 MIN - UMMC: Performed by: ORTHOPAEDIC SURGERY

## 2018-10-10 PROCEDURE — 25000125 ZZHC RX 250: Performed by: ANESTHESIOLOGY

## 2018-10-10 PROCEDURE — 85049 AUTOMATED PLATELET COUNT: CPT | Performed by: STUDENT IN AN ORGANIZED HEALTH CARE EDUCATION/TRAINING PROGRAM

## 2018-10-10 PROCEDURE — 40000170 ZZH STATISTIC PRE-PROCEDURE ASSESSMENT II: Performed by: ORTHOPAEDIC SURGERY

## 2018-10-10 PROCEDURE — 27810169 ZZH OR IMPLANT GENERAL: Performed by: ORTHOPAEDIC SURGERY

## 2018-10-10 PROCEDURE — 37000009 ZZH ANESTHESIA TECHNICAL FEE, EACH ADDTL 15 MIN: Performed by: ORTHOPAEDIC SURGERY

## 2018-10-10 PROCEDURE — 25000132 ZZH RX MED GY IP 250 OP 250 PS 637: Performed by: ORTHOPAEDIC SURGERY

## 2018-10-10 DEVICE — IMP INSERT ARTICULAR S&N LGN CR XLPE SZ5-6 9MM 71453121: Type: IMPLANTABLE DEVICE | Site: KNEE | Status: FUNCTIONAL

## 2018-10-10 DEVICE — IMPLANTABLE DEVICE: Type: IMPLANTABLE DEVICE | Site: KNEE | Status: FUNCTIONAL

## 2018-10-10 DEVICE — BONE CEMENT SIMPLEX W/TOBRAMYCIN 6197-9-001: Type: IMPLANTABLE DEVICE | Site: KNEE | Status: FUNCTIONAL

## 2018-10-10 DEVICE — IMP BASEPLATE TIBIAL GENESIS II SZ 5 LT TI 71420168: Type: IMPLANTABLE DEVICE | Site: KNEE | Status: FUNCTIONAL

## 2018-10-10 RX ORDER — OXYCODONE HYDROCHLORIDE 5 MG/1
5-10 TABLET ORAL EVERY 4 HOURS PRN
Status: DISCONTINUED | OUTPATIENT
Start: 2018-10-10 | End: 2018-10-11

## 2018-10-10 RX ORDER — ACETAMINOPHEN 325 MG/1
975 TABLET ORAL ONCE
Status: DISCONTINUED | OUTPATIENT
Start: 2018-10-10 | End: 2018-10-10 | Stop reason: HOSPADM

## 2018-10-10 RX ORDER — LIDOCAINE 40 MG/G
CREAM TOPICAL
Status: DISCONTINUED | OUTPATIENT
Start: 2018-10-10 | End: 2018-10-12 | Stop reason: HOSPADM

## 2018-10-10 RX ORDER — DEXAMETHASONE SODIUM PHOSPHATE 4 MG/ML
INJECTION, SOLUTION INTRA-ARTICULAR; INTRALESIONAL; INTRAMUSCULAR; INTRAVENOUS; SOFT TISSUE PRN
Status: DISCONTINUED | OUTPATIENT
Start: 2018-10-10 | End: 2018-10-10

## 2018-10-10 RX ORDER — AMOXICILLIN 250 MG
1 CAPSULE ORAL 2 TIMES DAILY
Status: DISCONTINUED | OUTPATIENT
Start: 2018-10-10 | End: 2018-10-12 | Stop reason: HOSPADM

## 2018-10-10 RX ORDER — FENTANYL CITRATE 50 UG/ML
25-50 INJECTION, SOLUTION INTRAMUSCULAR; INTRAVENOUS
Status: DISCONTINUED | OUTPATIENT
Start: 2018-10-10 | End: 2018-10-10 | Stop reason: HOSPADM

## 2018-10-10 RX ORDER — CEFAZOLIN SODIUM 1 G/3ML
1 INJECTION, POWDER, FOR SOLUTION INTRAMUSCULAR; INTRAVENOUS SEE ADMIN INSTRUCTIONS
Status: DISCONTINUED | OUTPATIENT
Start: 2018-10-10 | End: 2018-10-10 | Stop reason: HOSPADM

## 2018-10-10 RX ORDER — BUPIVACAINE HYDROCHLORIDE AND EPINEPHRINE 2.5; 5 MG/ML; UG/ML
INJECTION, SOLUTION INFILTRATION; PERINEURAL PRN
Status: DISCONTINUED | OUTPATIENT
Start: 2018-10-10 | End: 2018-10-10

## 2018-10-10 RX ORDER — ONDANSETRON 2 MG/ML
4 INJECTION INTRAMUSCULAR; INTRAVENOUS EVERY 6 HOURS PRN
Status: DISCONTINUED | OUTPATIENT
Start: 2018-10-10 | End: 2018-10-12 | Stop reason: HOSPADM

## 2018-10-10 RX ORDER — NALOXONE HYDROCHLORIDE 0.4 MG/ML
.1-.4 INJECTION, SOLUTION INTRAMUSCULAR; INTRAVENOUS; SUBCUTANEOUS
Status: DISCONTINUED | OUTPATIENT
Start: 2018-10-10 | End: 2018-10-10 | Stop reason: HOSPADM

## 2018-10-10 RX ORDER — ONDANSETRON 4 MG/1
4 TABLET, ORALLY DISINTEGRATING ORAL EVERY 6 HOURS PRN
Status: DISCONTINUED | OUTPATIENT
Start: 2018-10-10 | End: 2018-10-12 | Stop reason: HOSPADM

## 2018-10-10 RX ORDER — HYDROMORPHONE HYDROCHLORIDE 1 MG/ML
.3-.5 INJECTION, SOLUTION INTRAMUSCULAR; INTRAVENOUS; SUBCUTANEOUS
Status: DISCONTINUED | OUTPATIENT
Start: 2018-10-10 | End: 2018-10-12 | Stop reason: HOSPADM

## 2018-10-10 RX ORDER — ACETAMINOPHEN 325 MG/1
975 TABLET ORAL EVERY 8 HOURS
Status: DISCONTINUED | OUTPATIENT
Start: 2018-10-10 | End: 2018-10-12 | Stop reason: HOSPADM

## 2018-10-10 RX ORDER — MAGNESIUM HYDROXIDE 1200 MG/15ML
LIQUID ORAL PRN
Status: DISCONTINUED | OUTPATIENT
Start: 2018-10-10 | End: 2018-10-10 | Stop reason: HOSPADM

## 2018-10-10 RX ORDER — CEFAZOLIN SODIUM 1 G/3ML
1 INJECTION, POWDER, FOR SOLUTION INTRAMUSCULAR; INTRAVENOUS EVERY 8 HOURS
Status: COMPLETED | OUTPATIENT
Start: 2018-10-10 | End: 2018-10-11

## 2018-10-10 RX ORDER — CEFAZOLIN SODIUM 2 G/100ML
2 INJECTION, SOLUTION INTRAVENOUS
Status: COMPLETED | OUTPATIENT
Start: 2018-10-10 | End: 2018-10-10

## 2018-10-10 RX ORDER — SODIUM CHLORIDE, SODIUM LACTATE, POTASSIUM CHLORIDE, CALCIUM CHLORIDE 600; 310; 30; 20 MG/100ML; MG/100ML; MG/100ML; MG/100ML
INJECTION, SOLUTION INTRAVENOUS CONTINUOUS
Status: DISCONTINUED | OUTPATIENT
Start: 2018-10-10 | End: 2018-10-12 | Stop reason: HOSPADM

## 2018-10-10 RX ORDER — SODIUM CHLORIDE, SODIUM LACTATE, POTASSIUM CHLORIDE, CALCIUM CHLORIDE 600; 310; 30; 20 MG/100ML; MG/100ML; MG/100ML; MG/100ML
INJECTION, SOLUTION INTRAVENOUS CONTINUOUS PRN
Status: DISCONTINUED | OUTPATIENT
Start: 2018-10-10 | End: 2018-10-10

## 2018-10-10 RX ORDER — KETOROLAC TROMETHAMINE 30 MG/ML
15 INJECTION, SOLUTION INTRAMUSCULAR; INTRAVENOUS EVERY 6 HOURS
Status: COMPLETED | OUTPATIENT
Start: 2018-10-10 | End: 2018-10-11

## 2018-10-10 RX ORDER — FLUMAZENIL 0.1 MG/ML
0.2 INJECTION, SOLUTION INTRAVENOUS
Status: DISCONTINUED | OUTPATIENT
Start: 2018-10-10 | End: 2018-10-10 | Stop reason: HOSPADM

## 2018-10-10 RX ORDER — NALOXONE HYDROCHLORIDE 0.4 MG/ML
.1-.4 INJECTION, SOLUTION INTRAMUSCULAR; INTRAVENOUS; SUBCUTANEOUS
Status: DISCONTINUED | OUTPATIENT
Start: 2018-10-10 | End: 2018-10-12 | Stop reason: HOSPADM

## 2018-10-10 RX ORDER — LISINOPRIL 10 MG/1
10 TABLET ORAL DAILY
Status: DISCONTINUED | OUTPATIENT
Start: 2018-10-11 | End: 2018-10-11

## 2018-10-10 RX ORDER — ALBUTEROL SULFATE 90 UG/1
2 AEROSOL, METERED RESPIRATORY (INHALATION) EVERY 6 HOURS PRN
Status: DISCONTINUED | OUTPATIENT
Start: 2018-10-10 | End: 2018-10-12 | Stop reason: HOSPADM

## 2018-10-10 RX ORDER — ONDANSETRON 2 MG/ML
INJECTION INTRAMUSCULAR; INTRAVENOUS PRN
Status: DISCONTINUED | OUTPATIENT
Start: 2018-10-10 | End: 2018-10-10

## 2018-10-10 RX ORDER — FLUTICASONE PROPIONATE 50 MCG
2 SPRAY, SUSPENSION (ML) NASAL DAILY PRN
Status: DISCONTINUED | OUTPATIENT
Start: 2018-10-10 | End: 2018-10-12 | Stop reason: HOSPADM

## 2018-10-10 RX ORDER — KETOROLAC TROMETHAMINE 30 MG/ML
INJECTION, SOLUTION INTRAMUSCULAR; INTRAVENOUS PRN
Status: DISCONTINUED | OUTPATIENT
Start: 2018-10-10 | End: 2018-10-10

## 2018-10-10 RX ORDER — ACETAMINOPHEN 325 MG/1
975 TABLET ORAL ONCE
Status: COMPLETED | OUTPATIENT
Start: 2018-10-10 | End: 2018-10-10

## 2018-10-10 RX ORDER — DIPHENHYDRAMINE HCL 12.5MG/5ML
12.5 LIQUID (ML) ORAL EVERY 6 HOURS PRN
Status: DISCONTINUED | OUTPATIENT
Start: 2018-10-10 | End: 2018-10-12 | Stop reason: HOSPADM

## 2018-10-10 RX ORDER — DIPHENHYDRAMINE HYDROCHLORIDE 50 MG/ML
12.5 INJECTION INTRAMUSCULAR; INTRAVENOUS EVERY 6 HOURS PRN
Status: DISCONTINUED | OUTPATIENT
Start: 2018-10-10 | End: 2018-10-12 | Stop reason: HOSPADM

## 2018-10-10 RX ORDER — GABAPENTIN 100 MG/1
100 CAPSULE ORAL
Status: COMPLETED | OUTPATIENT
Start: 2018-10-10 | End: 2018-10-10

## 2018-10-10 RX ORDER — ACETAMINOPHEN 325 MG/1
650 TABLET ORAL EVERY 4 HOURS PRN
Status: DISCONTINUED | OUTPATIENT
Start: 2018-10-13 | End: 2018-10-12 | Stop reason: HOSPADM

## 2018-10-10 RX ORDER — BISACODYL 10 MG
10 SUPPOSITORY, RECTAL RECTAL DAILY
Status: DISCONTINUED | OUTPATIENT
Start: 2018-10-11 | End: 2018-10-12 | Stop reason: HOSPADM

## 2018-10-10 RX ORDER — CELECOXIB 200 MG/1
200 CAPSULE ORAL ONCE
Status: COMPLETED | OUTPATIENT
Start: 2018-10-10 | End: 2018-10-10

## 2018-10-10 RX ORDER — AMOXICILLIN 250 MG
2 CAPSULE ORAL 2 TIMES DAILY
Status: DISCONTINUED | OUTPATIENT
Start: 2018-10-10 | End: 2018-10-12 | Stop reason: HOSPADM

## 2018-10-10 RX ORDER — PROPOFOL 10 MG/ML
INJECTION, EMULSION INTRAVENOUS CONTINUOUS PRN
Status: DISCONTINUED | OUTPATIENT
Start: 2018-10-10 | End: 2018-10-10

## 2018-10-10 RX ORDER — SODIUM CHLORIDE 9 MG/ML
INJECTION, SOLUTION INTRAVENOUS CONTINUOUS
Status: DISCONTINUED | OUTPATIENT
Start: 2018-10-10 | End: 2018-10-12 | Stop reason: HOSPADM

## 2018-10-10 RX ORDER — BUPIVACAINE HYDROCHLORIDE 7.5 MG/ML
INJECTION, SOLUTION INTRASPINAL PRN
Status: DISCONTINUED | OUTPATIENT
Start: 2018-10-10 | End: 2018-10-10

## 2018-10-10 RX ADMIN — FENTANYL CITRATE 50 MCG: 50 INJECTION, SOLUTION INTRAMUSCULAR; INTRAVENOUS at 10:11

## 2018-10-10 RX ADMIN — HYDROMORPHONE HYDROCHLORIDE 0.25 MG: 1 INJECTION, SOLUTION INTRAMUSCULAR; INTRAVENOUS; SUBCUTANEOUS at 14:50

## 2018-10-10 RX ADMIN — FENTANYL CITRATE 50 MCG: 50 INJECTION INTRAMUSCULAR; INTRAVENOUS at 15:10

## 2018-10-10 RX ADMIN — PHENYLEPHRINE HYDROCHLORIDE 100 MCG: 10 INJECTION, SOLUTION INTRAMUSCULAR; INTRAVENOUS; SUBCUTANEOUS at 12:58

## 2018-10-10 RX ADMIN — CEFAZOLIN 1 G: 1 INJECTION, POWDER, FOR SOLUTION INTRAMUSCULAR; INTRAVENOUS at 14:15

## 2018-10-10 RX ADMIN — HYDROMORPHONE HYDROCHLORIDE 0.25 MG: 1 INJECTION, SOLUTION INTRAMUSCULAR; INTRAVENOUS; SUBCUTANEOUS at 14:43

## 2018-10-10 RX ADMIN — CEFAZOLIN SODIUM 2 G: 2 INJECTION, SOLUTION INTRAVENOUS at 12:15

## 2018-10-10 RX ADMIN — SODIUM CHLORIDE, POTASSIUM CHLORIDE, SODIUM LACTATE AND CALCIUM CHLORIDE: 600; 310; 30; 20 INJECTION, SOLUTION INTRAVENOUS at 11:44

## 2018-10-10 RX ADMIN — MIDAZOLAM HYDROCHLORIDE 1 MG: 1 INJECTION, SOLUTION INTRAMUSCULAR; INTRAVENOUS at 10:12

## 2018-10-10 RX ADMIN — CELECOXIB 200 MG: 200 CAPSULE ORAL at 09:27

## 2018-10-10 RX ADMIN — FAMOTIDINE 20 MG: 10 INJECTION, SOLUTION INTRAVENOUS at 14:08

## 2018-10-10 RX ADMIN — ONDANSETRON 4 MG: 2 INJECTION INTRAMUSCULAR; INTRAVENOUS at 13:47

## 2018-10-10 RX ADMIN — FENTANYL CITRATE 25 MCG: 50 INJECTION, SOLUTION INTRAMUSCULAR; INTRAVENOUS at 14:00

## 2018-10-10 RX ADMIN — KETOROLAC TROMETHAMINE 15 MG: 30 INJECTION, SOLUTION INTRAMUSCULAR at 14:12

## 2018-10-10 RX ADMIN — ACETAMINOPHEN 975 MG: 325 TABLET, FILM COATED ORAL at 09:28

## 2018-10-10 RX ADMIN — SODIUM CHLORIDE 1 G: 9 INJECTION, SOLUTION INTRAVENOUS at 14:07

## 2018-10-10 RX ADMIN — PHENYLEPHRINE HYDROCHLORIDE 100 MCG: 10 INJECTION, SOLUTION INTRAMUSCULAR; INTRAVENOUS; SUBCUTANEOUS at 13:09

## 2018-10-10 RX ADMIN — KETOROLAC TROMETHAMINE 15 MG: 30 INJECTION, SOLUTION INTRAMUSCULAR at 21:07

## 2018-10-10 RX ADMIN — BUPIVACAINE HYDROCHLORIDE IN DEXTROSE 1.8 ML: 7.5 INJECTION, SOLUTION SUBARACHNOID at 11:52

## 2018-10-10 RX ADMIN — PROPOFOL 100 MCG/KG/MIN: 10 INJECTION, EMULSION INTRAVENOUS at 11:52

## 2018-10-10 RX ADMIN — SODIUM CHLORIDE 1 G: 9 INJECTION, SOLUTION INTRAVENOUS at 12:00

## 2018-10-10 RX ADMIN — MIDAZOLAM 1 MG: 1 INJECTION INTRAMUSCULAR; INTRAVENOUS at 11:44

## 2018-10-10 RX ADMIN — SENNOSIDES AND DOCUSATE SODIUM 2 TABLET: 8.6; 5 TABLET ORAL at 21:07

## 2018-10-10 RX ADMIN — CEFAZOLIN 1 G: 1 INJECTION, POWDER, FOR SOLUTION INTRAMUSCULAR; INTRAVENOUS at 21:08

## 2018-10-10 RX ADMIN — MIDAZOLAM 1 MG: 1 INJECTION INTRAMUSCULAR; INTRAVENOUS at 11:50

## 2018-10-10 RX ADMIN — DEXAMETHASONE SODIUM PHOSPHATE 8 MG: 4 INJECTION, SOLUTION INTRAMUSCULAR; INTRAVENOUS at 12:05

## 2018-10-10 RX ADMIN — ACETAMINOPHEN 975 MG: 325 TABLET, FILM COATED ORAL at 17:42

## 2018-10-10 RX ADMIN — ONDANSETRON 4 MG: 4 TABLET, ORALLY DISINTEGRATING ORAL at 16:31

## 2018-10-10 RX ADMIN — FENTANYL CITRATE 25 MCG: 50 INJECTION, SOLUTION INTRAMUSCULAR; INTRAVENOUS at 14:15

## 2018-10-10 RX ADMIN — PHENYLEPHRINE HYDROCHLORIDE 100 MCG: 10 INJECTION, SOLUTION INTRAMUSCULAR; INTRAVENOUS; SUBCUTANEOUS at 12:49

## 2018-10-10 RX ADMIN — BUPIVACAINE 10 ML: 13.3 INJECTION, SUSPENSION, LIPOSOMAL INFILTRATION at 10:16

## 2018-10-10 RX ADMIN — OXYCODONE HYDROCHLORIDE 5 MG: 5 TABLET ORAL at 16:32

## 2018-10-10 RX ADMIN — FENTANYL CITRATE 50 MCG: 50 INJECTION, SOLUTION INTRAMUSCULAR; INTRAVENOUS at 14:40

## 2018-10-10 RX ADMIN — GABAPENTIN 100 MG: 100 CAPSULE ORAL at 09:27

## 2018-10-10 RX ADMIN — BUPIVACAINE HYDROCHLORIDE AND EPINEPHRINE BITARTRATE 20 ML: 2.5; .005 INJECTION, SOLUTION INFILTRATION; PERINEURAL at 10:16

## 2018-10-10 ASSESSMENT — ACTIVITIES OF DAILY LIVING (ADL)
ADLS_ACUITY_SCORE: 10
RETIRED_EATING: 0-->INDEPENDENT

## 2018-10-10 ASSESSMENT — COPD QUESTIONNAIRES: COPD: 0

## 2018-10-10 ASSESSMENT — LIFESTYLE VARIABLES: TOBACCO_USE: 1

## 2018-10-10 NOTE — IP AVS SNAPSHOT
[facility]    [address]    [place] [address]    Phone:  [phone]                                       After Visit Summary   10/10/2018    Maddie Lala    MRN: [id number]           After Visit Summary Signature Page     I have received my discharge instructions, and my questions have been answered. I have discussed any challenges I see with this plan with the nurse or doctor.    ..........................................................................................................................................  Patient/Patient Representative Signature      ..........................................................................................................................................  Patient Representative Print Name and Relationship to Patient    ..................................................               ................................................  Date                                   Time    ..........................................................................................................................................  Reviewed by Signature/Title    ...................................................              ..............................................  Date                                               Time          22EPIC Rev 08/18

## 2018-10-10 NOTE — IP AVS SNAPSHOT
MRN:[id number]                      After Visit Summary   10/10/2018    Maddie Lala    MRN: [id number]           Thank you!     Thank you for choosing [facility] for your care. Our goal is always to provide you with excellent care. Hearing back from our patients is one way we can continue to improve our services. Please take a few minutes to complete the written survey that you may receive in the mail after you visit with us. Thank you!        Patient Information     Date Of Birth          1950        Designated Caregiver       Most Recent Value    Caregiver    Will someone help with your care after discharge? yes    Name of designated caregiver Sam -     Phone number of caregiver [phone]    Caregiver address Same as pt      About your hospital stay     You were admitted on:  October 10, 2018 You last received care in the:  [facility] 8A    You were discharged on:  October 12, 2018        Reason for your hospital stay       You were admitted to the hospital following your total knee arthroplasty.                  Who to Call     For medical emergencies, please call 911.  For non-urgent questions about your medical care, please call your primary care provider or clinic, [phone]  For questions related to your surgery, please call your surgery clinic        Attending Provider     Provider Specialty    James Amaay MD Orthopedics       Primary Care Provider Office Phone # Fax #    Radha Hurtado [phone]      After Care Instructions     Activity       Your activity upon discharge will be as tolerated. Continue to perform knee ROM exercises, focusing on hyperextension to 90 degrees (or greater) of knee flexion.            Diet       You may return to your regular, pre-surgery diet unless instructed otherwise by your provider.            Discharge Instructions       TOTAL KNEE ARTHROPLASTY POST OPERATIVE DISCHARGE INSTRUCTIONS    FOLLOW UP APPOINTMENT  You are  scheduled for a post operative wound check with Dr. Amaya s nurse approximately two weeks after surgery. At approximately six weeks after surgery, you will see Dr. Amaya in clinic. During this visit, repeat X rays of your operative hip will be performed.      Your follow up appointments will be at the location that you regularly see Dr. Amaya:    [facility]  [address] [place] [address]  [phone]    [address] [address] [phone]    [facility]  [address], [place] [address] [phone]    Physical therapy:   A prescription for physical therapy will be provided at the time of discharge.       ACTIVITY  Weight bearing status:   You may bear weight on your operative extremity as tolerated, using assistive devices (walker, cane) as needed. As you begin to feel more comfortable ambulating, you may gradually transition from a walker to a cane. Eventually, you should wean from all assistive devices. Although we would like you to discontinue use of assistive devices as soon as possible, do not transition until you have worked with your physical therapist to achieve safe balance and comfort.     Continuous passive motion machine:   Perform CPM exercises for six to eight hours per day for the first four weeks after surgery. The CPM should be set at 0 degrees to flexion tolerance with a goal of 90 degrees. Advance the CPM settings aggressively in increments of 5 degrees every 30 minutes until goal is achieved.     Exercises:   Perform the following exercises at least three times per day for the first four weeks after surgery to prevent complications, such as blood clots in your legs:  1) Point and flex your feet  2) Move your ankle around in big circles  3) Wiggle your toes   Also, perform thigh muscle tightening exercises for 10 to 15 minutes  at least three times per day for the first four weeks after surgery.    Athletic Activities:  Activities such as swimming, bicycling, jogging, running, and stop-and-go sports should be avoided until permitted by your provider.    Driving:  Driving is not permitted until directed by your provider. Under no circumstance are you permitted to drive while using narcotic pain medications.    Return to Work:  You may return to work when directed by your provider.       COMFORT AND PAIN MANAGEMENT  Elevation:   During times of inactivity throughout the first two weeks after surgery, make an effort to decrease swelling by elevating your operative extremity. This is most effectively done by lying down and placing several pillows lengthwise under your thigh and calf to raise your toes above the level of your nose. To ensure that you knee remains in full extension, do not place pillows directly under your knee.     Icing:  An ice pack will be provided to control swelling and discomfort after surgery. Place a thin towel on your skin and apply the ice pack overtop. You may apply ice for 20 minutes as often as two times per hour.    Pain Medications:  You will be discharged with acetaminophen (Tylenol) and a narcotic medication for pain management after surgery. Acetaminophen can help to effectively manage pain when used as prescribed, however, do not exceed the maximum daily dose of 3000 mg. The narcotic pain medication should be reserved for severe, breakthrough pain. Take the narcotic medication as prescribed and use only as often as necessary.       ANTICOAGULATION  Take enoxaparin (Lovenox) as prescribed for a total of two weeks after surgery. After you complete your enoxaparin regimen, take one aspirin 325mg daily for the next two weeks.       WOUND CARE AND SHOWERING  Wound care:  You have a clean Aquacel dressing on your surgical wound. This dressing should stay in place for seven days to allow the incision to heal. After  seven days, you may change the dressing. Please use sterile 4x4 gauze dressings with tape over top to secure the dressing. If the Aquacel dressing becomes wet or saturated with wound drainage, it is appropriate to change the dressing before seven days. If drainage persists from the incision site to the extent that it is frequently saturating the dressing, please contact Dr. Amaya s clinic.    Showering:  You may shower 48 hours after surgery, provided the Aquacel dressing is in place. You may allow water to run over the dressing, but do not to soak or submerge your wound underwater. The steri-strips (small white tape that is directly on the incision areas) should be left on until they fall off or are removed at your first office visit.    Tub Bathing:  Tub bathing, swimming, or any other activities that cause your incision to be submerged should be avoided until allowed by your provider. Typically, patients are allowed to return to these activities four weeks after surgery.      CONTACTING YOUR PHYSICIAN:  You may experience symptoms that require follow-up before your scheduled two week appointment. Please contact Dr. Amaya's office if you experience:  1) Pain in your knee that persists or worsens in the first few days after surgery  2) Excessive redness or drainage of cloudy or bloody material from the wounds (Clear red tinted fluid and some mild drainage should be expected) or drainage of any kind five days after surgery  3) A temperature elevation greater than 101.5    4) Pain, swelling or redness in your calf  5) Numbness or weakness in your leg or foot      Regular business hours (Monday - Friday, 8am - 5pm):  [facility] [place]: [phone]  [facility]: [phone]  [facility]: [phone]    After hours and weekends:  [facility] on call Orthopedic resident: [phone]                  Follow-up  Appointments     [facility] Follow-up and recommended labs and tests       You are to return to clinic in 2 weeks for wound check with the clinic nurse. Approximately 6 weeks after your surgery, you will be scheduled for an appointment with Dr. Amaya. At this time, AP and lateral knee imaging will be obtained.    If you need to schedule your 2 and 6 week postoperative visits or haven't received confirmation regarding these visits, please call one of the following numbers within 7 days of discharge.    For patients that see Dr. Amaya at:   -[facility]: [phone]  -[facility] [place]: [phone]  -[facility]: [phone]                  Your next 10 appointments already scheduled     Oct 23, 2018  8:30 AM CDT   Nurse Only with [facility] NURSE [facility]    [address] [place] [address] [phone]            Nov 13, 2018  8:00 AM CST   XR KNEE LEFT 1/2 VIEWS with MGXR2   [facility])    [address] [place] [address] [phone]           How do I prepare for my exam? (Food and drink instructions) No Food and Drink Restrictions.  How do I prepare for my exam? (Other instructions) You do not need to do anything special for this exam.  What should I wear: Wear comfortable clothes.  How long does the exam take: Most scans take less than 5 minutes.  What should I bring: Bring a list of your medicines, including vitamins, minerals and over-the-counter drugs. It is safest to leave personal items at home.  Do I need a :  No  is needed.  What do I need to tell my doctor: Tell your doctor if there s any chance you are pregnant.  What should I do after the exam: No restrictions, You may resume normal activities.  What is this test: An image of a specific body part shown in shades of black and white.  Who  should I call with questions: If you have any questions, please call the Imaging Department where you will have your exam. Directions, parking instructions, and other information is available on our website, [email or web address].            Nov 13, 2018  8:30 AM CST   (Arrive by 8:00 AM)   [facility] Visit with James [facility] MD   [facility]    [address] [place] [address] [phone]            Jan 03, 2019  1:15 PM CST   (Arrive by 1:00 PM)   Return Visit with Aaron Roy MD   [facility] ([facility] [place])    [address]  3rd Floor  [facility] [address] [phone]              Additional Services     Physical Therapy Referral       Patient prefers  OP PT in [facility], would like to be able to start on Monday 10/15/18     PT to eval and treat        If you have not heard from the scheduling office within 2 business days, please call [phone] for all locations, with the exception of [place], please call [phone] and [facility], please call [phone].    Please be aware that coverage of these services is subject to the terms and limitations of your health insurance plan.  Call member services at your health plan with any benefit or coverage questions.                  Future tests that were ordered for you     Assign Questionnaire Series to Patient                 Pending Results     Date and Time Order Name Status Description    10/10/2018 0902 POC US Guidance Needle Placement In process             Statement of Approval     Ordered          10/12/18 0657  I have reviewed and agree with all the recommendations and orders detailed in this document.  EFFECTIVE NOW     Approved and electronically signed by:  Diane Hobson MD             Admission Information     Date & Time Provider Department Dept. Phone    10/10/2018 James Amaya MD [facility] 8A  "[phone]      Your Vitals Were     Blood Pressure Temperature Respirations Height Weight Pulse Oximetry    125/69 (BP Location: Right arm) 96.9  F (36.1  C) (Oral) 16 1.727 m (5' 8\") 78.3 kg (172 lb 9.9 oz) 99%    BMI (Body Mass Index)                   26.25 kg/m2           [facility] Information     [employer] gives you secure access to your electronic health record. If you see a primary care provider, you can also send messages to your care team and make appointments. If you have questions, please call your primary care clinic.  If you do not have a primary care provider, please call [phone] and they will assist you.        Care EveryWhere ID     This is your Care EveryWhere ID. This could be used by other organizations to access your [facility] medical records  [id number]        Equal Access to Services     ALYSON DAVIS : Damaris Gresham, janet spaulding, nereyda kim. So [facility] [phone].    ATENCIÓN: Si habla español, tiene a mayo disposición servicios gratuitos de asistencia lingüística. Llame al [phone].    We comply with applicable federal civil rights laws and [place] laws. We do not discriminate on the basis of race, color, national origin, age, disability, sex, sexual orientation, or gender identity.               Review of your medicines      START taking        Dose / Directions    aspirin 325 MG tablet        Dose:  325 mg   Start taking on:  10/25/2018   Take 1 tablet (325 mg) by mouth daily   Quantity:  14 tablet   Refills:  0       enoxaparin 40 MG/0.4ML injection   Commonly known as:  LOVENOX        Dose:  40 mg   Inject 0.4 mLs (40 mg) Subcutaneous every 24 hours   Quantity:  13 Syringe   Refills:  0       oxyCODONE IR 5 MG tablet   Commonly known as:  ROXICODONE        Dose:  5-10 mg   Take 1-2 tablets (5-10 mg) by mouth every 4 hours as needed for severe pain   Quantity:  40 tablet   Refills:  0       senna-docusate " 8.6-50 MG per tablet   Commonly known as:  SENOKOT-S;PERICOLACE        Dose:  1-2 tablet   Take 1-2 tablets by mouth 2 times daily as needed for constipation   Quantity:  50 tablet   Refills:  0         CONTINUE these medicines which may have CHANGED, or have new prescriptions. If we are uncertain of the size of tablets/capsules you have at home, strength may be listed as something that might have changed.        Dose / Directions    acetaminophen 325 MG tablet   Commonly known as:  TYLENOL   This may have changed:    - medication strength  - how much to take  - reasons to take this  - additional instructions  - These instructions start on 10/13/2018. If you are unsure what to do until then, ask your doctor or other care provider.        Dose:  325-650 mg   Start taking on:  10/13/2018   Take 1-2 tablets (325-650 mg) by mouth every 4 hours as needed for other or pain   Quantity:  100 tablet   Refills:  0         CONTINUE these medicines which have NOT CHANGED        Dose / Directions    ADVIL PO        Dose:  800 mg   Take 800 mg by mouth every 4 hours as needed for moderate pain   Refills:  0       albuterol 108 (90 Base) MCG/ACT inhaler   Commonly known as:  VENTOLIN HFA   Used for:  Cough due to bronchospasm        INHALE TWO PUFFS BY MOUTH EVERY 6 HOURS AS NEEDED FOR SHORTNESS OF BREATH / DYSPNEA AND AS NEEDED BEFORE EXERCISE   Quantity:  18 g   Refills:  11       beclomethasone 40 MCG/ACT Inhaler   Commonly known as:  QVAR   Used for:  Chronic cough        INHALE 2 PUFFS BY MOUTH INTO THE LUNGS TWO TIMES A DAY   Quantity:  3 Inhaler   Refills:  3       CALCIUM + D PO        One bid   Refills:  0       CHLOR-TRIMETON ALLERGY 12 MG Tbcr   Generic drug:  chlorpheniramine maleate        Take  by mouth as needed.   Refills:  0       estradiol 10 MCG Tabs vaginal tablet   Commonly known as:  VAGIFEM   Used for:  Vaginal atrophy        Dose:  10 mcg   Place 1 tablet (10 mcg) vaginally twice a week   Quantity:  24  tablet   Refills:  1       fluocinonide 0.05 % solution   Commonly known as:  LIDEX   Used for:  Dermatitis, seborrheic        Apply to scalp nightly for up to 1 week, then 2-3 times weekly as needed.   Quantity:  60 mL   Refills:  3       fluticasone 50 MCG/ACT spray   Commonly known as:  FLONASE   Used for:  Chronic allergic conjunctivitis        USE ONE TO TWO SPRAYS IN EACH NOSTRIL EVERY DAY   Quantity:  48 g   Refills:  3       lisinopril 10 MG tablet   Commonly known as:  PRINIVIL/ZESTRIL   Used for:  Benign essential hypertension        Dose:  10 mg   Take 1 tablet (10 mg) by mouth daily   Quantity:  90 tablet   Refills:  1       OPTIVITE PO        once a day   Refills:  0       order for DME   Used for:  Primary osteoarthritis of left knee        Equipment being ordered:    left knee brace-medial compartment  for osteoarthritis.   Quantity:  1 Units   Refills:  0       order for DME   Used for:  Cough due to bronchospasm        Equipment being ordered: spacer   Quantity:  1 each   Refills:  0       ranitidine 300 MG tablet   Commonly known as:  ZANTAC   Used for:  Gastroesophageal reflux disease without esophagitis        TAKE ONE TABLET BY MOUTH EVERY DAY   Quantity:  90 tablet   Refills:  2            Where to get your medicines      These medications were sent to [facility] [address]  [address] [address]     Phone:  [phone]     acetaminophen 325 MG tablet    aspirin 325 MG tablet    enoxaparin 40 MG/0.4ML injection    senna-docusate 8.6-50 MG per tablet         Some of these will need a paper prescription and others can be bought over the counter. Ask your nurse if you have questions.     Bring a paper prescription for each of these medications     oxyCODONE IR 5 MG tablet                Protect others around you: Learn how to safely use, store and throw away your medicines at www.disposemymeds.org.        Information about  "your nerve block     Today you received a block to numb the nerves near your surgery site.    This is a block using local anesthetic or \"numbing\" medication injected around the nerves to anesthetize or \"numb\" the area supplied by those nerves. This block is injected into the muscle layer near your surgical site. The type of anesthesia (Exparel) your anesthesia team used to numb your abdomen may give you relief for up to 72 hours.     Diet: There are no diet restrictions, but you should drink plenty of fluids, unless you are on a fluid-restricted diet.     Activity: If your surgical site is an arm or leg you should be careful with your affected limb, since it is possible to injure your limb without being aware of it due to the numbing. Until full feeling returns, you should guard against bumping or hitting your limb, and avoid extreme hot or cold temperatures on the skin.    Pain Medication: As the block wears off, the feeling will return as a tingling or prickly sensation near your surgical site. You will experience more discomfort from your incisions as the feeling returns. You may want to take a pain pill (a narcotic or Tylenol if this was prescribed by your surgeon) when you start to experience mild pain, before the pain becomes more severe. If your pain medications do not control your pain, you should notify your surgeon. If you are taking narcotics for pain management, do not drink alcohol, drive a car, or perform hazardous activities.  If you have questions or concerns you may call your surgeon at the number provided with your discharge instructions.     Call your surgeon if you experience blurry vision, ringing in the ears or metallic taste in your mouth.         Information about OPIOIDS     PRESCRIPTION OPIOIDS: WHAT YOU NEED TO KNOW   We gave you an opioid (narcotic) pain medicine. It is important to manage your pain, but opioids are not always the best choice. You should first try all the other options " your care team gave you. Take this medicine for as short a time (and as few doses) as possible.    Some activities can increase your pain, such as bandage changes or therapy sessions. It may help to take your pain medicine 30 to 60 minutes before these activities. Reduce your stress by getting enough sleep, working on hobbies you enjoy and practicing relaxation or meditation. Talk to your care team about ways to manage your pain beyond prescription opioids.    These medicines have risks:    DO NOT drive when on new or higher doses of pain medicine. These medicines can affect your alertness and reaction times, and you could be arrested for driving under the influence (DUI). If you need to use opioids long-term, talk to your care team about driving.    DO NOT operate heavy machinery    DO NOT do any other dangerous activities while taking these medicines.    DO NOT drink any alcohol while taking these medicines.     If the opioid prescribed includes acetaminophen, DO NOT take with any other medicines that contain acetaminophen. Read all labels carefully. Look for the word  acetaminophen  or  Tylenol.  Ask your pharmacist if you have questions or are unsure.    You can get addicted to pain medicines, especially if you have a history of addiction (chemical, alcohol or substance dependence). Talk to your care team about ways to reduce this risk.    All opioids tend to cause constipation. Drink plenty of water and eat foods that have a lot of fiber, such as fruits, vegetables, prune juice, apple juice and high-fiber cereal. Take a laxative (Miralax, milk of magnesia, Colace, Senna) if you don t move your bowels at least every other day. Other side effects include upset stomach, sleepiness, dizziness, throwing up, tolerance (needing more of the medicine to have the same effect), physical dependence and slowed breathing.    Store your pills in a secure place, locked if possible. We will not replace any lost or stolen  medicine. If you don t finish your medicine, please throw away (dispose) as directed by your pharmacist. The [place] Pollution Control Agency has more information about safe disposal: https://www.pca.state.[place].us/living-green/managing-unwanted-medications             Medication List: This is a list of all your medications and when to take them. Check marks below indicate your daily home schedule. Keep this list as a reference.      Medications           Morning Afternoon Evening Bedtime As Needed    acetaminophen 325 MG tablet   Commonly known as:  TYLENOL   Take 1-2 tablets (325-650 mg) by mouth every 4 hours as needed for other or pain   Start taking on:  10/13/2018   Last time this was given:  975 mg on 10/12/2018  3:25 AM                                ADVIL PO   Take 800 mg by mouth every 4 hours as needed for moderate pain                                albuterol 108 (90 Base) MCG/ACT inhaler   Commonly known as:  VENTOLIN HFA   INHALE TWO PUFFS BY MOUTH EVERY 6 HOURS AS NEEDED FOR SHORTNESS OF BREATH / DYSPNEA AND AS NEEDED BEFORE EXERCISE                                aspirin 325 MG tablet   Take 1 tablet (325 mg) by mouth daily   Start taking on:  10/25/2018                                beclomethasone 40 MCG/ACT Inhaler   Commonly known as:  QVAR   INHALE 2 PUFFS BY MOUTH INTO THE LUNGS TWO TIMES A DAY                                CALCIUM + D PO   One bid                                CHLOR-TRIMETON ALLERGY 12 MG Tbcr   Take  by mouth as needed.   Generic drug:  chlorpheniramine maleate                                enoxaparin 40 MG/0.4ML injection   Commonly known as:  LOVENOX   Inject 0.4 mLs (40 mg) Subcutaneous every 24 hours   Last time this was given:  40 mg on 10/12/2018  8:22 AM                                estradiol 10 MCG Tabs vaginal tablet   Commonly known as:  VAGIFEM   Place 1 tablet (10 mcg) vaginally twice a week                                fluocinonide 0.05 % solution    Commonly known as:  LIDEX   Apply to scalp nightly for up to 1 week, then 2-3 times weekly as needed.                                fluticasone 50 MCG/ACT spray   Commonly known as:  FLONASE   USE ONE TO TWO SPRAYS IN EACH NOSTRIL EVERY DAY                                lisinopril 10 MG tablet   Commonly known as:  PRINIVIL/ZESTRIL   Take 1 tablet (10 mg) by mouth daily   Last time this was given:  10 mg on 10/11/2018  8:20 AM                                OPTIVITE PO   once a day                                order for DME   Equipment being ordered:    left knee brace-medial compartment  for osteoarthritis.                                order for DME   Equipment being ordered: spacer                                oxyCODONE IR 5 MG tablet   Commonly known as:  ROXICODONE   Take 1-2 tablets (5-10 mg) by mouth every 4 hours as needed for severe pain   Last time this was given:  5 mg on 10/12/2018  8:31 AM                                ranitidine 300 MG tablet   Commonly known as:  ZANTAC   TAKE ONE TABLET BY MOUTH EVERY DAY   Last time this was given:  300 mg on 10/11/2018  8:20 AM                                senna-docusate 8.6-50 MG per tablet   Commonly known as:  SENOKOT-S;PERICOLACE   Take 1-2 tablets by mouth 2 times daily as needed for constipation   Last time this was given:  1 tablet on 10/12/2018  8:23 AM

## 2018-10-10 NOTE — BRIEF OP NOTE
Orthopaedic Surgery Brief Op-Note      Patient: Maddie Lala  : 1950  Date of Service: 10/10/2018 3:01 PM    Pre-operative Diagnosis: Osteoarthritis Left Knee, Chronic Pain Of Left Knee   Post-operative Diagnosis: same    Procedure(s) Performed: Procedure(s):  Left Total Knee Arthroplasty     - Wound Class: I-Clean    Staff: Dr. Amaya  Assistants:   Diane Hobson MD    Anesthesia: General  EBL: 50 cc  Tourniquet Time: 90 minutes    Implants:     Implant Name Type Inv. Item Serial No.  Lot No. LRB No. Used   IMP COMP FEMORAL SNN GEN II CR SZ 7 LT [id number] Total Joint Component/Insert IMP COMP FEMORAL SNN GEN II CR SZ 7 LT [id number]  [place]  [id number] Left 1   IMP BASEPLATE TIBIAL GEORGE II SZ 5 LT TI [id number] Total Joint Component/Insert IMP BASEPLATE TIBIAL GEORGE II SZ 5 LT TI [id number]  [place]  [id number] Left 1   BONE CEMENT SIMPLEX W/TOBRAMYCIN 6197-9-001 [employer], Bone BONE CEMENT SIMPLEX W/TOBRAMYCIN 6197-9-001  [employer] [id number] Left 2   IMP INSERT ARTICULAR S&N LGN CR XLPE SZ5-6 9MM [id number] Total Joint Component/Insert IMP INSERT ARTICULAR S&N LGN CR XLPE SZ5-6 9MM [id number]   [place]  [id number] Left 1     Drains: none  Intra-op Labs/Cxs/Specimens: None  Complications: No apparent complications during procedure  Findings: Please see dictated operative note for details    Disposition: Stable to PACU, then admit to Orthopaedics.    Post-Op Plan:  Assessment/Plan: Maddie Lala is a 68 year old female s/p Procedure(s):  Left Total Knee Arthroplasty     - Wound Class: I-Clean on 10/10/2018 with Dr. Amaya.    Activity: Up with assist and assistive devices as needed until independent. Knee ROM as tolerated. Advance CPM as tolerated to goal of 0 to 90 degrees.  Weight bearing status: WBAT    Antibiotics: Cefazolin x 24 hours   Diet: Begin with clear fluids and progress diet as tolerated. Bowel regimen. Anti-emetics PRN.    DVT prophylaxis:  Mechanical while in hospital with Lovenox x  2 weeks, followed by aspirin x 2 weeks  Elevation: Elevate heels off of bed on pillows, no pillows behind the knee at any time    Wound Care: Aquacel x 5-7 days    Pain management: Orals PRN, IV for breakthrough only  X-rays: AP/Lat left knee XR in PACU.  Physical Therapy: Mobilization, ROM, ADL's  Occupational Therapy: ADL's  Labs: Trend Hgb on PODs #1 & 2  Cultures: None  Consults: PT, OT. Hospitalist, appreciate assistance in caring for this patient throughout the perioperative period    Future Appointments  Date Time Provider Department Center   10/11/2018 8:30 AM Zoe Downey, PT URPT [place]   10/11/2018 12:30 PM Betina Morris, NED UROT [place]   10/11/2018 2:30 PM Zoe Downey PT URPT [place]   10/23/2018 8:30 AM [facility] [place]   11/13/2018 8:00 AM [facility] [place]   11/13/2018 8:30 AM James Amaya MD [facility] [place]   1/3/2019 1:15 PM Aaron Roy MD [facility]       Disposition: Pending progress with therapies, pain control on orals, and medical stability, anticipate discharge to Home on POD #1-2    Diane Reed PGY4  [phone]

## 2018-10-10 NOTE — ANESTHESIA PROCEDURE NOTES
Peripheral Nerve Block Procedure Note    Staff:     Anesthesiologist:  MECHE REED    Resident/CRNA:  CORNELIA GIL    Block performed by resident/CRNA in the presence of a teaching physician    Procedure Start/Stop TImes:      10/10/2018 10:11 AM     10/10/2018 10:15 AM    patient identified, IV checked, site marked, risks and benefits discussed, informed consent, monitors and equipment checked, pre-op evaluation, at physician/surgeon's request and post-op pain management      Correct Patient: Yes      Correct Position: Yes      Correct Site: Yes      Correct Procedure: Yes      Correct Laterality:  Yes    Site Marked:  Yes  Procedure details:     Procedure:  Adductor canal    ASA:  2    Diagnosis:  Post operative pain control    Laterality:  Left    Position:  Supine    Sterile Prep: chloraprep, mask and sterile gloves      Local skin infiltration:  1% lidocaine    amount (mL):  3    Needle:  Short bevel    Needle gauge:  21    Needle length (mm):  100    Ultrasound: Yes      Ultrasound used to identify targeted nerve, plexus, or vascular structure and placed a needle adjacent to it      Permanent Image entered into patiient's record      Abnormal pain on injection: No      Blood Aspirated: No      Paresthesias:  No    Bleeding at site: No      Bolus via:  Needle    Infusion Method:  Single Shot  Assessment/Narrative:     Injection made incrementally with aspirations every (mL):  5

## 2018-10-10 NOTE — OP NOTE
OPERATIVE REPORT    DATE OF SERVICE:   10/10/2018  SURGEON: James Amaya MD.    ASSISTANT(S):  Diane Hobson MD    PREOPERATIVE DIAGNOSIS:  Osteoarthritis    POSTOPERATIVE DIAGNOSIS:  Osteoarthritis    OPERATION PERFORMED:  Left total knee arthroplasty    IMPLANTS:  [employer]    Implant Name Type Inv. Item Serial No.  Lot No. LRB No. Used   IMP COMP FEMORAL SNN GEN II CR SZ 7 LT [id number] Total Joint Component/Insert IMP COMP FEMORAL SNN GEN II CR SZ 7 LT [id number]  [place]  [id number] Left 1   IMP BASEPLATE TIBIAL GEORGE II SZ 5 LT TI [id number] Total Joint Component/Insert IMP BASEPLATE TIBIAL GEORGE II SZ 5 LT TI [id number]  [place]  [id number] Left 1   BONE CEMENT SIMPLEX W/TOBRAMYCIN 6197-9-001 [employer], Bone BONE CEMENT SIMPLEX W/TOBRAMYCIN 6197-9-001  [employer] [id number] Left 2   IMP INSERT ARTICULAR S&N LGN CR XLPE SZ5-6 9MM [id number] Total Joint Component/Insert IMP INSERT ARTICULAR S&N LGN CR XLPE SZ5-6 9MM [id number]   [place]  [id number] Left 1       ANESTHETIC: General     OPERATIVE FINDINGS:  End stage arthrosis of the knee. Patella appropriate for patellar retention.     BLOOD LOSS: abou50 ml     TOURNIQUET TIME: about 90 minutes     COMPLICATIONS:  None apparent    OPERATIVE INDICATIONS:  The patient has a long history of debilitating pain secondary to ostearthritis of the knee.  Despite comprehensive non-operative management these symptoms continued to interfere with activities of daily living.  After discussion of further treatment options including the risks and benefits that patient elected to proceed with a total knee.    DESCRIPTION OF THE PROCEDURE:  The patient was identified in the preoperative holding area.  The consent form including the risks and benefits were reviewed with the patient.  The operative limb was identified and marked.  The patient was brought back to the operating room and placed supine on the operating table.  An anesthetic was induced by the anesthesia  team.   The patient was prepped and draped in the normal standard fashion for a knee replacement.  A time-out was called.  Antibiotics were given.The tourniquet was inflated.  We utilized an approximately 15 cm curvilinear incision, centered on the patella ridge, and performed a standard parapatellar approach to the knee. There was normal appearing joint fluid seen upon arthrotomy. A modest medial release was performed. The patella was everted. Medial and lateral retractors were placed. The knee was brought into flexion. The AP axis of the knee was marked and an intra-medullary femoral guide nichelle was placed as templated. The epicondylar axis was then marked. The anterior femoral cutting guide was placed and the epicondylar and AP axes were used to set the rotation of the jig. A stylus was then used to set the depth of the resection. Retractors were used to protect the skin and the anterior cut was made. The distal femoral cutting guide was then placed and pinned. The resection was set to remove the cartilage from the intra-condylar notch. The femoral sizing guide was then placed; the knee sized well to a 7. The four-in-one femoral cutting guide was then placed and pinned. The cuts were made. The trial component was well fit. Attention was then turned to the tibia. A PCL retractor was then placed and used to bring the tibia anterior. Medial and lateral retractors were placed. A tibial intra-medullary guide nichelle was placed. The tibial cutting guide was then assembled on the guide nichelle. The slope was set to nearly mirror the anatomic slope. The tibial cut was first checked with a two-and-nine guide and the cut was then made. A lamina  was then used to remove the necessary bone from the posterior condyles and then the remaining meniscus was removed. The tibia and was sized and it sized well to a 5. The trial components were then assembled celena  9 mm trial poly was used for trialing. The knee came out into full  extension and the knee had greater than 120 degrees of flexion. It was well balanced in the coronal plane with varus and valgus stress at 0 and 30 degrees of flexion. Happy with the reconstruction the tibial rotation was marked, the trial components were removed, and all cancellous surfaces were cleaned with a pulse lavage and then dried. The components were cemented into place, a trial poly placed, the knee brought into extension, and the cement was allowed to dry. With the cement dry the knee was lavaged. The tourniquet was let down and hemostasis was achieved. The knee was then brought into extension and the final poly was placed. It was seen to lock into place. The soft tissues were then injected with analgesic. The capsule was closed over a drain. The capsule was closed with interrupted Vicryl, the dermis with interrupted Vicryl, and skin with running monocryl, Dermabond and steri-strips.  At the end of the procedure the sponge and needle counts were correct times two.  The patient tolerated the procedure well and returned to the PAR extubated and stable.    POSTOPERATIVE PLAN:  1. Weight bearing as tolerated  2. DVT prophylaxis with two weeks of lovenox followed by two weeks of ECASA  4. 24 hours of prophylactic antibiotics  5. Follow-up:  Wound clinic in 2 weeks and with Jose Luis in clinic in 6 weeks for x-rays and a rehabilitation check.

## 2018-10-10 NOTE — OR NURSING
PACU to Inpatient Nursing Handoff    Patient Maddie Lala is a 68 year old female who speaks English.   Procedure Procedure(s):  Left Total Knee Arthroplasty     - Wound Class: I-Clean   Surgeon(s) Primary: James Amaya MD  Resident - Assisting: Diane Hobson MD     Allergies   Allergen Reactions     Sulfa Drugs Rash       Isolation  [unfilled]     Past Medical History   has a past medical history of Allergic rhinitis (1979); Arthritis; Benign essential hypertension (3/23/2018); Cervical dysplasia (with cone in 1979); Dyspareunia; Hearing problem (2015); Meniere's disease (2002); Nonsenile cataract; Plantar fasciitis; Stress incontinence; SVT (supraventricular tachycardia) (H); and Tinnitus (2002). She also has no past medical history of Amblyopia; Diabetes (H); Diabetic retinopathy (H); Glaucoma; Macular degeneration; Retinal detachment; Strabismus; or Uveitis.    Anesthesia Spinal   Dermatome Level Dermatomes Left: S1  Dermatomes Right: T12   Preop Meds acetaminophen (Tylenol) - time given: 0928  celecoxib (Celebrex) - time given: 0927  gabapentin (Neurontin) - time given: 0927   Nerve block Adductor canal.  Location:left. Med:bupivacaine and Exparel (liposomal bupivacaine). Time given: 1016  Femoral (popliteal).  Location:left. Med:bupivacaine and Exparel (liposomal bupivacaine). Time given: Spinal Intrathecal as well at 1152   Intraop Meds dexamethasone (Decadron)  famotidine (Pepcid): last given at 1408  fentanyl (Sublimaze): 100 mcg total  hydromorphone (Dilaudid): .5 mg total  ketorolac (Toradol): last given at 1412  ondansetron (Zofran): last given at 1347   Local Meds Yes - Local Cocktail (morphine, ropivacaine, epinephrine, Toradol)   Antibiotics cefazolin (Ancef) - last given at 1415     Pain Patient Currently in Pain: yes  Comfort: comfortably manageable   PACU meds  fentanyl (Sublimaze): 50 mcg (total dose) last given at 1510   oxycodone (Roxicodone): 5 mg (total dose) last  given at 1625    PCA / epidural No   Capnography     Telemetry ECG Rhythm: Sinus rhythm   Inpatient Telemetry Monitor Ordered? No        Labs Glucose Lab Results   Component Value Date    GLC 87 09/27/2018       Hgb Lab Results   Component Value Date    HGB 12.5 09/27/2018       INR Lab Results   Component Value Date    INR 0.95 09/27/2018      PACU Imaging Completed     Wound/Incision Incision/Surgical Site 10/10/18 Left Knee (Active)   Isabel-Incision Assessment UTV 10/10/2018  4:15 PM   Closure Adhesive strip(s) 10/10/2018  2:49 PM   Incision Drainage Amount None 10/10/2018  4:15 PM   Incision Care Ice applied 10/10/2018  3:00 PM   Dressing Intervention Clean, dry, intact 10/10/2018  4:15 PM   Number of days:0      CMS Peripheral Neurovascular WDL:  WDL except;sensation (Group) (10/10/18 1500)  All Extremities Temperature: warm (10/10/18 1500)  All Extremities Color: no discoloration (10/10/18 1500)  All Extremities Sensation: no numbness;no tingling (10/10/18 0922)  LLE Sensation: tingling present (10/10/18 1615)  RLE Sensation: tingling present (10/10/18 1615)   Equipment ice pack and continuous passive motion machine (CPM)   Other LDA       IV Access Peripheral IV 10/10/18 Left Lower forearm (Active)   Site Assessment WDL 10/10/2018  4:14 PM   Line Status Saline locked 10/10/2018  4:14 PM   Phlebitis Scale 0-->no symptoms 10/10/2018  4:14 PM   Dressing Intervention New dressing  10/10/2018  9:58 AM   Number of days:0       Peripheral IV 10/10/18 Right Hand (Active)   Site Assessment WDL 10/10/2018  4:14 PM   Line Status Infusing 10/10/2018  4:14 PM   Phlebitis Scale 0-->no symptoms 10/10/2018  4:14 PM   Number of days:0      Blood Products Not applicable EBL 75 mL   Intake/Output Date 10/10/18 0700 - 10/11/18 0659   Shift 0246-7427 6377-7047 3446-5520 24 Hour Total   I  N  T  A  K  E   P.O.  110  110    I.V.  900  900    Shift Total  (mL/kg)  1010  (12.9)  1010  (12.9)   O  U  T  P  U  T   Urine  900  900     Blood 75   75    Shift Total  (mL/kg) 75  (0.96) 900  (11.49)  975  (12.45)   Weight (kg) 78.3 78.3 78.3 78.3        Drains / Leung     Time of void PreOp Void Prior to Procedure: 0900 (10/10/18 0922)    PostOp Straight cath: 900 mL (10/10/18 1550)    Diapered? No   Bladder Scan Bladder Scan Volume (mL): 780 ml (10/10/18 1530)   PO 60 mL (10/10/18 1614)  tolerating sips and crackers     Vitals    B/P: 141/81  T: 98.6  F (37  C)    Temp src: Oral  P:       Heart Rate: 87 (10/10/18 1600)     R: 16  O2:  SpO2: 98 %    O2 Device: Nasal cannula (10/10/18 1530)    Oxygen Delivery: 3 LPM (10/10/18 1530)         Family/support present significant other   Patient belongings Patient Belongings: clothing;shoes;cell phone/electronics;glasses  Disposition of Belongings: Other (see comment) (labeled and secured )   Patient transported on bed   DC meds/scripts (obs/outpt) Not applicable   Inpatient Pain Meds Released? Yes       Special needs/considerations None   Tasks needing completion None       Fatou Freitas RN  ASCOM [id number]

## 2018-10-10 NOTE — OR NURSING
VSS  Pt awake and alert   MDA in meeting pt meets pacu discharge criteria  To room via bed  Report and transfer of care to   Lupe Adams RN

## 2018-10-10 NOTE — ANESTHESIA PROCEDURE NOTES
Peripheral Nerve Block Procedure Note    Staff:     Anesthesiologist:  MECHE REED    Resident/CRNA:  CORNELIA GIL    Block performed by resident/CRNA in the presence of a teaching physician    Procedure Start/Stop TImes:      10/10/2018 10:16 AM     10/10/2018 10:20 AM    patient identified, IV checked, site marked, risks and benefits discussed, informed consent, monitors and equipment checked, pre-op evaluation, at physician/surgeon's request and post-op pain management      Correct Patient: Yes      Correct Position: Yes      Correct Site: Yes      Correct Procedure: Yes      Correct Laterality:  Yes    Site Marked:  Yes  Procedure details:     Procedure:  Other (IPACK)    ASA:  2    Diagnosis:  Post operative pain control    Laterality:  Left    Position:  Supine    Sterile Prep: chloraprep, mask and sterile gloves      Local skin infiltration:  1% lidocaine    amount (mL):  3    Needle:  Short bevel    Needle gauge:  21    Needle length (mm):  100    Abnormal pain on injection: No      Blood Aspirated: No      Paresthesias:  No    Bleeding at site: No      Bolus via:  Needle    Infusion Method:  Single Shot  Assessment/Narrative:     Injection made incrementally with aspirations every (mL):  5

## 2018-10-10 NOTE — ANESTHESIA CARE TRANSFER NOTE
Patient: Maddie Lala    Procedure(s):  Left Total Knee Arthroplasty     - Wound Class: I-Clean    Diagnosis: Osteoarthritis Left Knee, Chronic Pain Of Left Knee   Diagnosis Additional Information: No value filed.    Anesthesia Type:   Spinal     Note:  Airway :Room Air  Patient transferred to:PACU  Comments: Stable, awake, comfortable, report to pacu, rn.Handoff Report: Identifed the Patient, Identified the Reponsible Provider, Reviewed the pertinent medical history, Discussed the surgical course, Reviewed Intra-OP anesthesia mangement and issues during anesthesia, Set expectations for post-procedure period and Allowed opportunity for questions and acknowledgement of understanding      Vitals: (Last set prior to Anesthesia Care Transfer)    CRNA VITALS  10/10/2018 1427 - 10/10/2018 1504      10/10/2018             Resp Rate (observed): (!)  1                Electronically Signed By: QUEENIE Garnett CRNA  October 10, 2018  3:04 PM

## 2018-10-10 NOTE — ANESTHESIA PREPROCEDURE EVALUATION
Anesthesia Evaluation     . Pt has had prior anesthetic. Type: Regional and General    History of anesthetic complications (after spinal for c/s in past)   - spinal headache        ROS/MED HX    ENT/Pulmonary:     (+)tobacco use, Past use , . Other pulmonary disease chronic cough 2/2 lisinopril, latent TB.   (-) asthma and COPD   Neurologic:      (-) CVA, TIA and Neuropathy (hx of arm numbness 2/2 work overuse, now resolved)   Cardiovascular:     (+) hypertension----. : . . . :. .      (-) CAD, irregular heartbeat/palpitations and stent   METS/Exercise Tolerance:     Hematologic:        (-) anemia   Musculoskeletal:         GI/Hepatic:     (+) GERD Asymptomatic on medication,      (-) liver disease   Renal/Genitourinary:      (-) renal disease   Endo:      (-) Type I DM, Type II DM and thyroid disease   Psychiatric:         Infectious Disease:  - neg infectious disease ROS       Malignancy:         Other:                     Physical Exam  Normal systems: cardiovascular, pulmonary and dental    Airway   Mallampati: II  TM distance: >3 FB  Neck ROM: full    Dental     Cardiovascular   Rhythm and rate: regular and normal      Pulmonary    breath sounds clear to auscultation                    Anesthesia Plan      History & Physical Review  History and physical reviewed and following examination; no interval change.    ASA Status:  2 .        Plan for Spinal   PONV prophylaxis:  Ondansetron (or other 5HT-3) and Dexamethasone or Solumedrol       Postoperative Care  Postoperative pain management:  Oral pain medications, Multi-modal analgesia and IV analgesics.      Consents  Anesthetic plan, risks, benefits and alternatives discussed with:  Patient or representative and Patient.  Use of blood products discussed: Yes.   Use of blood products discussed with Patient.  Consented to blood products.  .          Joaquin Rojas MD  Staff Anesthesiologist  9:17 AM October 10, 2018                   .

## 2018-10-11 ENCOUNTER — APPOINTMENT (OUTPATIENT)
Dept: PHYSICAL THERAPY | Facility: CLINIC | Age: 68
DRG: 470 | End: 2018-10-11
Attending: STUDENT IN AN ORGANIZED HEALTH CARE EDUCATION/TRAINING PROGRAM
Payer: COMMERCIAL

## 2018-10-11 ENCOUNTER — APPOINTMENT (OUTPATIENT)
Dept: PHYSICAL THERAPY | Facility: CLINIC | Age: 68
DRG: 470 | End: 2018-10-11
Attending: ORTHOPAEDIC SURGERY
Payer: COMMERCIAL

## 2018-10-11 ENCOUNTER — APPOINTMENT (OUTPATIENT)
Dept: OCCUPATIONAL THERAPY | Facility: CLINIC | Age: 68
DRG: 470 | End: 2018-10-11
Attending: STUDENT IN AN ORGANIZED HEALTH CARE EDUCATION/TRAINING PROGRAM
Payer: COMMERCIAL

## 2018-10-11 LAB
ANION GAP SERPL CALCULATED.3IONS-SCNC: 4 MMOL/L (ref 3–14)
BUN SERPL-MCNC: 14 MG/DL (ref 7–30)
CALCIUM SERPL-MCNC: 8.3 MG/DL (ref 8.5–10.1)
CHLORIDE SERPL-SCNC: 100 MMOL/L (ref 94–109)
CO2 SERPL-SCNC: 31 MMOL/L (ref 20–32)
CREAT SERPL-MCNC: 0.53 MG/DL (ref 0.52–1.04)
GFR SERPL CREATININE-BSD FRML MDRD: >90 ML/MIN/1.7M2
GLUCOSE SERPL-MCNC: 115 MG/DL (ref 70–99)
HGB BLD-MCNC: 10.3 G/DL (ref 11.7–15.7)
POTASSIUM SERPL-SCNC: 4.4 MMOL/L (ref 3.4–5.3)
SODIUM SERPL-SCNC: 135 MMOL/L (ref 133–144)

## 2018-10-11 PROCEDURE — 97535 SELF CARE MNGMENT TRAINING: CPT | Mod: GO

## 2018-10-11 PROCEDURE — 97530 THERAPEUTIC ACTIVITIES: CPT | Mod: GO

## 2018-10-11 PROCEDURE — 25000132 ZZH RX MED GY IP 250 OP 250 PS 637: Performed by: INTERNAL MEDICINE

## 2018-10-11 PROCEDURE — 99231 SBSQ HOSP IP/OBS SF/LOW 25: CPT | Performed by: INTERNAL MEDICINE

## 2018-10-11 PROCEDURE — 97116 GAIT TRAINING THERAPY: CPT | Mod: GP | Performed by: PHYSICAL THERAPIST

## 2018-10-11 PROCEDURE — 80048 BASIC METABOLIC PNL TOTAL CA: CPT | Performed by: INTERNAL MEDICINE

## 2018-10-11 PROCEDURE — 40000133 ZZH STATISTIC OT WARD VISIT

## 2018-10-11 PROCEDURE — 36415 COLL VENOUS BLD VENIPUNCTURE: CPT | Performed by: INTERNAL MEDICINE

## 2018-10-11 PROCEDURE — 97165 OT EVAL LOW COMPLEX 30 MIN: CPT | Mod: GO

## 2018-10-11 PROCEDURE — 97116 GAIT TRAINING THERAPY: CPT | Mod: GP

## 2018-10-11 PROCEDURE — 97161 PT EVAL LOW COMPLEX 20 MIN: CPT | Mod: GP

## 2018-10-11 PROCEDURE — 40000193 ZZH STATISTIC PT WARD VISIT

## 2018-10-11 PROCEDURE — 97530 THERAPEUTIC ACTIVITIES: CPT | Mod: GP | Performed by: PHYSICAL THERAPIST

## 2018-10-11 PROCEDURE — 25000128 H RX IP 250 OP 636: Performed by: STUDENT IN AN ORGANIZED HEALTH CARE EDUCATION/TRAINING PROGRAM

## 2018-10-11 PROCEDURE — 25000132 ZZH RX MED GY IP 250 OP 250 PS 637: Performed by: STUDENT IN AN ORGANIZED HEALTH CARE EDUCATION/TRAINING PROGRAM

## 2018-10-11 PROCEDURE — 97110 THERAPEUTIC EXERCISES: CPT | Mod: GP

## 2018-10-11 PROCEDURE — 40000193 ZZH STATISTIC PT WARD VISIT: Performed by: PHYSICAL THERAPIST

## 2018-10-11 PROCEDURE — 97110 THERAPEUTIC EXERCISES: CPT | Mod: GP | Performed by: PHYSICAL THERAPIST

## 2018-10-11 PROCEDURE — 36415 COLL VENOUS BLD VENIPUNCTURE: CPT | Performed by: STUDENT IN AN ORGANIZED HEALTH CARE EDUCATION/TRAINING PROGRAM

## 2018-10-11 PROCEDURE — 12000001 ZZH R&B MED SURG/OB UMMC

## 2018-10-11 PROCEDURE — 85018 HEMOGLOBIN: CPT | Performed by: STUDENT IN AN ORGANIZED HEALTH CARE EDUCATION/TRAINING PROGRAM

## 2018-10-11 RX ORDER — OXYCODONE HYDROCHLORIDE 5 MG/1
5-10 TABLET ORAL
Status: DISCONTINUED | OUTPATIENT
Start: 2018-10-11 | End: 2018-10-12 | Stop reason: HOSPADM

## 2018-10-11 RX ORDER — ACETAMINOPHEN 325 MG/1
325-650 TABLET ORAL EVERY 4 HOURS PRN
Qty: 100 TABLET | Refills: 0 | Status: SHIPPED | OUTPATIENT
Start: 2018-10-13 | End: 2019-03-19

## 2018-10-11 RX ORDER — SIMETHICONE 80 MG
80 TABLET,CHEWABLE ORAL 4 TIMES DAILY PRN
Status: DISCONTINUED | OUTPATIENT
Start: 2018-10-11 | End: 2018-10-12 | Stop reason: HOSPADM

## 2018-10-11 RX ORDER — ASPIRIN 325 MG
325 TABLET ORAL DAILY
Qty: 14 TABLET | Refills: 0 | Status: SHIPPED | OUTPATIENT
Start: 2018-10-25 | End: 2020-01-30

## 2018-10-11 RX ORDER — OXYCODONE HYDROCHLORIDE 5 MG/1
5-10 TABLET ORAL EVERY 4 HOURS PRN
Qty: 40 TABLET | Refills: 0 | Status: SHIPPED | OUTPATIENT
Start: 2018-10-11 | End: 2018-10-23

## 2018-10-11 RX ORDER — AMOXICILLIN 250 MG
1-2 CAPSULE ORAL 2 TIMES DAILY PRN
Qty: 50 TABLET | Refills: 0 | Status: SHIPPED | OUTPATIENT
Start: 2018-10-11 | End: 2018-10-29

## 2018-10-11 RX ADMIN — ACETAMINOPHEN 975 MG: 325 TABLET, FILM COATED ORAL at 01:28

## 2018-10-11 RX ADMIN — LISINOPRIL 10 MG: 10 TABLET ORAL at 08:20

## 2018-10-11 RX ADMIN — SIMETHICONE CHEW TAB 80 MG 80 MG: 80 TABLET ORAL at 15:02

## 2018-10-11 RX ADMIN — KETOROLAC TROMETHAMINE 15 MG: 30 INJECTION, SOLUTION INTRAMUSCULAR at 14:21

## 2018-10-11 RX ADMIN — ENOXAPARIN SODIUM 40 MG: 40 INJECTION SUBCUTANEOUS at 15:02

## 2018-10-11 RX ADMIN — KETOROLAC TROMETHAMINE 15 MG: 30 INJECTION, SOLUTION INTRAMUSCULAR at 01:28

## 2018-10-11 RX ADMIN — ACETAMINOPHEN 975 MG: 325 TABLET, FILM COATED ORAL at 10:18

## 2018-10-11 RX ADMIN — OXYCODONE HYDROCHLORIDE 5 MG: 5 TABLET ORAL at 17:19

## 2018-10-11 RX ADMIN — RANITIDINE 300 MG: 150 TABLET ORAL at 08:20

## 2018-10-11 RX ADMIN — ACETAMINOPHEN 975 MG: 325 TABLET, FILM COATED ORAL at 18:42

## 2018-10-11 RX ADMIN — CEFAZOLIN 1 G: 1 INJECTION, POWDER, FOR SOLUTION INTRAMUSCULAR; INTRAVENOUS at 03:48

## 2018-10-11 RX ADMIN — KETOROLAC TROMETHAMINE 15 MG: 30 INJECTION, SOLUTION INTRAMUSCULAR at 08:21

## 2018-10-11 ASSESSMENT — ACTIVITIES OF DAILY LIVING (ADL)
ADLS_ACUITY_SCORE: 10

## 2018-10-11 NOTE — PLAN OF CARE
Problem: PT General Care Plan  Goal: Transfer (PT)  PT Transfer  Patient will transfer Leanne from sit<>stand with FWW.

## 2018-10-11 NOTE — PROGRESS NOTES
"Orthopaedic Surgery Progress Note   October 11, 2018    Subjective: No acute events overnight. Pain well controlled. Patient is trying to avoid narcotics if she is able, but does want them available at home for breakthrough pain. Tolerating diet. Voiding spontaneously. Hoping for discharge home today.    Objective: /72 (BP Location: Left arm)  Temp 97.1  F (36.2  C) (Oral)  Resp 12  Ht 1.727 m (5' 8\")  Wt 78.3 kg (172 lb 9.9 oz)  SpO2 98%  BMI 26.25 kg/m2    General: NAD, alert and oriented, cooperative with exam.   Cardio: RRR, extremities wwp.   Respiratory: Non-labored breathing.  MSK: Focused examination of LLE: dressing c/d/i. SILT s/s/dp/sp/tib nerve. Fires EHL. FHL, GSC, TA. 2+ pedal pulse. Toes warm and well perfused    Labs:   Lab Results   Component Value Date    WBC 5.9 09/27/2018     Lab Results   Component Value Date    RBC 3.80 09/27/2018     Lab Results   Component Value Date    HGB 12.5 09/27/2018     Lab Results   Component Value Date    HCT 35.6 09/27/2018     No components found for: MCT  Lab Results   Component Value Date    MCV 94 09/27/2018     Lab Results   Component Value Date    MCH 32.9 09/27/2018     Lab Results   Component Value Date    MCHC 35.1 09/27/2018     Lab Results   Component Value Date    RDW 12.6 09/27/2018     Lab Results   Component Value Date     10/10/2018         Imaging: post-op XR complete with stable position of implants. No fracture    Assessment and Plan: Maddie Lala is a 68 year old female s/p Procedure(s):  Left Total Knee Arthroplasty  on 10/10/2018 with Dr. Amaya.     Activity: Up with assist and assistive devices as needed until independent. Knee ROM as tolerated. Advance CPM as tolerated to goal of 0 to 90 degrees.  Weight bearing status: WBAT    Antibiotics: Cefazolin x 24 hours   Diet: Begin with clear fluids and progress diet as tolerated. Bowel regimen. Anti-emetics PRN.    DVT prophylaxis:  Mechanical while in hospital with Lovenox x 2 " weeks, followed by aspirin x 2 weeks  Elevation: Elevate heels off of bed on pillows, no pillows behind the knee at any time    Wound Care: Aquacel x 5-7 days    Pain management: Orals PRN, IV for breakthrough only  X-rays: AP/Lat left knee XR in PACU.  Physical Therapy: Mobilization, ROM, ADL's  Occupational Therapy: ADL's  Labs: Trend Hgb on PODs #1 & 2  Cultures: None  Consults: PT, OT. Hospitalist, appreciate assistance in caring for this patient throughout the perioperative period     Future Appointments  Date Time Provider Department Center   10/11/2018 8:30 AM Zoe Downey PT URPT [place]   10/11/2018 12:30 PM Betina Morris OT UROT [place]   10/11/2018 2:30 PM Zoe Downey PT URPT [place]   10/23/2018 8:30 AM [facility]   11/13/2018 8:00 AM MGXR2 [facility] [place]   11/13/2018 8:30 AM James Amaya MD [facility] [place]   1/3/2019 1:15 PM Aaron Roy MD [facility]         Disposition: Pending progress with therapies, pain control on orals, and medical stability, anticipate discharge to Home today   Diane Reed PGY4  [phone]

## 2018-10-11 NOTE — PLAN OF CARE
Problem: PT General Care Plan  Goal: PT Frequency  Patient will participate in PT BID until discharge in order to improve strength, ROM and activity tolerance necessary for safe mobility at home.

## 2018-10-11 NOTE — PROGRESS NOTES
Post Op check    S: No acute events since surgery. Feeling very good. Up in chair for dinner. No nausea/vomiting. No chest pain or shortness of breath    O:  No acute distress  Alert and oriented  Nonlabored breathing  LLE: dressing in place, silt s/s/dp/sp/tib nerve. Fires EHL, FHL,GSC, TA. 2+ pedal pulse. Toes warm and well perfused    A: doing well s/p left TKA today    P: per brief op note    Diane Hobson, PGY4  [phone]

## 2018-10-11 NOTE — PLAN OF CARE
Problem: Patient Care Overview  Goal: Plan of Care/Patient Progress Review  Outcome: Improving  A/Ox's 4. Pt rated pain as tolerable. Tylenol, Toradol and Ice packs given for pain control. Pt does not want to take any narcotics. Unable to wean oxygen via NC. Pt would have times that her O2 would drop to 87 overnight on room air. Dressing CDI. CMS intact. Tolerated regular diet. Denied any nausea, CP, SOB, lightheadedness or dizziness. Voiding without pain or difficulty. Up with SBA. Encouraged increased/continued IS use. Bilateral heels elevated off bed. CPM to 60 degrees flexion. Resting in bed at this time with call light in reach. Able to make needs known. Continue to monitor.

## 2018-10-11 NOTE — PROGRESS NOTES
10/11/18 0830   Quick Adds   Type of Visit Initial PT Evaluation   Living Environment   Lives With spouse   Living Arrangements house   Home Accessibility stairs (1 railing present)   Number of Stairs to Enter Home 2   Number of Stairs Within Home 14   Stair Railings at Home outside, present on left side;inside, present on right side   Transportation Available car   Living Environment Comment PT:Pt lives at home with her .  will be home to help her at all times. Stairs to enter and stairs inside, both with at least one railing.   Self-Care   Dominant Hand right   Usual Activity Tolerance good   Current Activity Tolerance moderate   Regular Exercise yes   Activity/Exercise Type other (see comments)   Exercise Amount/Frequency 3-5 times/wk   Equipment Currently Used at Home cane, straight;walker, rolling   Activity/Exercise/Self-Care Comment Lifts/ upper body, yoga 2x per week and eliptical    Functional Level Prior   Ambulation 0-->independent   Transferring 0-->independent   Toileting 0-->independent   Bathing 0-->independent   Dressing 0-->independent   Eating 0-->independent   Communication 0-->understands/communicates without difficulty   Swallowing 0-->swallows foods/liquids without difficulty   Cognition 0 - no cognition issues reported   Fall history within last six months no   Which of the above functional risks had a recent onset or change? ambulation;transferring;dressing   Prior Functional Level Comment PT:Prior to surgery, able to do everything IND, had to reduce level of exercise leading up to surgery d/t pain.   General Information   Onset of Illness/Injury or Date of Surgery - Date 10/10/18   Referring Physician Diane Hobson MD and Surgeon: Dr. Serna   Patient/Family Goals Statement To walk and get home.   Pertinent History of Current Problem (include personal factors and/or comorbidities that impact the POC) POD #1 L TKA, PMH: Allergic rhinitis (1979); Arthritis; Benign  essential hypertension (3/23/2018); Cervical dysplasia (with cone in 1979); Dyspareunia; Hearing problem (2015); Meniere's disease (2002); Nonsenile cataract; Plantar fasciitis; Stress incontinence; SVT (supraventricular tachycardia) (H); and Tinnitus (2002).    Weight-Bearing Status - LLE weight-bearing as tolerated   Heart Disease Risk Factors High blood pressure;Age   General Observations Patient Ox4, pain 2/10 at rest. Denies N/V, dizziness or lightheadedness. Patient has L LE in CPM and pneumoboots.    Cognitive Status Examination   Orientation orientation to person, place and time   Level of Consciousness alert   Follows Commands and Answers Questions 100% of the time;able to follow multistep instructions   Personal Safety and Judgment intact   Memory intact   Cognitive Comment No concerns   Pain Assessment   Patient Currently in Pain Yes, see Vital Sign flowsheet   Integumentary/Edema   Integumentary/Edema Comments Unable to assess incision d/t full length ace wrap on L LE   Posture    Posture Not impaired   Range of Motion (ROM)   ROM Comment Bilateral LE ROM is WFL for bed mobility, transfers and gait. L Knee ROM is 95 degrees    Strength   Strength Comments Bilateral LE strength WFL and at least 3+/5 for DF/PF   Bed Mobility   Bed Mobility Comments Patient transfered supine<>sit SBA    Transfer Skills   Transfer Comments Transfers sit<>stand SBA.   Gait   Gait Comments Patient ambulated 125 feet with FWW, SBA and step through pattern. Pt reported slight pain increase during ambulation.   Balance   Balance Comments No deficits were identified with sitting balance. Standing  balance with a FWW is stable.    Sensory Examination   Sensory Perception no deficits were identified   Modality Interventions   Planned Modality Interventions Cryotherapy   General Therapy Interventions   Planned Therapy Interventions gait training;neuromuscular re-education;strengthening;ROM;stretching;home program guidelines  "  Intervention Comments PT: PT will continue to work on improving activity tolerance, strength and functional mobility.   Clinical Impression   Criteria for Skilled Therapeutic Intervention yes, treatment indicated   PT Diagnosis Impaired functional mobility s/p L TKA   Influenced by the following impairments pain, decreased strength, decreased ROM, decreased functional mobility.   Functional limitations due to impairments ambulation, ADLs    Clinical Presentation Stable/Uncomplicated   Clinical Presentation Rationale Patient demonstrates safety during mobility tasks, knee flexion of 95 degrees and ambulates safely with FWW and SBA.   Clinical Decision Making (Complexity) Low complexity   Therapy Frequency` 2 times/day   Predicted Duration of Therapy Intervention (days/wks) 2 days   Anticipated Discharge Disposition Home with Outpatient Therapy   Risk & Benefits of therapy have been explained Yes   Patient, Family & other staff in agreement with plan of care Yes   Clinical Impression Comments PT: Pt. particicipated well in therapy session this AM. She is very motivated to use her CPM, manage her pain, and to get moving. Bed mobility is IND and safe. Transfers in her room are IND. Patient was given clearance to be up in her room, IND with FWW. Not permitted to ambulate outside of her room without medical supervision.   [facility] [employer]-PAC TM \"6 Clicks\"   2016, Trustees of [facility], under license to [employer].  All rights reserved.   6 Clicks Short Forms Basic Mobility Inpatient Short Form   [facility] AM-PAC  \"6 Clicks\" V.2 Basic Mobility Inpatient Short Form   1. Turning from your back to your side while in a flat bed without using bedrails? 3 - A Little   2. Moving from lying on your back to sitting on the side of a flat bed without using bedrails? 3 - A Little   3. Moving to and from a bed to a chair (including a wheelchair)? 3 - A Little   4. Standing up from a chair using your arms (e.g., " wheelchair, or bedside chair)? 3 - A Little   5. To walk in hospital room? 3 - A Little   6. Climbing 3-5 steps with a railing? 2 - A Lot   Basic Mobility Raw Score (Score out of 24.Lower scores equate to lower levels of function) 17   Total Evaluation Time   Total Evaluation Time (Minutes) 15  (inlcudes 10 minutes evaluation)

## 2018-10-11 NOTE — PROGRESS NOTES
Pt seen, case reviewed with team  IM consult from last night reviewed    Pt feels well, has no pain this am   No chest pain or SOB    VSS  /  HR 60s    Alert, fully oriented, appears well   Lungs clear  CV rrr  Abd soft  No calf edema    Hgb 10.3  BMP pending      Assessment    S/p L TKA. Pt is doing well. Block remains in effect    HTN, controlled, on lisinopril    Acute BL anemia, mild, asymptomatic    Plan  Therapies  Recheck BMP  Continue lisinopril if BMP ok  Hgb tomorrow  Discharge today or tomorrow  Lovenox for DVT proph

## 2018-10-11 NOTE — CONSULTS
[facility], [place]    Hospitalist Consultation    Date of Admission:  10/10/2018    Assessment & Plan   Maddie Lala is a 68 year old female who was admitted on 10/10/2018. I was asked to see the patient for postop medical management.      Status post knee surgery    Assessment: Patient had her left total knee arthroplasty done on October 10, 2018 with Dr. Amaya.    Plan:     Postsurgical management per primary      GERD (gastroesophageal reflux disease)    Assessment: Patient has been on Zantac    Plan:     Continue home dose and      Benign essential hypertension     Assessment: Patient has been using lisinopril for blood pressure control    Plan:    Resume home dose lisinopril with hold parameter      Chronic bronchitis (H)    Assessment: Patient has been using Qvar and albuterol as needed inhaler with Flonase for preventing allergy inducing bronchitis    Plan:    Continue home dose of inhalers and Flonase.    DVT Prophylaxis: Enoxaparin (Lovenox) SQ  Code Status: Full Code    Disposition: Per primary    Tyrell Duong MD    Reason for Consult   Reason for consult: I was asked by Dr. Hobson to evaluate this patient for postop medical management.    Primary Care Physician   *Radha Hurtado    Chief Complaint   Left knee pain    History is obtained from the patient    History of Present Illness   Maddie Lala is a 68 year old female with past medical history significant for seasonal allergy, chronic bronchitis, hypertension and GERD who has been having chronic pain on her left knee due to osteoarthritis.  Patient is here and is status post left total knee arthroplasty with Dr. Amaya on October 10, 2018.  Postoperatively patient is doing well.  She is tolerating oral diet with no nausea or vomiting.  She feels she may be slightly more sleepy from the medications.  She denies any chest pain or shortness of breath.  She has no fever, chills, sore throat or cough.  She denies any  recent dysuria or diarrhea.  Pain is very minimal at the left knee area.  Patient still able to move her left lower ankle and toes.    Past Medical History    I have reviewed this patient's medical history and updated it with pertinent information if needed.   Past Medical History:   Diagnosis Date     Allergic rhinitis 1979     Arthritis      Benign essential hypertension 3/23/2018     Cervical dysplasia with cone in 1979    nl pap ever since      Dyspareunia      Hearing problem 2015     Meniere's disease 2002     Nonsenile cataract      Plantar fasciitis      Stress incontinence      SVT (supraventricular tachycardia) (H)     resolved     Tinnitus 2002       Past Surgical History   I have reviewed this patient's surgical history and updated it with pertinent information if needed.  Past Surgical History:   Procedure Laterality Date     BLADDER SURGERY       CATARACT IOL, RT/LT  2010     COLONOSCOPY WITH CO2 INSUFFLATION N/A 10/19/2016    Procedure: COLONOSCOPY WITH CO2 INSUFFLATION;  Surgeon: Duane, William Charles, MD;  Location: [facility] OR     GENITOURINARY SURGERY      bladder     Midurethral sling with cystoscopy.  2010     TUBAL LIGATION         Prior to Admission Medications   Prior to Admission Medications   Prescriptions Last Dose Informant Patient Reported? Taking?   Acetaminophen (TYLENOL PO) 10/9/2018 at Unknown time  Yes Yes   Sig: Take 500 mg by mouth every 4 hours as needed for mild pain or fever Pt takes 2 - 500 mg tablets PRN for pain   CALCIUM + D OR 10/9/2018 at Unknown time  Yes Yes   Sig: One bid   Chlorpheniramine Maleate (CHLOR-TRIMETON ALLERGY) 12 MG TBCR 10/9/2018 at Unknown time  Yes Yes   Sig: Take  by mouth as needed.   Ibuprofen (ADVIL PO) Past Week at Unknown time  Yes Yes   Sig: Take 800 mg by mouth every 4 hours as needed for moderate pain   OPTIVITE OR 10/9/2018 at Unknown time  Yes Yes   Sig: once a day   albuterol (VENTOLIN HFA) 108 (90 BASE) MCG/ACT Inhaler More than a month at  Unknown time  No No   Sig: INHALE TWO PUFFS BY MOUTH EVERY 6 HOURS AS NEEDED FOR SHORTNESS OF BREATH / DYSPNEA AND AS NEEDED BEFORE EXERCISE   beclomethasone (QVAR) 40 MCG/ACT Inhaler 10/10/2018 at Unknown time  No Yes   Sig: INHALE 2 PUFFS BY MOUTH INTO THE LUNGS TWO TIMES A DAY   estradiol (VAGIFEM) 10 MCG TABS vaginal tablet 10/7/2018 at Unknown time  No Yes   Sig: Place 1 tablet (10 mcg) vaginally twice a week   fluocinonide (LIDEX) 0.05 % solution Past Week at Unknown time  No Yes   Sig: Apply to scalp nightly for up to 1 week, then 2-3 times weekly as needed.   fluticasone (FLONASE) 50 MCG/ACT spray Past Week at Unknown time  No Yes   Sig: USE ONE TO TWO SPRAYS IN EACH NOSTRIL EVERY DAY   lisinopril (PRINIVIL/ZESTRIL) 10 MG tablet 10/8/2018 at Unknown time  No Yes   Sig: Take 1 tablet (10 mg) by mouth daily   order for DME Unknown at Unknown time  No No   Sig: Equipment being ordered:    left knee brace-medial compartment  for osteoarthritis.   order for DME Unknown at Unknown time  No No   Sig: Equipment being ordered: spacer   ranitidine (ZANTAC) 300 MG tablet More than a month at Unknown time  No No   Sig: TAKE ONE TABLET BY MOUTH EVERY DAY      Facility-Administered Medications: None     Allergies   Allergies   Allergen Reactions     Sulfa Drugs Rash       Social History   I have reviewed this patient's social history and updated it with pertinent information if needed. Maddie Lala  reports that she quit smoking about 37 years ago. Her smoking use included Cigarettes. She has a 10.00 pack-year smoking history. She has never used smokeless tobacco. She reports that she drinks alcohol. She reports that she does not use illicit drugs.    Family History   I have reviewed this patient's family history and updated it with pertinent information if needed.   Family History   Problem Relation Age of Onset     HEART DISEASE Mother      older      Osteoporosis Mother      C.A.D. Mother      60's       Macular Degeneration Mother      Respiratory Father      farmers lung     Skin Cancer Father      Alzheimer Disease Maternal Grandmother      HEART DISEASE Maternal Grandfather      HEART DISEASE Brother      Afib     HEART DISEASE Brother      Asthma No family hx of      Diabetes No family hx of      Hypertension No family hx of      Breast Cancer No family hx of      Cancer - colorectal No family hx of      Cancer No family hx of      Thyroid Disease No family hx of      Glaucoma No family hx of      Colon Cancer No family hx of      Melanoma No family hx of        Review of Systems   The 10 point Review of Systems is negative other than noted in the HPI or here.     Physical Exam   Temp: 97.3  F (36.3  C) Temp src: Oral BP: 140/88   Heart Rate: 70 Resp: 17 SpO2: 96 % O2 Device: Nasal cannula Oxygen Delivery: 2 LPM  Vital Signs with Ranges  Temp:  [97.3  F (36.3  C)-98.6  F (37  C)] 97.3  F (36.3  C)  Heart Rate:  [66-97] 70  Resp:  [9-26] 17  BP: (126-150)/(71-99) 140/88  SpO2:  [91 %-100 %] 96 %  172 lbs 9.92 oz    Constitutional: Nonacute distress, sitting up eating dinner  Eyes: No conjunctival injection, no scleral icterus, extraocular movement intact  HEENT: No nasal or ear drainage.  Respiratory: CTAB, no wheezing or crackle.  Cardiovascular: RRR, normal S1, S2, no murmur or rub.  GI: Bowel sounds active, no tenderness no distention.  No organomegaly appreciated.  Skin: No obvious rash noted  Musculoskeletal: Left knee in ace wrap.  Neurologic: Alert and oriented x4.  Sensation intact in left lower leg.  Patient able to have range of motion in left toes and ankle.  Psychiatric: Calm and pleasant    Data   -Data reviewed today: All pertinent laboratory and imaging results from this encounter were reviewed. I personally reviewed the XR L knee image(s) showing New postsurgical changes of placement of a left total knee  arthroplasty, without complication.    Recent Labs  Lab 10/10/18  7406 10/10/18  0989   PLT  257  --    POTASSIUM  --  4.4   CR 0.59 0.62       Imaging:  Recent Results (from the past 24 hour(s))   XR Knee Port Left 1/2 Views    Narrative    Exam: 2 views of the left knee dated 10/10/2018.    COMPARISON: 3/30/2017.    CLINICAL HISTORY: Postop evaluation.    FINDINGS: AP and lateral views of the left knee were obtained. New  postsurgical changes of placement of a left total knee arthroplasty.  The hardware appears intact. Surgical air is noted.      Impression    IMPRESSION: New postsurgical changes of placement of a left total knee  arthroplasty, without complication.    CHANEL CANTU MD

## 2018-10-11 NOTE — PLAN OF CARE
Problem: Patient Care Overview  Goal: Plan of Care/Patient Progress Review  Discharge Planner PT   Patient plan for discharge: Home with spouse and Outpatient PT  Current status: Patient is currently IND with bed mobility and SBA with transfers. Patient ambulated 125 feet with FWW. Patient was educated on safety with mobility and supine LE strengthening. Patient is very motivated and participates well in PT. Current L knee ROM is 95 degrees  Barriers to return to prior living situation: Pain, decreased activity tolerance and decreased strength.  Recommendations for discharge: Discharge to home with OP PT.  Rationale for recommendations: Patient is moving well, performs bed mobility IND and transfers SBA. Gait training is progressing but stairs still need to be addressed prior to discharge.       Entered by: Ghazala Chapman 10/11/2018 10:50 AM

## 2018-10-11 NOTE — ANESTHESIA POSTPROCEDURE EVALUATION
Patient: Maddie Lala    Procedure(s):  Left Total Knee Arthroplasty     - Wound Class: I-Clean    Diagnosis:Osteoarthritis Left Knee, Chronic Pain Of Left Knee   Diagnosis Additional Information: No value filed.    Anesthesia Type:  Spinal    Note:  Anesthesia Post Evaluation    Patient location during evaluation: PACU  Patient participation: Able to fully participate in evaluation  Level of consciousness: awake and alert  Pain management: adequate  Airway patency: patent  Cardiovascular status: acceptable  Respiratory status: acceptable  Hydration status: balanced  PONV: none     Anesthetic complications: None          Last vitals:  Vitals:    10/10/18 1955 10/10/18 2050 10/10/18 2255   BP: 124/72 125/74 138/81   Resp:  8 18   Temp:   36.1  C (97  F)   SpO2:  93% 99%         Electronically Signed By: Rayna Ricardo MD  October 10, 2018  10:28 PM

## 2018-10-11 NOTE — PROGRESS NOTES
10/11/18 1100   Quick Adds   Type of Visit Initial Occupational Therapy Evaluation   Living Environment   Lives With spouse   Living Arrangements house   Home Accessibility stairs to enter home;stairs within home;tub/shower is not walk in   Number of Stairs to Enter Home 2   Number of Stairs Within Home 14   Transportation Available family or friend will provide   Living Environment Comment Has tub/shower. Low toilet.    Self-Care   Dominant Hand right   Usual Activity Tolerance good   Current Activity Tolerance moderate   Regular Exercise yes   Activity/Exercise Type other (see comments)  (Lifts/ upper body, yoga 2x per week and eliptical )   Exercise Amount/Frequency 3-5 times/wk   Equipment Currently Used at Home cane, straight;walker, rolling   Functional Level Prior   Ambulation 0-->independent   Transferring 0-->independent   Toileting 0-->independent   Bathing 0-->independent   Dressing 0-->independent   Eating 0-->independent   Communication 0-->understands/communicates without difficulty   Swallowing 0-->swallows foods/liquids without difficulty   Cognition 0 - no cognition issues reported   Fall history within last six months no   Prior Functional Level Comment PTA patient IND with functional mobility and ADLs.    General Information   Onset of Illness/Injury or Date of Surgery - Date 10/10/18   Referring Physician Dr. Amaya   Patient/Family Goals Statement did not state, in agreement with OT related goals.    Additional Occupational Profile Info/Pertinent History of Current Problem s/p L TKA   Precautions/Limitations fall precautions   Weight-Bearing Status - LLE weight-bearing as tolerated   Cognitive Status Examination   Cognitive Comment no concerns   Visual Perception   Visual Perception No deficits were identified   Sensory Examination   Sensory Comments reports block still in effect   Pain Assessment   Patient Currently in Pain Yes, see Vital Sign flowsheet   Range of Motion (ROM)   ROM Comment B  "UE's WFL   Strength   Strength Comments UE strength WFL   Muscle Tone Assessment   Muscle Tone Quick Adds No deficits were identified   Coordination   Upper Extremity Coordination No deficits were identified   Mobility   Bed Mobility Comments IND   Transfer Skills   Transfer Comments SBA-mod I for all basic transfers   Upper Body Dressing   Level of [place]: Dress Upper Body independent   Physical Assist/Nonphysical Assist: Dress Upper Body set-up required   Lower Body Dressing   Level of [place]: Dress Lower Body independent   Physical Assist/Nonphysical Assist: Dress Lower Body set-up required   Toileting   Level of [place]: Toilet stand-by assist   Physical Assist/Nonphysical Assist: Toilet set-up required   Grooming   Level of [place]: Grooming independent   Eating/Self Feeding   Level of [place]: Eating independent   Activities of Daily Living Analysis   Impairments Contributing to Impaired Activities of Daily Living balance impaired;pain;ROM decreased;strength decreased   General Therapy Interventions   Planned Therapy Interventions ADL retraining;transfer training   Clinical Impression   Criteria for Skilled Therapeutic Interventions Met yes, treatment indicated   OT Diagnosis Decreased functional mobility and ADLs   Influenced by the following impairments pain, decreased strength and ROM L LE   Assessment of Occupational Performance 1-3 Performance Deficits   Identified Performance Deficits bathing, toileting   Clinical Decision Making (Complexity) Low complexity   Therapy Frequency (one time eval and treat)   Predicted Duration of Therapy Intervention (days/wks) 1 day   Anticipated Equipment Needs at Discharge shower chair   Anticipated Discharge Disposition Home with Assist   Risks and Benefits of Treatment have been explained. Yes   Patient, Family & other staff in agreement with plan of care Yes   [facility] AM-PAC TM \"6 Clicks\"   2016, Trustees of [facility], under " "license to [employer].  All rights reserved.   6 Clicks Short Forms Daily Activity Inpatient Short Form   [facility] AM-PAC  \"6 Clicks\" Daily Activity Inpatient Short Form   1. Putting on and taking off regular lower body clothing? 4 - None   2. Bathing (including washing, rinsing, drying)? 3 - A Little   3. Toileting, which includes using toilet, bedpan or urinal? 4 - None   4. Putting on and taking off regular upper body clothing? 4 - None   5. Taking care of personal grooming such as brushing teeth? 4 - None   6. Eating meals? 4 - None   Daily Activity Raw Score (Score out of 24.Lower scores equate to lower levels of function) 23   Total Evaluation Time   Total Evaluation Time (Minutes) 8     "

## 2018-10-11 NOTE — PLAN OF CARE
Problem: Patient Care Overview  Goal: Plan of Care/Patient Progress Review  Outcome: Improving  Pt is A/O x 4. VSS. Oxygen via NC still applied due to sats dropping with movement. Patient reminded to take deep breaths. Declines chest pain, nausea, and SOB. Patient declines any pain medications at this time. IV scheduled Toradol given. Up with an assist of 1 with gait belt and walker. CPM on-0 to 55. ICE applied. Pneumo boots on. Denies any numbness and tinging. CMS intact at 1930 and 2330. Pedal pulses +2. Ace wrap on and intact. LR infusing at 50 ml/hr in IV in right arm. IV saline locked in left wrist. Voided 100 ml x 1 with 0 PVR. Second void was 400 ml. Tolerated regular diet. Passing flatus. Call light with reach. Able to make needs known. Continue to monitor.

## 2018-10-11 NOTE — PROGRESS NOTES
Care Coordinator Progress Note    Admission Date/Time:  10/10/2018  Attending MD:  James Amaya MD    Data  Chart reviewed, discussed with interdisciplinary team.   Patient was admitted for: Status post knee surgery.    Concerns with insurance coverage for discharge needs: None.  Current Living Situation: Patient lives with spouse.  Support System: Supportive  Services Involved: No services in home prior to admit  Transportation at Discharge: Family or friend will provide  Transportation to Medical Appointments:   - Name of caregiver: Sam/spouse  Barriers to Discharge: No barriers noted that would impact discharge to home    Coordination of Care and Referrals: Provided patient/family with options for Outpatient Rehab.        Assessment  This RNCC met with patient and spouse at bedside to discuss discharge planning, patient doesn't appear to have any complex needs during admission or at discharge and is independent with mobility. Plan is for patient to return to previous living situation with his spouse but will require ongoing outpatient physical therapy to increase independence with IADL s and mobility. Referral sent to central scheduling for [facility].     Plan  Anticipated Discharge Date:  1-2 days  Anticipated Discharge Plan:  Home with OP PT    Leanna GRANADOS RN CCM    RN Care Coordinator 10A  E-mail: [email or web address]  Phone: [phone]  Pager: [phone]  To contact weekend RNCC, dial [phone] and enter pager number [phone] at prompt. This pager can not be contacted by text page or outside line.

## 2018-10-11 NOTE — PLAN OF CARE
Problem: Knee Arthroplasty (Total, Partial) (Adult)  Goal: Signs and Symptoms of Listed Potential Problems Will be Absent, Minimized or Managed (Knee Arthroplasty)  Signs and symptoms of listed potential problems will be absent, minimized or managed by discharge/transition of care (reference Knee Arthroplasty (Total, Partial) (Adult) CPG).   Outcome: Improving  VS: VS and capnography stable   O2: >90% on RA   Output: Voiding adequate amts in bathroom.    Last BM: 10/9. +FL, Simethicone given for bloating.    Activity: WBAT; 1A with FWW and gait belt. Cleared to ambulate independently in room per therapy.    Up for meals? Yes   Skin: Intact except for incision   Pain: Rated pain as minimal to moderate; managed with acetaminophen and Toradol. Patient declines oxycodone at this time although pain is starting to increase per patient.    CMS: Intact   Dressing: CDI and ace wrapped.    Diet: Regular   LDA: PIV SL   Equipment: PCDs, CPM (up to 80) FWW, IV pole, capnography   Plan: TBD   Additional Info: Patient must discharge at 10:30 am tomorrow morning. Meds are in the safe.

## 2018-10-11 NOTE — PLAN OF CARE
Problem: Knee Arthroplasty (Total, Partial) (Adult)  Goal: Signs and Symptoms of Listed Potential Problems Will be Absent, Minimized or Managed (Knee Arthroplasty)  Signs and symptoms of listed potential problems will be absent, minimized or managed by discharge/transition of care (reference Knee Arthroplasty (Total, Partial) (Adult) CPG).   Outcome: Improving  Pt arrived to unit at 1655 and was oriented to room and call light.   VS: VS and capnography stable   O2: >90% on 2L   Output: Pt was S.C. In PACU for 900 at 1600. Pt wants to try voiding after eating.    Last BM: 10/9   Activity: WBAT; 1A with FWW and gait belt   Up for meals? Yes   Skin: Intact except for incision   Pain: Rated pain as minimal; managed with 5mg oxycodone in PACU; educated on pain management plan   CMS: Intact   Dressing: ACE CDI   Diet: Regular   LDA: PIV infusing   Equipment: PCDs, CPM (up to 40 so far) FWW, gait belt, IV pole, capnography   Plan: TBD   Additional Info: Pt received spinal anesthesia + Exparel + cocktail. EBL 75

## 2018-10-11 NOTE — PLAN OF CARE
Problem: Patient Care Overview  Goal: Plan of Care/Patient Progress Review  Discharge Planner OT   Patient plan for discharge: home  Current status: Patient IND with bed mobility. Sitting EOB patient able to demo ability to reach down to feet to complete LE dressing. Mod I for sit<>stand transfers, did cue for proper hand placement. Patient ambulated in room and in hallway using FWW with mod I. Completed tub transfer using shower chair/seated pivot method with SBA. Patient plans to obtain shower chair for home. Patient completed toilet transfer from low toilet as simulated for home envt with SBA.   Barriers to return to prior living situation: none anticipated  Recommendations for discharge: home with assist  Rationale for recommendations: patient has met OT goals, will obtain shower chair and have assist as needed. Will discharge from skilled OT.        Entered by: DARREN ANDERSON 10/11/2018 11:44 AM     Occupational Therapy Discharge Summary    Reason for therapy discharge:    All goals and outcomes met, no further needs identified.    Progress towards therapy goal(s). See goals on Care Plan in [facility] electronic health record for goal details.  Goals met    Therapy recommendation(s):    No further therapy is recommended.   Home with assist and use of DME prn.

## 2018-10-12 VITALS
RESPIRATION RATE: 16 BRPM | BODY MASS INDEX: 26.16 KG/M2 | SYSTOLIC BLOOD PRESSURE: 125 MMHG | TEMPERATURE: 96.9 F | DIASTOLIC BLOOD PRESSURE: 69 MMHG | HEIGHT: 68 IN | OXYGEN SATURATION: 99 % | WEIGHT: 172.62 LBS

## 2018-10-12 LAB
ANION GAP SERPL CALCULATED.3IONS-SCNC: 7 MMOL/L (ref 3–14)
BUN SERPL-MCNC: 15 MG/DL (ref 7–30)
CALCIUM SERPL-MCNC: 7.8 MG/DL (ref 8.5–10.1)
CHLORIDE SERPL-SCNC: 103 MMOL/L (ref 94–109)
CO2 SERPL-SCNC: 28 MMOL/L (ref 20–32)
CREAT SERPL-MCNC: 0.61 MG/DL (ref 0.52–1.04)
GFR SERPL CREATININE-BSD FRML MDRD: >90 ML/MIN/1.7M2
GLUCOSE SERPL-MCNC: 99 MG/DL (ref 70–99)
HGB BLD-MCNC: 9.8 G/DL (ref 11.7–15.7)
POTASSIUM SERPL-SCNC: 4.2 MMOL/L (ref 3.4–5.3)
SODIUM SERPL-SCNC: 138 MMOL/L (ref 133–144)

## 2018-10-12 PROCEDURE — 36415 COLL VENOUS BLD VENIPUNCTURE: CPT | Performed by: STUDENT IN AN ORGANIZED HEALTH CARE EDUCATION/TRAINING PROGRAM

## 2018-10-12 PROCEDURE — 25000132 ZZH RX MED GY IP 250 OP 250 PS 637: Performed by: INTERNAL MEDICINE

## 2018-10-12 PROCEDURE — 80048 BASIC METABOLIC PNL TOTAL CA: CPT | Performed by: STUDENT IN AN ORGANIZED HEALTH CARE EDUCATION/TRAINING PROGRAM

## 2018-10-12 PROCEDURE — 25000128 H RX IP 250 OP 636: Performed by: STUDENT IN AN ORGANIZED HEALTH CARE EDUCATION/TRAINING PROGRAM

## 2018-10-12 PROCEDURE — 85018 HEMOGLOBIN: CPT | Performed by: STUDENT IN AN ORGANIZED HEALTH CARE EDUCATION/TRAINING PROGRAM

## 2018-10-12 PROCEDURE — 25000132 ZZH RX MED GY IP 250 OP 250 PS 637: Performed by: STUDENT IN AN ORGANIZED HEALTH CARE EDUCATION/TRAINING PROGRAM

## 2018-10-12 RX ADMIN — ENOXAPARIN SODIUM 40 MG: 40 INJECTION SUBCUTANEOUS at 08:22

## 2018-10-12 RX ADMIN — OXYCODONE HYDROCHLORIDE 5 MG: 5 TABLET ORAL at 03:25

## 2018-10-12 RX ADMIN — ACETAMINOPHEN 975 MG: 325 TABLET, FILM COATED ORAL at 03:25

## 2018-10-12 RX ADMIN — OXYCODONE HYDROCHLORIDE 5 MG: 5 TABLET ORAL at 08:31

## 2018-10-12 RX ADMIN — SENNOSIDES AND DOCUSATE SODIUM 1 TABLET: 8.6; 5 TABLET ORAL at 08:23

## 2018-10-12 ASSESSMENT — ACTIVITIES OF DAILY LIVING (ADL)
ADLS_ACUITY_SCORE: 10

## 2018-10-12 NOTE — PLAN OF CARE
Problem: Patient Care Overview  Goal: Plan of Care/Patient Progress Review  Outcome: Improving  Patient A/Ox4. VSS. Denies CP, SOB, dizziness/LH. LSCTA. +fl/BS. Voiding well in bathroom. CMS intact. Dressing to left knee CDI, ice applied often. Tolerating regular diet without NV. Activity level is good, pt moves very well. Pain rated as well managed throughout shift, pt only taking scheduled pain control and one dose of 5mg oxycodone PRN. Overall doing well; likely discharge today before 11am. Patient has demonstrated ability to call appropriately. Patient is resting with call light within reach. Will continue to monitor.

## 2018-10-12 NOTE — PROGRESS NOTES
"Orthopaedic Surgery Progress Note   October 12, 2018    Subjective: No acute events overnight. Pain well controlled. No shortness of breath. Tolerating diet. Voiding spontaneously. Planning discharge thi smorning.    Objective: /60 (BP Location: Right arm)  Temp 97.6  F (36.4  C) (Oral)  Resp 16  Ht 1.727 m (5' 8\")  Wt 78.3 kg (172 lb 9.9 oz)  SpO2 94%  BMI 26.25 kg/m2    General: NAD, alert and oriented, cooperative with exam.   Cardio: RRR, extremities wwp.   Respiratory: Non-labored breathing.  MSK: Focused examination of LLE: dressing c/d/i. SILT s/s/dp/sp/tib nerve. Fires EHL. FHL, GSC, TA. 2+ pedal pulse. Toes warm and well perfused    Labs:   Lab Results   Component Value Date    WBC 5.9 09/27/2018     Lab Results   Component Value Date    RBC 3.80 09/27/2018     Lab Results   Component Value Date    HGB 9.8 10/12/2018     Lab Results   Component Value Date    HCT 35.6 09/27/2018     No components found for: MCT  Lab Results   Component Value Date    MCV 94 09/27/2018     Lab Results   Component Value Date    MCH 32.9 09/27/2018     Lab Results   Component Value Date    MCHC 35.1 09/27/2018     Lab Results   Component Value Date    RDW 12.6 09/27/2018     Lab Results   Component Value Date     10/10/2018         Imaging: post-op XR complete with stable position of implants. No fracture    Assessment and Plan: Maddie Lala is a 68 year old female s/p Procedure(s):  Left Total Knee Arthroplasty  on 10/10/2018 with Dr. Amaya.     Activity: Up with assist and assistive devices as needed until independent. Knee ROM as tolerated. Advance CPM as tolerated to goal of 0 to 90 degrees.  Weight bearing status: WBAT    Antibiotics: Cefazolin x 24 hours   Diet: Begin with clear fluids and progress diet as tolerated. Bowel regimen. Anti-emetics PRN.    DVT prophylaxis:  Mechanical while in hospital with Lovenox x 2 weeks, followed by aspirin x 2 weeks  Elevation: Elevate heels off of bed on pillows, no " pillows behind the knee at any time    Wound Care: Aquacel x 5-7 days    Pain management: Orals PRN, IV for breakthrough only  X-rays: AP/Lat left knee XR in PACU.  Physical Therapy: Mobilization, ROM, ADL's  Occupational Therapy: ADL's  Labs: Trend Hgb on PODs #1 & 2  Cultures: None  Consults: PT, OT. Hospitalist, appreciate assistance in caring for this patient throughout the perioperative period     Future Appointments  Date Time Provider Department Center   10/11/2018 8:30 AM Zoe Downey, PT URPT [place]   10/11/2018 12:30 PM Betina Morris, NED UROT [place]   10/11/2018 2:30 PM Zoe Downey PT URPT [place]   10/23/2018 8:30 AM MG NURSE ONLY ORTHO [facility] [place]   11/13/2018 8:00 AM MGXR2 [facility] [place]   11/13/2018 8:30 AM James Amaya MD [facility] [place]   1/3/2019 1:15 PM Aaron Roy MD [facility]         Disposition: Pending progress with therapies, pain control on orals, and medical stability, anticipate discharge to Home today   Diane Reed PGY4  [phone]

## 2018-10-12 NOTE — PROGRESS NOTES
"  VS: stable   O2: Room air saturations about 94% on RA.    Output: Up to bathroom voiding in good amounts.    Last BM: 10/10/2018. Declined suppository. Patient states\" I will take care of bowels when I get home today.\"    Activity: Up with walker. SBA   Skin: Left knee covered with ace bandage.    Pain: Using Oxycodone for pain with good results.    CMS: Intact.   Dressing: CDI   Diet: Regular/Denies nausea   LDA: NA   Equipment: CPM/walker/Lovenox kit   Plan: Patient wanting to discharge to home right away this am. Patient was given discharge instructions, Lovenox kit, CPM and all patient belongings. Patient was given oral and written instructions about medications, follow up appointments, numbers to call for questions or concerns. Patient seems to have a full understanding of plan of cares for discharge. Patient will be escorted to front door with all belongings filled scripts and CPM and Lovenox kit in wheelchair to be discharged to home with  to private home.    Additional Info:        "

## 2018-10-12 NOTE — PLAN OF CARE
Problem: Patient Care Overview  Goal: Plan of Care/Patient Progress Review  Pt discharged today prior to scheduled session.  Goals essentially met.     Physical Therapy Discharge Summary    Reason for therapy discharge:    Discharged to home with outpatient therapy.    Progress towards therapy goal(s). See goals on Care Plan in [facility] electronic health record for goal details.  Goals met    Therapy recommendation(s):    Continued therapy is recommended.  Rationale/Recommendations:  Out-pt PT to continue to work on knee strengthening and ROM and advance gait training. .  Continue home exercise program.

## 2018-10-15 ENCOUNTER — TELEPHONE (OUTPATIENT)
Dept: FAMILY MEDICINE | Facility: CLINIC | Age: 68
End: 2018-10-15

## 2018-10-15 NOTE — DISCHARGE SUMMARY
ORTHOPAEDIC DISCHARGE SUMMARY     Date of Admission: 10/10/2018  Date of Discharge: 10/12/2018 10:03 AM  Disposition: Home  Staff Physician: Dr. James Amaya  Primary Care Provider: Radha Hurtado    DISCHARGE DIAGNOSIS:  Osteoarthritis Left Knee, Chronic Pain Of Left Knee     PROCEDURES: Procedure(s):  ARTHROPLASTY KNEE on 10/10/2018    BRIEF HISTORY:  69 yo female with progressively symptomatic left knee osteoarthritis that was refractory to nonoperative interventions. Ultimately her symptoms were so debilitating, that arthroplasty was considered. Risks and benefits of the procedure were discussed and patient elected to proceed with arthroplasty for definitive management of her left knee arthritis      HOSPITAL COURSE:    Surgery was uncomplicated. Maddie Lala has done well post-operatively. Medicine was consulted post operatively to aid in management of medical comorbidities.. The patient received routine nursing cares and is medically stable. Vital signs are stable. The patient is tolerating a regular diet without GI distress/nausea or vomiting. Voiding spontaneously. All PT/OT goals have been met for safe mobility. Pain is now controlled on oral medications which will be available on discharge. Stool softeners have been used while taking pain medications to help prevent constipation. Maddie Lala is deemed medically safe to discharge.     Antibiotics:  ancef given periop and 24 hours postop.   DVT prophylaxis:  lovenox x2 weeks then daily ASA 325mg  for the following 2 weeks  PT Progress: Has met PT/OT goals for safe mobility.    Pain Meds:  Weaned off all IV pain meds by discharge.  Inpatient Events: No significant events or complications.     Discharge orders and instructions as below.    FOLLOWUP:    Future Appointments  Date Time Provider Department Center   10/23/2018 8:30 AM MG NURSE ONLY ORTHO [facility] [place]   10/23/2018 10:30 AM Zuri Alvarado, PT PHPT [place]   11/13/2018 8:00 AM MGXR2  [facility] [place]   11/13/2018 8:30 AM James Amaya MD [facility] [place]   1/3/2019 1:15 PM Aaron Roy MD [facility]       Appointments on [facility]. Call [phone] if you haven't heard regarding these appointments within 7 days of discharge.    PLANNED DISCHARGE ORDERS:           Discharge Medication List as of 10/12/2018  8:37 AM      START taking these medications    Details   aspirin 325 MG tablet Take 1 tablet (325 mg) by mouth daily, Disp-14 tablet, R-0, E-Prescribe      enoxaparin (LOVENOX) 40 MG/0.4ML injection Inject 0.4 mLs (40 mg) Subcutaneous every 24 hours, Disp-13 Syringe, R-0, E-Prescribe      oxyCODONE IR (ROXICODONE) 5 MG tablet Take 1-2 tablets (5-10 mg) by mouth every 4 hours as needed for severe pain, Disp-40 tablet, R-0, Local Print      senna-docusate (SENOKOT-S;PERICOLACE) 8.6-50 MG per tablet Take 1-2 tablets by mouth 2 times daily as needed for constipation, Disp-50 tablet, R-0, E-Prescribe         CONTINUE these medications which have CHANGED    Details   acetaminophen (TYLENOL) 325 MG tablet Take 1-2 tablets (325-650 mg) by mouth every 4 hours as needed for other or pain, Disp-100 tablet, R-0, E-Prescribe         CONTINUE these medications which have NOT CHANGED    Details   albuterol (VENTOLIN HFA) 108 (90 BASE) MCG/ACT Inhaler INHALE TWO PUFFS BY MOUTH EVERY 6 HOURS AS NEEDED FOR SHORTNESS OF BREATH / DYSPNEA AND AS NEEDED BEFORE EXERCISE, Disp-18 g, R-11, E-Prescribe      beclomethasone (QVAR) 40 MCG/ACT Inhaler INHALE 2 PUFFS BY MOUTH INTO THE LUNGS TWO TIMES A DAY, Disp-3 Inhaler, R-3, E-Prescribe      CALCIUM + D OR One bid, Historical      Chlorpheniramine Maleate (CHLOR-TRIMETON ALLERGY) 12 MG TBCR Take  by mouth as needed., Historical      estradiol (VAGIFEM) 10 MCG TABS vaginal tablet Place 1 tablet (10 mcg) vaginally twice a week, Disp-24 tablet, R-1, E-Prescribe      fluocinonide (LIDEX) 0.05 % solution Apply to scalp  nightly for up to 1 week, then 2-3 times weekly as needed.Disp-60 mL, [id number]      fluticasone (FLONASE) 50 MCG/ACT spray USE ONE TO TWO SPRAYS IN EACH NOSTRIL EVERY DAY, Disp-48 g, R-3, E-Prescribe      Ibuprofen (ADVIL PO) Take 800 mg by mouth every 4 hours as needed for moderate pain, Historical      lisinopril (PRINIVIL/ZESTRIL) 10 MG tablet Take 1 tablet (10 mg) by mouth daily, Disp-90 tablet, R-1, E-Prescribe      OPTIVITE OR once a day, Historical      !! order for DME Equipment being ordered: spacerDisp-1 each, R-0, Fax      !! order for DME Equipment being ordered:    left knee brace-medial compartment  for osteoarthritis.Disp-1 Units, R-0, Local Print      ranitidine (ZANTAC) 300 MG tablet TAKE ONE TABLET BY MOUTH EVERY DAY, Disp-90 tablet, R-2, E-Prescribe       !! - Potential duplicate medications found. Please discuss with provider.            Discharge Procedure Orders  Physical Therapy Referral   Standing Status: Future  Standing Exp. Date: 10/11/19   Referral Type: Rehab Therapy Physical Therapy     Reason for your hospital stay   Order Comments: You were admitted to the hospital following your total knee arthroplasty.     Adult [facility] Follow-up and recommended labs and tests   Order Comments: You are to return to clinic in 2 weeks for wound check with the clinic nurse. Approximately 6 weeks after your surgery, you will be scheduled for an appointment with Dr. Amaya. At this time, AP and lateral knee imaging will be obtained.    If you need to schedule your 2 and 6 week postoperative visits or haven't received confirmation regarding these visits, please call one of the following numbers within 7 days of discharge.    For patients that see Dr. Amaya at:   -[facility]: [phone]  -[facility]: [phone]  -[facility]: [phone]     Discharge Instructions   Order Comments: TOTAL KNEE ARTHROPLASTY POST OPERATIVE  DISCHARGE INSTRUCTIONS    FOLLOW UP APPOINTMENT  You are scheduled for a post operative wound check with Dr. Amaya s nurse approximately two weeks after surgery. At approximately six weeks after surgery, you will see Dr. Amaya in clinic. During this visit, repeat X rays of your operative hip will be performed.      Your follow up appointments will be at the location that you regularly see Dr. Amaya:    [facility]  [address], [place] [address] [phone]    [address] [address] [phone]    [facility]  [address], [place] [address] [phone]    Physical therapy:   A prescription for physical therapy will be provided at the time of discharge.       ACTIVITY  Weight bearing status:   You may bear weight on your operative extremity as tolerated, using assistive devices (walker, cane) as needed. As you begin to feel more comfortable ambulating, you may gradually transition from a walker to a cane. Eventually, you should wean from all assistive devices. Although we would like you to discontinue use of assistive devices as soon as possible, do not transition until you have worked with your physical therapist to achieve safe balance and comfort.     Continuous passive motion machine:   Perform CPM exercises for six to eight hours per day for the first four weeks after surgery. The CPM should be set at 0 degrees to flexion tolerance with a goal of 90 degrees. Advance the CPM settings aggressively in increments of 5 degrees every 30 minutes until goal is achieved.     Exercises:   Perform the following exercises at least three times per day for the first four weeks after surgery to prevent complications, such as blood clots in your legs:  1) Point and flex your feet  2) Move your ankle around in big circles  3) Wiggle your toes   Also, perform  thigh muscle tightening exercises for 10 to 15 minutes at least three times per day for the first four weeks after surgery.    Athletic Activities:  Activities such as swimming, bicycling, jogging, running, and stop-and-go sports should be avoided until permitted by your provider.    Driving:  Driving is not permitted until directed by your provider. Under no circumstance are you permitted to drive while using narcotic pain medications.    Return to Work:  You may return to work when directed by your provider.       COMFORT AND PAIN MANAGEMENT  Elevation:   During times of inactivity throughout the first two weeks after surgery, make an effort to decrease swelling by elevating your operative extremity. This is most effectively done by lying down and placing several pillows lengthwise under your thigh and calf to raise your toes above the level of your nose. To ensure that you knee remains in full extension, do not place pillows directly under your knee.     Icing:  An ice pack will be provided to control swelling and discomfort after surgery. Place a thin towel on your skin and apply the ice pack overtop. You may apply ice for 20 minutes as often as two times per hour.    Pain Medications:  You will be discharged with acetaminophen (Tylenol) and a narcotic medication for pain management after surgery. Acetaminophen can help to effectively manage pain when used as prescribed, however, do not exceed the maximum daily dose of 3000 mg. The narcotic pain medication should be reserved for severe, breakthrough pain. Take the narcotic medication as prescribed and use only as often as necessary.       ANTICOAGULATION  Take enoxaparin (Lovenox) as prescribed for a total of two weeks after surgery. After you complete your enoxaparin regimen, take one aspirin 325mg daily for the next two weeks.       WOUND CARE AND SHOWERING  Wound care:  You have a clean Aquacel dressing on your surgical wound. This dressing should stay in  place for seven days to allow the incision to heal. After seven days, you may change the dressing. Please use sterile 4x4 gauze dressings with tape over top to secure the dressing. If the Aquacel dressing becomes wet or saturated with wound drainage, it is appropriate to change the dressing before seven days. If drainage persists from the incision site to the extent that it is frequently saturating the dressing, please contact Dr. Amaya s clinic.    Showering:  You may shower 48 hours after surgery, provided the Aquacel dressing is in place. You may allow water to run over the dressing, but do not to soak or submerge your wound underwater. The steri-strips (small white tape that is directly on the incision areas) should be left on until they fall off or are removed at your first office visit.    Tub Bathing:  Tub bathing, swimming, or any other activities that cause your incision to be submerged should be avoided until allowed by your provider. Typically, patients are allowed to return to these activities four weeks after surgery.      CONTACTING YOUR PHYSICIAN:  You may experience symptoms that require follow-up before your scheduled two week appointment. Please contact Dr. Amaya's office if you experience:  1) Pain in your knee that persists or worsens in the first few days after surgery  2) Excessive redness or drainage of cloudy or bloody material from the wounds (Clear red tinted fluid and some mild drainage should be expected) or drainage of any kind five days after surgery  3) A temperature elevation greater than 101.5    4) Pain, swelling or redness in your calf  5) Numbness or weakness in your leg or foot      Regular business hours (Monday - Friday, 8am - 5pm):  [facility]: [phone]  [facility]: [phone]  [facility]: [phone]    After hours and weekends:  [facility] on call  Orthopedic resident: [phone]     Activity   Order Comments: Your activity upon discharge will be as tolerated. Continue to perform knee ROM exercises, focusing on hyperextension to 90 degrees (or greater) of knee flexion.   Order Specific Question Answer Comments   Is discharge order? Yes      Diet   Order Comments: You may return to your regular, pre-surgery diet unless instructed otherwise by your provider.   Order Specific Question Answer Comments   Is discharge order? Yes      Assign Questionnaire Series to Patient         Diane Hobson MD  PGY-4 Orthopaedic Surgery

## 2018-10-15 NOTE — TELEPHONE ENCOUNTER
Patient discharged from Inpatient.     Discharge location: [facility]  Discharge date: 10/12/18  DiagnosisStatus Post Total Left Knee Replacement Not Using Cement, Status Post Knee Surgery, Osteoarthritis Left Knee, Chronic:     Please follow up as appropriate. If no follow up required, please close encounter.      Savannah SPENCE, Patient Care

## 2018-10-15 NOTE — TELEPHONE ENCOUNTER
"ED/Discharge Protocol    \"Hi, my name is Isabel Giang, a registered nurse, and I am calling on behalf of Dr. Hurtado's office at [facility].  I am calling to follow up and see how things are going for you after your recent visit.\"    \"I see that you were in the (ER/UC/IP) on 10/12/18.    How are you doing now that you are home?\" \"Doing fine, doing exercises, taking pain pills 30 minutes before exercises.\"  Without pain medication rating pain at 7/10.    Is patient experiencing symptoms that may require a hospital visit?  No    Discharge Instructions    \"Let's review your discharge instructions.  What is/are the follow-up recommendations?  Pt. Response: \"No\"    \"Were you instructed to make a follow-up appointment?\"  Pt. Response: Yes.  Has appointment been made?  \"Yes, I have an apt with Dr. Amaya.\"     \"When you see the provider, I would recommend that you bring your discharge instructions with you.    Medications    \"How many new medications are you on since your hospitalization/ED visit?\"    0-1  \"How many of your current medicines changed (dose, timing, name, etc.) while you were in the hospital/ED visit?\"   0-1  \"Do you have questions about your medications?\"   No  \"Were you newly diagnosed with heart failure, COPD, diabetes or did you have a heart attack?\"   No  For patients on insulin: \"Did you start on insulin in the hospital or did you have your insulin dose changed?\"   No  Was MTM referral placed (*Make sure to put transitions as reason for referral)?   No    Call Summary    \"Do you have any questions or concerns about your condition or care plan at the moment?\"    No  Triage nurse advice given: \"continue doing your exercises, call the clinic if have any questions.\"    Patient was in ER 1 in the past year (assess appropriateness of ER visits.)      \"If you have questions or things don't continue to improve, we encourage you contact us through the main clinic number.  Even if the clinic is not open, " "triage nurses are available 24/7 to help you.     We would like you to know that our clinic has extended hours (provide information).  We also have urgent care (provide details on closest location and hours/contact info)\"      \"Thank you for your time and take care!\"        "

## 2018-10-16 ENCOUNTER — MYC MEDICAL ADVICE (OUTPATIENT)
Dept: FAMILY MEDICINE | Facility: CLINIC | Age: 68
End: 2018-10-16

## 2018-10-16 DIAGNOSIS — R11.0 NAUSEA: Primary | ICD-10-CM

## 2018-10-16 RX ORDER — ONDANSETRON 4 MG/1
4-8 TABLET, FILM COATED ORAL EVERY 8 HOURS PRN
Qty: 18 TABLET | Refills: 0 | Status: SHIPPED | OUTPATIENT
Start: 2018-10-16 | End: 2019-03-19

## 2018-10-18 ENCOUNTER — HOSPITAL ENCOUNTER (OUTPATIENT)
Dept: PHYSICAL THERAPY | Facility: CLINIC | Age: 68
Setting detail: THERAPIES SERIES
End: 2018-10-18
Attending: ORTHOPAEDIC SURGERY
Payer: COMMERCIAL

## 2018-10-18 DIAGNOSIS — Z98.890 STATUS POST KNEE SURGERY: ICD-10-CM

## 2018-10-18 PROCEDURE — 97161 PT EVAL LOW COMPLEX 20 MIN: CPT | Mod: GP | Performed by: PHYSICAL THERAPIST

## 2018-10-18 PROCEDURE — 40000718 ZZHC STATISTIC PT DEPARTMENT ORTHO VISIT: Performed by: PHYSICAL THERAPIST

## 2018-10-18 PROCEDURE — 97110 THERAPEUTIC EXERCISES: CPT | Mod: GP | Performed by: PHYSICAL THERAPIST

## 2018-10-18 NOTE — PROGRESS NOTES
10/18/18 1123   General Information   Type of Visit Initial OP Ortho PT Evaluation   Start of Care Date 10/18/18   Referring Physician James Amaya MD   Patient/Family Goals Statement The pt would like to get back to full ROM, walk, and have no pain.  Pt would like to get back to yoga.   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 She states that she would have like to have been off the oxycodone by now.       Present No   Body Part(s)   Body Part(s) Knee   Presentation and Etiology   Pertinent history of current problem (include personal factors and/or comorbidities that impact the POC) The pt presents to physical therapy s/p L total knee arthroplasty performed on 10/10/18. The pt notes that it has been rough and she is taking 3 oxycodone a day. She is having a difficult time sleeping, and is only able to sleep for 3 hours at a time without waking up due to pain. Yesterday she was having some nausea, and she took zofran which helped. The pt has not been nauseous since. The pt has been elevating her legs and icing consistently at least 2x a day. The pt has been using her CPM from 0-90 degrees, for 6 hours consistently every day. She has not been able to get the full 8 hours that the doctor has instructed. Sam her  has been helping her with showering, and he provides Min A of his arm to  act as the other railing.  She has been up and down stairs with side stepping by herself with one railing. Pt presents today with , cane, and walker. She has only been using her walker to get up and walk around.   Impairments A. Pain;B. Decreased WB tolerance;C. Swelling;D. Decreased ROM;E. Decreased flexibility;F. Decreased strength and endurance;G. Impaired balance;H. Impaired gait;J. Burning;Q. Dizziness  (dizziness once when getting up from sitting in shower chair)   Functional Limitations perform activities of daily living   Symptom Location whole right knee   How/Where did it occur From insidious onset   Onset date of current episode/exacerbation 10/10/18   Chronicity New   Pain rating (0-10 point scale) Best (/10);Worst (/10);Other   Best (/10) 2/10   Worst (/10) 8/10   Pain rating comment current: 3/10   Pain quality B. Dull;C. Aching   Frequency of pain/symptoms A. Constant   Pain/symptoms are: Worse during the night   Pain/symptoms exacerbated by B. Walking;J. ADL;K. Home tasks;A. Sitting   Pain/symptoms eased by I. OTC medication(s);H. Cold  (oxycodone)   Progression of symptoms since onset: Improved   Prior Level of Function   Prior Level of Function-Mobility IND   Prior Level of Function-ADLs IND   Current Level of Function   Current Community Support Family/friend caregiver  (family acosta)   Patient role/employment history F. Retired   [facility]   Home/community accessibility 14 step one railing, 3 steps to enter with no railing   Current equipment-Gait/Locomotion Walker;Standard cane  (standard cane with stairs)   Current equipment-ADL Shower/tub grab bar   Fall Risk Screen   Fall screen completed by PT   Have you fallen 2 or more times in the past year? No   Have you fallen and had an injury in the past year? No   Is patient a fall risk? No   System Outcome Measures   Outcome Measures (LEFS)   Knee Objective Findings   Side (if bilateral, select both right and left)  Right;Left   Observation Pt is pleasant throughout evaluation.   Integumentary  ecchymosis along lateral L calf and mid posterior thigh of L LE, bandage over incision.    Gait/Locomotion ambulates swing through with 2WW, decreased stance on L LE, decreased step length on L LE and decreased L foot clearance.   Knee ROM Comment end range L knee flexion pain 8/10   Knee/Hip Strength Comments hip flexion strength: L: 5/5, R: 4/5, Dorsiflexion strength 5/5 B   Knee Special Test Comments no special tests performed due to pt 8 days s/p L TKA   Palpation tenderness to palpation of lateral L gastroc and musculotendinous junction of L quad.    Right Knee Extension AROM 7 degrees hyperextension   Right Knee Flexion AROM 135 degrees   Right Knee Flexion Strength 5/5   Right Knee Extension Strength 5/5   Right Hip Abduction Strength 4/5   Right Quad Set Strength good   Right Gastrocnemius Flexibility no tightness noted   Right Hamstring Flexibility 90/90: ~10 degrees from straight   Left Knee Extension AROM 7 degrees from straight   Left Knee Extension PROM 6 degrees from straight,    Left Knee Flexion AROM 70 degrees, pain   Left Knee Flexion PROM 75 degrees, pain    Left Knee Flexion Strength 4/5, pain   Left Knee Extension Strength 4/5, pain   Left Hip Abduction Strength 4/5   Left Gastrocnemius Flexibility no tightness noted   Planned Therapy Interventions   Planned Therapy Interventions gait training;manual therapy;joint mobilization;neuromuscular re-education;ROM;strengthening;stretching   Planned Modality Interventions   Planned Modality Interventions Cryotherapy  (as needed for symptom management)   Clinical Impression   Criteria for Skilled Therapeutic Interventions Met yes, treatment indicated   PT Diagnosis L LE pain, decreased ROM, decreased strength, decreased balance, impaired gait   Influenced by the following impairments 8 days s/p L TKA   Functional limitations due to impairments ambulating without assistive  device and pain, squats, standing and sitting for greater than 30 minutes without pain   Clinical Presentation Stable/Uncomplicated   Clinical Presentation Rationale Pt's presentation is stable with co morbidities of Meniere's disease, neck pain, and hypertension that may affect pt's progress in physical therapy.   Clinical Decision Making (Complexity) Low complexity   Therapy Frequency 2 times/Week   Predicted Duration of Therapy Intervention (days/wks) 12 weeks   Risk & Benefits of therapy have been explained Yes   Patient, Family & other staff in agreement with plan of care Yes   Clinical Impression Comments Will continue to monitor pain medication intake with goal to reduce oxycodone intake.   Education Assessment   Preferred Learning Style Listening;Reading;Demonstration;Pictures/video   Barriers to Learning No barriers   ORTHO GOALS   PT Ortho Eval Goals 1;2;3;4   Ortho Goal 1   Goal Identifier HEP   Goal Description Pt will be compliant with HEP to assist with addressing L knee pain, decreased strength, decreased ROM, and impaired gait.   Target Date 11/17/18   Ortho Goal 2   Goal Identifier ambulation   Goal Description Pt will be able to ambulate on uneven surfaces without an assistive device greater than 250ft with no greater than 1/10 L knee pain.   Target Date 01/09/19   Ortho Goal 3   Goal Identifier stairs   Goal Description Pt will be able to navigate up and down a flight of stairs independently with no greater than 1/10 L knee pain.   Target Date 01/09/19   Ortho Goal 4   Goal Identifier ROM   Goal Description Pt will be able to achieve L knee AROM of 0-125 degrees to assist with full functional mobility   Target Date 12/17/18   Total Evaluation Time   Total Evaluation Time 36   Thank you for your referral.    Ariane Alcantara, PT, DTP  [facility]  [phone]

## 2018-10-23 ENCOUNTER — ALLIED HEALTH/NURSE VISIT (OUTPATIENT)
Dept: NURSING | Facility: CLINIC | Age: 68
End: 2018-10-23
Payer: COMMERCIAL

## 2018-10-23 DIAGNOSIS — Z96.652 S/P TOTAL KNEE ARTHROPLASTY, LEFT: Primary | ICD-10-CM

## 2018-10-23 DIAGNOSIS — Z51.89 VISIT FOR WOUND CHECK: ICD-10-CM

## 2018-10-23 DIAGNOSIS — Z48.02 VISIT FOR SUTURE REMOVAL: Primary | ICD-10-CM

## 2018-10-23 DIAGNOSIS — Z98.890 STATUS POST KNEE SURGERY: ICD-10-CM

## 2018-10-23 PROCEDURE — 99024 POSTOP FOLLOW-UP VISIT: CPT

## 2018-10-23 RX ORDER — OXYCODONE HYDROCHLORIDE 5 MG/1
5-10 TABLET ORAL EVERY 4 HOURS PRN
Qty: 40 TABLET | Refills: 0 | Status: SHIPPED | OUTPATIENT
Start: 2018-10-23 | End: 2019-03-19

## 2018-10-23 RX ORDER — HYDROXYZINE HYDROCHLORIDE 25 MG/1
25-50 TABLET, FILM COATED ORAL EVERY 6 HOURS PRN
Qty: 60 TABLET | Refills: 1 | Status: SHIPPED | OUTPATIENT
Start: 2018-10-23 | End: 2019-03-19

## 2018-10-23 NOTE — PATIENT INSTRUCTIONS
"No NSAIDs post op while on aspirin, lovenox or Coumadin. This includes Advil (ibuprofen) and Aleve (naproxen).    Continue anticoagulation plan of: Lovenox until injections are gone then start Aspirin 325mg once daily for 14 days.      Cover incision if draining with gauze. If steri strips (tapes) applied, let fall off on their own. Once dry may leave incision open to air. Please no ointments or lotions to incision. No soaking in tubs or pools for 3 months after surgery.    Elevate surgical leg if swelling present in leg above heart.    Continue to wear thigh/knee high teds for 4 weeks. May have off at night.  TEDS stockings.     *Please call if a sharp increase in pain, change in movement or sensation, chest pain, calf pain, shortness of breath, fevers greater than 101.4, redness, erythema around the incision sites.    *If needing refills on pain meds, please call clinic a day or more before you run out.    *Continue Physical therapy as directed.  If needing orders for Outpatient Physical therapy please call clinic.    *Continue to use assistive device: cane or crutch until no limp. Discuss with therapist if questions.    *Dr. Amaya advises no dental work or cleaning until 6 months after any surgery with implants to hips or knees unless emergent issue arises. This includes total joint surgeries. Once you are 6 months past your surgery, you will need prophylactic antibiotics for the rest of your lifetime with any cleaning or dental work.  This is also for any other \"dirty\" procedures that you may have such as a colonoscopy.      If you have any questions, call the clinic at [phone] and ask for the Orthopaedics dept.       "

## 2018-10-23 NOTE — NURSING NOTE
Maddie Lala comes into clinic today at the request of Dr. Amaya for suture removal and wound check.    The patient is status post LTKA on 10/10/18.     Incision clean, dry and intact. Cut bilateral suture ends to incision.  Steri-strips removed. Incision cleaned and new steri-strips applied. Pt advised to let steri-strips fall off on their own. Pt instructed on wound care and symptoms of infection to watch for.     Discussed anticoagulation. Pt is currently taking lovenox for 3 more then change to aspirin 325mg. Pt advised against any NSAIDs while on any anticoagulation med (ASA, Lovenox,or Coumadin).      Discussed current pain medication regime. Pt currently taking 1 oxycodone per day.  Pain is not well controlled as she is having lots of pain at night, unable to complete therapies. Had long discussion about pain control and when to take pain medications.  We discussed Tramadol for patient, but she has had this in the past and has not provided great relief for her.  We then discussed increasing Oxycodone to 2-3 times per day, taking Tylenol and adding Hydroxyzine to see if this would provide better pain control.  She was in agreement with plan and will call with questions     Discussed current physical therapy program. Pt is 2 times per week, [facility]    Total time spent with Pt: 45 minutes.    Anat Rodriguez RN

## 2018-10-23 NOTE — MR AVS SNAPSHOT
"              After Visit Summary   10/23/2018    Maddie Lala    MRN: [id number]           Patient Information     Date Of Birth          1950        Visit Information        Provider Department      10/23/2018 8:30 AM MG NURSE ONLY [facility]        Care Instructions    No NSAIDs post op while on aspirin, lovenox or Coumadin. This includes Advil (ibuprofen) and Aleve (naproxen).    Continue anticoagulation plan of: Lovenox until injections are gone then start Aspirin 325mg once daily for 14 days.      Cover incision if draining with gauze. If steri strips (tapes) applied, let fall off on their own. Once dry may leave incision open to air. Please no ointments or lotions to incision. No soaking in tubs or pools for 3 months after surgery.    Elevate surgical leg if swelling present in leg above heart.    Continue to wear thigh/knee high teds for 4 weeks. May have off at night.  TEDS stockings.     *Please call if a sharp increase in pain, change in movement or sensation, chest pain, calf pain, shortness of breath, fevers greater than 101.4, redness, erythema around the incision sites.    *If needing refills on pain meds, please call clinic a day or more before you run out.    *Continue Physical therapy as directed.  If needing orders for Outpatient Physical therapy please call clinic.    *Continue to use assistive device: cane or crutch until no limp. Discuss with therapist if questions.    *Dr. mAaya advises no dental work or cleaning until 6 months after any surgery with implants to hips or knees unless emergent issue arises. This includes total joint surgeries. Once you are 6 months past your surgery, you will need prophylactic antibiotics for the rest of your lifetime with any cleaning or dental work.  This is also for any other \"dirty\" procedures that you may have such as a colonoscopy.      If you have any questions, call the clinic at [phone] and ask for the Orthopaedics " dept.               Follow-ups after your visit        Your next 10 appointments already scheduled     Oct 24, 2018 11:45 AM CDT   Ortho Treatment with Ariane Alcantara, PT   [facility] ([facility])    [address] [place] [address]   [phone]            Oct 25, 2018  2:45 PM CDT   Ortho Treatment with Ariane Alcantara, PT   [facility] ([facility])    [address] [place] [address] Dr [place] [address]   [phone]            Oct 31, 2018 10:30 AM CDT   Ortho Treatment with Ariane Alcantara, PT   [facility] ([facility])    [address] [place] [address] Dr [place] [address]   [phone]            Nov 02, 2018  9:00 AM CDT   Ortho Treatment with Ariane Alcantara, PT   [facility] ([facility])    [address] [place] [address] Dr [place] [address]   [phone]            Nov 07, 2018 10:45 AM CST   Ortho Treatment with Ariane Alcantara, PT   [facility] ([facility])    [address] [place] [address] Dr [place] [address]   [phone]            Nov 09, 2018  8:15 AM CST   Ortho Treatment with Ariane Alcantara, PT   [facility] ([facility])    [address] [place] [address] Dr [place] [address]   [phone]            Nov 12, 2018  8:15 AM CST   Ortho Treatment with Ariane Alcantara, PT   [facility] ([facility])    [address] [place] [address] [place] [address]   [phone]            Nov 13, 2018  8:00 AM CST   XR KNEE LEFT 1/2 VIEWS with MGXR2   [facility] ([facility])    [address] [place] [id number] [phone]           How do I prepare for my exam? (Food and drink instructions) No Food and Drink Restrictions.  How do I prepare for my exam? (Other instructions) You do not  need to do anything special for this exam.  What should I wear: Wear comfortable clothes.  How long does the exam take: Most scans take less than 5 minutes.  What should I bring: Bring a list of your medicines, including vitamins, minerals and over-the-counter drugs. It is safest to leave personal items at home.  Do I need a :  No  is needed.  What do I need to tell my doctor: Tell your doctor if there s any chance you are pregnant.  What should I do after the exam: No restrictions, You may resume normal activities.  What is this test: An image of a specific body part shown in shades of black and white.  Who should I call with questions: If you have any questions, please call the Imaging Department where you will have your exam. Directions, parking instructions, and other information is available on our website, [email or web address].            Nov 13, 2018  8:30 AM CST   (Arrive by 8:00 AM)   Return Visit with James Amaya MD   [facility] ([facility])    [address] [place] [address] [phone]            Nov 15, 2018  8:15 AM CST   Ortho Treatment with Ariane Alcantara PT   [facility] ([facility])    [address] [place] [address] [address] [place] [address] [phone]              Who to contact     If you have questions or need follow up information about today's clinic visit or your schedule please contact [facility] directly at [phone].  Normal or non-critical lab and imaging results will be communicated to you by MyChart, letter or phone within 4 business days after the clinic has received the results. If you do not hear from us within 7 days, please contact the clinic through [employer] or phone. If you have a critical or abnormal lab result, we will notify you by phone as soon as possible.  Submit refill requests through [employer] or call your pharmacy and they will forward the  refill request to us. Please allow 3 business days for your refill to be completed.          Additional Information About Your Visit        [employer] Information     [employer] gives you secure access to your electronic health record. If you see a primary care provider, you can also send messages to your care team and make appointments. If you have questions, please call your primary care clinic.  If you do not have a primary care provider, please call [phone] and they will assist you.      [employer] is an electronic gateway that provides easy, online access to your medical records. With [employer], you can request a clinic appointment, read your test results, renew a prescription or communicate with your care team.     To access your existing account, please contact your [facility] or call [phone] for assistance.        Care EveryWhere ID     This is your Care EveryWhere ID. This could be used by other organizations to access your [facility] medical records  [id number]         Blood Pressure from Last 3 Encounters:   10/12/18 125/69   09/27/18 124/76   08/21/18 126/78    Weight from Last 3 Encounters:   10/10/18 78.3 kg (172 lb 9.9 oz)   09/27/18 79.9 kg (176 lb 3.2 oz)   08/21/18 (P) 80.3 kg (177 lb)              Today, you had the following     No orders found for display       Primary Care Provider Office Phone # Fax #    Radha Hurtado [phone] [address]  [facility] [address]        Equal Access to Services     RIC[facility] HospitalLORENZO : Hadii aad ku hadasho Soomaali, waaxda luqadaha, qaybta kaalmada adeegyada, waxay phylicia hayzoila talbert . So [facility] [phone].    ATENCIÓN: Si habla español, tiene a mayo disposición servicios gratuitos de asistencia lingüística. Llame al [phone].    We comply with applicable federal civil rights laws and [place] laws. We do not discriminate on the basis of race, color, national origin, age, disability,  sex, sexual orientation, or gender identity.            Thank you!     Thank you for choosing [facility]  for your care. Our goal is always to provide you with excellent care. Hearing back from our patients is one way we can continue to improve our services. Please take a few minutes to complete the written survey that you may receive in the mail after your visit with us. Thank you!             Your Updated Medication List - Protect others around you: Learn how to safely use, store and throw away your medicines at www.disposemymeds.org.          This list is accurate as of 10/23/18  9:16 AM.  Always use your most recent med list.                   Brand Name Dispense Instructions for use Diagnosis    acetaminophen 325 MG tablet    TYLENOL    100 tablet    Take 1-2 tablets (325-650 mg) by mouth every 4 hours as needed for other or pain    Status post knee surgery       ADVIL PO      Take 800 mg by mouth every 4 hours as needed for moderate pain        albuterol 108 (90 Base) MCG/ACT inhaler    VENTOLIN HFA    18 g    INHALE TWO PUFFS BY MOUTH EVERY 6 HOURS AS NEEDED FOR SHORTNESS OF BREATH / DYSPNEA AND AS NEEDED BEFORE EXERCISE    Cough due to bronchospasm       aspirin 325 MG tablet   Start taking on:  10/25/2018     14 tablet    Take 1 tablet (325 mg) by mouth daily    Status post knee surgery       beclomethasone 40 MCG/ACT Aers IS A DISCONTINUED MEDICATION    QVAR    3 Inhaler    INHALE 2 PUFFS BY MOUTH INTO THE LUNGS TWO TIMES A DAY    Chronic cough       CALCIUM + D PO      One bid        CHLOR-TRIMETON ALLERGY 12 MG Tbcr   Generic drug:  chlorpheniramine maleate      Take  by mouth as needed.        enoxaparin 40 MG/0.4ML injection    LOVENOX    13 Syringe    Inject 0.4 mLs (40 mg) Subcutaneous every 24 hours    Status post knee surgery       estradiol 10 MCG Tabs vaginal tablet    VAGIFEM    24 tablet    Place 1 tablet (10 mcg) vaginally twice a week    Vaginal atrophy       fluocinonide  0.05 % solution    LIDEX    60 mL    Apply to scalp nightly for up to 1 week, then 2-3 times weekly as needed.    Dermatitis, seborrheic       fluticasone 50 MCG/ACT spray    FLONASE    48 g    USE ONE TO TWO SPRAYS IN EACH NOSTRIL EVERY DAY    Chronic allergic conjunctivitis       lisinopril 10 MG tablet    PRINIVIL/ZESTRIL    90 tablet    Take 1 tablet (10 mg) by mouth daily    Benign essential hypertension       ondansetron 4 MG tablet    ZOFRAN    18 tablet    Take 1-2 tablets (4-8 mg) by mouth every 8 hours as needed for nausea    Nausea       OPTIVITE PO      once a day        order for DME     1 Units    Equipment being ordered:    left knee brace-medial compartment  for osteoarthritis.    Primary osteoarthritis of left knee       order for DME     1 each    Equipment being ordered: spacer    Cough due to bronchospasm       oxyCODONE IR 5 MG tablet    ROXICODONE    40 tablet    Take 1-2 tablets (5-10 mg) by mouth every 4 hours as needed for severe pain    Status post knee surgery       ranitidine 300 MG tablet    ZANTAC    90 tablet    TAKE ONE TABLET BY MOUTH EVERY DAY    Gastroesophageal reflux disease without esophagitis       senna-docusate 8.6-50 MG per tablet    SENOKOT-S;PERICOLACE    50 tablet    Take 1-2 tablets by mouth 2 times daily as needed for constipation    Status post knee surgery

## 2018-10-24 ENCOUNTER — HOSPITAL ENCOUNTER (OUTPATIENT)
Dept: PHYSICAL THERAPY | Facility: CLINIC | Age: 68
Setting detail: THERAPIES SERIES
End: 2018-10-24
Attending: ORTHOPAEDIC SURGERY
Payer: COMMERCIAL

## 2018-10-24 PROCEDURE — 40000718 ZZHC STATISTIC PT DEPARTMENT ORTHO VISIT: Performed by: PHYSICAL THERAPIST

## 2018-10-24 PROCEDURE — 97110 THERAPEUTIC EXERCISES: CPT | Mod: GP | Performed by: PHYSICAL THERAPIST

## 2018-10-24 PROCEDURE — 97116 GAIT TRAINING THERAPY: CPT | Mod: GP | Performed by: PHYSICAL THERAPIST

## 2018-10-24 PROCEDURE — 97140 MANUAL THERAPY 1/> REGIONS: CPT | Mod: GP | Performed by: PHYSICAL THERAPIST

## 2018-10-25 ENCOUNTER — HOSPITAL ENCOUNTER (OUTPATIENT)
Dept: PHYSICAL THERAPY | Facility: CLINIC | Age: 68
Setting detail: THERAPIES SERIES
End: 2018-10-25
Attending: ORTHOPAEDIC SURGERY
Payer: COMMERCIAL

## 2018-10-25 PROCEDURE — 97110 THERAPEUTIC EXERCISES: CPT | Mod: GP | Performed by: PHYSICAL THERAPIST

## 2018-10-25 PROCEDURE — 97140 MANUAL THERAPY 1/> REGIONS: CPT | Mod: GP | Performed by: PHYSICAL THERAPIST

## 2018-10-25 PROCEDURE — 40000718 ZZHC STATISTIC PT DEPARTMENT ORTHO VISIT: Performed by: PHYSICAL THERAPIST

## 2018-10-31 ENCOUNTER — HOSPITAL ENCOUNTER (OUTPATIENT)
Dept: PHYSICAL THERAPY | Facility: CLINIC | Age: 68
Setting detail: THERAPIES SERIES
End: 2018-10-31
Attending: ORTHOPAEDIC SURGERY
Payer: COMMERCIAL

## 2018-10-31 PROCEDURE — 97140 MANUAL THERAPY 1/> REGIONS: CPT | Mod: GP | Performed by: PHYSICAL THERAPIST

## 2018-10-31 PROCEDURE — 97110 THERAPEUTIC EXERCISES: CPT | Mod: GP | Performed by: PHYSICAL THERAPIST

## 2018-10-31 PROCEDURE — 40000718 ZZHC STATISTIC PT DEPARTMENT ORTHO VISIT: Performed by: PHYSICAL THERAPIST

## 2018-11-02 ENCOUNTER — HOSPITAL ENCOUNTER (OUTPATIENT)
Dept: PHYSICAL THERAPY | Facility: CLINIC | Age: 68
Setting detail: THERAPIES SERIES
End: 2018-11-02
Attending: ORTHOPAEDIC SURGERY
Payer: COMMERCIAL

## 2018-11-02 PROCEDURE — 40000718 ZZHC STATISTIC PT DEPARTMENT ORTHO VISIT: Performed by: PHYSICAL THERAPIST

## 2018-11-02 PROCEDURE — 97140 MANUAL THERAPY 1/> REGIONS: CPT | Mod: GP | Performed by: PHYSICAL THERAPIST

## 2018-11-02 PROCEDURE — 97110 THERAPEUTIC EXERCISES: CPT | Mod: GP | Performed by: PHYSICAL THERAPIST

## 2018-11-05 ENCOUNTER — MYC MEDICAL ADVICE (OUTPATIENT)
Dept: ORTHOPEDICS | Facility: CLINIC | Age: 68
End: 2018-11-05

## 2018-11-05 ENCOUNTER — HOSPITAL ENCOUNTER (OUTPATIENT)
Dept: PHYSICAL THERAPY | Facility: CLINIC | Age: 68
Setting detail: THERAPIES SERIES
End: 2018-11-05
Attending: ORTHOPAEDIC SURGERY
Payer: COMMERCIAL

## 2018-11-05 PROCEDURE — 97140 MANUAL THERAPY 1/> REGIONS: CPT | Mod: GP | Performed by: PHYSICAL THERAPIST

## 2018-11-05 PROCEDURE — 97110 THERAPEUTIC EXERCISES: CPT | Mod: GP | Performed by: PHYSICAL THERAPIST

## 2018-11-05 PROCEDURE — 40000718 ZZHC STATISTIC PT DEPARTMENT ORTHO VISIT: Performed by: PHYSICAL THERAPIST

## 2018-11-05 NOTE — ADDENDUM NOTE
Encounter addended by: Ariane Alcantara PT on: 11/5/2018  5:59 PM<BR>     Actions taken: Flowsheet accepted

## 2018-11-07 ENCOUNTER — HOSPITAL ENCOUNTER (OUTPATIENT)
Dept: PHYSICAL THERAPY | Facility: CLINIC | Age: 68
Setting detail: THERAPIES SERIES
End: 2018-11-07
Attending: ORTHOPAEDIC SURGERY
Payer: COMMERCIAL

## 2018-11-07 PROCEDURE — 40000718 ZZHC STATISTIC PT DEPARTMENT ORTHO VISIT: Performed by: PHYSICAL THERAPIST

## 2018-11-07 PROCEDURE — 97110 THERAPEUTIC EXERCISES: CPT | Mod: GP | Performed by: PHYSICAL THERAPIST

## 2018-11-07 PROCEDURE — 97140 MANUAL THERAPY 1/> REGIONS: CPT | Mod: GP | Performed by: PHYSICAL THERAPIST

## 2018-11-09 ENCOUNTER — HOSPITAL ENCOUNTER (OUTPATIENT)
Dept: PHYSICAL THERAPY | Facility: CLINIC | Age: 68
Setting detail: THERAPIES SERIES
End: 2018-11-09
Attending: ORTHOPAEDIC SURGERY
Payer: COMMERCIAL

## 2018-11-09 PROCEDURE — 97110 THERAPEUTIC EXERCISES: CPT | Mod: GP | Performed by: PHYSICAL THERAPIST

## 2018-11-09 PROCEDURE — 40000718 ZZHC STATISTIC PT DEPARTMENT ORTHO VISIT: Performed by: PHYSICAL THERAPIST

## 2018-11-09 PROCEDURE — 97140 MANUAL THERAPY 1/> REGIONS: CPT | Mod: GP | Performed by: PHYSICAL THERAPIST

## 2018-11-11 DIAGNOSIS — I10 BENIGN ESSENTIAL HYPERTENSION: ICD-10-CM

## 2018-11-12 ENCOUNTER — HOSPITAL ENCOUNTER (OUTPATIENT)
Dept: PHYSICAL THERAPY | Facility: CLINIC | Age: 68
Setting detail: THERAPIES SERIES
End: 2018-11-12
Attending: ORTHOPAEDIC SURGERY
Payer: COMMERCIAL

## 2018-11-12 PROCEDURE — 97110 THERAPEUTIC EXERCISES: CPT | Mod: GP | Performed by: PHYSICAL THERAPIST

## 2018-11-12 PROCEDURE — 40000718 ZZHC STATISTIC PT DEPARTMENT ORTHO VISIT: Performed by: PHYSICAL THERAPIST

## 2018-11-12 PROCEDURE — 97140 MANUAL THERAPY 1/> REGIONS: CPT | Mod: GP | Performed by: PHYSICAL THERAPIST

## 2018-11-12 NOTE — TELEPHONE ENCOUNTER
"Requested Prescriptions   Pending Prescriptions Disp Refills     lisinopril (PRINIVIL/ZESTRIL) 10 MG tablet [Pharmacy Med Name: LISINOPRIL 10MG TABS] 90 tablet 1     Sig: TAKE ONE TABLET BY MOUTH EVERY DAY    ACE Inhibitors (Including Combos) Protocol Passed    11/11/2018  4:07 PM       Passed - Blood pressure under 140/90 in past 12 months    BP Readings from Last 3 Encounters:   10/12/18 125/69   09/27/18 124/76   08/21/18 126/78                Passed - Recent (12 mo) or future (30 days) visit within the authorizing provider's specialty    Patient had office visit in the last 12 months or has a visit in the next 30 days with authorizing provider or within the authorizing provider's specialty.  See \"Patient Info\" tab in inbasket, or \"Choose Columns\" in Meds & Orders section of the refill encounter.             Passed - Patient is age 18 or older       Passed - No active pregnancy on record       Passed - Normal serum creatinine on file in past 12 months    Recent Labs   Lab Test  10/12/18   0614   CR  0.61            Passed - Normal serum potassium on file in past 12 months    Recent Labs   Lab Test  10/12/18   0614   POTASSIUM  4.2            Passed - No positive pregnancy test in past 12 months        lisinopril (PRINIVIL/ZESTRIL) 10 MG tablet  Last Written Prescription Date:  4/16/18  Last Fill Quantity: 90,  # refills: 1   Last office visit: 9/27/2018 with prescribing provider:  Dr. Hurtado   Future Office Visit:   Next 5 appointments (look out 90 days)     Nov 13, 2018  8:30 AM CST   (Arrive by 8:00 AM)   Return Visit with James Amaya MD   [facility] ([facility])    [address] [place] [address] [phone]                   "

## 2018-11-12 NOTE — PROGRESS NOTES
Outpatient Physical Therapy Progress Note     Patient: Maddie Lala  : 1950    Beginning/End Dates of Reporting Period:  10/18/18 to 2018    Referring Provider: James Amaya MD    Therapy Diagnosis: L LE pain, decreased ROM, decreased strength, decreased balance, impaired gait    Client Self Report: Pt has been sleeping 5 hours consistently.  She notes her sleep is better. She has been using heat on her groin because it is sore from her quad stretches. She walked a lot this weekend shopping at 4 different stores. The pt states that her left leg tends to buckle on her if she does not take it slow after sitting for a period of time. Today was her first day driving to her physical therapy appointment. She reports she is only taking 800mg ibuprofen for pain and is not on any narcotics.     Objective Measurements:  Objective Measure: symptoms L knee  Details: 3/10 posterior left knee  Objective Measure: edema L knee  Details: midline patella 43.5cm, 10cm above midline of patella: 46cm, 10cm below midline of patella 37.5cm,   1+ pitting edema  Objective Measure: ROM  Details: L knee AROM: 0-95 degrees; PROM:0-101 degrees         Outcome Measures (most recent score):  LEFS:10/18/18: 40/80  LEFS 18: 46/80    Goals:  Goal Identifier HEP   Goal Description Pt will be compliant with HEP to assist with addressing L knee pain, decreased strength, decreased ROM, and impaired gait.   Target Date 18   Date Met  18   Progress: goal met     Goal Identifier ambulation   Goal Description Pt will be able to ambulate on uneven surfaces without an assistive device greater than 250ft with no greater than 1/10 L knee pain.   Target Date 19   Date Met   (progressing toward goal, pt still using single point cane)   Progress:     Goal Identifier stairs   Goal Description Pt will be able to navigate up and down a flight of stairs independently with no greater than 1/10 L knee pain.   Target Date  01/09/19   Date Met  11/12/18   Progress: goal met     Goal Identifier ROM   Goal Description Pt will be able to achieve L knee AROM of 0-125 degrees to assist with full functional mobility   Target Date 12/17/18   Date Met   (Progressing toward goal R knee AROM 0-95 degrees)   Progress:         Progress Toward Goals:   Progress this reporting period: Pt is steadily progressing towards goals. She demonstrates L knee AROM: 0-95 degrees and PROM: 0-103 degrees. The pt's L knee edema has steadily decreased and she has been consistent with icing, elevating, and wearing her CAROL hose. The pt is no longer on her narcotics for pain and is managing it with 800mg of ibuprofen. She will continue to benefit from skilled physical therapy to address L knee ROM, pain, strength, flexibility, and balance, to allow pt to return to full functional mobility pain free.      Plan:  Continue therapy per current plan of care.    Discharge:  No    Thank you for your referral.    Ariane Alcantara, PT, DTP  [facility]  [phone]

## 2018-11-13 ENCOUNTER — RADIANT APPOINTMENT (OUTPATIENT)
Dept: GENERAL RADIOLOGY | Facility: CLINIC | Age: 68
End: 2018-11-13
Attending: ORTHOPAEDIC SURGERY
Payer: COMMERCIAL

## 2018-11-13 ENCOUNTER — OFFICE VISIT (OUTPATIENT)
Dept: ORTHOPEDICS | Facility: CLINIC | Age: 68
End: 2018-11-13
Payer: COMMERCIAL

## 2018-11-13 VITALS
SYSTOLIC BLOOD PRESSURE: 130 MMHG | HEART RATE: 88 BPM | OXYGEN SATURATION: 99 % | DIASTOLIC BLOOD PRESSURE: 83 MMHG | RESPIRATION RATE: 16 BRPM

## 2018-11-13 DIAGNOSIS — Z47.89 ORTHOPEDIC AFTERCARE: Primary | ICD-10-CM

## 2018-11-13 DIAGNOSIS — Z96.652 STATUS POST TOTAL LEFT KNEE REPLACEMENT: ICD-10-CM

## 2018-11-13 PROCEDURE — 99024 POSTOP FOLLOW-UP VISIT: CPT | Performed by: ORTHOPAEDIC SURGERY

## 2018-11-13 PROCEDURE — 73560 X-RAY EXAM OF KNEE 1 OR 2: CPT | Mod: LT | Performed by: RADIOLOGY

## 2018-11-13 ASSESSMENT — PAIN SCALES - GENERAL: PAINLEVEL: MILD PAIN (3)

## 2018-11-13 NOTE — LETTER
11/13/2018         RE: Maddie Lala  [address] [facility] [address]        Dear Colleague,    Thank you for referring your patient, Maddie Lala, to the [facility]. Please see a copy of my visit note below.    Chief Complaint: Follow Up For of the Left Knee (5 wk s/p left total knee arthroplasty. DOS: 10/10/18)       HPI: Maddie Lala returns today in follow-up for her left knee. SHe reports that she is doing well. No pain medication and advancing well in PT. No cane. 0    Medications and allergies are documented in the EMR and have been reviewed.    Current Outpatient Prescriptions:      beclomethasone (QVAR) 40 MCG/ACT Inhaler, INHALE 2 PUFFS BY MOUTH INTO THE LUNGS TWO TIMES A DAY, Disp: 3 Inhaler, Rfl: 3     CALCIUM + D OR, One bid, Disp: , Rfl:      estradiol (VAGIFEM) 10 MCG TABS vaginal tablet, Place 1 tablet (10 mcg) vaginally twice a week, Disp: 24 tablet, Rfl: 1     fluocinonide (LIDEX) 0.05 % solution, Apply to scalp nightly for up to 1 week, then 2-3 times weekly as needed., Disp: 60 mL, Rfl: 3     fluticasone (FLONASE) 50 MCG/ACT spray, USE ONE TO TWO SPRAYS IN EACH NOSTRIL EVERY DAY, Disp: 48 g, Rfl: 3     Ibuprofen (ADVIL PO), Take 800 mg by mouth every 4 hours as needed for moderate pain, Disp: , Rfl:      lisinopril (PRINIVIL/ZESTRIL) 10 MG tablet, Take 1 tablet (10 mg) by mouth daily, Disp: 90 tablet, Rfl: 1     OPTIVITE OR, once a day, Disp: , Rfl:      acetaminophen (TYLENOL) 325 MG tablet, Take 1-2 tablets (325-650 mg) by mouth every 4 hours as needed for other or pain (Patient not taking: Reported on 11/13/2018), Disp: 100 tablet, Rfl: 0     albuterol (VENTOLIN HFA) 108 (90 BASE) MCG/ACT Inhaler, INHALE TWO PUFFS BY MOUTH EVERY 6 HOURS AS NEEDED FOR SHORTNESS OF BREATH / DYSPNEA AND AS NEEDED BEFORE EXERCISE (Patient not taking: Reported on 11/13/2018), Disp: 18 g, Rfl: 11     aspirin 325 MG tablet, Take 1 tablet (325 mg) by mouth daily (Patient not taking:  Reported on 11/13/2018), Disp: 14 tablet, Rfl: 0     Chlorpheniramine Maleate (CHLOR-TRIMETON ALLERGY) 12 MG TBCR, Take  by mouth as needed., Disp: , Rfl:      enoxaparin (LOVENOX) 40 MG/0.4ML injection, Inject 0.4 mLs (40 mg) Subcutaneous every 24 hours (Patient not taking: Reported on 11/13/2018), Disp: 13 Syringe, Rfl: 0     hydrOXYzine (ATARAX) 25 MG tablet, Take 1-2 tablets (25-50 mg) by mouth every 6 hours as needed for itching (Patient not taking: Reported on 11/13/2018), Disp: 60 tablet, Rfl: 1     ondansetron (ZOFRAN) 4 MG tablet, Take 1-2 tablets (4-8 mg) by mouth every 8 hours as needed for nausea (Patient not taking: Reported on 11/13/2018), Disp: 18 tablet, Rfl: 0     order for DME, Equipment being ordered: spacer (Patient not taking: Reported on 11/13/2018), Disp: 1 each, Rfl: 0     order for DME, Equipment being ordered:    left knee brace-medial compartment  for osteoarthritis. (Patient not taking: Reported on 11/13/2018), Disp: 1 Units, Rfl: 0     oxyCODONE IR (ROXICODONE) 5 MG tablet, Take 1-2 tablets (5-10 mg) by mouth every 4 hours as needed for severe pain (Patient not taking: Reported on 11/13/2018), Disp: 40 tablet, Rfl: 0     ranitidine (ZANTAC) 300 MG tablet, TAKE ONE TABLET BY MOUTH EVERY DAY (Patient not taking: Reported on 11/13/2018), Disp: 90 tablet, Rfl: 2     senna-docusate (SENOKOT-S;PERICOLACE) 8.6-50 MG per tablet, Take 1-2 tablets by mouth 2 times daily as needed for constipation (Patient not taking: Reported on 11/13/2018), Disp: 50 tablet, Rfl: 0  Allergies: Sulfa drugs    Physical Exam:  On physical examination the patient appears the stated age, is in no acute distress, affect is appropriate, and breathing is non-labored.  /83 (BP Location: Left arm, Patient Position: Sitting, Cuff Size: Adult Large)  Pulse 88  Resp 16  SpO2 99%  There is no height or weight on file to calculate BMI.  Gait: mild limp on the left   Incision: healed   ROM: 0-100  Distally, the  circulatory, motor, and sensation exam is intact with 5/5 EHL, gastroc-soleus, and tibialis anterior.  Sensation to light touch is intact.  Dorsalis pedis and posterior tibialis pulses are palpable.  There are no sores on the feet, no bruising, and no lymphedema.    X-rays:    I reviewed the x-rays dated today.  Previous films reviewed.    Findings:  Normal progression for a total knee arthroplasty without evidence of loosening or subsidence.    Assessment: doing well  Plan: cont PT and RTC in 6 weeks, sooner if issues.     No ref. provider found      Again, thank you for allowing me to participate in the care of your patient.        Sincerely,        James Amaya MD

## 2018-11-13 NOTE — NURSING NOTE
Maddie Lala's chief complaint for this visit includes:  Chief Complaint   Patient presents with     Left Knee - Follow Up For     5 wk s/p left total knee arthroplasty. DOS: 10/10/18     PCP: Radha Hurtado    Referring Provider:  No referring provider defined for this encounter.    /83 (BP Location: Left arm, Patient Position: Sitting, Cuff Size: Adult Large)  Pulse 88  Resp 16  SpO2 99%  Mild Pain (3)     Do you need any medication refills at today's visit? No

## 2018-11-13 NOTE — PROGRESS NOTES
Chief Complaint: Follow Up For of the Left Knee (5 wk s/p left total knee arthroplasty. DOS: 10/10/18)       HPI: Maddie Lala returns today in follow-up for her left knee. SHe reports that she is doing well. No pain medication and advancing well in PT. No cane. 0    Medications and allergies are documented in the EMR and have been reviewed.    Current Outpatient Prescriptions:      beclomethasone (QVAR) 40 MCG/ACT Inhaler, INHALE 2 PUFFS BY MOUTH INTO THE LUNGS TWO TIMES A DAY, Disp: 3 Inhaler, Rfl: 3     CALCIUM + D OR, One bid, Disp: , Rfl:      estradiol (VAGIFEM) 10 MCG TABS vaginal tablet, Place 1 tablet (10 mcg) vaginally twice a week, Disp: 24 tablet, Rfl: 1     fluocinonide (LIDEX) 0.05 % solution, Apply to scalp nightly for up to 1 week, then 2-3 times weekly as needed., Disp: 60 mL, Rfl: 3     fluticasone (FLONASE) 50 MCG/ACT spray, USE ONE TO TWO SPRAYS IN EACH NOSTRIL EVERY DAY, Disp: 48 g, Rfl: 3     Ibuprofen (ADVIL PO), Take 800 mg by mouth every 4 hours as needed for moderate pain, Disp: , Rfl:      lisinopril (PRINIVIL/ZESTRIL) 10 MG tablet, Take 1 tablet (10 mg) by mouth daily, Disp: 90 tablet, Rfl: 1     OPTIVITE OR, once a day, Disp: , Rfl:      acetaminophen (TYLENOL) 325 MG tablet, Take 1-2 tablets (325-650 mg) by mouth every 4 hours as needed for other or pain (Patient not taking: Reported on 11/13/2018), Disp: 100 tablet, Rfl: 0     albuterol (VENTOLIN HFA) 108 (90 BASE) MCG/ACT Inhaler, INHALE TWO PUFFS BY MOUTH EVERY 6 HOURS AS NEEDED FOR SHORTNESS OF BREATH / DYSPNEA AND AS NEEDED BEFORE EXERCISE (Patient not taking: Reported on 11/13/2018), Disp: 18 g, Rfl: 11     aspirin 325 MG tablet, Take 1 tablet (325 mg) by mouth daily (Patient not taking: Reported on 11/13/2018), Disp: 14 tablet, Rfl: 0     Chlorpheniramine Maleate (CHLOR-TRIMETON ALLERGY) 12 MG TBCR, Take  by mouth as needed., Disp: , Rfl:      enoxaparin (LOVENOX) 40 MG/0.4ML injection, Inject 0.4 mLs (40 mg) Subcutaneous every  24 hours (Patient not taking: Reported on 11/13/2018), Disp: 13 Syringe, Rfl: 0     hydrOXYzine (ATARAX) 25 MG tablet, Take 1-2 tablets (25-50 mg) by mouth every 6 hours as needed for itching (Patient not taking: Reported on 11/13/2018), Disp: 60 tablet, Rfl: 1     ondansetron (ZOFRAN) 4 MG tablet, Take 1-2 tablets (4-8 mg) by mouth every 8 hours as needed for nausea (Patient not taking: Reported on 11/13/2018), Disp: 18 tablet, Rfl: 0     order for DME, Equipment being ordered: spacer (Patient not taking: Reported on 11/13/2018), Disp: 1 each, Rfl: 0     order for DME, Equipment being ordered:    left knee brace-medial compartment  for osteoarthritis. (Patient not taking: Reported on 11/13/2018), Disp: 1 Units, Rfl: 0     oxyCODONE IR (ROXICODONE) 5 MG tablet, Take 1-2 tablets (5-10 mg) by mouth every 4 hours as needed for severe pain (Patient not taking: Reported on 11/13/2018), Disp: 40 tablet, Rfl: 0     ranitidine (ZANTAC) 300 MG tablet, TAKE ONE TABLET BY MOUTH EVERY DAY (Patient not taking: Reported on 11/13/2018), Disp: 90 tablet, Rfl: 2     senna-docusate (SENOKOT-S;PERICOLACE) 8.6-50 MG per tablet, Take 1-2 tablets by mouth 2 times daily as needed for constipation (Patient not taking: Reported on 11/13/2018), Disp: 50 tablet, Rfl: 0  Allergies: Sulfa drugs    Physical Exam:  On physical examination the patient appears the stated age, is in no acute distress, affect is appropriate, and breathing is non-labored.  /83 (BP Location: Left arm, Patient Position: Sitting, Cuff Size: Adult Large)  Pulse 88  Resp 16  SpO2 99%  There is no height or weight on file to calculate BMI.  Gait: mild limp on the left   Incision: healed   ROM: 0-100  Distally, the circulatory, motor, and sensation exam is intact with 5/5 EHL, gastroc-soleus, and tibialis anterior.  Sensation to light touch is intact.  Dorsalis pedis and posterior tibialis pulses are palpable.  There are no sores on the feet, no bruising,  and no lymphedema.    X-rays:    I reviewed the x-rays dated today.  Previous films reviewed.    Findings:  Normal progression for a total knee arthroplasty without evidence of loosening or subsidence.    Assessment: doing well  Plan: cont PT and RTC in 6 weeks, sooner if issues.     No ref. provider found

## 2018-11-13 NOTE — PATIENT INSTRUCTIONS
Thanks for coming today.  [facility]  [address] [place] [address]    To schedule future appointments in Ortho Clinic, you may call [phone].    To schedule ordered imaging by your provider:   Call Central Imaging [phone]    To schedule an injection ordered by your provider:  Call Central Imaging Injection scheduling line: [phone]  [employer] VISIONhart available online at:  [email or web address]    Please call if any further questions or concerns ([phone]).  Clinic hours 8 am to 5 pm.    Return to clinic (call) if symptoms worsen or fail to improve.

## 2018-11-13 NOTE — MR AVS SNAPSHOT
After Visit Summary   2018    Maddie Lala    MRN: [id number]           Patient Information     Date Of Birth          1950        Visit Information        Provider Department      2018 8:30 AM James Amaya MD [facility]        Today's Diagnoses     Orthopedic aftercare    -  1      Care Instructions    Thanks for coming today.  [facility]  [address] [place] [address]    To schedule future appointments in Ortho Clinic, you may call [phone].    To schedule ordered imaging by your provider:   Call Central Imaging [phone]    To schedule an injection ordered by your provider:  Call Central Imaging Injection scheduling line: [phone]  "[email or web address] available online at:  [email or web address]    Please call if any further questions or concerns ([phone]).  Clinic hours 8 am to 5 pm.    Return to clinic (call) if symptoms worsen or fail to improve.            Follow-ups after your visit        Your next 10 appointments already scheduled     Nov 15, 2018  8:15 AM CST   Ortho Treatment with Ariane Alcantara, PT   [facility] ([facility])    [address] [place] [address]   [phone]            2018 11:00 AM CST   Ortho Treatment with Ariane Alcantara, PT   [facility] ([facility])    [address] [place] [address]   [phone]            2019  1:15 PM CST   (Arrive by 1:00 PM)   Return Visit with Aaron Roy MD   [facility] ([facility])    [address] [place] [address] [phone]              Who to contact     If you have questions or need follow up information about today's clinic visit or your schedule please contact [facility] directly at [phone].  Normal or non-critical lab and  imaging results will be communicated to you by [employer]ihart, letter or phone within 4 business days after the clinic has received the results. If you do not hear from us within 7 days, please contact the clinic through [employer] or phone. If you have a critical or abnormal lab result, we will notify you by phone as soon as possible.  Submit refill requests through [employer] or call your pharmacy and they will forward the refill request to us. Please allow 3 business days for your refill to be completed.          Additional Information About Your Visit        AMS-Qihar[employer]Burst Information     [employer] gives you secure access to your electronic health record. If you see a primary care provider, you can also send messages to your care team and make appointments. If you have questions, please call your primary care clinic.  If you do not have a primary care provider, please call [phone] and they will assist you.      [employer] is an electronic gateway that provides easy, online access to your medical records. With [employer], you can request a clinic appointment, read your test results, renew a prescription or communicate with your care team.     To access your existing account, please contact your [facility] or call [phone] for assistance.        Care EveryWhere ID     This is your Care EveryWhere ID. This could be used by other organizations to access your [facility] medical records  [id number]        Your Vitals Were     Pulse Respirations Pulse Oximetry             88 16 99%          Blood Pressure from Last 3 Encounters:   11/13/18 130/83   10/12/18 125/69   09/27/18 124/76    Weight from Last 3 Encounters:   10/10/18 78.3 kg (172 lb 9.9 oz)   09/27/18 79.9 kg (176 lb 3.2 oz)   08/21/18 (P) 80.3 kg (177 lb)              Today, you had the following     No orders found for display       Primary Care Provider Office Phone # Fax #    Radha Hurtado [phone] [address]  [facility] [address]        Equal Access to Services     RICKEY DAVIS : Hadii aad ku hadawadinorah [place], waashutoshda luqadaha, qaybta beatrizmacammy carver, nereyda guy. So [facility] [phone].    ATENCIÓN: Si habla español, tiene a mayo disposición servicios gratuitos de asistencia lingüística. Emaniame al [phone].    We comply with applicable federal civil rights laws and [place] laws. We do not discriminate on the basis of race, color, national origin, age, disability, sex, sexual orientation, or gender identity.            Thank you!     Thank you for choosing [facility]  for your care. Our goal is always to provide you with excellent care. Hearing back from our patients is one way we can continue to improve our services. Please take a few minutes to complete the written survey that you may receive in the mail after your visit with us. Thank you!             Your Updated Medication List - Protect others around you: Learn how to safely use, store and throw away your medicines at www.disposemymeds.org.          This list is accurate as of 11/13/18  9:15 AM.  Always use your most recent med list.                   Brand Name Dispense Instructions for use Diagnosis    acetaminophen 325 MG tablet    TYLENOL    100 tablet    Take 1-2 tablets (325-650 mg) by mouth every 4 hours as needed for other or pain    Status post knee surgery       ADVIL PO      Take 800 mg by mouth every 4 hours as needed for moderate pain        albuterol 108 (90 Base) MCG/ACT inhaler    VENTOLIN HFA    18 g    INHALE TWO PUFFS BY MOUTH EVERY 6 HOURS AS NEEDED FOR SHORTNESS OF BREATH / DYSPNEA AND AS NEEDED BEFORE EXERCISE    Cough due to bronchospasm       aspirin 325 MG tablet     14 tablet    Take 1 tablet (325 mg) by mouth daily    Status post knee surgery       beclomethasone 40 MCG/ACT Aers IS A DISCONTINUED MEDICATION    QVAR    3 Inhaler    INHALE 2 PUFFS BY MOUTH INTO THE LUNGS TWO TIMES A DAY     Chronic cough       CALCIUM + D PO      One bid        CHLOR-TRIMETON ALLERGY 12 MG Tbcr   Generic drug:  chlorpheniramine maleate      Take  by mouth as needed.        enoxaparin 40 MG/0.4ML injection    LOVENOX    13 Syringe    Inject 0.4 mLs (40 mg) Subcutaneous every 24 hours    Status post knee surgery       estradiol 10 MCG Tabs vaginal tablet    VAGIFEM    24 tablet    Place 1 tablet (10 mcg) vaginally twice a week    Vaginal atrophy       fluocinonide 0.05 % solution    LIDEX    60 mL    Apply to scalp nightly for up to 1 week, then 2-3 times weekly as needed.    Dermatitis, seborrheic       fluticasone 50 MCG/ACT spray    FLONASE    48 g    USE ONE TO TWO SPRAYS IN EACH NOSTRIL EVERY DAY    Chronic allergic conjunctivitis       hydrOXYzine 25 MG tablet    ATARAX    60 tablet    Take 1-2 tablets (25-50 mg) by mouth every 6 hours as needed for itching    S/P total knee arthroplasty, left       lisinopril 10 MG tablet    PRINIVIL/ZESTRIL    90 tablet    Take 1 tablet (10 mg) by mouth daily    Benign essential hypertension       ondansetron 4 MG tablet    ZOFRAN    18 tablet    Take 1-2 tablets (4-8 mg) by mouth every 8 hours as needed for nausea    Nausea       OPTIVITE PO      once a day        order for DME     1 Units    Equipment being ordered:    left knee brace-medial compartment  for osteoarthritis.    Primary osteoarthritis of left knee       order for DME     1 each    Equipment being ordered: spacer    Cough due to bronchospasm       oxyCODONE IR 5 MG tablet    ROXICODONE    40 tablet    Take 1-2 tablets (5-10 mg) by mouth every 4 hours as needed for severe pain    Status post knee surgery       ranitidine 300 MG tablet    ZANTAC    90 tablet    TAKE ONE TABLET BY MOUTH EVERY DAY    Gastroesophageal reflux disease without esophagitis       senna-docusate 8.6-50 MG per tablet    SENOKOT-S;PERICOLACE    50 tablet    Take 1-2 tablets by mouth 2 times daily as needed for constipation    Status  post knee surgery

## 2018-11-14 RX ORDER — LISINOPRIL 10 MG/1
TABLET ORAL
Qty: 90 TABLET | Refills: 1 | Status: SHIPPED | OUTPATIENT
Start: 2018-11-14 | End: 2019-03-19

## 2018-11-14 NOTE — TELEPHONE ENCOUNTER
Prescription approved per [facility] Refill Protocol.    Saray Watts RN

## 2018-11-15 ENCOUNTER — HOSPITAL ENCOUNTER (OUTPATIENT)
Dept: PHYSICAL THERAPY | Facility: CLINIC | Age: 68
Setting detail: THERAPIES SERIES
End: 2018-11-15
Attending: ORTHOPAEDIC SURGERY
Payer: COMMERCIAL

## 2018-11-15 PROCEDURE — 97110 THERAPEUTIC EXERCISES: CPT | Mod: GP | Performed by: PHYSICAL THERAPIST

## 2018-11-15 PROCEDURE — 97140 MANUAL THERAPY 1/> REGIONS: CPT | Mod: GP | Performed by: PHYSICAL THERAPIST

## 2018-11-15 PROCEDURE — 40000718 ZZHC STATISTIC PT DEPARTMENT ORTHO VISIT: Performed by: PHYSICAL THERAPIST

## 2018-11-21 ENCOUNTER — HOSPITAL ENCOUNTER (OUTPATIENT)
Dept: PHYSICAL THERAPY | Facility: CLINIC | Age: 68
Setting detail: THERAPIES SERIES
End: 2018-11-21
Attending: ORTHOPAEDIC SURGERY
Payer: COMMERCIAL

## 2018-11-21 PROCEDURE — 97140 MANUAL THERAPY 1/> REGIONS: CPT | Mod: GP | Performed by: PHYSICAL THERAPIST

## 2018-11-21 PROCEDURE — 97116 GAIT TRAINING THERAPY: CPT | Mod: GP | Performed by: PHYSICAL THERAPIST

## 2018-11-21 PROCEDURE — 40000718 ZZHC STATISTIC PT DEPARTMENT ORTHO VISIT: Performed by: PHYSICAL THERAPIST

## 2018-11-21 PROCEDURE — 97110 THERAPEUTIC EXERCISES: CPT | Mod: GP | Performed by: PHYSICAL THERAPIST

## 2018-11-28 ENCOUNTER — HOSPITAL ENCOUNTER (OUTPATIENT)
Dept: PHYSICAL THERAPY | Facility: CLINIC | Age: 68
Setting detail: THERAPIES SERIES
End: 2018-11-28
Attending: ORTHOPAEDIC SURGERY
Payer: COMMERCIAL

## 2018-11-28 PROCEDURE — 40000718 ZZHC STATISTIC PT DEPARTMENT ORTHO VISIT: Performed by: PHYSICAL THERAPIST

## 2018-11-28 PROCEDURE — 97140 MANUAL THERAPY 1/> REGIONS: CPT | Mod: GP | Performed by: PHYSICAL THERAPIST

## 2018-11-28 PROCEDURE — 97110 THERAPEUTIC EXERCISES: CPT | Mod: GP | Performed by: PHYSICAL THERAPIST

## 2018-11-30 ENCOUNTER — HOSPITAL ENCOUNTER (OUTPATIENT)
Dept: PHYSICAL THERAPY | Facility: CLINIC | Age: 68
Setting detail: THERAPIES SERIES
End: 2018-11-30
Attending: ORTHOPAEDIC SURGERY
Payer: COMMERCIAL

## 2018-11-30 PROCEDURE — 97110 THERAPEUTIC EXERCISES: CPT | Mod: GP | Performed by: PHYSICAL THERAPIST

## 2018-11-30 PROCEDURE — 97140 MANUAL THERAPY 1/> REGIONS: CPT | Mod: GP | Performed by: PHYSICAL THERAPIST

## 2018-11-30 PROCEDURE — 40000718 ZZHC STATISTIC PT DEPARTMENT ORTHO VISIT: Performed by: PHYSICAL THERAPIST

## 2018-12-03 ENCOUNTER — HOSPITAL ENCOUNTER (OUTPATIENT)
Dept: PHYSICAL THERAPY | Facility: CLINIC | Age: 68
Setting detail: THERAPIES SERIES
End: 2018-12-03
Attending: ORTHOPAEDIC SURGERY
Payer: COMMERCIAL

## 2018-12-03 PROCEDURE — 40000718 ZZHC STATISTIC PT DEPARTMENT ORTHO VISIT: Performed by: PHYSICAL THERAPIST

## 2018-12-03 PROCEDURE — 97110 THERAPEUTIC EXERCISES: CPT | Mod: GP | Performed by: PHYSICAL THERAPIST

## 2018-12-03 PROCEDURE — 97140 MANUAL THERAPY 1/> REGIONS: CPT | Mod: GP | Performed by: PHYSICAL THERAPIST

## 2018-12-03 PROCEDURE — 97116 GAIT TRAINING THERAPY: CPT | Mod: GP | Performed by: PHYSICAL THERAPIST

## 2018-12-05 ENCOUNTER — HOSPITAL ENCOUNTER (OUTPATIENT)
Dept: PHYSICAL THERAPY | Facility: CLINIC | Age: 68
Setting detail: THERAPIES SERIES
End: 2018-12-05
Attending: ORTHOPAEDIC SURGERY
Payer: COMMERCIAL

## 2018-12-05 PROCEDURE — 97110 THERAPEUTIC EXERCISES: CPT | Mod: GP | Performed by: PHYSICAL THERAPIST

## 2018-12-05 PROCEDURE — 40000718 ZZHC STATISTIC PT DEPARTMENT ORTHO VISIT: Performed by: PHYSICAL THERAPIST

## 2018-12-10 ENCOUNTER — HOSPITAL ENCOUNTER (OUTPATIENT)
Dept: PHYSICAL THERAPY | Facility: CLINIC | Age: 68
Setting detail: THERAPIES SERIES
End: 2018-12-10
Attending: ORTHOPAEDIC SURGERY
Payer: COMMERCIAL

## 2018-12-10 PROCEDURE — 40000718 ZZHC STATISTIC PT DEPARTMENT ORTHO VISIT: Performed by: PHYSICAL THERAPIST

## 2018-12-10 PROCEDURE — 97110 THERAPEUTIC EXERCISES: CPT | Mod: GP | Performed by: PHYSICAL THERAPIST

## 2018-12-10 PROCEDURE — 97140 MANUAL THERAPY 1/> REGIONS: CPT | Mod: GP | Performed by: PHYSICAL THERAPIST

## 2018-12-13 ENCOUNTER — HOSPITAL ENCOUNTER (OUTPATIENT)
Dept: PHYSICAL THERAPY | Facility: CLINIC | Age: 68
Setting detail: THERAPIES SERIES
End: 2018-12-13
Attending: ORTHOPAEDIC SURGERY
Payer: COMMERCIAL

## 2018-12-13 PROCEDURE — 97140 MANUAL THERAPY 1/> REGIONS: CPT | Mod: GP | Performed by: PHYSICAL THERAPIST

## 2018-12-13 PROCEDURE — 97110 THERAPEUTIC EXERCISES: CPT | Mod: GP | Performed by: PHYSICAL THERAPIST

## 2018-12-13 PROCEDURE — 40000718 ZZHC STATISTIC PT DEPARTMENT ORTHO VISIT: Performed by: PHYSICAL THERAPIST

## 2018-12-20 ENCOUNTER — HOSPITAL ENCOUNTER (OUTPATIENT)
Dept: PHYSICAL THERAPY | Facility: CLINIC | Age: 68
Setting detail: THERAPIES SERIES
End: 2018-12-20
Attending: ORTHOPAEDIC SURGERY
Payer: COMMERCIAL

## 2018-12-20 PROCEDURE — 97140 MANUAL THERAPY 1/> REGIONS: CPT | Mod: GP | Performed by: PHYSICAL THERAPIST

## 2018-12-20 PROCEDURE — 97110 THERAPEUTIC EXERCISES: CPT | Mod: GP | Performed by: PHYSICAL THERAPIST

## 2018-12-24 ENCOUNTER — HOSPITAL ENCOUNTER (OUTPATIENT)
Dept: PHYSICAL THERAPY | Facility: CLINIC | Age: 68
Setting detail: THERAPIES SERIES
End: 2018-12-24
Attending: ORTHOPAEDIC SURGERY
Payer: COMMERCIAL

## 2018-12-24 PROCEDURE — 97140 MANUAL THERAPY 1/> REGIONS: CPT | Mod: GP | Performed by: PHYSICAL THERAPIST

## 2018-12-24 PROCEDURE — 97110 THERAPEUTIC EXERCISES: CPT | Mod: GP | Performed by: PHYSICAL THERAPIST

## 2018-12-27 ENCOUNTER — HOSPITAL ENCOUNTER (OUTPATIENT)
Dept: PHYSICAL THERAPY | Facility: CLINIC | Age: 68
Setting detail: THERAPIES SERIES
End: 2018-12-27
Attending: ORTHOPAEDIC SURGERY
Payer: COMMERCIAL

## 2018-12-27 PROCEDURE — 97140 MANUAL THERAPY 1/> REGIONS: CPT | Mod: GP | Performed by: PHYSICAL THERAPIST

## 2018-12-27 PROCEDURE — 97110 THERAPEUTIC EXERCISES: CPT | Mod: GP | Performed by: PHYSICAL THERAPIST

## 2018-12-27 NOTE — PROGRESS NOTES
Outpatient Physical Therapy Progress Note     Patient: Maddie Lala  : 1950    Beginning/End Dates of Reporting Period:  18 to 2018    Referring Provider: James Amaya MD    Therapy Diagnosis: L LE pain, decreased ROM, decreased strength, decreased balance, impaired gait     Client Self Report: Pt states that she has not been as active on her elliptical or with performing all of her exercises due to the holiday season. She notes her main goals are to descend stairs without any pain.    Objective Measurements:  Objective Measure: edema   Details: midline patella: 41.75cm, 10cm above 45cm, 10cm below 36.5cm, 1+ pitting edema medial to tibial tuberosity  Objective Measure: ROM of L knee  Details: AROM: 1-105 PROM: 0-118  Objective Measure: HEP   Details: Pt notes fair compliance during the  with HEP  Objective Measure: gait  Details: slightly antalgic decreased stance time on L LE      Outcome Measures (most recent score):  LEFS 18: 46/80  LEFS 18: 58/80    Goals:  Goal Identifier HEP   Goal Description Pt will be compliant with HEP to assist with addressing L knee pain, decreased strength, decreased ROM, and impaired gait.   Target Date 18   Date Met  18   Progress: goal met     Goal Identifier ambulation   Goal Description Pt will be able to ambulate on uneven surfaces without an assistive device greater than 250ft with no greater than 1/10 L knee pain.   Target Date 19   Date Met  18   Progress: goal met     Goal Identifier stairs   Goal Description Pt will be able to navigate up and down a flight of stairs independently with no greater than 1/10 L knee pain.   Target Date 19   Date Met  (Pt has some pain when she navigates stairs in the morning)   Progress:     Goal Identifier ROM   Goal Description Pt will be able to achieve L knee AROM of 0-125 degrees to assist with full functional mobility   Target Date 18   Date Met   (Progressing toward goal R knee AROM 0-95 degrees)   Progress:         Progress Toward Goals:   Progress this reporting period: Pt is steadily progressing towards her goals. She demonstrates minimal L knee pain overall, however continues to have some pain with squatting and stairs. Pt is a very active and notes she has not been resting her leg as much as she should have been during the holiday season. The pt s L knee edema has reduced to where it is now minimal. The pt is growing frustrated with her L knee AROM. She has not progressed as far as expected at this point. She has been able to attain 0-120 degrees of L knee AROM, however, her high activity level has resulted in some swelling and soft tissue tightness including: gastroc, hamstring, and quad tightness.  This has prevented her from achieving 0-120 consistently.  The patient is highly motivated and she states that she will focus more on resting and her HEP now that the holidays are over.  She will continue to benefit from skilled physical therapy to address pain, ROM, edema, and soft tissue restrictions to allow pt to return to full functional mobility without pain.        Plan:  Continue therapy per current plan of care.    Discharge:  No    Thank you for your referral.    Ariane Alcantara, PT, DTP  [facility]  [phone]

## 2018-12-31 ENCOUNTER — HOSPITAL ENCOUNTER (OUTPATIENT)
Dept: PHYSICAL THERAPY | Facility: CLINIC | Age: 68
Setting detail: THERAPIES SERIES
End: 2018-12-31
Attending: ORTHOPAEDIC SURGERY
Payer: COMMERCIAL

## 2018-12-31 ENCOUNTER — PATIENT OUTREACH (OUTPATIENT)
Dept: CARE COORDINATION | Facility: CLINIC | Age: 68
End: 2018-12-31

## 2018-12-31 PROCEDURE — 97140 MANUAL THERAPY 1/> REGIONS: CPT | Mod: GP | Performed by: PHYSICAL THERAPIST

## 2018-12-31 PROCEDURE — 97110 THERAPEUTIC EXERCISES: CPT | Mod: GP | Performed by: PHYSICAL THERAPIST

## 2019-01-03 ENCOUNTER — OFFICE VISIT (OUTPATIENT)
Dept: DERMATOLOGY | Facility: CLINIC | Age: 69
End: 2019-01-03
Payer: COMMERCIAL

## 2019-01-03 DIAGNOSIS — L57.0 AK (ACTINIC KERATOSIS): Primary | ICD-10-CM

## 2019-01-03 ASSESSMENT — PAIN SCALES - GENERAL
PAINLEVEL: MILD PAIN (2)
PAINLEVEL: NO PAIN (0)

## 2019-01-03 NOTE — PATIENT INSTRUCTIONS
Cryotherapy    What is it?    Use of a very cold liquid, such as liquid nitrogen, to freeze and destroy abnormal skin cells that need to be removed    What should I expect?    Tenderness and redness    A small blister that might grow and fill with dark purple blood. There may be crusting.    More than one treatment may be needed if the lesions do not go away.    How do I care for the treated area?    Gently wash the area with your hands when bathing.    Use a thin layer of Vaseline to help with healing. You may use a Band-Aid.     The area should heal within 7-10 days and may leave behind a pink or lighter color.     Do not use an antibiotic or Neosporin ointment.     You may take acetaminophen (Tylenol) for pain.     Call your Doctor if you have:    Severe pain    Signs of infection (warmth, redness, cloudy yellow drainage, and or a bad smell)    Questions or concerns    Who should I call with questions?       [facility]: [phone]       [facility]: [phone]       For urgent needs outside of business hours call the [facility] at [phone]        and ask for the dermatology resident on call

## 2019-01-03 NOTE — LETTER
1/3/2019       RE: Maddie Lala  [address]  [facility] [address]     Dear Colleague,    Thank you for referring your patient, Maddie Lala, to the [facility] at [facility]. Please see a copy of my visit note below.    [facility] Dermatology Note      Dermatology Problem List:  1.Actinic keratosis  -S/p cryotherapy 6/28/18, repeat 1/3/19 to dorsal bridge  2.Seborrheic dermatitis  3.Eczematous dermatitis, resolved    Encounter Date: Drew 3, 2019    CC:   Chief Complaint   Patient presents with     Derm Problem     Recheck lesion on nose. Maddie states the lesion has not grown.      History of Present Illness:  Ms. Maddie Lala is a 68 year old female who presents as a follow-up for AK of the dorsal nasal bridge. The patient was last seen 6/28/18 when she received cryotherapy to an AK on the dorsal nasal bridge. She was also noted to have an irritated SK of the right posterior calf s/p LN2 which has resolved. She is well today with no additional skin concerns.  Per the patient, the site on the dorsal nose is still present. It is not overly tender or bothersome, she is just concerned because it has not resolved. She has never had a skin cancer. She wears sunscreen and tried to protect her skin from the sun. She is in her baseline state of health today.     Past Medical History:   Patient Active Problem List   Diagnosis     Urinary incontinence     Cataract     CARDIOVASCULAR SCREENING; LDL GOAL LESS THAN 160     Tendonitis of shoulder, right     Chronic cough     Brachial neuritis or radiculitis     Seasonal allergic rhinitis     Chronic allergic conjunctivitis     GERD (gastroesophageal reflux disease)     Latent tuberculosis     Meniere's disease     Menopause     Cervical nerve root impingement     Atrophic vaginitis     Hyponatremia     Neck pain     Muscle spasms of neck     Benign essential hypertension     AK (actinic keratosis)      Multiple melanocytic nevi     Status post knee surgery     Chronic bronchitis (H)     Past Medical History:   Diagnosis Date     Allergic rhinitis 1979     Arthritis      Benign essential hypertension 3/23/2018     Cervical dysplasia with cone in 1979    nl pap ever since      Dyspareunia      Hearing problem 2015     Meniere's disease 2002     Nonsenile cataract      Plantar fasciitis      Stress incontinence      SVT (supraventricular tachycardia) (H)     resolved     Tinnitus 2002     Past Surgical History:   Procedure Laterality Date     ARTHROPLASTY KNEE Left 10/10/2018    Procedure: ARTHROPLASTY KNEE;  Left Total Knee Arthroplasty    ;  Surgeon: James Amaya MD;  Location: [facility] OR     BLADDER SURGERY       CATARACT IOL, RT/LT  2010     COLONOSCOPY WITH CO2 INSUFFLATION N/A 10/19/2016    Procedure: COLONOSCOPY WITH CO2 INSUFFLATION;  Surgeon: Duane, William Charles, MD;  Location: [facility] OR     GENITOURINARY SURGERY      bladder     Midurethral sling with cystoscopy.  2010     TUBAL LIGATION         Social History:  Patient reports that she quit smoking about 38 years ago. Her smoking use included cigarettes. She has a 10.00 pack-year smoking history. she has never used smokeless tobacco. She reports that she drinks alcohol. She reports that she does not use drugs.    Family History:  Family History   Problem Relation Age of Onset     Heart Disease Mother         older      Osteoporosis Mother      C.A.D. Mother         60's      Macular Degeneration Mother      Respiratory Father         farmers lung     Skin Cancer Father      Alzheimer Disease Maternal Grandmother      Heart Disease Maternal Grandfather      Heart Disease Brother         Afib     Heart Disease Brother      Asthma No family hx of      Diabetes No family hx of      Hypertension No family hx of      Breast Cancer No family hx of      Cancer - colorectal No family hx of      Cancer No family hx of      Thyroid Disease No family hx of       Glaucoma No family hx of      Colon Cancer No family hx of      Melanoma No family hx of        Medications:  Current Outpatient Medications   Medication Sig Dispense Refill     beclomethasone (QVAR) 40 MCG/ACT Inhaler INHALE 2 PUFFS BY MOUTH INTO THE LUNGS TWO TIMES A DAY 3 Inhaler 3     CALCIUM + D OR One bid       Chlorpheniramine Maleate (CHLOR-TRIMETON ALLERGY) 12 MG TBCR Take  by mouth as needed.       estradiol (VAGIFEM) 10 MCG TABS vaginal tablet Place 1 tablet (10 mcg) vaginally twice a week 24 tablet 1     fluocinonide (LIDEX) 0.05 % solution Apply to scalp nightly for up to 1 week, then 2-3 times weekly as needed. 60 mL 3     fluticasone (FLONASE) 50 MCG/ACT spray USE ONE TO TWO SPRAYS IN EACH NOSTRIL EVERY DAY 48 g 3     Ibuprofen (ADVIL PO) Take 800 mg by mouth every 4 hours as needed for moderate pain       lisinopril (PRINIVIL/ZESTRIL) 10 MG tablet TAKE ONE TABLET BY MOUTH EVERY DAY 90 tablet 1     OPTIVITE OR once a day       order for DME Equipment being ordered: spacer 1 each 0     ranitidine (ZANTAC) 300 MG tablet TAKE ONE TABLET BY MOUTH EVERY DAY 90 tablet 2     acetaminophen (TYLENOL) 325 MG tablet Take 1-2 tablets (325-650 mg) by mouth every 4 hours as needed for other or pain (Patient not taking: Reported on 11/13/2018) 100 tablet 0     albuterol (VENTOLIN HFA) 108 (90 BASE) MCG/ACT Inhaler INHALE TWO PUFFS BY MOUTH EVERY 6 HOURS AS NEEDED FOR SHORTNESS OF BREATH / DYSPNEA AND AS NEEDED BEFORE EXERCISE (Patient not taking: Reported on 11/13/2018) 18 g 11     aspirin 325 MG tablet Take 1 tablet (325 mg) by mouth daily (Patient not taking: Reported on 11/13/2018) 14 tablet 0     enoxaparin (LOVENOX) 40 MG/0.4ML injection Inject 0.4 mLs (40 mg) Subcutaneous every 24 hours (Patient not taking: Reported on 11/13/2018) 13 Syringe 0     hydrOXYzine (ATARAX) 25 MG tablet Take 1-2 tablets (25-50 mg) by mouth every 6 hours as needed for itching (Patient not taking: Reported on 11/13/2018) 60 tablet 1      ondansetron (ZOFRAN) 4 MG tablet Take 1-2 tablets (4-8 mg) by mouth every 8 hours as needed for nausea (Patient not taking: Reported on 11/13/2018) 18 tablet 0     order for DME Equipment being ordered:    left knee brace-medial compartment  for osteoarthritis. (Patient not taking: Reported on 11/13/2018) 1 Units 0     oxyCODONE IR (ROXICODONE) 5 MG tablet Take 1-2 tablets (5-10 mg) by mouth every 4 hours as needed for severe pain (Patient not taking: Reported on 11/13/2018) 40 tablet 0     senna-docusate (SENOKOT-S;PERICOLACE) 8.6-50 MG per tablet Take 1-2 tablets by mouth 2 times daily as needed for constipation (Patient not taking: Reported on 11/13/2018) 50 tablet 0        Allergies   Allergen Reactions     Sulfa Drugs Rash         Review of Systems:  -As per HPI  -Constitutional: Otherwise feeling well today, in usual state of health.  -Skin: As above in HPI. No additional skin concerns.    Physical exam:  Vitals: There were no vitals taken for this visit.  GEN: This is a well developed, well-nourished female in no acute distress, in a pleasant mood.    SKIN: Focused examination of the face, head, and neck was performed.  -There is an erythematous macule with overyling adherent scale on the dorsal nasal bridge.  -No other lesions of concern on areas examined.     Impression/Plan:  1. Actinic keratosis    Cryotherapy procedure note: After verbal consent and discussion of risks and benefits including but no limited to dyspigmentation/scar, blister, and pain, 1 was(were) treated with 1-2mm freeze border for 2 cycles with liquid nitrogen. Post cryotherapy instructions were provided.    If unresolved at follow up, recommend biopsy       CC Referred Self, MD  No address on file on close of this encounter.  Follow-up in 6 months, earlier for new or changing lesions.       Dr. Roy staffed the patient.    Staff Involved:  Resident(Octavia Mckeon)/Staff    I have seen and examined this patient and agree  with the assessment and plan as documented in the resident's note, and was present for all procedures.    Aaron Roy MD  Dermatology Attending

## 2019-01-03 NOTE — PROGRESS NOTES
[facility] Dermatology Note      Dermatology Problem List:  1.Actinic keratosis  -S/p cryotherapy 6/28/18, repeat 1/3/19 to dorsal bridge  2.Seborrheic dermatitis  3.Eczematous dermatitis, resolved    Encounter Date: Drew 3, 2019    CC:   Chief Complaint   Patient presents with     Derm Problem     Recheck lesion on nose. Maddie states the lesion has not grown.      History of Present Illness:  Ms. Maddie Lala is a 68 year old female who presents as a follow-up for AK of the dorsal nasal bridge. The patient was last seen 6/28/18 when she received cryotherapy to an AK on the dorsal nasal bridge. She was also noted to have an irritated SK of the right posterior calf s/p LN2 which has resolved. She is well today with no additional skin concerns.  Per the patient, the site on the dorsal nose is still present. It is not overly tender or bothersome, she is just concerned because it has not resolved. She has never had a skin cancer. She wears sunscreen and tried to protect her skin from the sun. She is in her baseline state of health today.     Past Medical History:   Patient Active Problem List   Diagnosis     Urinary incontinence     Cataract     CARDIOVASCULAR SCREENING; LDL GOAL LESS THAN 160     Tendonitis of shoulder, right     Chronic cough     Brachial neuritis or radiculitis     Seasonal allergic rhinitis     Chronic allergic conjunctivitis     GERD (gastroesophageal reflux disease)     Latent tuberculosis     Meniere's disease     Menopause     Cervical nerve root impingement     Atrophic vaginitis     Hyponatremia     Neck pain     Muscle spasms of neck     Benign essential hypertension     AK (actinic keratosis)     Multiple melanocytic nevi     Status post knee surgery     Chronic bronchitis (H)     Past Medical History:   Diagnosis Date     Allergic rhinitis 1979     Arthritis      Benign essential hypertension 3/23/2018     Cervical dysplasia with cone in 1979    nl pap ever since       Dyspareunia      Hearing problem 2015     Meniere's disease 2002     Nonsenile cataract      Plantar fasciitis      Stress incontinence      SVT (supraventricular tachycardia) (H)     resolved     Tinnitus 2002     Past Surgical History:   Procedure Laterality Date     ARTHROPLASTY KNEE Left 10/10/2018    Procedure: ARTHROPLASTY KNEE;  Left Total Knee Arthroplasty    ;  Surgeon: James Amaya MD;  Location: [facility] OR     BLADDER SURGERY       CATARACT IOL, RT/LT  2010     COLONOSCOPY WITH CO2 INSUFFLATION N/A 10/19/2016    Procedure: COLONOSCOPY WITH CO2 INSUFFLATION;  Surgeon: Duane, William Charles, MD;  Location: [facility] OR     GENITOURINARY SURGERY      bladder     Midurethral sling with cystoscopy.  2010     TUBAL LIGATION         Social History:  Patient reports that she quit smoking about 38 years ago. Her smoking use included cigarettes. She has a 10.00 pack-year smoking history. she has never used smokeless tobacco. She reports that she drinks alcohol. She reports that she does not use drugs.    Family History:  Family History   Problem Relation Age of Onset     Heart Disease Mother         older      Osteoporosis Mother      C.A.D. Mother         60's      Macular Degeneration Mother      Respiratory Father         farmers lung     Skin Cancer Father      Alzheimer Disease Maternal Grandmother      Heart Disease Maternal Grandfather      Heart Disease Brother         Afib     Heart Disease Brother      Asthma No family hx of      Diabetes No family hx of      Hypertension No family hx of      Breast Cancer No family hx of      Cancer - colorectal No family hx of      Cancer No family hx of      Thyroid Disease No family hx of      Glaucoma No family hx of      Colon Cancer No family hx of      Melanoma No family hx of        Medications:  Current Outpatient Medications   Medication Sig Dispense Refill     beclomethasone (QVAR) 40 MCG/ACT Inhaler INHALE 2 PUFFS BY MOUTH INTO THE LUNGS TWO TIMES A DAY 3 Inhaler  3     CALCIUM + D OR One bid       Chlorpheniramine Maleate (CHLOR-TRIMETON ALLERGY) 12 MG TBCR Take  by mouth as needed.       estradiol (VAGIFEM) 10 MCG TABS vaginal tablet Place 1 tablet (10 mcg) vaginally twice a week 24 tablet 1     fluocinonide (LIDEX) 0.05 % solution Apply to scalp nightly for up to 1 week, then 2-3 times weekly as needed. 60 mL 3     fluticasone (FLONASE) 50 MCG/ACT spray USE ONE TO TWO SPRAYS IN EACH NOSTRIL EVERY DAY 48 g 3     Ibuprofen (ADVIL PO) Take 800 mg by mouth every 4 hours as needed for moderate pain       lisinopril (PRINIVIL/ZESTRIL) 10 MG tablet TAKE ONE TABLET BY MOUTH EVERY DAY 90 tablet 1     OPTIVITE OR once a day       order for DME Equipment being ordered: spacer 1 each 0     ranitidine (ZANTAC) 300 MG tablet TAKE ONE TABLET BY MOUTH EVERY DAY 90 tablet 2     acetaminophen (TYLENOL) 325 MG tablet Take 1-2 tablets (325-650 mg) by mouth every 4 hours as needed for other or pain (Patient not taking: Reported on 11/13/2018) 100 tablet 0     albuterol (VENTOLIN HFA) 108 (90 BASE) MCG/ACT Inhaler INHALE TWO PUFFS BY MOUTH EVERY 6 HOURS AS NEEDED FOR SHORTNESS OF BREATH / DYSPNEA AND AS NEEDED BEFORE EXERCISE (Patient not taking: Reported on 11/13/2018) 18 g 11     aspirin 325 MG tablet Take 1 tablet (325 mg) by mouth daily (Patient not taking: Reported on 11/13/2018) 14 tablet 0     enoxaparin (LOVENOX) 40 MG/0.4ML injection Inject 0.4 mLs (40 mg) Subcutaneous every 24 hours (Patient not taking: Reported on 11/13/2018) 13 Syringe 0     hydrOXYzine (ATARAX) 25 MG tablet Take 1-2 tablets (25-50 mg) by mouth every 6 hours as needed for itching (Patient not taking: Reported on 11/13/2018) 60 tablet 1     ondansetron (ZOFRAN) 4 MG tablet Take 1-2 tablets (4-8 mg) by mouth every 8 hours as needed for nausea (Patient not taking: Reported on 11/13/2018) 18 tablet 0     order for DME Equipment being ordered:    left knee brace-medial compartment  for osteoarthritis. (Patient  not taking: Reported on 11/13/2018) 1 Units 0     oxyCODONE IR (ROXICODONE) 5 MG tablet Take 1-2 tablets (5-10 mg) by mouth every 4 hours as needed for severe pain (Patient not taking: Reported on 11/13/2018) 40 tablet 0     senna-docusate (SENOKOT-S;PERICOLACE) 8.6-50 MG per tablet Take 1-2 tablets by mouth 2 times daily as needed for constipation (Patient not taking: Reported on 11/13/2018) 50 tablet 0        Allergies   Allergen Reactions     Sulfa Drugs Rash         Review of Systems:  -As per HPI  -Constitutional: Otherwise feeling well today, in usual state of health.  -Skin: As above in HPI. No additional skin concerns.    Physical exam:  Vitals: There were no vitals taken for this visit.  GEN: This is a well developed, well-nourished female in no acute distress, in a pleasant mood.    SKIN: Focused examination of the face, head, and neck was performed.  -There is an erythematous macule with overyling adherent scale on the dorsal nasal bridge.  -No other lesions of concern on areas examined.     Impression/Plan:  1. Actinic keratosis    Cryotherapy procedure note: After verbal consent and discussion of risks and benefits including but no limited to dyspigmentation/scar, blister, and pain, 1 was(were) treated with 1-2mm freeze border for 2 cycles with liquid nitrogen. Post cryotherapy instructions were provided.    If unresolved at follow up, recommend biopsy       CC Referred Self, MD  No address on file on close of this encounter.  Follow-up in 6 months, earlier for new or changing lesions.       Dr. Roy staffed the patient.    Staff Involved:  Resident(Octavia Mckeon)/Staff    I have seen and examined this patient and agree with the assessment and plan as documented in the resident's note, and was present for all procedures.    Aaron Roy MD  Dermatology Attending

## 2019-01-03 NOTE — NURSING NOTE
Dermatology Rooming Note    Maddie Lala's goals for this visit include:   Chief Complaint   Patient presents with     Derm Problem     Recheck lesion on nose. Maddie states the lesion has not grown.      Dinorah Fonseca LPN

## 2019-01-08 ENCOUNTER — OFFICE VISIT (OUTPATIENT)
Dept: ORTHOPEDICS | Facility: CLINIC | Age: 69
End: 2019-01-08
Payer: COMMERCIAL

## 2019-01-08 VITALS
HEART RATE: 82 BPM | DIASTOLIC BLOOD PRESSURE: 87 MMHG | OXYGEN SATURATION: 98 % | TEMPERATURE: 97.6 F | RESPIRATION RATE: 18 BRPM | SYSTOLIC BLOOD PRESSURE: 138 MMHG

## 2019-01-08 DIAGNOSIS — Z47.89 ORTHOPEDIC AFTERCARE: Primary | ICD-10-CM

## 2019-01-08 PROCEDURE — 99024 POSTOP FOLLOW-UP VISIT: CPT | Performed by: ORTHOPAEDIC SURGERY

## 2019-01-08 ASSESSMENT — PAIN SCALES - GENERAL: PAINLEVEL: NO PAIN (0)

## 2019-01-08 NOTE — PROGRESS NOTES
Chief Complaint: Follow Up of the Left Knee (13 wk s/p left total knee arthroplasty. DOS: 10/10/18)       HPI: Maddie Lala returns today in follow-up for her left knee. She reports that she is doing well. She is using minimal pain medication and her ROM in physical tyherapy is at goal with 0-118. She is not quite back to her usual activities but is quite active. She is happy with how she is doing so far.     Medications and allergies are documented in the EMR and have been reviewed.    Current Outpatient Medications:      beclomethasone (QVAR) 40 MCG/ACT Inhaler, INHALE 2 PUFFS BY MOUTH INTO THE LUNGS TWO TIMES A DAY, Disp: 3 Inhaler, Rfl: 3     CALCIUM + D OR, One bid, Disp: , Rfl:      Chlorpheniramine Maleate (CHLOR-TRIMETON ALLERGY) 12 MG TBCR, Take  by mouth as needed., Disp: , Rfl:      estradiol (VAGIFEM) 10 MCG TABS vaginal tablet, Place 1 tablet (10 mcg) vaginally twice a week, Disp: 24 tablet, Rfl: 1     fluocinonide (LIDEX) 0.05 % solution, Apply to scalp nightly for up to 1 week, then 2-3 times weekly as needed., Disp: 60 mL, Rfl: 3     fluticasone (FLONASE) 50 MCG/ACT spray, USE ONE TO TWO SPRAYS IN EACH NOSTRIL EVERY DAY, Disp: 48 g, Rfl: 3     Ibuprofen (ADVIL PO), Take 800 mg by mouth every 4 hours as needed for moderate pain, Disp: , Rfl:      lisinopril (PRINIVIL/ZESTRIL) 10 MG tablet, TAKE ONE TABLET BY MOUTH EVERY DAY, Disp: 90 tablet, Rfl: 1     OPTIVITE OR, once a day, Disp: , Rfl:      order for DME, Equipment being ordered: spacer, Disp: 1 each, Rfl: 0     ranitidine (ZANTAC) 300 MG tablet, TAKE ONE TABLET BY MOUTH EVERY DAY, Disp: 90 tablet, Rfl: 2     acetaminophen (TYLENOL) 325 MG tablet, Take 1-2 tablets (325-650 mg) by mouth every 4 hours as needed for other or pain (Patient not taking: Reported on 11/13/2018), Disp: 100 tablet, Rfl: 0     albuterol (VENTOLIN HFA) 108 (90 BASE) MCG/ACT Inhaler, INHALE TWO PUFFS BY MOUTH EVERY 6 HOURS AS NEEDED FOR SHORTNESS OF BREATH / DYSPNEA AND AS  NEEDED BEFORE EXERCISE (Patient not taking: Reported on 11/13/2018), Disp: 18 g, Rfl: 11     aspirin 325 MG tablet, Take 1 tablet (325 mg) by mouth daily (Patient not taking: Reported on 11/13/2018), Disp: 14 tablet, Rfl: 0     enoxaparin (LOVENOX) 40 MG/0.4ML injection, Inject 0.4 mLs (40 mg) Subcutaneous every 24 hours (Patient not taking: Reported on 11/13/2018), Disp: 13 Syringe, Rfl: 0     hydrOXYzine (ATARAX) 25 MG tablet, Take 1-2 tablets (25-50 mg) by mouth every 6 hours as needed for itching (Patient not taking: Reported on 11/13/2018), Disp: 60 tablet, Rfl: 1     ondansetron (ZOFRAN) 4 MG tablet, Take 1-2 tablets (4-8 mg) by mouth every 8 hours as needed for nausea (Patient not taking: Reported on 11/13/2018), Disp: 18 tablet, Rfl: 0     order for DME, Equipment being ordered:    left knee brace-medial compartment  for osteoarthritis. (Patient not taking: Reported on 11/13/2018), Disp: 1 Units, Rfl: 0     oxyCODONE IR (ROXICODONE) 5 MG tablet, Take 1-2 tablets (5-10 mg) by mouth every 4 hours as needed for severe pain (Patient not taking: Reported on 11/13/2018), Disp: 40 tablet, Rfl: 0     senna-docusate (SENOKOT-S;PERICOLACE) 8.6-50 MG per tablet, Take 1-2 tablets by mouth 2 times daily as needed for constipation (Patient not taking: Reported on 11/13/2018), Disp: 50 tablet, Rfl: 0  Allergies: Sulfa drugs    Physical Exam:  On physical examination the patient appears the stated age, is in no acute distress, affect is appropriate, and breathing is non-labored.  /87 (BP Location: Left arm, Patient Position: Sitting, Cuff Size: Adult Large)   Pulse 82   Temp 97.6  F (36.4  C) (Oral)   Resp 18   SpO2 98%   There is no height or weight on file to calculate BMI.  Gait: mild limp on the left  Incision:healed  ROM: 0-115  Distally, the circulatory, motor, and sensation exam is intact with 5/5 EHL, gastroc-soleus, and tibialis anterior.  Sensation to light touch is intact.  Dorsalis pedis and  posterior tibialis pulses are palpable.  There are no sores on the feet, no bruising, and no lymphedema.    X-rays:    I reviewed the x-rays dated today.  Previous films reviewed.    Findings:  Normal progression for a total knee arthroplasty without evidence of loosening or subsidence.    Assessment: doing well     Plan: cont with rehab and RTC in 6 weeks for a recheck     No ref. provider found

## 2019-01-08 NOTE — LETTER
1/8/2019         RE: Maddie Lala  [address]  [facility] [address]        Dear Colleague,    Thank you for referring your patient, Maddie Lala, to the [facility]. Please see a copy of my visit note below.    Chief Complaint: Follow Up of the Left Knee (13 wk s/p left total knee arthroplasty. DOS: 10/10/18)       HPI: Maddie Lala returns today in follow-up for her left knee. She reports that she is doing well. She is using minimal pain medication and her ROM in physical tyherapy is at goal with 0-118. She is not quite back to her usual activities but is quite active. She is happy with how she is doing so far.     Medications and allergies are documented in the EMR and have been reviewed.    Current Outpatient Medications:      beclomethasone (QVAR) 40 MCG/ACT Inhaler, INHALE 2 PUFFS BY MOUTH INTO THE LUNGS TWO TIMES A DAY, Disp: 3 Inhaler, Rfl: 3     CALCIUM + D OR, One bid, Disp: , Rfl:      Chlorpheniramine Maleate (CHLOR-TRIMETON ALLERGY) 12 MG TBCR, Take  by mouth as needed., Disp: , Rfl:      estradiol (VAGIFEM) 10 MCG TABS vaginal tablet, Place 1 tablet (10 mcg) vaginally twice a week, Disp: 24 tablet, Rfl: 1     fluocinonide (LIDEX) 0.05 % solution, Apply to scalp nightly for up to 1 week, then 2-3 times weekly as needed., Disp: 60 mL, Rfl: 3     fluticasone (FLONASE) 50 MCG/ACT spray, USE ONE TO TWO SPRAYS IN EACH NOSTRIL EVERY DAY, Disp: 48 g, Rfl: 3     Ibuprofen (ADVIL PO), Take 800 mg by mouth every 4 hours as needed for moderate pain, Disp: , Rfl:      lisinopril (PRINIVIL/ZESTRIL) 10 MG tablet, TAKE ONE TABLET BY MOUTH EVERY DAY, Disp: 90 tablet, Rfl: 1     OPTIVITE OR, once a day, Disp: , Rfl:      order for DME, Equipment being ordered: spacer, Disp: 1 each, Rfl: 0     ranitidine (ZANTAC) 300 MG tablet, TAKE ONE TABLET BY MOUTH EVERY DAY, Disp: 90 tablet, Rfl: 2     acetaminophen (TYLENOL) 325 MG tablet, Take 1-2 tablets (325-650 mg) by mouth every 4 hours as needed  for other or pain (Patient not taking: Reported on 11/13/2018), Disp: 100 tablet, Rfl: 0     albuterol (VENTOLIN HFA) 108 (90 BASE) MCG/ACT Inhaler, INHALE TWO PUFFS BY MOUTH EVERY 6 HOURS AS NEEDED FOR SHORTNESS OF BREATH / DYSPNEA AND AS NEEDED BEFORE EXERCISE (Patient not taking: Reported on 11/13/2018), Disp: 18 g, Rfl: 11     aspirin 325 MG tablet, Take 1 tablet (325 mg) by mouth daily (Patient not taking: Reported on 11/13/2018), Disp: 14 tablet, Rfl: 0     enoxaparin (LOVENOX) 40 MG/0.4ML injection, Inject 0.4 mLs (40 mg) Subcutaneous every 24 hours (Patient not taking: Reported on 11/13/2018), Disp: 13 Syringe, Rfl: 0     hydrOXYzine (ATARAX) 25 MG tablet, Take 1-2 tablets (25-50 mg) by mouth every 6 hours as needed for itching (Patient not taking: Reported on 11/13/2018), Disp: 60 tablet, Rfl: 1     ondansetron (ZOFRAN) 4 MG tablet, Take 1-2 tablets (4-8 mg) by mouth every 8 hours as needed for nausea (Patient not taking: Reported on 11/13/2018), Disp: 18 tablet, Rfl: 0     order for DME, Equipment being ordered:    left knee brace-medial compartment  for osteoarthritis. (Patient not taking: Reported on 11/13/2018), Disp: 1 Units, Rfl: 0     oxyCODONE IR (ROXICODONE) 5 MG tablet, Take 1-2 tablets (5-10 mg) by mouth every 4 hours as needed for severe pain (Patient not taking: Reported on 11/13/2018), Disp: 40 tablet, Rfl: 0     senna-docusate (SENOKOT-S;PERICOLACE) 8.6-50 MG per tablet, Take 1-2 tablets by mouth 2 times daily as needed for constipation (Patient not taking: Reported on 11/13/2018), Disp: 50 tablet, Rfl: 0  Allergies: Sulfa drugs    Physical Exam:  On physical examination the patient appears the stated age, is in no acute distress, affect is appropriate, and breathing is non-labored.  /87 (BP Location: Left arm, Patient Position: Sitting, Cuff Size: Adult Large)   Pulse 82   Temp 97.6  F (36.4  C) (Oral)   Resp 18   SpO2 98%   There is no height or weight on file to calculate  BMI.  Gait: mild limp on the left  Incision:healed  ROM: 0-115  Distally, the circulatory, motor, and sensation exam is intact with 5/5 EHL, gastroc-soleus, and tibialis anterior.  Sensation to light touch is intact.  Dorsalis pedis and posterior tibialis pulses are palpable.  There are no sores on the feet, no bruising, and no lymphedema.    X-rays:    I reviewed the x-rays dated today.  Previous films reviewed.    Findings:  Normal progression for a total knee arthroplasty without evidence of loosening or subsidence.    Assessment: doing well     Plan: cont with rehab and RTC in 6 weeks for a recheck     No ref. provider found      Again, thank you for allowing me to participate in the care of your patient.        Sincerely,        James Amaya MD

## 2019-01-08 NOTE — NURSING NOTE
Maddie Lala's chief complaint for this visit includes:  Chief Complaint   Patient presents with     Left Knee - Follow Up     13 wk s/p left total knee arthroplasty. DOS: 10/10/18     PCP: Radha Hurtado    Referring Provider:  No referring provider defined for this encounter.    /87 (BP Location: Left arm, Patient Position: Sitting, Cuff Size: Adult Large)   Pulse 82   Temp 97.6  F (36.4  C) (Oral)   Resp 18   SpO2 98%   No Pain (0)     Do you need any medication refills at today's visit? No      Jose Garcia CMA (AAMA)

## 2019-01-08 NOTE — PATIENT INSTRUCTIONS
Thanks for coming today.  [facility]  [address] [place] [address]    To schedule future appointments in Ortho Clinic, you may call [phone].    To schedule ordered imaging by your provider:   Call Central Imaging [phone]    To schedule an injection ordered by your provider:  Call Central Imaging Injection scheduling line: [phone]  [facility] available online at:  [email or web address]    Please call if any further questions or concerns ([phone]).  Clinic hours 8 am to 5 pm.    Return to clinic (call) if symptoms worsen or fail to improve.

## 2019-01-10 ENCOUNTER — HOSPITAL ENCOUNTER (OUTPATIENT)
Dept: PHYSICAL THERAPY | Facility: CLINIC | Age: 69
Setting detail: THERAPIES SERIES
End: 2019-01-10
Attending: ORTHOPAEDIC SURGERY
Payer: COMMERCIAL

## 2019-01-10 ENCOUNTER — MYC MEDICAL ADVICE (OUTPATIENT)
Dept: ORTHOPEDICS | Facility: CLINIC | Age: 69
End: 2019-01-10

## 2019-01-10 PROCEDURE — 97110 THERAPEUTIC EXERCISES: CPT | Mod: GP | Performed by: PHYSICAL THERAPIST

## 2019-01-10 PROCEDURE — 97112 NEUROMUSCULAR REEDUCATION: CPT | Mod: GP | Performed by: PHYSICAL THERAPIST

## 2019-01-17 ENCOUNTER — HOSPITAL ENCOUNTER (OUTPATIENT)
Dept: PHYSICAL THERAPY | Facility: CLINIC | Age: 69
Setting detail: THERAPIES SERIES
End: 2019-01-17
Attending: ORTHOPAEDIC SURGERY
Payer: COMMERCIAL

## 2019-01-17 PROCEDURE — 97110 THERAPEUTIC EXERCISES: CPT | Mod: GP | Performed by: PHYSICAL THERAPIST

## 2019-01-30 ENCOUNTER — HOSPITAL ENCOUNTER (OUTPATIENT)
Dept: PHYSICAL THERAPY | Facility: CLINIC | Age: 69
Setting detail: THERAPIES SERIES
End: 2019-01-30
Attending: ORTHOPAEDIC SURGERY
Payer: COMMERCIAL

## 2019-01-30 PROCEDURE — 97110 THERAPEUTIC EXERCISES: CPT | Mod: GP | Performed by: PHYSICAL THERAPIST

## 2019-01-30 NOTE — PROGRESS NOTES
Outpatient Physical Therapy Discharge Note     Patient: Maddie Lala  : 1950    Beginning/End Dates of Reporting Period:  18 to 2019    Referring Provider: James Amaya MD    Therapy Diagnosis: L LE pain, decreased ROM, decreased strength, decreased balance, impaired gait     Client Self Report: The pt notes that she is no going on [employer] with medicare now since her  retired. She will then be able to use a recumbant bike at any gym. She is doing yoga every week. She is back to 2 miles on [employer]. She is lifting her upper body. The patient notes that she does not like to exercise in front of people. Pt ntoes that she still has pain with navigating stairs. 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    Objective Measurements:  Objective Measure: pain  Details: left knee achiness        Goals:  Goal Identifier HEP   Goal Description Pt will be compliant with HEP to assist with addressing L knee pain, decreased strength, decreased ROM, and impaired gait.   Target Date 18   Date Met  18   Progress: goal met     Goal Identifier ambulation   Goal Description Pt will be able to ambulate on uneven surfaces without an assistive device greater than 250ft with no greater than 1/10 L knee pain.   Target Date 19   Date Met  18   Progress: goal met     Goal Identifier stairs   Goal Description Pt will be able to navigate up and down a flight of stairs independently with no greater than 1/10 L knee pain.   Target Date 19   Date Met  18   Progress: goal met         Progress Toward Goals:   Progress this reporting period: Pt has met all of her goals. She continues to have minimal L knee soreness with stairs. The pt also has difficulty performing yoga poses requiring maximum knee flexion. Pt expresses understanding that her L knee flexion will likely not be the same as on her R side. She will continue to benefit from performing her HEP to further strengthen  quads and glutes to continue to improve pt's activity tolerance and strength.      Plan:  Discharge from therapy.    Discharge:    Reason for Discharge: Patient has met all goals.    Equipment Issued: n/a    Discharge Plan: Patient to continue home program.    Thank you for your referral.    Ariane Alcantara, PT, DTP  [facility]  [phone]

## 2019-02-25 ENCOUNTER — MYC MEDICAL ADVICE (OUTPATIENT)
Dept: FAMILY MEDICINE | Facility: CLINIC | Age: 69
End: 2019-02-25

## 2019-02-25 DIAGNOSIS — H10.45 CHRONIC ALLERGIC CONJUNCTIVITIS: ICD-10-CM

## 2019-02-26 NOTE — TELEPHONE ENCOUNTER
Pending Prescriptions:                       Disp   Refills    fluticasone (FLONASE) 50 MCG/ACT nasal sp*48 g   3            Sig: USE ONE TO TWO SPRAYS IN EACH NOSTRIL EVERY DAY

## 2019-02-27 RX ORDER — FLUTICASONE PROPIONATE 50 MCG
SPRAY, SUSPENSION (ML) NASAL
Qty: 48 G | Refills: 3 | Status: SHIPPED | OUTPATIENT
Start: 2019-02-27 | End: 2020-04-08

## 2019-02-27 NOTE — TELEPHONE ENCOUNTER
Routing patient's MyChart question to provider:  2.  As I am writing this, I see I am overdue for a couple of things.  I did have my pre-op physical in November and wondering if I should schedule a physical also.   Thank you so much for your assistance.   Maddie Espinosae:  Prescription approved per [facility] Refill Protocol.      Susanne Beaevr RN, BSN

## 2019-03-18 ASSESSMENT — ENCOUNTER SYMPTOMS
SORE THROAT: 0
DIZZINESS: 0
COUGH: 0
DYSURIA: 0
DIARRHEA: 0
CONSTIPATION: 0
NERVOUS/ANXIOUS: 0
BREAST MASS: 0
ABDOMINAL PAIN: 0
JOINT SWELLING: 0
SHORTNESS OF BREATH: 0
FEVER: 0
HEARTBURN: 0
HEMATOCHEZIA: 0
ARTHRALGIAS: 0
HEMATURIA: 0
PALPITATIONS: 0
WEAKNESS: 0
MYALGIAS: 1
NAUSEA: 0
FREQUENCY: 0
CHILLS: 0
PARESTHESIAS: 0
HEADACHES: 0
EYE PAIN: 0

## 2019-03-18 ASSESSMENT — ACTIVITIES OF DAILY LIVING (ADL): CURRENT_FUNCTION: NO ASSISTANCE NEEDED

## 2019-03-19 ENCOUNTER — OFFICE VISIT (OUTPATIENT)
Dept: FAMILY MEDICINE | Facility: CLINIC | Age: 69
End: 2019-03-19
Payer: COMMERCIAL

## 2019-03-19 VITALS
SYSTOLIC BLOOD PRESSURE: 130 MMHG | DIASTOLIC BLOOD PRESSURE: 76 MMHG | HEART RATE: 66 BPM | OXYGEN SATURATION: 98 % | BODY MASS INDEX: 26.62 KG/M2 | WEIGHT: 169.6 LBS | TEMPERATURE: 97.3 F | HEIGHT: 67 IN

## 2019-03-19 DIAGNOSIS — L21.9 DERMATITIS, SEBORRHEIC: ICD-10-CM

## 2019-03-19 DIAGNOSIS — N95.2 VAGINAL ATROPHY: ICD-10-CM

## 2019-03-19 DIAGNOSIS — I10 BENIGN ESSENTIAL HYPERTENSION: ICD-10-CM

## 2019-03-19 DIAGNOSIS — Z23 NEED FOR SHINGLES VACCINE: ICD-10-CM

## 2019-03-19 DIAGNOSIS — B07.9 VIRAL WARTS, UNSPECIFIED TYPE: ICD-10-CM

## 2019-03-19 DIAGNOSIS — Z12.31 VISIT FOR SCREENING MAMMOGRAM: ICD-10-CM

## 2019-03-19 DIAGNOSIS — R05.3 CHRONIC COUGH: ICD-10-CM

## 2019-03-19 DIAGNOSIS — Z00.00 ROUTINE GENERAL MEDICAL EXAMINATION AT A HEALTH CARE FACILITY: Primary | ICD-10-CM

## 2019-03-19 PROCEDURE — 90750 HZV VACC RECOMBINANT IM: CPT | Performed by: FAMILY MEDICINE

## 2019-03-19 PROCEDURE — 99213 OFFICE O/P EST LOW 20 MIN: CPT | Mod: 25 | Performed by: FAMILY MEDICINE

## 2019-03-19 PROCEDURE — 90471 IMMUNIZATION ADMIN: CPT | Performed by: FAMILY MEDICINE

## 2019-03-19 PROCEDURE — 99397 PER PM REEVAL EST PAT 65+ YR: CPT | Mod: 25 | Performed by: FAMILY MEDICINE

## 2019-03-19 PROCEDURE — 17110 DESTRUCTION B9 LES UP TO 14: CPT | Performed by: FAMILY MEDICINE

## 2019-03-19 RX ORDER — ESTRADIOL 10 UG/1
10 INSERT VAGINAL
Qty: 24 TABLET | Refills: 3 | Status: SHIPPED | OUTPATIENT
Start: 2019-03-21 | End: 2020-04-02

## 2019-03-19 RX ORDER — LISINOPRIL 10 MG/1
10 TABLET ORAL DAILY
Qty: 90 TABLET | Refills: 1 | Status: SHIPPED | OUTPATIENT
Start: 2019-03-19 | End: 2019-11-21

## 2019-03-19 RX ORDER — FLUOCINONIDE TOPICAL SOLUTION USP, 0.05% 0.5 MG/ML
SOLUTION TOPICAL
Qty: 60 ML | Refills: 3 | Status: SHIPPED | OUTPATIENT
Start: 2019-03-19 | End: 2020-07-10

## 2019-03-19 ASSESSMENT — ENCOUNTER SYMPTOMS
PALPITATIONS: 0
MYALGIAS: 1
HEARTBURN: 0
CHILLS: 0
CONSTIPATION: 0
NAUSEA: 0
ABDOMINAL PAIN: 0
HEADACHES: 0
BREAST MASS: 0
JOINT SWELLING: 0
WEAKNESS: 0
DYSURIA: 0
DIZZINESS: 0
FREQUENCY: 0
SHORTNESS OF BREATH: 0
COUGH: 0
NERVOUS/ANXIOUS: 0
SORE THROAT: 0
EYE PAIN: 0
HEMATOCHEZIA: 0
HEMATURIA: 0
FEVER: 0
PARESTHESIAS: 0
ARTHRALGIAS: 0
DIARRHEA: 0

## 2019-03-19 ASSESSMENT — ACTIVITIES OF DAILY LIVING (ADL): CURRENT_FUNCTION: NO ASSISTANCE NEEDED

## 2019-03-19 ASSESSMENT — MIFFLIN-ST. JEOR: SCORE: 1320.8

## 2019-03-19 ASSESSMENT — PAIN SCALES - GENERAL: PAINLEVEL: NO PAIN (0)

## 2019-03-19 NOTE — PROGRESS NOTES
"   SUBJECTIVE:   CC: Maddie Lala is an 69 year old woman who presents for preventive health visit.     Annual Wellness Visit     In general, how would you rate your overall health?  Good    Frequency of exercise:  4-5 days/week    Do you usually eat at least 4 servings of fruit and vegetables a day, include whole grains    & fiber and avoid regularly eating high fat or \"junk\" foods?  Yes    Taking medications regularly:  Yes    Medication side effects:  None    Ability to successfully perform activities of daily living:  No assistance needed    Home Safety:  Lack of grab bars in the bathroom    Hearing Impairment:  Difficulty following a conversation in a noisy restaurant or crowded room, feel that people are mumbling or not speaking clearly, need to ask people to speak up or repeat themselves, difficulty understanding soft or whispered speech and difficulty understanding speech on the telephone    In the past 6 months, have you been bothered by leaking of urine? Yes    In general, how would you rate your overall mental or emotional health?  Good    PHQ-2 Total Score: 0    Additional concerns today:  No          Skin lesion  Onset: months    Description:     Location: right shin  Number of lesions: 1  Painful: no     Accompanying Signs & Symptoms:  Signs of infection: no     History:   History of trauma: no   Prior moles: YES    Therapies Tried and outcome: none - requesting liquid nitrogen    Today's PHQ-2 Score:   PHQ-2 ( 1999 Pfizer) 3/18/2019   Q1: Little interest or pleasure in doing things 0   Q2: Feeling down, depressed or hopeless 0   PHQ-2 Score 0   Q1: Little interest or pleasure in doing things Not at all   Q2: Feeling down, depressed or hopeless Not at all   PHQ-2 Score 0       Abuse: Current or Past(Physical, Sexual or Emotional)- No  Do you feel safe in your environment? Yes    Social History     Tobacco Use     Smoking status: Former Smoker     Packs/day: 1.00     Years: 10.00     Pack years: 10.00 "     Types: Cigarettes     Last attempt to quit: 1981     Years since quittin.2     Smokeless tobacco: Never Used   Substance Use Topics     Alcohol use: Yes     Comment: occ - Glass of white wine per night     Alcohol Use 3/18/2019   If you drink alcohol do you typically have greater than 3 drinks per day OR greater than 7 drinks per week? No       Reviewed orders with patient.  Reviewed health maintenance and updated orders accordingly - Yes  BP Readings from Last 3 Encounters:   19 130/76   19 138/87   18 130/83    Wt Readings from Last 3 Encounters:   19 76.9 kg (169 lb 9.6 oz)   10/10/18 78.3 kg (172 lb 9.9 oz)   18 79.9 kg (176 lb 3.2 oz)                    Mammogram Screening: Patient over age 50, mutual decision to screen reflected in health maintenance.    Pertinent mammograms are reviewed under the imaging tab.  History of abnormal Pap smear: NO - age 65 - see link Cervical Cytology Screening Guidelines  PAP / HPV 10/15/2014 10/5/2011   PAP NIL NIL     Reviewed and updated as needed this visit by clinical staff  Tobacco  Allergies  Meds  Med Hx  Surg Hx  Fam Hx  Soc Hx        Reviewed and updated as needed this visit by Provider  Tobacco  Allergies  Meds  Med Hx  Surg Hx  Fam Hx  Soc Hx       Past Medical History:   Diagnosis Date     Allergic rhinitis      Arthritis      Benign essential hypertension 3/23/2018     Cervical dysplasia with cone in     nl pap ever since      Dyspareunia      Hearing problem 2015     Meniere's disease 2002     Nonsenile cataract      Plantar fasciitis      Stress incontinence      SVT (supraventricular tachycardia) (H)     resolved     Tinnitus       Past Surgical History:   Procedure Laterality Date     ARTHROPLASTY KNEE Left 10/10/2018    Procedure: ARTHROPLASTY KNEE;  Left Total Knee Arthroplasty    ;  Surgeon: James Amaya MD;  Location: [facility] OR     BLADDER SURGERY       CATARACT IOL, RT/LT        COLONOSCOPY WITH CO2 INSUFFLATION N/A 10/19/2016    Procedure: COLONOSCOPY WITH CO2 INSUFFLATION;  Surgeon: Duane, William Charles, MD;  Location: [facility] OR     GENITOURINARY SURGERY      bladder     Midurethral sling with cystoscopy.  2010     TUBAL LIGATION       Obstetric History       T2      L2     SAB0   TAB0   Ectopic0   Multiple0   Live Births2       # Outcome Date GA Lbr Efrain/2nd Weight Sex Delivery Anes PTL Lv   2 Term      -SEC   ARACELI   1 Term      -SEC   ARACELI          Review of Systems   Constitutional: Negative for chills and fever.   HENT: Negative for congestion, ear pain, hearing loss and sore throat.    Eyes: Negative for pain and visual disturbance.   Respiratory: Negative for cough and shortness of breath.    Cardiovascular: Positive for peripheral edema. Negative for chest pain and palpitations.   Gastrointestinal: Negative for abdominal pain, constipation, diarrhea, heartburn, hematochezia and nausea.   Breasts:  Negative for tenderness, breast mass and discharge.   Genitourinary: Positive for vaginal discharge. Negative for dysuria, frequency, genital sores, hematuria, pelvic pain, urgency and vaginal bleeding.   Musculoskeletal: Positive for myalgias. Negative for arthralgias and joint swelling.   Skin: Negative for rash.   Neurological: Negative for dizziness, weakness, headaches and paresthesias.   Psychiatric/Behavioral: Negative for mood changes. The patient is not nervous/anxious.      Swelling in the left knee area, distal edema at times as well since TKA.  Improving gradually.  Continues rehabing knee.  5 miles on elliptical now - less than presurgery but improving.  Aching in the left calf with exercise.    Noting a clear mucus type vaginal discharge since using the vagifem.  Thinks some improvement in vaginal dryness.         OBJECTIVE:   /76 (BP Location: Right arm, Patient Position: Chair, Cuff Size: Adult Large)   Pulse 66   Temp 97.3  F (36.3  C)  "(Tympanic)   Ht 1.7 m (5' 6.93\")   Wt 76.9 kg (169 lb 9.6 oz)   SpO2 98%   Breastfeeding? No   BMI 26.62 kg/m    Physical Exam  GENERAL APPEARANCE: healthy, alert and no distress  EYES: Eyes grossly normal to inspection, PERRL and conjunctivae and sclerae normal  HENT: ear canals and TM's normal, nose and mouth without ulcers or lesions, oropharynx clear and oral mucous membranes moist  NECK: no adenopathy, no asymmetry, masses, or scars and thyroid normal to palpation  RESP: lungs clear to auscultation - no rales, rhonchi or wheezes  BREAST: normal without masses, tenderness or nipple discharge and no palpable axillary masses or adenopathy  CV: regular rate and rhythm, normal S1 S2, no S3 or S4, no murmur, click or rub, no peripheral edema and peripheral pulses strong  ABDOMEN: soft, nontender, no hepatosplenomegaly, no masses and bowel sounds normal   (female): normal female external genitalia, normal urethral meatus, vaginal mucosal atrophy noted, normal cervix, adnexae, and uterus without masses or abnormal discharge  MS: no musculoskeletal defects are noted, gait is age appropriate without ataxia, LLE exam reveals no erythema, induration, or nodules, no evidence of joint instability and effusion with mild warmth to touch.    SKIN: scalp with scaling and thickened skin with mild erythema right occipital area. Right pretibial area with 2 mm warty growth noted.  No surrounding erythema.  Consent obtained - Liquid nitrogen was applied for 10-12 seconds to the skin lesion and the expected blistering or scabbing reaction explained. Do not pick at the area. Patient reminded to expect hypopigmented scars from the procedure. Return if lesion fails to fully resolve.  NEURO: Normal strength and tone, sensory exam grossly normal, mentation intact and speech normal  PSYCH: mentation appears normal and affect normal/bright        ASSESSMENT/PLAN:   1. Routine general medical examination at a [facility]" "facility  Return to clinic for fasting labs.  Labs from surgery reviewed.    2. Dermatitis, seborrheic  Script for prn use.  - fluocinonide (LIDEX) 0.05 % external solution; Apply to scalp nightly for up to 1 week, then 2-3 times weekly as needed.  Dispense: 60 mL; Refill: 3      3. Chronic cough  Continue QVAR for prevention of symptoms.  - beclomethasone HFA (QVAR REDIHALER) 40 MCG/ACT inhaler; Inhale 2 puffs into the lungs 2 times daily  Dispense: 3 Inhaler; Refill: 3      4. Vaginal atrophy  refill  - estradiol (VAGIFEM) 10 MCG TABS vaginal tablet; Place 1 tablet (10 mcg) vaginally twice a week  Dispense: 24 tablet; Refill: 3    5. Benign essential hypertension  refill  - lisinopril (PRINIVIL/ZESTRIL) 10 MG tablet; Take 1 tablet (10 mg) by mouth daily  Dispense: 90 tablet; Refill: 1  - Lipid panel reflex to direct LDL Fasting; Future  - Albumin Random Urine Quantitative with Creat Ratio; Future    6. Visit for screening mammogram  - MA SCREENING DIGITAL BILAT - Future  (s+30); Future    7. Need for shingles vaccine  - ZOSTER VACCINE RECOMBINANT ADJUVANTED IM NJX  - ADMIN 1st VACCINE    8. Viral warts, unspecified type  Treatment as noted.  Follow up if not resolved.  - Treat Benign Wart or Mulloscum Contagiosum or Milia up to 14 lesions (No quantity required)    COUNSELING:  Reviewed preventive health counseling, as reflected in patient instructions       Regular exercise       Healthy diet/nutrition       Vision screening       Immunizations    Vaccinated for: Zoster             Osteoporosis Prevention/Bone Health       Colon cancer screening       (Isabel)menopause management    BP Readings from Last 1 Encounters:   03/19/19 130/76     Estimated body mass index is 26.62 kg/m  as calculated from the following:    Height as of this encounter: 1.7 m (5' 6.93\").    Weight as of this encounter: 76.9 kg (169 lb 9.6 oz).           reports that she quit smoking about 38 years ago. Her smoking use included cigarettes. " She has a 10.00 pack-year smoking history. she has never used smokeless tobacco.      Counseling Resources:  ATP IV Guidelines  Pooled Cohorts Equation Calculator  Breast Cancer Risk Calculator  FRAX Risk Assessment  ICSI Preventive Guidelines  Dietary Guidelines for Americans, 2010  USDA's MyPlate  ASA Prophylaxis  Lung CA Screening    Radha Hurtado MD  [facility]      Patient Instructions     Fasting labs recommended.  Orders are in place for this.    For the area on leg frozen today - follow up in 3-4 weeks if not resolved for repeat freezing.    Continue  Home exercises for knee pain/rehab.    Refill medications today.

## 2019-03-19 NOTE — PATIENT INSTRUCTIONS
Fasting labs recommended.  Orders are in place for this.    For the area on leg frozen today - follow up in 3-4 weeks if not resolved for repeat freezing.    Continue  Home exercises for knee pain/rehab.    Refill medications today.      Preventive Health Recommendations    See your health care provider every year to    Review health changes.     Discuss preventive care.      Review your medicines if your doctor has prescribed any.      You no longer need a yearly Pap test unless you've had an abnormal Pap test in the past 10 years. If you have vaginal symptoms, such as bleeding or discharge, be sure to talk with your provider about a Pap test.      Every 1 to 2 years, have a mammogram.  If you are over 69, talk with your health care provider about whether or not you want to continue having screening mammograms.      Every 10 years, have a colonoscopy. Or, have a yearly FIT test (stool test). These exams will check for colon cancer.       Have a cholesterol test every 5 years, or more often if your doctor advises it.       Have a diabetes test (fasting glucose) every three years. If you are at risk for diabetes, you should have this test more often.       At age 65, have a bone density scan (DEXA) to check for osteoporosis (brittle bone disease).    Shots:    Get a flu shot each year.    Get a tetanus shot every 10 years.    Talk to your doctor about your pneumonia vaccines. There are now two you should receive - Pneumovax (PPSV 23) and Prevnar (PCV 13).    Talk to your pharmacist about the shingles vaccine.    Talk to your doctor about the hepatitis B vaccine.    Nutrition:     Eat at least 5 servings of fruits and vegetables each day.      Eat whole-grain bread, whole-wheat pasta and brown rice instead of white grains and rice.      Get adequate about Calcium and Vitamin D.     Lifestyle    Exercise at least 150 minutes a week (30 minutes a day, 5 days a week). This will help you control your weight and prevent  disease.      Limit alcohol to one drink per day.      No smoking.       Wear sunscreen to prevent skin cancer.       See your dentist twice a year for an exam and cleaning.      See your eye doctor every 1 to 2 years to screen for conditions such as glaucoma, macular degeneration, cataracts, etc.    Personalized Prevention Plan  You are due for the preventive services outlined below.  Your care team is available to assist you in scheduling these services.  If you have already completed any of these items, please share that information with your care team to update in your medical record.    Health Maintenance Due   Topic Date Due     COPD ACTION PLAN Q1 YR  1950     Zoster (Shingles) Vaccine (2 of 3) 01/16/2013     Annual Wellness Visit  11/16/2018     Mammogram - every 2 years  01/31/2019     FALL RISK ASSESSMENT  03/20/2019

## 2019-03-19 NOTE — NURSING NOTE
Screening Questionnaire for Adult Immunization    Are you sick today?   No   Do you have allergies to medications, food, a vaccine component or latex?   No   Have you ever had a serious reaction after receiving a vaccination?   No   Do you have a long-term health problem with heart disease, lung disease, asthma, kidney disease, metabolic disease (e.g. diabetes), anemia, or other blood disorder?   No   Do you have cancer, leukemia, HIV/AIDS, or any other immune system problem?   No   In the past 3 months, have you taken medications that affect  your immune system, such as prednisone, other steroids, or anticancer drugs; drugs for the treatment of rheumatoid arthritis, Crohn s disease, or psoriasis; or have you had radiation treatments?   No   Have you had a seizure, or a brain or other nervous system problem?   No   During the past year, have you received a transfusion of blood or blood     products, or been given immune (gamma) globulin or antiviral drug?   No   For women: Are you pregnant or is there a chance you could become        pregnant during the next month?   No   Have you received any vaccinations in the past 4 weeks?   No     Immunization questionnaire answers were all negative.      Patient instructed to remain in clinic for 15 minutes afterwards, and to report any adverse reaction to me immediately.       Screening performed by Orville Crowell on 3/19/2019 at 12:21 PM.

## 2019-04-29 ENCOUNTER — ANCILLARY PROCEDURE (OUTPATIENT)
Dept: MAMMOGRAPHY | Facility: CLINIC | Age: 69
End: 2019-04-29
Attending: FAMILY MEDICINE
Payer: COMMERCIAL

## 2019-04-29 DIAGNOSIS — Z12.31 VISIT FOR SCREENING MAMMOGRAM: ICD-10-CM

## 2019-04-29 PROCEDURE — 77067 SCR MAMMO BI INCL CAD: CPT | Performed by: RADIOLOGY

## 2019-05-08 DIAGNOSIS — I10 BENIGN ESSENTIAL HYPERTENSION: ICD-10-CM

## 2019-05-08 RX ORDER — LISINOPRIL 10 MG/1
10 TABLET ORAL DAILY
Qty: 90 TABLET | Refills: 1 | Status: CANCELLED | OUTPATIENT
Start: 2019-05-08

## 2019-05-08 NOTE — TELEPHONE ENCOUNTER
"Requested Prescriptions   Pending Prescriptions Disp Refills     lisinopril (PRINIVIL/ZESTRIL) 10 MG tablet 90 tablet 1     Sig: Take 1 tablet (10 mg) by mouth daily       ACE Inhibitors (Including Combos) Protocol Passed - 5/8/2019  8:04 AM        Passed - Blood pressure under 140/90 in past 12 months     BP Readings from Last 3 Encounters:   03/19/19 130/76   01/08/19 138/87   11/13/18 130/83                 Passed - Recent (12 mo) or future (30 days) visit within the authorizing provider's specialty     Patient had office visit in the last 12 months or has a visit in the next 30 days with authorizing provider or within the authorizing provider's specialty.  See \"Patient Info\" tab in inbasket, or \"Choose Columns\" in Meds & Orders section of the refill encounter.              Passed - Medication is active on med list        Passed - Patient is age 18 or older        Passed - No active pregnancy on record        Passed - Normal serum creatinine on file in past 12 months     Recent Labs   Lab Test 10/12/18  0614   CR 0.61             Passed - Normal serum potassium on file in past 12 months     Recent Labs   Lab Test 10/12/18  0614   POTASSIUM 4.2             Passed - No positive pregnancy test within past 12 months        lisinopril (PRINIVIL/ZESTRIL) 10 MG tablet  Last Written Prescription Date:  3/19/19  Last Fill Quantity: 90,  # refills: 1   Last office visit: 3/19/2019 with prescribing provider:  Dr. Hurtado   Future Office Visit:      "

## 2019-05-09 NOTE — TELEPHONE ENCOUNTER
Outpatient Medication Detail      Disp Refills Start End DA   lisinopril (PRINIVIL/ZESTRIL) 10 MG tablet 90 tablet 1 3/19/2019  No   Sig - Route: Take 1 tablet (10 mg) by mouth daily - Oral   Sent to pharmacy as: lisinopril (PRINIVIL/ZESTRIL) 10 MG tablet   Class: E-Prescribe   Order: [id number]   E-Prescribing Status: Receipt confirmed by pharmacy (3/19/2019 12:04 PM CDT)   Printout Tracking     External Result Report   Pharmacy     [id number] - [place], [place] - [address]     A six month supply of Rx sent to requesting pharmacy in March 2019. [employer] message sent to patient advising on contacting pharmacy directly to request a refill.     Saray Watts RN, BSN

## 2019-07-08 DIAGNOSIS — K21.9 GASTROESOPHAGEAL REFLUX DISEASE WITHOUT ESOPHAGITIS: ICD-10-CM

## 2019-07-08 NOTE — TELEPHONE ENCOUNTER
"Requested Prescriptions   Pending Prescriptions Disp Refills     ranitidine (ZANTAC) 300 MG tablet 90 tablet 2     Sig: Take 1 tablet (300 mg) by mouth daily       H2 Blockers Protocol Passed - 7/8/2019  4:08 PM        Passed - Patient is age 12 or older        Passed - Recent (12 mo) or future (30 days) visit within the authorizing provider's specialty     Patient had office visit in the last 12 months or has a visit in the next 30 days with authorizing provider or within the authorizing provider's specialty.  See \"Patient Info\" tab in inbasket, or \"Choose Columns\" in Meds & Orders section of the refill encounter.              Passed - Medication is active on med list        ranitidine (ZANTAC) 300 MG tablet  Last Written Prescription Date:  3/27/18  Last Fill Quantity: 90,  # refills: 2   Last office visit: 3/19/2019 with prescribing provider:  Dr. Hurtado   Future Office Visit:      "

## 2019-07-10 ENCOUNTER — MYC REFILL (OUTPATIENT)
Dept: FAMILY MEDICINE | Facility: CLINIC | Age: 69
End: 2019-07-10

## 2019-07-10 ENCOUNTER — MYC MEDICAL ADVICE (OUTPATIENT)
Dept: FAMILY MEDICINE | Facility: CLINIC | Age: 69
End: 2019-07-10

## 2019-07-10 DIAGNOSIS — K21.9 GASTROESOPHAGEAL REFLUX DISEASE WITHOUT ESOPHAGITIS: ICD-10-CM

## 2019-07-11 NOTE — TELEPHONE ENCOUNTER
Prescription approved per [facility] Refill Protocol.      Susanne Beaver RN, BSN, PHN

## 2019-07-12 NOTE — TELEPHONE ENCOUNTER
Outpatient Medication Detail      Disp Refills Start End DA   ranitidine (ZANTAC) 300 MG tablet 90 tablet 1 7/11/2019  No   Sig - Route: Take 1 tablet (300 mg) by mouth daily - Oral   Sent to pharmacy as: ranitidine (ZANTAC) 300 MG tablet   Class: E-Prescribe   Order: [id number]   E-Prescribing Status: Receipt confirmed by pharmacy (7/11/2019  7:23 AM CDT)   Printout Tracking     External Result Report   Pharmacy     [facility] 2019 - [place], [place] - [facility] [address]     Rx filled yesterday for a six month supply.     Saray Nair, PAPITON, RN, PHN

## 2019-08-22 ENCOUNTER — OFFICE VISIT (OUTPATIENT)
Dept: FAMILY MEDICINE | Facility: CLINIC | Age: 69
End: 2019-08-22
Payer: COMMERCIAL

## 2019-08-22 VITALS
TEMPERATURE: 98.1 F | SYSTOLIC BLOOD PRESSURE: 128 MMHG | HEART RATE: 83 BPM | DIASTOLIC BLOOD PRESSURE: 74 MMHG | OXYGEN SATURATION: 98 %

## 2019-08-22 DIAGNOSIS — B35.4 TINEA CORPORIS: ICD-10-CM

## 2019-08-22 DIAGNOSIS — M79.10 MYALGIA: ICD-10-CM

## 2019-08-22 DIAGNOSIS — B07.8 COMMON WART: Primary | ICD-10-CM

## 2019-08-22 PROCEDURE — 99213 OFFICE O/P EST LOW 20 MIN: CPT | Mod: 25 | Performed by: FAMILY MEDICINE

## 2019-08-22 PROCEDURE — 17110 DESTRUCTION B9 LES UP TO 14: CPT | Performed by: FAMILY MEDICINE

## 2019-08-22 ASSESSMENT — PAIN SCALES - GENERAL: PAINLEVEL: NO PAIN (0)

## 2019-08-22 NOTE — PATIENT INSTRUCTIONS
Continue clotrimazole twice a day until clear - if symptoms not improved significantly in next 7-10 days notify me for medication change.    Try mixing hydrocortisone and clotrimazole if needed for itching.    Stretching and massage to the left anterior leg - follow up if worsening.  PHYSICAL THERAPY may be beneficial if persistent.    Freezing wart today.  Follow up in 4 weeks if not resolved.

## 2019-08-22 NOTE — PROGRESS NOTES
Subjective     Maddie Lala is a 69 year old female who presents to clinic today for the following health issues:    HPI     Musculoskeletal   - LTKA on 10/10/18  Patient explains that she continues to experience muscle tightness in the left calf muscle that waxes and wanes. she has some edema in her left knee.  No weakness or other pain described.  Did PHYSICAL THERAPY and now uses deep massage to the lateral calf area with relief/improvement of symptoms.          Leg Pain    Onset: 6 months    Description: cramps in lower leg/calf  Location: left calf  Character: Cramping    Intensity: mild, moderate    Progression of Symptoms: same    Accompanying Signs & Symptoms:  Other symptoms: none    History:   Previous similar pain: no       Precipitating factors:   Trauma or overuse: no     Alleviating factors:  Improved by: nothing    Therapies Tried and outcome: heat, massage- no relief      WART(S)  Onset: ongoing    Description:   Location: left leg  Number of warts: 1  Painful: no     Accompanying Signs & Symptoms:  Signs of infection: no     History:   History of trauma: no   Prior warts: no     Therapies Tried and outcome: none         Rash  Onset: a couple months    Description:   Location: right ankle - posterior   Character: round, red  Itching (Pruritis): YES    Progression of Symptoms:  same    Accompanying Signs & Symptoms:  Fever: no   Body aches or joint pain: no   Sore throat symptoms: no   Recent cold symptoms: no     History:   Previous similar rash: no     Precipitating factors:   Exposure to similar rash: no   New exposures: None   Recent travel: no     Alleviating factors:  none    Therapies Tried and outcome: hydrocortisone cream - no improvement  and antifungal cream just used for a few days - thinks less red.     Patient Active Problem List   Diagnosis     Urinary incontinence     Cataract     CARDIOVASCULAR SCREENING; LDL GOAL LESS THAN 160     Tendonitis of shoulder, right     Chronic cough      Brachial neuritis or radiculitis     Seasonal allergic rhinitis     Chronic allergic conjunctivitis     GERD (gastroesophageal reflux disease)     Latent tuberculosis     Meniere's disease     Menopause     Cervical nerve root impingement     Atrophic vaginitis     Hyponatremia     Neck pain     Muscle spasms of neck     Benign essential hypertension     AK (actinic keratosis)     Multiple melanocytic nevi     Status post knee surgery     Chronic bronchitis (H)     Past Surgical History:   Procedure Laterality Date     ARTHROPLASTY KNEE Left 10/10/2018    Procedure: ARTHROPLASTY KNEE;  Left Total Knee Arthroplasty    ;  Surgeon: James Amaya MD;  Location: [facility] OR     BLADDER SURGERY       CATARACT IOL, RT/LT       COLONOSCOPY WITH CO2 INSUFFLATION N/A 10/19/2016    Procedure: COLONOSCOPY WITH CO2 INSUFFLATION;  Surgeon: Duane, William Charles, MD;  Location: [facility] OR     GENITOURINARY SURGERY      bladder     Midurethral sling with cystoscopy.       TUBAL LIGATION         Social History     Tobacco Use     Smoking status: Former Smoker     Packs/day: 1.00     Years: 10.00     Pack years: 10.00     Types: Cigarettes     Last attempt to quit: 1981     Years since quittin.6     Smokeless tobacco: Never Used   Substance Use Topics     Alcohol use: Yes     Comment: occ - Glass of white wine per night     Family History   Problem Relation Age of Onset     Heart Disease Mother         older      Osteoporosis Mother      C.A.D. Mother         60's      Macular Degeneration Mother      Respiratory Father         farmers lung     Skin Cancer Father      Alzheimer Disease Maternal Grandmother      Heart Disease Maternal Grandfather      Heart Disease Brother         Afib     Heart Disease Brother      Asthma No family hx of      Diabetes No family hx of      Hypertension No family hx of      Breast Cancer No family hx of      Cancer - colorectal No family hx of      Cancer No family hx of      Thyroid  Disease No family hx of      Glaucoma No family hx of      Colon Cancer No family hx of      Melanoma No family hx of              Reviewed and updated as needed this visit by Provider  Tobacco  Allergies  Meds  Med Hx  Surg Hx  Fam Hx  Soc Hx        Review of Systems     10 point ROS of systems including Constitutional, Eyes, Respiratory, Cardiovascular, Gastroenterology, Genitourinary, Integumentary, Muscularskeletal, Psychiatric were all negative except for pertinent positives noted in my HPI.    The information in this document, created by the medical scribe, Krishna Hills, for me, accurately reflects the services I personally performed and the decisions made by me. I have reviewed and approved this document for accuracy prior to leaving the patient care area.          Objective    /74 (BP Location: Right arm, Patient Position: Chair, Cuff Size: Adult Regular)   Pulse 83   Temp 98.1  F (36.7  C) (Oral)   LMP  (Exact Date)   SpO2 98%   Breastfeeding? No   There is no height or weight on file to calculate BMI.  Physical Exam   GENERAL: healthy, alert and no distress  NECK: no adenopathy, no asymmetry, masses, or scars and thyroid normal to palpation  RESP: lungs clear to auscultation - no rales, rhonchi or wheezes  CV: regular rate and rhythm, normal S1 S2, no S3 or S4, no murmur, click or rub, no peripheral edema and peripheral pulses strong  MS: effusion left knee.  ROM - limited flexion with full extension.    SKIN: 3 mm warty growth anterior left lower leg.  Consent obtained - Liquid nitrogen was applied for 10-12 seconds to the skin lesion and the expected blistering or scabbing reaction explained. Do not pick at the area. Patient reminded to expect hypopigmented scars from the procedure. Return if lesion fails to fully resolve.  Right medial ankle /heel area with 3.5 cm eythematous patch of skin with advancing margin and central clearing.  No scaling currently.    NEURO: Normal strength  "and tone, mentation intact and speech normal  PSYCH: mentation appears normal, affect normal/bright    Diagnostic Test Results:  none         Assessment & Plan     1. Common wart  Treated with liquid Nitrogen (3x)   follow up if not resolved.      2. Tinea corporis  Continue clotrimazole twice a day   IF itching persists recommended mix of hydrocortisone and clotrimazole for short term if needed.. Notify if does not improve     3. Myalgia  Massage the area with an golf ball/deep massage and follow up if pain persist        BMI:   Estimated body mass index is 26.62 kg/m  as calculated from the following:    Height as of 3/19/19: 1.7 m (5' 6.93\").    Weight as of 3/19/19: 76.9 kg (169 lb 9.6 oz).           Patient Instructions   Continue clotrimazole twice a day until clear - if symptoms not improved significantly in next 7-10 days notify me for medication change.    Try mixing hydrocortisone and clotrimazole if needed for itching.    Stretching and massage to the left anterior leg - follow up if worsening.  PHYSICAL THERAPY may be beneficial if persistent.    Freezing wart today.  Follow up in 4 weeks if not resolved.          Return in about 4 weeks (around 9/19/2019) for as needed for persistent symptoms.    This document serves as a record of the services and decisions personally performed and made by Radha Hurtado MD. It was created on her behalf by Krishna Hills, trained medical scribe. The creation of this document is based on the provider's statements to the medical scribe.  Krishna Hills 1:48 PM August 22, 2019      Radha Hurtado MD  [facility]"

## 2019-09-28 ENCOUNTER — HEALTH MAINTENANCE LETTER (OUTPATIENT)
Age: 69
End: 2019-09-28

## 2019-10-31 ENCOUNTER — ALLIED HEALTH/NURSE VISIT (OUTPATIENT)
Dept: FAMILY MEDICINE | Facility: CLINIC | Age: 69
End: 2019-10-31
Payer: COMMERCIAL

## 2019-10-31 DIAGNOSIS — I10 BENIGN ESSENTIAL HYPERTENSION: ICD-10-CM

## 2019-10-31 DIAGNOSIS — Z23 NEED FOR PROPHYLACTIC VACCINATION AND INOCULATION AGAINST INFLUENZA: Primary | ICD-10-CM

## 2019-10-31 LAB
CHOLEST SERPL-MCNC: 179 MG/DL
CREAT UR-MCNC: 41 MG/DL
HDLC SERPL-MCNC: 89 MG/DL
LDLC SERPL CALC-MCNC: 81 MG/DL
MICROALBUMIN UR-MCNC: <5 MG/L
MICROALBUMIN/CREAT UR: NORMAL MG/G CR (ref 0–25)
NONHDLC SERPL-MCNC: 90 MG/DL
TRIGL SERPL-MCNC: 47 MG/DL

## 2019-10-31 PROCEDURE — 82043 UR ALBUMIN QUANTITATIVE: CPT | Performed by: FAMILY MEDICINE

## 2019-10-31 PROCEDURE — 99207 ZZC NO CHARGE NURSE ONLY: CPT

## 2019-10-31 PROCEDURE — 36415 COLL VENOUS BLD VENIPUNCTURE: CPT | Performed by: FAMILY MEDICINE

## 2019-10-31 PROCEDURE — 80061 LIPID PANEL: CPT | Performed by: FAMILY MEDICINE

## 2019-10-31 PROCEDURE — 90662 IIV NO PRSV INCREASED AG IM: CPT

## 2019-10-31 PROCEDURE — G0008 ADMIN INFLUENZA VIRUS VAC: HCPCS

## 2019-10-31 NOTE — RESULT ENCOUNTER NOTE
Your urine testing is normal.  There is not elevated protein present.  Your cholesterol is well controlled.  HDL, good cholesterol, remains higher than LDL, bad cholesterol.    Please call or MyChart message me if you have any questions.    PSK

## 2019-11-18 DIAGNOSIS — I10 BENIGN ESSENTIAL HYPERTENSION: ICD-10-CM

## 2019-11-18 NOTE — TELEPHONE ENCOUNTER
"Requested Prescriptions   Pending Prescriptions Disp Refills     lisinopril (PRINIVIL/ZESTRIL) 10 MG tablet 90 tablet 1     Sig: Take 1 tablet (10 mg) by mouth daily       ACE Inhibitors (Including Combos) Protocol Failed - 11/18/2019  2:16 PM        Failed - Normal serum creatinine on file in past 12 months     Recent Labs   Lab Test 10/12/18  0614   CR 0.61             Failed - Normal serum potassium on file in past 12 months     Recent Labs   Lab Test 10/12/18  0614   POTASSIUM 4.2             Passed - Blood pressure under 140/90 in past 12 months     BP Readings from Last 3 Encounters:   08/22/19 128/74   03/19/19 130/76   01/08/19 138/87                 Passed - Recent (12 mo) or future (30 days) visit within the authorizing provider's specialty     Patient has had an office visit with the authorizing provider or a provider within the authorizing providers department within the previous 12 mos or has a future within next 30 days. See \"Patient Info\" tab in inbasket, or \"Choose Columns\" in Meds & Orders section of the refill encounter.              Passed - Medication is active on med list        Passed - Patient is age 18 or older        Passed - No active pregnancy on record        Passed - No positive pregnancy test within past 12 months        lisinopril (PRINIVIL/ZESTRIL) 10 MG tablet  Last Written Prescription Date:  3/19/19  Last Fill Quantity: 90,  # refills: 1   Last office visit: 10/31/2019 with prescribing provider:  Dr. Hurtado   Future Office Visit:      "

## 2019-11-21 RX ORDER — LISINOPRIL 10 MG/1
10 TABLET ORAL DAILY
Qty: 30 TABLET | Refills: 0 | Status: SHIPPED | OUTPATIENT
Start: 2019-11-21 | End: 2019-12-15

## 2019-11-21 NOTE — TELEPHONE ENCOUNTER
Routing refill request to provider for review/approval because:  Labs not current:  Creatinine and potassium  A break in medication        Susanne Beaver RN, BSN, PHN

## 2019-11-21 NOTE — TELEPHONE ENCOUNTER
Please call and assist with scheduling lab only appointment - they did not do a basic metabolic panel when came in for labs   Given one month- further refills depend on lab results

## 2019-11-22 NOTE — TELEPHONE ENCOUNTER
Reason for Call:  Other returning call    Detailed comments: Pt returned call and stated she would make a lab appt on My Chart. Thank you.    Phone Number Patient can be reached at: Home number on file [phone] (home)    Best Time: Any    Can we leave a detailed message on this number? Not Applicable    Call taken on 11/22/2019 at 4:35 PM by Courtney Bond

## 2019-11-22 NOTE — TELEPHONE ENCOUNTER
This writer attempted to contact pt on 11/22/19      Reason for call schedule lab appt for further refills. 1 month RX sent to pharmacy. and left message.      If patient calls back:   Schedule Lab appointment within 1 month with lab, document that pt called and close encounter         Marina Angela

## 2019-12-10 DIAGNOSIS — I10 BENIGN ESSENTIAL HYPERTENSION: ICD-10-CM

## 2019-12-10 LAB
ANION GAP SERPL CALCULATED.3IONS-SCNC: 2 MMOL/L (ref 3–14)
BUN SERPL-MCNC: 12 MG/DL (ref 7–30)
CALCIUM SERPL-MCNC: 8.9 MG/DL (ref 8.5–10.1)
CHLORIDE SERPL-SCNC: 103 MMOL/L (ref 94–109)
CO2 SERPL-SCNC: 32 MMOL/L (ref 20–32)
CREAT SERPL-MCNC: 0.67 MG/DL (ref 0.52–1.04)
GFR SERPL CREATININE-BSD FRML MDRD: 89 ML/MIN/{1.73_M2}
GLUCOSE SERPL-MCNC: 103 MG/DL (ref 70–99)
POTASSIUM SERPL-SCNC: 4.2 MMOL/L (ref 3.4–5.3)
SODIUM SERPL-SCNC: 137 MMOL/L (ref 133–144)

## 2019-12-10 PROCEDURE — 80048 BASIC METABOLIC PNL TOTAL CA: CPT | Performed by: FAMILY MEDICINE

## 2019-12-10 PROCEDURE — 36415 COLL VENOUS BLD VENIPUNCTURE: CPT | Performed by: FAMILY MEDICINE

## 2019-12-15 ENCOUNTER — MYC MEDICAL ADVICE (OUTPATIENT)
Dept: FAMILY MEDICINE | Facility: CLINIC | Age: 69
End: 2019-12-15

## 2019-12-15 DIAGNOSIS — I10 BENIGN ESSENTIAL HYPERTENSION: ICD-10-CM

## 2019-12-15 RX ORDER — LISINOPRIL 10 MG/1
10 TABLET ORAL DAILY
Qty: 90 TABLET | Refills: 1 | Status: SHIPPED | OUTPATIENT
Start: 2019-12-15 | End: 2020-04-02

## 2019-12-31 DIAGNOSIS — Z96.652 S/P TOTAL KNEE ARTHROPLASTY, LEFT: Primary | ICD-10-CM

## 2020-01-07 ENCOUNTER — ANCILLARY PROCEDURE (OUTPATIENT)
Dept: GENERAL RADIOLOGY | Facility: CLINIC | Age: 70
End: 2020-01-07
Attending: ORTHOPAEDIC SURGERY
Payer: COMMERCIAL

## 2020-01-07 ENCOUNTER — OFFICE VISIT (OUTPATIENT)
Dept: ORTHOPEDICS | Facility: CLINIC | Age: 70
End: 2020-01-07
Payer: COMMERCIAL

## 2020-01-07 VITALS
WEIGHT: 171.7 LBS | HEART RATE: 94 BPM | OXYGEN SATURATION: 98 % | HEIGHT: 67 IN | SYSTOLIC BLOOD PRESSURE: 132 MMHG | BODY MASS INDEX: 26.95 KG/M2 | DIASTOLIC BLOOD PRESSURE: 78 MMHG

## 2020-01-07 DIAGNOSIS — Z47.89 ORTHOPEDIC AFTERCARE: Primary | ICD-10-CM

## 2020-01-07 DIAGNOSIS — Z96.652 S/P TOTAL KNEE ARTHROPLASTY, LEFT: ICD-10-CM

## 2020-01-07 PROCEDURE — 99213 OFFICE O/P EST LOW 20 MIN: CPT | Performed by: ORTHOPAEDIC SURGERY

## 2020-01-07 PROCEDURE — 73562 X-RAY EXAM OF KNEE 3: CPT | Mod: LT | Performed by: RADIOLOGY

## 2020-01-07 ASSESSMENT — PAIN SCALES - GENERAL: PAINLEVEL: NO PAIN (0)

## 2020-01-07 ASSESSMENT — MIFFLIN-ST. JEOR: SCORE: 1331.46

## 2020-01-07 NOTE — PROGRESS NOTES
Chief Complaint: Total Joint Post-op of the Left Knee (1 year S/p Left Total Knee Arthroplasty DOS: 10/10/18/Still having pain in knee with descending stairs. )       HPI: Maddie Lala returns today in follow-up for her left knee. She reports that she is doing well. She uses no pain medication and is not limited in anything due to her knee. She reports some discomfort with descending stairs though this does not prevent her from using the stairs. Continues to be very active with 45-50 minutes on an elliptical 5 days a week. She is happy with how she is doing.     Medications and allergies are documented in the EMR and have been reviewed.    Current Outpatient Medications:      albuterol (VENTOLIN HFA) 108 (90 BASE) MCG/ACT Inhaler, INHALE TWO PUFFS BY MOUTH EVERY 6 HOURS AS NEEDED FOR SHORTNESS OF BREATH / DYSPNEA AND AS NEEDED BEFORE EXERCISE, Disp: 18 g, Rfl: 11     aspirin 325 MG tablet, Take 1 tablet (325 mg) by mouth daily, Disp: 14 tablet, Rfl: 0     beclomethasone HFA (QVAR REDIHALER) 40 MCG/ACT inhaler, Inhale 2 puffs into the lungs 2 times daily, Disp: 3 Inhaler, Rfl: 3     CALCIUM + D OR, One bid, Disp: , Rfl:      Chlorpheniramine Maleate (CHLOR-TRIMETON ALLERGY) 12 MG TBCR, Take  by mouth as needed., Disp: , Rfl:      estradiol (VAGIFEM) 10 MCG TABS vaginal tablet, Place 1 tablet (10 mcg) vaginally twice a week, Disp: 24 tablet, Rfl: 3     fluocinonide (LIDEX) 0.05 % external solution, Apply to scalp nightly for up to 1 week, then 2-3 times weekly as needed., Disp: 60 mL, Rfl: 3     fluticasone (FLONASE) 50 MCG/ACT nasal spray, USE ONE TO TWO SPRAYS IN EACH NOSTRIL EVERY DAY, Disp: 48 g, Rfl: 3     Ibuprofen (ADVIL PO), Take 800 mg by mouth every 4 hours as needed for moderate pain, Disp: , Rfl:      lisinopril (PRINIVIL/ZESTRIL) 10 MG tablet, Take 1 tablet (10 mg) by mouth daily, Disp: 90 tablet, Rfl: 1     order for DME, Equipment being ordered: spacer, Disp: 1 each, Rfl: 0     ranitidine (ZANTAC) 300  "MG tablet, Take 1 tablet (300 mg) by mouth daily, Disp: 90 tablet, Rfl: 1  Allergies: Sulfa drugs    Physical Exam:  On physical examination the patient appears the stated age, is in no acute distress, affect is appropriate, and breathing is non-labored.  /78 (BP Location: Left arm, Patient Position: Sitting, Cuff Size: Adult Regular)   Pulse 94   Ht 1.702 m (5' 7\")   Wt 77.9 kg (171 lb 11.2 oz)   SpO2 98%   BMI 26.89 kg/m    Body mass index is 26.89 kg/m .  Gait: normal   Incision:healed  ROM:0-110  Distally, the circulatory, motor, and sensation exam is intact with 5/5 EHL, gastroc-soleus, and tibialis anterior.  Sensation to light touch is intact.  Dorsalis pedis and posterior tibialis pulses are palpable.  There are no sores on the feet, no bruising, and no lymphedema.    X-rays:    I reviewed the x-rays dated today.  Previous films reviewed.    Findings:  Normal progression for a total knee arthroplasty without evidence of loosening or subsidence.    Assessment: doing well   Plan: appropriate for RTC in 5 years for radiographs, sooner if issues      No ref. provider found    "

## 2020-01-07 NOTE — PATIENT INSTRUCTIONS
Thanks for coming today.  [facility]  [address] [place] [address]    To schedule future appointments in Ortho Clinic, you may call [phone].    To schedule ordered imaging by your provider:   Call Central Imaging [phone]    To schedule an injection ordered by your provider:  Call Central [facility] Injection scheduling line: [phone]  [facility] available online at:  [email or web address]    Please call if any further questions or concerns ([phone]).  Clinic hours 8 am to 5 pm.    Return to clinic (call) if symptoms worsen or fail to improve.

## 2020-01-07 NOTE — LETTER
1/7/2020         RE: Maddie Lala  [address] [facility] [address]        Dear Colleague,    Thank you for referring your patient, Maddie Lala, to the [facility]. Please see a copy of my visit note below.    Chief Complaint: Total Joint Post-op of the Left Knee (1 year S/p Left Total Knee Arthroplasty DOS: 10/10/18/Still having pain in knee with descending stairs. )       HPI: Maddie Lala returns today in follow-up for her left knee. She reports that she is doing well. She uses no pain medication and is not limited in anything due to her knee. She reports some discomfort with descending stairs though this does not prevent her from using the stairs. Continues to be very active with 45-50 minutes on an elliptical 5 days a week. She is happy with how she is doing.     Medications and allergies are documented in the EMR and have been reviewed.    Current Outpatient Medications:      albuterol (VENTOLIN HFA) 108 (90 BASE) MCG/ACT Inhaler, INHALE TWO PUFFS BY MOUTH EVERY 6 HOURS AS NEEDED FOR SHORTNESS OF BREATH / DYSPNEA AND AS NEEDED BEFORE EXERCISE, Disp: 18 g, Rfl: 11     aspirin 325 MG tablet, Take 1 tablet (325 mg) by mouth daily, Disp: 14 tablet, Rfl: 0     beclomethasone HFA (QVAR REDIHALER) 40 MCG/ACT inhaler, Inhale 2 puffs into the lungs 2 times daily, Disp: 3 Inhaler, Rfl: 3     CALCIUM + D OR, One bid, Disp: , Rfl:      Chlorpheniramine Maleate (CHLOR-TRIMETON ALLERGY) 12 MG TBCR, Take  by mouth as needed., Disp: , Rfl:      estradiol (VAGIFEM) 10 MCG TABS vaginal tablet, Place 1 tablet (10 mcg) vaginally twice a week, Disp: 24 tablet, Rfl: 3     fluocinonide (LIDEX) 0.05 % external solution, Apply to scalp nightly for up to 1 week, then 2-3 times weekly as needed., Disp: 60 mL, Rfl: 3     fluticasone (FLONASE) 50 MCG/ACT nasal spray, USE ONE TO TWO SPRAYS IN EACH NOSTRIL EVERY DAY, Disp: 48 g, Rfl: 3     Ibuprofen (ADVIL PO), Take 800 mg by mouth every 4 hours as needed for  "moderate pain, Disp: , Rfl:      lisinopril (PRINIVIL/ZESTRIL) 10 MG tablet, Take 1 tablet (10 mg) by mouth daily, Disp: 90 tablet, Rfl: 1     order for DME, Equipment being ordered: spacer, Disp: 1 each, Rfl: 0     ranitidine (ZANTAC) 300 MG tablet, Take 1 tablet (300 mg) by mouth daily, Disp: 90 tablet, Rfl: 1  Allergies: Sulfa drugs    Physical Exam:  On physical examination the patient appears the stated age, is in no acute distress, affect is appropriate, and breathing is non-labored.  /78 (BP Location: Left arm, Patient Position: Sitting, Cuff Size: Adult Regular)   Pulse 94   Ht 1.702 m (5' 7\")   Wt 77.9 kg (171 lb 11.2 oz)   SpO2 98%   BMI 26.89 kg/m     Body mass index is 26.89 kg/m .  Gait: normal   Incision:healed  ROM:0-110  Distally, the circulatory, motor, and sensation exam is intact with 5/5 EHL, gastroc-soleus, and tibialis anterior.  Sensation to light touch is intact.  Dorsalis pedis and posterior tibialis pulses are palpable.  There are no sores on the feet, no bruising, and no lymphedema.    X-rays:    I reviewed the x-rays dated today.  Previous films reviewed.    Findings:  Normal progression for a total knee arthroplasty without evidence of loosening or subsidence.    Assessment: doing well   Plan: appropriate for RTC in 5 years for radiographs, sooner if issues      No ref. provider found      Again, thank you for allowing me to participate in the care of your patient.        Sincerely,        James Amaya MD    "

## 2020-01-07 NOTE — NURSING NOTE
"Maddie Lala's chief complaint for this visit includes:  Chief Complaint   Patient presents with     Left Knee - Total Joint Post-op     1 year S/p Left Total Knee Arthroplasty DOS: 10/10/18  Still having pain in knee with descending stairs.      PCP: Radha Hurtado    Referring Provider:  No referring provider defined for this encounter.    /78 (BP Location: Left arm, Patient Position: Sitting, Cuff Size: Adult Regular)   Pulse 94   Ht 1.702 m (5' 7\")   Wt 77.9 kg (171 lb 11.2 oz)   SpO2 98%   BMI 26.89 kg/m    No Pain (0)     Do you need any medication refills at today's visit? No    Heidi Andrade CMA        "

## 2020-01-30 ENCOUNTER — OFFICE VISIT (OUTPATIENT)
Dept: DERMATOLOGY | Facility: CLINIC | Age: 70
End: 2020-01-30

## 2020-01-30 DIAGNOSIS — L82.0 INFLAMED SEBORRHEIC KERATOSIS: ICD-10-CM

## 2020-01-30 DIAGNOSIS — L57.0 AK (ACTINIC KERATOSIS): Primary | ICD-10-CM

## 2020-01-30 DIAGNOSIS — L81.4 SOLAR LENTIGINOSIS: ICD-10-CM

## 2020-01-30 DIAGNOSIS — B35.3 TINEA PEDIS OF BOTH FEET: ICD-10-CM

## 2020-01-30 DIAGNOSIS — D48.5 NEOPLASM OF UNCERTAIN BEHAVIOR OF SKIN: ICD-10-CM

## 2020-01-30 ASSESSMENT — PAIN SCALES - GENERAL
PAINLEVEL: NO PAIN (0)
PAINLEVEL: NO PAIN (1)

## 2020-01-30 NOTE — PATIENT INSTRUCTIONS
Cryotherapy    What is it?    Use of a very cold liquid, such as liquid nitrogen, to freeze and destroy abnormal skin cells that need to be removed    What should I expect?    Tenderness and redness    A small blister that might grow and fill with dark purple blood. There may be crusting.    More than one treatment may be needed if the lesions do not go away.    How do I care for the treated area?    Gently wash the area with your hands when bathing.    Use a thin layer of Vaseline to help with healing. You may use a Band-Aid.     The area should heal within 7-10 days and may leave behind a pink or lighter color.     Do not use an antibiotic or Neosporin ointment.     You may take acetaminophen (Tylenol) for pain.     Call your Doctor if you have:    Severe pain    Signs of infection (warmth, redness, cloudy yellow drainage, and or a bad smell)    Questions or concerns    Who should I call with questions?       [facility]: [phone]       [facility]: [phone]       For urgent needs outside of business hours call the [facility] at [phone] and ask for the dermatology resident on call

## 2020-01-30 NOTE — NURSING NOTE
Dermatology Rooming Note    Maddie Lala's goals for this visit include:   Chief Complaint   Patient presents with     Skin Check     Skin check. Maddie has a spot on her nose and leg of concern.     Yoli Rasheed, CMA

## 2020-01-30 NOTE — PROGRESS NOTES
[facility] Dermatology Note      Dermatology Problem List:  1. Actinic keratosis - S/p cryotherapy 6/28/18, repeat 1/3/19, 1/30/20 to dorsal bridge  2. Seborrheic dermatitis  3. Eczematous dermatitis, resolved  4. Neoplasm of uncertain behavior, right upper chest inferior to the clavicle.  Differential diagnosis includes venous lake, hidradenoma, other cyst. Monitoring   5. Tinea pedis of the bilateral feet including interdigital maceration in the setting of onychomycosis - clotrimazole bid    Encounter Date: Jan 30, 2020    CC:   Chief Complaint   Patient presents with     Skin Check     Skin check. Maddie has a spot on her nose and leg of concern.     History of Present Illness:  Ms. Maddie Lala is a 69 year old female who presents as a follow-up for skin check with lesions of concern on the nose and leg. She was last seen 1/3/19 when she underwent cryotherapy for an AK on the nasal dorsum.  At today's visit she would like a recheck of the spot as well as a previously cryotherapy lesion on the posterior right lower leg.  She denies bleeding very compliant with sun protection but is been getting better but applying sunscreen to her face and wearing a hat.  There is a family history of skin cancer in her father who was a farmer with excessive sun exposure without protection.  She is overall been doing well since her last visit and has no other new concerns today.    Past Medical History:   Patient Active Problem List   Diagnosis     Urinary incontinence     Cataract     CARDIOVASCULAR SCREENING; LDL GOAL LESS THAN 160     Tendonitis of shoulder, right     Chronic cough     Brachial neuritis or radiculitis     Seasonal allergic rhinitis     Chronic allergic conjunctivitis     GERD (gastroesophageal reflux disease)     Latent tuberculosis     Meniere's disease     Menopause     Cervical nerve root impingement     Atrophic vaginitis     Hyponatremia     Neck pain     Muscle spasms of neck      Benign essential hypertension     AK (actinic keratosis)     Multiple melanocytic nevi     Status post knee surgery     Chronic bronchitis (H)     Past Medical History:   Diagnosis Date     Allergic rhinitis 1979     Arthritis      Benign essential hypertension 3/23/2018     Cervical dysplasia with cone in 1979    nl pap ever since      Dyspareunia      Hearing problem 2015     Meniere's disease 2002     Nonsenile cataract      Plantar fasciitis      Stress incontinence      SVT (supraventricular tachycardia) (H)     resolved     Tinnitus 2002     Past Surgical History:   Procedure Laterality Date     ARTHROPLASTY KNEE Left 10/10/2018    Procedure: ARTHROPLASTY KNEE;  Left Total Knee Arthroplasty    ;  Surgeon: James Amaya MD;  Location: [facility]     BLADDER SURGERY       CATARACT IOL, RT/LT  2010     COLONOSCOPY WITH CO2 INSUFFLATION N/A 10/19/2016    Procedure: COLONOSCOPY WITH CO2 INSUFFLATION;  Surgeon: Duane, William Charles, MD;  Location: [facility] OR     GENITOURINARY SURGERY      bladder     Midurethral sling with cystoscopy.  2010     TUBAL LIGATION         Social History:  Patient reports that she quit smoking about 39 years ago. Her smoking use included cigarettes. She has a 10.00 pack-year smoking history. She has never used smokeless tobacco. She reports current alcohol use. She reports that she does not use drugs.    Family History:  Family History   Problem Relation Age of Onset     Heart Disease Mother         older      Osteoporosis Mother      C.A.D. Mother         60's      Macular Degeneration Mother      Respiratory Father         farmers lung     Skin Cancer Father      Alzheimer Disease Maternal Grandmother      Heart Disease Maternal Grandfather      Heart Disease Brother         Afib     Heart Disease Brother      Asthma No family hx of      Diabetes No family hx of      Hypertension No family hx of      Breast Cancer No family hx of      Cancer - colorectal No family hx of      Cancer No  family hx of      Thyroid Disease No family hx of      Glaucoma No family hx of      Colon Cancer No family hx of      Melanoma No family hx of        Medications:  Current Outpatient Medications   Medication Sig Dispense Refill     beclomethasone HFA (QVAR REDIHALER) 40 MCG/ACT inhaler Inhale 2 puffs into the lungs 2 times daily 3 Inhaler 3     CALCIUM + D OR One bid       Chlorpheniramine Maleate (CHLOR-TRIMETON ALLERGY) 12 MG TBCR Take  by mouth as needed.       estradiol (VAGIFEM) 10 MCG TABS vaginal tablet Place 1 tablet (10 mcg) vaginally twice a week 24 tablet 3     fluocinonide (LIDEX) 0.05 % external solution Apply to scalp nightly for up to 1 week, then 2-3 times weekly as needed. 60 mL 3     fluticasone (FLONASE) 50 MCG/ACT nasal spray USE ONE TO TWO SPRAYS IN EACH NOSTRIL EVERY DAY 48 g 3     Ibuprofen (ADVIL PO) Take 800 mg by mouth every 4 hours as needed for moderate pain       lisinopril (PRINIVIL/ZESTRIL) 10 MG tablet Take 1 tablet (10 mg) by mouth daily 90 tablet 1     order for DME Equipment being ordered: spacer 1 each 0     ranitidine (ZANTAC) 300 MG tablet Take 1 tablet (300 mg) by mouth daily 90 tablet 1        Allergies   Allergen Reactions     Sulfa Drugs Rash     Review of Systems:  -As per HPI  -Constitutional: Otherwise feeling well today, in usual state of health.  -Skin: As above in HPI. No additional skin concerns.    Physical exam:  Vitals: There were no vitals taken for this visit.  GEN: This is a well developed, well-nourished female in no acute distress, in a pleasant mood.    SKIN: Focused examination of the face, head, and neck was performed.  -There is an erythematous macule with overyling adherent scale on the dorsal nasal bridge.  -Stuck on appearing white scaly macule on the posterior right lower leg  -Ill-defined approximately 4 mm slightly blue subcutaneous nodule on the left upper chest just inferior to the clavicle  -Diffuse photoaging on sun exposed sites including the  face, chest, upper back, arms, legs  -Between the toes on the bilateral feet there are several areas of maceration without fissuring.  -Thickening and yellowing of nearly all toenails  -No other lesions of concern on areas examined.     Impression/Plan:  1. Actinic keratosis, nasal dorsum, and irritated seborrheic keratosis, posterior right lower leg    Cryotherapy procedure note: After verbal consent and discussion of risks and benefits including but no limited to dyspigmentation/scar, blister, and pain, 1 AK and 1 iSK was(were) treated with 1-2mm freeze border for 2 cycles with liquid nitrogen. Post cryotherapy instructions were provided.    If unresolved at follow up, recommend biopsy      2. Small light bluish papule, right upper chest inferior to the clavicle.  Differential diagnosis includes venous lake, hidradenoma, other cyst.    Overall does not have features suspicious for malignant lesion on today's exam.  The patient does not have any history of malignancy including melanoma.  There were no other pigmented lesions I could raise concern for melanoma with metastasis presenting as a blue nodule on the chest    Follow clinically.  Patient understands to RTC if significantly enlarging or otherwise changing.    3. Diffuse photoaging with solar lentiginosis    Increased photoprotection was encouraged including the use of sunscreens, hats, photoprotective clothing    4. Tinea pedis of the bilateral feet including interdigital maceration in the setting of onychomycosis    Patient is currently using clotrimazole with success and is just started using it for this particular flare.  We discussed that she can intermittently use this for maintenance and prevention of future flares.no improvement with terbinafine in the past    Follow-up in 12 months, earlier for new or changing lesions.     Dr. Roy staffed the patient.    Staff Involved:  Resident (Kandice Harrell MD) / Staff (as above)    I have seen and  examined this patient and agree with the assessment and plan as documented in the resident's note, and was present for the key elements of all procedures.    Aaron Roy MD  Dermatology Attending

## 2020-01-30 NOTE — LETTER
1/30/2020       RE: Maddie Lala  [address]  [facility] [address]     Dear Colleague,    Thank you for referring your patient, Maddie Lala, to the [facility] DERMATOLOGY at [facility]. Please see a copy of my visit note below.    [facility] Dermatology Note      Dermatology Problem List:  1. Actinic keratosis - S/p cryotherapy 6/28/18, repeat 1/3/19, 1/30/20 to dorsal bridge  2. Seborrheic dermatitis  3. Eczematous dermatitis, resolved  4. Neoplasm of uncertain behavior, right upper chest inferior to the clavicle.  Differential diagnosis includes venous lake, hidradenoma, other cyst. Monitoring   5. Tinea pedis of the bilateral feet including interdigital maceration in the setting of onychomycosis - clotrimazole bid    Encounter Date: Jan 30, 2020    CC:   Chief Complaint   Patient presents with     Skin Check     Skin check. Maddie has a spot on her nose and leg of concern.     History of Present Illness:  Ms. Maddie Lala is a 69 year old female who presents as a follow-up for skin check with lesions of concern on the nose and leg. She was last seen 1/3/19 when she underwent cryotherapy for an AK on the nasal dorsum.  At today's visit she would like a recheck of the spot as well as a previously cryotherapy lesion on the posterior right lower leg.  She denies bleeding very compliant with sun protection but is been getting better but applying sunscreen to her face and wearing a hat.  There is a family history of skin cancer in her father who was a farmer with excessive sun exposure without protection.  She is overall been doing well since her last visit and has no other new concerns today.    Past Medical History:   Patient Active Problem List   Diagnosis     Urinary incontinence     Cataract     CARDIOVASCULAR SCREENING; LDL GOAL LESS THAN 160     Tendonitis of shoulder, right     Chronic cough     Brachial neuritis or radiculitis     Seasonal  allergic rhinitis     Chronic allergic conjunctivitis     GERD (gastroesophageal reflux disease)     Latent tuberculosis     Meniere's disease     Menopause     Cervical nerve root impingement     Atrophic vaginitis     Hyponatremia     Neck pain     Muscle spasms of neck     Benign essential hypertension     AK (actinic keratosis)     Multiple melanocytic nevi     Status post knee surgery     Chronic bronchitis (H)     Past Medical History:   Diagnosis Date     Allergic rhinitis 1979     Arthritis      Benign essential hypertension 3/23/2018     Cervical dysplasia with cone in 1979    nl pap ever since      Dyspareunia      Hearing problem 2015     Meniere's disease 2002     Nonsenile cataract      Plantar fasciitis      Stress incontinence      SVT (supraventricular tachycardia) (H)     resolved     Tinnitus 2002     Past Surgical History:   Procedure Laterality Date     ARTHROPLASTY KNEE Left 10/10/2018    Procedure: ARTHROPLASTY KNEE;  Left Total Knee Arthroplasty    ;  Surgeon: James Amaya MD;  Location: [facility]     BLADDER SURGERY       CATARACT IOL, RT/LT  2010     COLONOSCOPY WITH CO2 INSUFFLATION N/A 10/19/2016    Procedure: COLONOSCOPY WITH CO2 INSUFFLATION;  Surgeon: Duane, William Charles, MD;  Location: [facility] OR     GENITOURINARY SURGERY      bladder     Midurethral sling with cystoscopy.  2010     TUBAL LIGATION         Social History:  Patient reports that she quit smoking about 39 years ago. Her smoking use included cigarettes. She has a 10.00 pack-year smoking history. She has never used smokeless tobacco. She reports current alcohol use. She reports that she does not use drugs.    Family History:  Family History   Problem Relation Age of Onset     Heart Disease Mother         older      Osteoporosis Mother      C.A.D. Mother         60's      Macular Degeneration Mother      Respiratory Father         farmers lung     Skin Cancer Father      Alzheimer Disease Maternal Grandmother      Heart  Disease Maternal Grandfather      Heart Disease Brother         Afib     Heart Disease Brother      Asthma No family hx of      Diabetes No family hx of      Hypertension No family hx of      Breast Cancer No family hx of      Cancer - colorectal No family hx of      Cancer No family hx of      Thyroid Disease No family hx of      Glaucoma No family hx of      Colon Cancer No family hx of      Melanoma No family hx of        Medications:  Current Outpatient Medications   Medication Sig Dispense Refill     beclomethasone HFA (QVAR REDIHALER) 40 MCG/ACT inhaler Inhale 2 puffs into the lungs 2 times daily 3 Inhaler 3     CALCIUM + D OR One bid       Chlorpheniramine Maleate (CHLOR-TRIMETON ALLERGY) 12 MG TBCR Take  by mouth as needed.       estradiol (VAGIFEM) 10 MCG TABS vaginal tablet Place 1 tablet (10 mcg) vaginally twice a week 24 tablet 3     fluocinonide (LIDEX) 0.05 % external solution Apply to scalp nightly for up to 1 week, then 2-3 times weekly as needed. 60 mL 3     fluticasone (FLONASE) 50 MCG/ACT nasal spray USE ONE TO TWO SPRAYS IN EACH NOSTRIL EVERY DAY 48 g 3     Ibuprofen (ADVIL PO) Take 800 mg by mouth every 4 hours as needed for moderate pain       lisinopril (PRINIVIL/ZESTRIL) 10 MG tablet Take 1 tablet (10 mg) by mouth daily 90 tablet 1     order for DME Equipment being ordered: spacer 1 each 0     ranitidine (ZANTAC) 300 MG tablet Take 1 tablet (300 mg) by mouth daily 90 tablet 1        Allergies   Allergen Reactions     Sulfa Drugs Rash     Review of Systems:  -As per HPI  -Constitutional: Otherwise feeling well today, in usual state of health.  -Skin: As above in HPI. No additional skin concerns.    Physical exam:  Vitals: There were no vitals taken for this visit.  GEN: This is a well developed, well-nourished female in no acute distress, in a pleasant mood.    SKIN: Focused examination of the face, head, and neck was performed.  -There is an erythematous macule with overyling adherent scale  on the dorsal nasal bridge.  -Stuck on appearing white scaly macule on the posterior right lower leg  -Ill-defined approximately 4 mm slightly blue subcutaneous nodule on the left upper chest just inferior to the clavicle  -Diffuse photoaging on sun exposed sites including the face, chest, upper back, arms, legs  -Between the toes on the bilateral feet there are several areas of maceration without fissuring.  -Thickening and yellowing of nearly all toenails  -No other lesions of concern on areas examined.     Impression/Plan:  1. Actinic keratosis, nasal dorsum, and irritated seborrheic keratosis, posterior right lower leg    Cryotherapy procedure note: After verbal consent and discussion of risks and benefits including but no limited to dyspigmentation/scar, blister, and pain, 1 AK and 1 iSK was(were) treated with 1-2mm freeze border for 2 cycles with liquid nitrogen. Post cryotherapy instructions were provided.    If unresolved at follow up, recommend biopsy      2. Small light bluish papule, right upper chest inferior to the clavicle.  Differential diagnosis includes venous lake, hidradenoma, other cyst.    Overall does not have features suspicious for malignant lesion on today's exam.  The patient does not have any history of malignancy including melanoma.  There were no other pigmented lesions I could raise concern for melanoma with metastasis presenting as a blue nodule on the chest    Follow clinically.  Patient understands to RTC if significantly enlarging or otherwise changing.    3. Diffuse photoaging with solar lentiginosis    Increased photoprotection was encouraged including the use of sunscreens, hats, photoprotective clothing    4. Tinea pedis of the bilateral feet including interdigital maceration in the setting of onychomycosis    Patient is currently using clotrimazole with success and is just started using it for this particular flare.  We discussed that she can intermittently use this for  maintenance and prevention of future flares.no improvement with terbinafine in the past    Follow-up in 12 months, earlier for new or changing lesions.     Dr. Roy staffed the patient.    Staff Involved:  Resident (Kandice Harrell MD) / Staff (as above)    I have seen and examined this patient and agree with the assessment and plan as documented in the resident's note, and was present for the key elements of all procedures.    Aaron Roy MD  Dermatology Attending

## 2020-04-01 ENCOUNTER — MYC MEDICAL ADVICE (OUTPATIENT)
Dept: FAMILY MEDICINE | Facility: CLINIC | Age: 70
End: 2020-04-01

## 2020-04-01 DIAGNOSIS — I10 BENIGN ESSENTIAL HYPERTENSION: ICD-10-CM

## 2020-04-01 DIAGNOSIS — N95.2 VAGINAL ATROPHY: ICD-10-CM

## 2020-04-02 RX ORDER — LISINOPRIL 10 MG/1
10 TABLET ORAL DAILY
Qty: 90 TABLET | Refills: 0 | Status: SHIPPED | OUTPATIENT
Start: 2020-04-02 | End: 2020-09-04

## 2020-04-02 RX ORDER — ESTRADIOL 10 UG/1
10 INSERT VAGINAL
Qty: 24 TABLET | Refills: 0 | Status: SHIPPED | OUTPATIENT
Start: 2020-04-02 | End: 2020-07-10

## 2020-04-07 DIAGNOSIS — H10.45 CHRONIC ALLERGIC CONJUNCTIVITIS: ICD-10-CM

## 2020-04-07 NOTE — TELEPHONE ENCOUNTER
"Requested Prescriptions   Pending Prescriptions Disp Refills     fluticasone (FLONASE) 50 MCG/ACT nasal spray  Last Written Prescription Date:  2/27/19  Last Fill Quantity: 48 g,  # refills: 3   Last office visit: 10/31/2019 with prescribing provider:  Dr. Hurtado   Future Office Visit:     48 g 3     Sig: USE ONE TO TWO SPRAYS IN EACH NOSTRIL EVERY DAY       Nasal Allergy Protocol Passed - 4/7/2020  1:50 PM        Passed - Patient is age 12 or older        Passed - Recent (12 mo) or future (30 days) visit within the authorizing provider's specialty     Patient has had an office visit with the authorizing provider or a provider within the authorizing providers department within the previous 12 mos or has a future within next 30 days. See \"Patient Info\" tab in inbasket, or \"Choose Columns\" in Meds & Orders section of the refill encounter.              Passed - Medication is active on med list             "

## 2020-04-08 ENCOUNTER — MYC MEDICAL ADVICE (OUTPATIENT)
Dept: FAMILY MEDICINE | Facility: CLINIC | Age: 70
End: 2020-04-08

## 2020-04-08 RX ORDER — FLUTICASONE PROPIONATE 50 MCG
SPRAY, SUSPENSION (ML) NASAL
Qty: 48 G | Refills: 1 | Status: SHIPPED | OUTPATIENT
Start: 2020-04-08 | End: 2021-01-13

## 2020-04-08 NOTE — TELEPHONE ENCOUNTER
Prescription approved per [facility] Refill Protocol.        Susanne Beaver RN, BSN, PHN

## 2020-04-09 ENCOUNTER — E-VISIT (OUTPATIENT)
Dept: FAMILY MEDICINE | Facility: CLINIC | Age: 70
End: 2020-04-09
Payer: COMMERCIAL

## 2020-04-09 DIAGNOSIS — K21.9 GASTROESOPHAGEAL REFLUX DISEASE WITHOUT ESOPHAGITIS: Primary | ICD-10-CM

## 2020-04-09 PROCEDURE — 99421 OL DIG E/M SVC 5-10 MIN: CPT | Performed by: FAMILY MEDICINE

## 2020-04-09 RX ORDER — FAMOTIDINE 40 MG/1
40 TABLET, FILM COATED ORAL
Qty: 90 TABLET | Refills: 1 | Status: SHIPPED | OUTPATIENT
Start: 2020-04-09 | End: 2020-09-04

## 2020-04-09 NOTE — TELEPHONE ENCOUNTER
E-visit instructions given to Maddie to further discuss changing medications.     Susanne Beaver RN, BSN, PHN

## 2020-07-07 DIAGNOSIS — L21.9 DERMATITIS, SEBORRHEIC: ICD-10-CM

## 2020-07-07 DIAGNOSIS — N95.2 VAGINAL ATROPHY: ICD-10-CM

## 2020-07-09 NOTE — TELEPHONE ENCOUNTER
Routing refill request to provider for review/approval because:  High potency steroid is on medication list.    Topical Steroids and Nonsteroidals Protocol [id number]/07/2020 03:24 PM   High potency steroid not ordered     Lizet Mejía RN, [facility] Triage

## 2020-07-10 RX ORDER — ESTRADIOL 10 UG/1
10 INSERT VAGINAL
Qty: 24 TABLET | Refills: 0 | Status: SHIPPED | OUTPATIENT
Start: 2020-07-13 | End: 2020-09-04

## 2020-07-10 RX ORDER — FLUOCINONIDE TOPICAL SOLUTION USP, 0.05% 0.5 MG/ML
SOLUTION TOPICAL
Qty: 60 ML | Refills: 3 | Status: SHIPPED | OUTPATIENT
Start: 2020-07-10 | End: 2021-08-05

## 2020-08-05 ENCOUNTER — MYC MEDICAL ADVICE (OUTPATIENT)
Dept: FAMILY MEDICINE | Facility: CLINIC | Age: 70
End: 2020-08-05

## 2020-08-10 NOTE — TELEPHONE ENCOUNTER
Please schedule patient with me on September 3 in 1 pm slot [facility].    CHRISTINA

## 2020-09-03 ENCOUNTER — OFFICE VISIT (OUTPATIENT)
Dept: FAMILY MEDICINE | Facility: CLINIC | Age: 70
End: 2020-09-03
Payer: COMMERCIAL

## 2020-09-03 VITALS
HEIGHT: 67 IN | DIASTOLIC BLOOD PRESSURE: 76 MMHG | BODY MASS INDEX: 26.68 KG/M2 | SYSTOLIC BLOOD PRESSURE: 124 MMHG | HEART RATE: 78 BPM | TEMPERATURE: 98.1 F | OXYGEN SATURATION: 100 % | WEIGHT: 170 LBS | RESPIRATION RATE: 18 BRPM

## 2020-09-03 DIAGNOSIS — I10 BENIGN ESSENTIAL HYPERTENSION: ICD-10-CM

## 2020-09-03 DIAGNOSIS — M19.049 HAND ARTHRITIS: ICD-10-CM

## 2020-09-03 DIAGNOSIS — K21.9 GASTROESOPHAGEAL REFLUX DISEASE WITHOUT ESOPHAGITIS: ICD-10-CM

## 2020-09-03 DIAGNOSIS — E87.1 HYPONATREMIA: ICD-10-CM

## 2020-09-03 DIAGNOSIS — Z23 NEED FOR PROPHYLACTIC VACCINATION AND INOCULATION AGAINST INFLUENZA: ICD-10-CM

## 2020-09-03 DIAGNOSIS — Z00.00 ROUTINE GENERAL MEDICAL EXAMINATION AT A HEALTH CARE FACILITY: Primary | ICD-10-CM

## 2020-09-03 DIAGNOSIS — R13.12 OROPHARYNGEAL DYSPHAGIA: ICD-10-CM

## 2020-09-03 DIAGNOSIS — Z23 NEED FOR SHINGLES VACCINE: ICD-10-CM

## 2020-09-03 DIAGNOSIS — M25.551 HIP PAIN, RIGHT: ICD-10-CM

## 2020-09-03 DIAGNOSIS — N95.2 VAGINAL ATROPHY: ICD-10-CM

## 2020-09-03 PROBLEM — J42 CHRONIC BRONCHITIS (H): Chronic | Status: RESOLVED | Noted: 2018-10-10 | Resolved: 2020-09-03

## 2020-09-03 LAB
ALBUMIN SERPL-MCNC: 4 G/DL (ref 3.4–5)
ALP SERPL-CCNC: 83 U/L (ref 40–150)
ALT SERPL W P-5'-P-CCNC: 36 U/L (ref 0–50)
ANION GAP SERPL CALCULATED.3IONS-SCNC: 6 MMOL/L (ref 3–14)
AST SERPL W P-5'-P-CCNC: 27 U/L (ref 0–45)
BILIRUB SERPL-MCNC: 0.5 MG/DL (ref 0.2–1.3)
BUN SERPL-MCNC: 13 MG/DL (ref 7–30)
CALCIUM SERPL-MCNC: 9 MG/DL (ref 8.5–10.1)
CHLORIDE SERPL-SCNC: 101 MMOL/L (ref 94–109)
CO2 SERPL-SCNC: 28 MMOL/L (ref 20–32)
CREAT SERPL-MCNC: 0.66 MG/DL (ref 0.52–1.04)
CREAT UR-MCNC: 56 MG/DL
GFR SERPL CREATININE-BSD FRML MDRD: 89 ML/MIN/{1.73_M2}
GLUCOSE SERPL-MCNC: 90 MG/DL (ref 70–99)
MICROALBUMIN UR-MCNC: <5 MG/L
MICROALBUMIN/CREAT UR: NORMAL MG/G CR (ref 0–25)
POTASSIUM SERPL-SCNC: 4.3 MMOL/L (ref 3.4–5.3)
PROT SERPL-MCNC: 7.3 G/DL (ref 6.8–8.8)
SODIUM SERPL-SCNC: 135 MMOL/L (ref 133–144)

## 2020-09-03 PROCEDURE — 80053 COMPREHEN METABOLIC PANEL: CPT | Performed by: FAMILY MEDICINE

## 2020-09-03 PROCEDURE — 90472 IMMUNIZATION ADMIN EACH ADD: CPT | Performed by: FAMILY MEDICINE

## 2020-09-03 PROCEDURE — 36415 COLL VENOUS BLD VENIPUNCTURE: CPT | Performed by: FAMILY MEDICINE

## 2020-09-03 PROCEDURE — 82043 UR ALBUMIN QUANTITATIVE: CPT | Performed by: FAMILY MEDICINE

## 2020-09-03 PROCEDURE — G0439 PPPS, SUBSEQ VISIT: HCPCS | Performed by: FAMILY MEDICINE

## 2020-09-03 PROCEDURE — 90662 IIV NO PRSV INCREASED AG IM: CPT | Performed by: FAMILY MEDICINE

## 2020-09-03 PROCEDURE — 90750 HZV VACC RECOMBINANT IM: CPT | Performed by: FAMILY MEDICINE

## 2020-09-03 PROCEDURE — G0008 ADMIN INFLUENZA VIRUS VAC: HCPCS | Performed by: FAMILY MEDICINE

## 2020-09-03 PROCEDURE — 99213 OFFICE O/P EST LOW 20 MIN: CPT | Mod: 25 | Performed by: FAMILY MEDICINE

## 2020-09-03 ASSESSMENT — PAIN SCALES - GENERAL: PAINLEVEL: MILD PAIN (2)

## 2020-09-03 ASSESSMENT — MIFFLIN-ST. JEOR: SCORE: 1318.74

## 2020-09-03 NOTE — PROGRESS NOTES
"  SUBJECTIVE:   Maddie Lala is a 70 year old female who presents for Preventive Visit.  Are you in the first 12 months of your Medicare Part B coverage?  No    Physical Health:    In general, how would you rate your overall physical health? good    Outside of work, how many days during the week do you exercise? 6-7 days/week  -  walking    Outside of work, approximately how many minutes a day do you exercise?45-60 minutes    If you drink alcohol do you typically have >3 drinks per day or >7 drinks per week? No    Do you usually eat at least 4 servings of fruit and vegetables a day, include whole grains & fiber and avoid regularly eating high fat or \"junk\" foods? Yes    Do you have any problems taking medications regularly?  No    Do you have any side effects from medications? none    Needs assistance for the following daily activities: no assistance needed    Which of the following safety concerns are present in your home?  none identified     Hearing impairment: Yes, Difficulty following a conversation in a noisy restaurant or crowded room.    In the past 6 months, have you been bothered by leaking of urine? no    Mental Health:    In general, how would you rate your overall mental or emotional health? good  PHQ-2 Score: 0    Do you feel safe in your environment? Yes    Have you ever done Advance Care Planning? (For example, a Health Directive, POLST, or a discussion with a medical provider or your loved ones about your wishes): No, advance care planning information given to patient to review.  Patient plans to discuss their wishes with loved ones or provider.      Additional concerns to address?      Fall risk:  Fallen 2 or more times in the past year?: No  Any fall with injury in the past year?: No    Cognitive Screenin) Repeat 3 items (Leader, Season, Table)    2) Clock draw: NORMAL  3) 3 item recall: Recalls 3 objects  Results: 3 items recalled: COGNITIVE IMPAIRMENT LESS LIKELY    Mini-CogTM Copyright S " Murtaza. Licensed by the author for use in [facility]; reprinted with permission (jakub@[facility]). All rights reserved.      Do you have sleep apnea, excessive snoring or daytime drowsiness?: no        Hypertension Follow-up      Do you check your blood pressure regularly outside of the clinic? No     Are you following a low salt diet? Yes    Are your blood pressures ever more than 140 on the top number (systolic) OR more   than 90 on the bottom number (diastolic), for example 140/90? No    GERD/episodic oral pharyngeal dysphasia -heartburn symptoms are controlled also doing well with the Pepcid.  She does report occasionally dry chicken will feel as though it is caught in her upper throat area; carbonated beverages also have that sensation.  Everything else goes down normally.  We had discussed endoscopy in the past and she would like to delay that until another year due to the current pandemic situation.    Reviewed and updated as needed this visit by clinical staff  Tobacco  Allergies  Meds  Med Hx  Surg Hx  Fam Hx  Soc Hx        Reviewed and updated as needed this visit by Provider  Tobacco  Allergies  Meds  Med Hx  Surg Hx  Fam Hx  Soc Hx       Social History     Tobacco Use     Smoking status: Former Smoker     Packs/day: 1.00     Years: 10.00     Pack years: 10.00     Types: Cigarettes     Last attempt to quit: 1981     Years since quittin.6     Smokeless tobacco: Never Used   Substance Use Topics     Alcohol use: Yes     Comment: occ - Glass of white wine per night                           Current providers sharing in care for this patient include:   Patient Care Team:  Radha Hurtado MD as PCP - General (Family Practice)  Aaron Roy MD as MD (Dermapathology)  [facility] ([facility]  Radha Hurtado MD as Assigned PCP    The following health maintenance items are reviewed in Epic and correct as of today:  Health Maintenance  "  Topic Date Due     COPD ACTION PLAN  1950     ZOSTER IMMUNIZATION (3 of 3) 05/14/2019     MEDICARE ANNUAL WELLNESS VISIT  03/19/2020     FALL RISK ASSESSMENT  03/19/2020     INFLUENZA VACCINE (1) 09/01/2020     MAMMO SCREENING  04/29/2021     COLORECTAL CANCER SCREENING  10/19/2021     DTAP/TDAP/TD IMMUNIZATION (5 - Td) 11/06/2022     ADVANCE CARE PLANNING  10/11/2023     LIPID  10/31/2024     DEXA  Completed     SPIROMETRY  Completed     HEPATITIS C SCREENING  Completed     PHQ-2  Completed     PNEUMOCOCCAL IMMUNIZATION 65+ LOW/MEDIUM RISK  Completed     IPV IMMUNIZATION  Aged Out     MENINGITIS IMMUNIZATION  Aged Out     HEPATITIS B IMMUNIZATION  Aged Out     BP Readings from Last 3 Encounters:   09/03/20 124/76   01/07/20 132/78   08/22/19 128/74    Wt Readings from Last 3 Encounters:   09/03/20 77.1 kg (170 lb)   01/07/20 77.9 kg (171 lb 11.2 oz)   03/19/19 76.9 kg (169 lb 9.6 oz)                  Pneumonia Vaccine:series completed.  Mammogram Screening: Mammogram Screening: Patient over age 50, mutual decision to screen reflected in health maintenance.    ROS:  Constitutional, HEENT, cardiovascular, pulmonary, gi and gu systems are negative, except as otherwise noted.    OBJECTIVE:   /76   Pulse 78   Temp 98.1  F (36.7  C) (Oral)   Resp 18   Ht 1.702 m (5' 7\")   Wt 77.1 kg (170 lb)   LMP  (Approximate)   SpO2 100%   Breastfeeding No   BMI 26.63 kg/m   Estimated body mass index is 26.63 kg/m  as calculated from the following:    Height as of this encounter: 1.702 m (5' 7\").    Weight as of this encounter: 77.1 kg (170 lb).  EXAM:   GENERAL APPEARANCE: healthy, alert and no distress  EYES: Eyes grossly normal to inspection, PERRL and conjunctivae and sclerae normal  HENT: ear canals and TM's normal, nose and mouth without ulcers or lesions, oropharynx clear and oral mucous membranes moist  NECK: no adenopathy, no asymmetry, masses, or scars and thyroid normal to palpation  RESP: lungs clear " to auscultation - no rales, rhonchi or wheezes  BREAST: normal without masses, tenderness or nipple discharge and no palpable axillary masses or adenopathy  CV: regular rate and rhythm, normal S1 S2, no S3 or S4, no murmur, click or rub, no peripheral edema and peripheral pulses strong  ABDOMEN: soft, nontender, no hepatosplenomegaly, no masses and bowel sounds normal   (female): normal female external genitalia, normal urethral meatus, vaginal mucosal atrophy noted, normal cervix, adnexae, and uterus without masses or abnormal discharge  MS: no musculoskeletal defects are noted, gait is age appropriate without ataxia, enlargement of the MCP joints bilaterally on the thumbs.  No erythema or warmth to touch noted.  Medial and lateral rotation of the right hip resulted in some discomfort in the lateral hip area.  Good range of motion is maintained.  SKIN: no suspicious lesions or rashes  NEURO: Normal strength and tone, sensory exam grossly normal, mentation intact and speech normal  PSYCH: mentation appears normal and affect normal/bright    Diagnostic Test Results:  Labs reviewed in Epic  Results for orders placed or performed in visit on 09/03/20   Comprehensive metabolic panel     Status: None   Result Value Ref Range    Sodium 135 133 - 144 mmol/L    Potassium 4.3 3.4 - 5.3 mmol/L    Chloride 101 94 - 109 mmol/L    Carbon Dioxide 28 20 - 32 mmol/L    Anion Gap 6 3 - 14 mmol/L    Glucose 90 70 - 99 mg/dL    Urea Nitrogen 13 7 - 30 mg/dL    Creatinine 0.66 0.52 - 1.04 mg/dL    GFR Estimate 89 >60 mL/min/[1.73_m2]    GFR Estimate If Black >90 >60 mL/min/[1.73_m2]    Calcium 9.0 8.5 - 10.1 mg/dL    Bilirubin Total 0.5 0.2 - 1.3 mg/dL    Albumin 4.0 3.4 - 5.0 g/dL    Protein Total 7.3 6.8 - 8.8 g/dL    Alkaline Phosphatase 83 40 - 150 U/L    ALT 36 0 - 50 U/L    AST 27 0 - 45 U/L   Albumin Random Urine Quantitative with Creat Ratio     Status: None   Result Value Ref Range    Creatinine Urine 56 mg/dL    Albumin  Urine mg/L <5 mg/L    Albumin Urine mg/g Cr Unable to calculate due to low value 0 - 25 mg/g Cr       ASSESSMENT / PLAN:   1. Routine general medical examination at a health care facility  Screening and preventative care discussed    2. Gastroesophageal reflux disease without esophagitis  Refill Pepcid as noted.  She will follow-up if symptoms are not controlled.  - famotidine (PEPCID) 40 MG tablet; Take 1 tablet (40 mg) by mouth nightly as needed for heartburn  Dispense: 90 tablet; Refill: 3    3. Oropharyngeal dysphagia  It discussed again the potential for endoscopy.  She declines that currently but will follow-up if worsening symptoms occur and rediscussion of this will be made at her next appointment.    4. Benign essential hypertension  Controlled.  Continue current medication.  - Comprehensive metabolic panel  - Albumin Random Urine Quantitative with Creat Ratio  - lisinopril (ZESTRIL) 10 MG tablet; Take 1 tablet (10 mg) by mouth daily  Dispense: 90 tablet; Refill: 3    5. Hyponatremia  Normal sodium currently off diuretics.  - Comprehensive metabolic panel    6. Hand arthritis  Trial topical diclofenac.  - diclofenac (VOLTAREN) 1 % topical gel; Place 2 g onto the skin 4 times daily  Dispense: 150 g; Refill: 1    7. Hip pain, right  Current hip pain appears to be more muscular in nature physical therapy would be an option but she will adjust her home activities and monitor symptoms.      8. Need for shingles vaccine  Vaccine today  - ZOSTER VACCINE RECOMBINANT ADJUVANTED IM NJX    9. Need for prophylactic vaccination and inoculation against influenza  Vaccine today  - FLUZONE HIGH DOSE 65+  [08160]    10. Vaginal atrophy  Continue topical estrogen monitor symptoms  - estradiol (VAGIFEM) 10 MCG TABS vaginal tablet; Place 1 tablet (10 mcg) vaginally twice a week  Dispense: 24 tablet; Refill: 3    COUNSELING:  Reviewed preventive health counseling, as reflected in patient instructions       Regular exercise       " Healthy diet/nutrition       Vision screening       Dental care       Bladder control       Fall risk prevention       Immunizations    Vaccinated for: Influenza and Zoster             Osteoporosis Prevention/Bone Health       Colon cancer screening    Estimated body mass index is 26.63 kg/m  as calculated from the following:    Height as of this encounter: 1.702 m (5' 7\").    Weight as of this encounter: 77.1 kg (170 lb).        She reports that she quit smoking about 39 years ago. Her smoking use included cigarettes. She has a 10.00 pack-year smoking history. She has never used smokeless tobacco.    Appropriate preventive services were discussed with this patient, including applicable screening as appropriate for cardiovascular disease, diabetes, osteopenia/osteoporosis, and glaucoma.  As appropriate for age/gender, discussed screening for colorectal cancer, prostate cancer, breast cancer, and cervical cancer. Checklist reviewing preventive services available has been given to the patient.    Reviewed patients plan of care and provided an AVS. The Basic Care Plan (routine screening as documented in Health Maintenance) for Maddie meets the Care Plan requirement. This Care Plan has been established and reviewed with the Patient.    Counseling Resources:  ATP IV Guidelines  Pooled Cohorts Equation Calculator  Breast Cancer Risk Calculator  BRCA-Related Cancer Risk Assessment: FHS-7 Tool  FRAX Risk Assessment  ICSI Preventive Guidelines  Dietary Guidelines for Americans, 2010  [employer]'s MyPlate  ASA Prophylaxis  Lung CA Screening    Radha Hurtado MD  [facility]      Patient Instructions     Flu and shingles vaccine    Labs today.  Refills will be updated when results return.    Trial diclofenac gel for hands or knees as needed for joint pain.    If swallowing difficulty worsens follow up is recommended.          This chart was documented by provider using a voice activated software called Dragon " in addition to manual typing. There may be vocabulary errors or other grammatical errors due to this.

## 2020-09-03 NOTE — PATIENT INSTRUCTIONS
Flu and shingles vaccine    Labs today.  Refills will be updated when results return.    Trial diclofenac gel for hands or knees as needed for joint pain.    If swallowing difficulty worsens follow up is recommended.          Preventive Health Recommendations    See your health care provider every year to    Review health changes.     Discuss preventive care.      Review your medicines if your doctor has prescribed any.      You no longer need a yearly Pap test unless you've had an abnormal Pap test in the past 10 years. If you have vaginal symptoms, such as bleeding or discharge, be sure to talk with your provider about a Pap test.      Every 1 to 2 years, have a mammogram.  If you are over 69, talk with your health care provider about whether or not you want to continue having screening mammograms.      Every 10 years, have a colonoscopy. Or, have a yearly FIT test (stool test). These exams will check for colon cancer.       Have a cholesterol test every 5 years, or more often if your doctor advises it.       Have a diabetes test (fasting glucose) every three years. If you are at risk for diabetes, you should have this test more often.       At age 65, have a bone density scan (DEXA) to check for osteoporosis (brittle bone disease).    Shots:    Get a flu shot each year.    Get a tetanus shot every 10 years.    Talk to your doctor about your pneumonia vaccines. There are now two you should receive - Pneumovax (PPSV 23) and Prevnar (PCV 13).    Talk to your pharmacist about the shingles vaccine.    Talk to your doctor about the hepatitis B vaccine.    Nutrition:     Eat at least 5 servings of fruits and vegetables each day.      Eat whole-grain bread, whole-wheat pasta and brown rice instead of white grains and rice.      Get adequate about Calcium and Vitamin D.     Lifestyle    Exercise at least 150 minutes a week (30 minutes a day, 5 days a week). This will help you control your weight and prevent  disease.      Limit alcohol to one drink per day.      No smoking.       Wear sunscreen to prevent skin cancer.       See your dentist twice a year for an exam and cleaning.      See your eye doctor every 1 to 2 years to screen for conditions such as glaucoma, macular degeneration, cataracts, etc.    Personalized Prevention Plan  You are due for the preventive services outlined below.  Your care team is available to assist you in scheduling these services.  If you have already completed any of these items, please share that information with your care team to update in your medical record.    Health Maintenance Due   Topic Date Due     COPD Action Plan  1950     Zoster (Shingles) Vaccine (3 of 3) 05/14/2019     Annual Wellness Visit  03/19/2020     FALL RISK ASSESSMENT  03/19/2020     Flu Vaccine (1) 09/01/2020

## 2020-09-04 RX ORDER — FAMOTIDINE 40 MG/1
40 TABLET, FILM COATED ORAL
Qty: 90 TABLET | Refills: 3 | Status: SHIPPED | OUTPATIENT
Start: 2020-09-04 | End: 2021-09-09

## 2020-09-04 RX ORDER — ESTRADIOL 10 UG/1
10 INSERT VAGINAL
Qty: 24 TABLET | Refills: 3 | Status: SHIPPED | OUTPATIENT
Start: 2020-09-07 | End: 2021-08-25

## 2020-09-04 RX ORDER — LISINOPRIL 10 MG/1
10 TABLET ORAL DAILY
Qty: 90 TABLET | Refills: 3 | Status: SHIPPED | OUTPATIENT
Start: 2020-09-04 | End: 2021-09-09

## 2021-01-12 DIAGNOSIS — H10.45 CHRONIC ALLERGIC CONJUNCTIVITIS: ICD-10-CM

## 2021-01-13 RX ORDER — FLUTICASONE PROPIONATE 50 MCG
SPRAY, SUSPENSION (ML) NASAL
Qty: 48 G | Refills: 1 | Status: SHIPPED | OUTPATIENT
Start: 2021-01-13 | End: 2021-09-16

## 2021-01-19 NOTE — TELEPHONE ENCOUNTER
FUTURE VISIT INFORMATION      FUTURE VISIT INFORMATION:    Date: 2.4.21    Time: 3 PM    Location:  EYE  REFERRAL INFORMATION:    Referring provider:  N/A    Referring providers clinic:  N/A    Reason for visit/diagnosis  General exam for glasses    RECORDS REQUESTED FROM:       *no records to gather

## 2021-02-04 ENCOUNTER — OFFICE VISIT (OUTPATIENT)
Dept: OPHTHALMOLOGY | Facility: CLINIC | Age: 71
End: 2021-02-04
Payer: COMMERCIAL

## 2021-02-04 ENCOUNTER — PRE VISIT (OUTPATIENT)
Dept: OPHTHALMOLOGY | Facility: CLINIC | Age: 71
End: 2021-02-04

## 2021-02-04 DIAGNOSIS — Z96.1 PSEUDOPHAKIA: ICD-10-CM

## 2021-02-04 DIAGNOSIS — H52.213 IRREGULAR ASTIGMATISM OF BOTH EYES: Primary | ICD-10-CM

## 2021-02-04 PROCEDURE — 92004 COMPRE OPH EXAM NEW PT 1/>: CPT | Performed by: OPTOMETRIST

## 2021-02-04 ASSESSMENT — REFRACTION_CURRENTRX
OD_SPHERE: -1.00
OD_BASECURVE: 8.4
OS_BASECURVE: 8.4
OD_BASECURVE: 7.6
OS_SPHERE: -2.50
OS_BASECURVE: 7.7
OS_SPHERE: +3.00
OD_SPHERE: -3.00

## 2021-02-04 ASSESSMENT — REFRACTION_WEARINGRX
OD_ADD: +2.50
OD_AXIS: 168
OD_SPHERE: -2.25
SPECS_TYPE: PAL
OS_CYLINDER: +4.75
OS_ADD: +2.50
OS_AXIS: 180
OS_SPHERE: -1.50
OD_CYLINDER: +3.25

## 2021-02-04 ASSESSMENT — VISUAL ACUITY
OS_CC: 20/30 OU
OD_CC: 20/20
CORRECTION_TYPE: CONTACTS
OS_PH_CC: 20/30
METHOD: SNELLEN - LINEAR
OS_CC: 20/50
OD_CC: 20/30 OU

## 2021-02-04 ASSESSMENT — REFRACTION_MANIFEST
OD_SPHERE: +0.25
OD_CYLINDER: +3.25
OS_ADD: +2.50
OS_SPHERE: -0.25
OD_ADD: +2.50
OD_AXIS: 160
OS_AXIS: 015
OS_CYLINDER: +5.00

## 2021-02-04 ASSESSMENT — CUP TO DISC RATIO
OS_RATIO: 0.20
OD_RATIO: 0.20

## 2021-02-04 ASSESSMENT — CONF VISUAL FIELD
OS_NORMAL: 1
OD_NORMAL: 1
METHOD: COUNTING FINGERS

## 2021-02-04 ASSESSMENT — EXTERNAL EXAM - RIGHT EYE: OD_EXAM: NORMAL

## 2021-02-04 ASSESSMENT — TONOMETRY
OS_IOP_MMHG: 14
IOP_METHOD: ICARE
OD_IOP_MMHG: 14

## 2021-02-04 ASSESSMENT — EXTERNAL EXAM - LEFT EYE: OS_EXAM: NORMAL

## 2021-02-04 NOTE — NURSING NOTE
"Chief Complaints and History of Present Illnesses   Patient presents with     COMPREHENSIVE EYE EXAM     With CTL exam     Chief Complaint(s) and History of Present Illness(es)     COMPREHENSIVE EYE EXAM     Laterality: both eyes    Quality: blurred vision    Context: distance vision and near vision    Associated symptoms: dryness (pt is using Blink Contacts AT 0-3x daily, AT gel qhs OU), floaters (longstanding, no changes per pt) and double vision (pt notes that she has noticed in the last few month, unsure if it is horizontally, mostly at night when watching tv, intermittent, closes one eye and it goes away per pt).  Negative for eye pain, tearing and flashes    Treatments tried: artificial tears    Pain scale: 0/10    Comments: With CTL exam              Comments     Pt notes that her last OD has left here today for comprehensive exam with CTL, she notes that she isn't seeing well mostly in the distance, she will move things closer to see at near as well, she notes that CTL are comfortable but feels she isn't seeing the best, wondering if it is her CTL rx or if it is \"as good as it can get\"    Nallely Pablo COT February 4, 2021 2:52 PM                  "

## 2021-02-05 ASSESSMENT — REFRACTION_CURRENTRX
OD_BRAND: PROCLEAR MULTIFOCAL
OD_AXIS: 100
OD_CYLINDER: -1.25
OD_ADD: +2.50
OD_DIAMETER: 14.4
OS_CYLINDER: -4.25
OS_ADD: +2.00
OS_DIAMETER: 14.4
OS_BRAND: PROCLEAR MULTIFOCAL
OS_AXIS: 100

## 2021-02-05 ASSESSMENT — REFRACTION
OD_CYLINDER: +2.00
OD_SPHERE: PLANO
OS_SPHERE: -2.50
OS_AXIS: 180
OS_SPHERE: -1.25
OD_SPHERE: -0.50
OS_CYLINDER: +5.00
OD_CYLINDER: +1.50
OD_AXIS: 150
OD_CYLINDER: +2.25
OS_CYLINDER: +5.25
OS_AXIS: 180
OD_AXIS: 167
OS_CYLINDER: +4.50
OD_AXIS: 0001
OD_SPHERE: -3.00
OS_SPHERE: -1.50
OS_AXIS: 178

## 2021-02-05 ASSESSMENT — SLIT LAMP EXAM - LIDS
COMMENTS: NORMAL
COMMENTS: NORMAL

## 2021-02-05 ASSESSMENT — KERATOMETRY
OS_K1POWER_DIOPTERS: 39.75
OS_AXISANGLE2_DEGREES: 092
METHOD_AUTO_MANUAL: AUTO-K
OD_AXISANGLE2_DEGREES: 073
OS_AXISANGLE_DEGREES: 002
OS_K2POWER_DIOPTERS: 44.75
OD_K1POWER_DIOPTERS: 42.50
OD_K2POWER_DIOPTERS: 43.75
OD_AXISANGLE_DEGREES: 163

## 2021-02-05 NOTE — PROGRESS NOTES
A/P  1.) Irregular Astigmatism  -Highly irregular/fluctuating numbers on refraction. Damp autorefraction and refraction results worse than dry refraction  -Currently in soft MF toric with complaints of blurred vision D>N. Wearing reading glasses for most things up close. Good comfort but lenses rip often  -Previously followed by Dr. Maxwell, then most recently at [facility] - will need records  -Poor/inconsistent results with refraction. Hard lens trial/overrefraction with instant clear 20/20 vision both eyes  -Topographer not working today, plan for Pentacam at [facility] next exam. Suspect irregular astigmatism. Per pt after cataract surgery saw great without correction for a while but has gotten worse over time    2.) Pseudophakia each eye  -Dilated ocular health unremarkable each eye    Order scleral lenses. Plan for gagan next exam. If consistent good results with hard lenses she is interested in wearing them. Consult old records. F/u 2 weeks    I have confirmed the patient's CC, HPI and reviewed Past Medical History, Past Surgical History, Social History, Family History, Problem List, Medication List and agree with Tech note.     Latia Garcia, OD [facility]

## 2021-02-17 ENCOUNTER — OFFICE VISIT (OUTPATIENT)
Dept: OPTOMETRY | Facility: CLINIC | Age: 71
End: 2021-02-17
Payer: COMMERCIAL

## 2021-02-17 DIAGNOSIS — H52.213 IRREGULAR ASTIGMATISM OF BOTH EYES: Primary | ICD-10-CM

## 2021-02-17 DIAGNOSIS — H18.462 PELLUCID MARGINAL DEGENERATION OF LEFT CORNEA: ICD-10-CM

## 2021-02-17 ASSESSMENT — SLIT LAMP EXAM - LIDS
COMMENTS: NORMAL
COMMENTS: NORMAL

## 2021-02-17 ASSESSMENT — EXTERNAL EXAM - LEFT EYE: OS_EXAM: NORMAL

## 2021-02-17 ASSESSMENT — VISUAL ACUITY
METHOD: SNELLEN - LINEAR
OS_CC: 20/25-1
OD_CC: 20/40-1
CORRECTION_TYPE: CONTACTS

## 2021-02-17 ASSESSMENT — REFRACTION_CURRENTRX
OD_SPHERE: -2.37
OS_BASECURVE: 7.7
OD_BASECURVE: 7.7
OS_BRAND: ONEFIT
OS_ADDL_SPECS: OPT EXTRA BLUE
OD_DIAMETER: 14.9
OS_DIAMETER: 14.9
OS_SPHERE: -1.50
OD_BRAND: ONEFIT
OD_ADDL_SPECS: OPT EXTRA CLEAR

## 2021-02-17 ASSESSMENT — REFRACTION_WEARINGRX
OS_SPHERE: -1.50
SPECS_TYPE: PAL
OD_AXIS: 168
OS_ADD: +2.50
OD_SPHERE: -2.25
OD_CYLINDER: +3.25
OS_CYLINDER: +4.75
OS_AXIS: 180
OD_ADD: +2.50

## 2021-02-17 ASSESSMENT — REFRACTION_MANIFEST
OS_CYLINDER: +5.50
OD_CYLINDER: +1.00
OS_SPHERE: -1.50
OS_AXIS: 180
OD_AXIS: 170
OD_SPHERE: -2.00

## 2021-02-17 ASSESSMENT — EXTERNAL EXAM - RIGHT EYE: OD_EXAM: NORMAL

## 2021-02-17 NOTE — PROGRESS NOTES
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 Still prefers MRx from last exam over anything from today  -Currently in soft MF toric with complaints of blurred vision D>N. Wearing reading glasses for most things up close.   -Tushar today shows irregular astigmatism both eyes with pellucid pattern left eye, possibly right eye  -BCVA with scleral lenses 20/15- significantly improved. Pt would like to proceed  -Successful I&R, reviewed CL care and hygiene with pt ([place] Simplus, Addipak vs Purilens)    Will order new right lens with adjusted power and mail direct. F/u prn with lens fit recheck, otherwise 1 year routine eye exam    Contact Lens Billing  V-Code:  - GP scleral  Final Contact Lens Rx       Brand Base Curve Diameter Sphere Lens Addl. Specs    Right Onefit 7.7 14.9 -1.87 std/1 flat edge Opt Extra clear    Left Onefit 7.7 14.9 -1.50 std/1 flat edge Opt Extra blue         # of units: 2  Price per Unit: $200    This patient requires contact lenses that are medically necessary for either improvement in vision over spectacles, support of the ocular surface, or other therapeutic benefit. These are not cosmetic contact lenses.     Encounter Diagnoses   Name Primary?     Irregular astigmatism of both eyes Yes     Pellucid marginal degeneration of left cornea

## 2021-03-12 ENCOUNTER — MYC MEDICAL ADVICE (OUTPATIENT)
Dept: FAMILY MEDICINE | Facility: CLINIC | Age: 71
End: 2021-03-12

## 2021-04-26 ENCOUNTER — OFFICE VISIT (OUTPATIENT)
Dept: OPHTHALMOLOGY | Facility: CLINIC | Age: 71
End: 2021-04-26
Attending: OPHTHALMOLOGY
Payer: COMMERCIAL

## 2021-04-26 DIAGNOSIS — Z96.1 PSEUDOPHAKIA OF BOTH EYES: ICD-10-CM

## 2021-04-26 DIAGNOSIS — S05.01XA ABRASION OF RIGHT CORNEA, INITIAL ENCOUNTER: Primary | ICD-10-CM

## 2021-04-26 PROCEDURE — 99203 OFFICE O/P NEW LOW 30 MIN: CPT | Mod: GC | Performed by: OPHTHALMOLOGY

## 2021-04-26 PROCEDURE — G0463 HOSPITAL OUTPT CLINIC VISIT: HCPCS

## 2021-04-26 ASSESSMENT — SLIT LAMP EXAM - LIDS
COMMENTS: NORMAL
COMMENTS: NORMAL

## 2021-04-26 ASSESSMENT — VISUAL ACUITY
OS_CC: 20/25
OD_PH_CC+: -1
OD_CC: 20/40
OD_CC+: -1
METHOD: SNELLEN - LINEAR
OD_PH_CC: 20/25

## 2021-04-26 ASSESSMENT — CONF VISUAL FIELD
OD_NORMAL: 1
OS_NORMAL: 1
METHOD: COUNTING FINGERS

## 2021-04-26 ASSESSMENT — EXTERNAL EXAM - RIGHT EYE: OD_EXAM: NORMAL

## 2021-04-26 ASSESSMENT — TONOMETRY
IOP_METHOD: ICARE
OS_IOP_MMHG: 14
OD_IOP_MMHG: 12

## 2021-04-26 ASSESSMENT — EXTERNAL EXAM - LEFT EYE: OS_EXAM: NORMAL

## 2021-04-26 NOTE — PROGRESS NOTES
Chief Complaint(s) and History of Present Illness(es)     Corneal Abrasion Evaluation     Laterality: right eye              Comments     Maddie is here complaining os pain RE with photophobia RE that started   Friday. She was wearing contacts Friday whe she started feel ing pain RE.   She removed Right contact.   She says the pain is worse at night.   Today RE feels a little better but still irritation. She has been using an   OTC gtt that has Ketotinen in it.     Aryan Sheehan COT 10:30 AM April 26, 2021               Review of systems for the eyes was negative other than the pertinent positives/negatives listed in the HPI.        Chief Complaint/Presenting Concern: right eye corneal abrasion    History of Present Illness:   Patient is a 70 yo F presenting for right eye pain and photophobia.  Her symptoms started 3 days ago. She had contact lens on her eyes at thte time. She was walking outdoor at the time. She also had blurry vision and pressure sensation of the right eye. She removed the right CL which made her feel a little better. Left eye is feeling normal.   Last CEE on 2/4/2021 with Dr. Garcia.     Additional Ocular History:   Contact lens wearer (history of corneal abrasion from hard contact lens > 20 years ago)     Relevant Past Medical/Family/Social History: none relevant     Relevant Review of Systems: none relevant       Current eye related medications:  Ketotofien PRN     Assessment:    # Corneal abrasion right eye   - Epi defect almost healed  - Start artificial tears QID right eye for a few days  - CL holiday until the eye feels totally back to normal. If irritation recurs after starting CL, patient will inform the eye clinic  - Discussed return precautions    # Pseudophakia each eye   - CEIOL each eye around 2010  - Monitor       RTC PRN        Jonna Aponte MD  Ophthalmology PGY-3    Teaching statement:  Complete documentation of historical and exam elements from today's encounter can be found in  the full encounter summary report (not reduplicated in this progress note). I personally obtained the chief complaint(s) and history of present illness.  I confirmed and edited as necessary the review of systems, past medical/surgical history, family history, social history, and examination findings as documented by others; and I examined the patient myself. I personally reviewed the relevant tests, images, and reports as documented above.     I formulated and edited as necessary the assessment and plan and discussed the findings and management plan with the patient and family.    Ghazala Epperson MD  Comprehensive Ophthalmology & Ocular Pathology  Department of Ophthalmology and Visual Neurosciences  [email or web address]  Pager [phone]

## 2021-04-26 NOTE — NURSING NOTE
Chief Complaints and History of Present Illnesses   Patient presents with     Corneal Abrasion Evaluation     Chief Complaint(s) and History of Present Illness(es)     Corneal Abrasion Evaluation     Laterality: right eye              Comments     Maddie is here complaining os pain RE with photophobia RE that started Friday. She was wearing contacts Friday whe she started feel ing pain RE. She removed Right contact.   She says the pain is worse at night.   Today RE feels a little better but still irritation. She has been using an OTC gtt that has Ketotinen in it.     Aryan Sheehan COT 10:30 AM April 26, 2021

## 2021-05-16 ENCOUNTER — MYC MEDICAL ADVICE (OUTPATIENT)
Dept: FAMILY MEDICINE | Facility: CLINIC | Age: 71
End: 2021-05-16

## 2021-05-17 NOTE — TELEPHONE ENCOUNTER
Routing to provider to review and advise    Provider: please see patients mychart message and pictures.    Belkis Michael RN

## 2021-07-03 ENCOUNTER — HEALTH MAINTENANCE LETTER (OUTPATIENT)
Age: 71
End: 2021-07-03

## 2021-08-02 DIAGNOSIS — L21.9 DERMATITIS, SEBORRHEIC: ICD-10-CM

## 2021-08-04 NOTE — TELEPHONE ENCOUNTER
Routing refill request to provider for review/approval because:  Drug not on the [facility] refill protocol     Gloria COBOSN, RN

## 2021-08-05 DIAGNOSIS — Z12.31 VISIT FOR SCREENING MAMMOGRAM: ICD-10-CM

## 2021-08-05 PROCEDURE — 77067 SCR MAMMO BI INCL CAD: CPT | Mod: GC | Performed by: RADIOLOGY

## 2021-08-05 RX ORDER — FLUOCINONIDE TOPICAL SOLUTION USP, 0.05% 0.5 MG/ML
SOLUTION TOPICAL
Qty: 60 ML | Refills: 3 | Status: SHIPPED | OUTPATIENT
Start: 2021-08-05 | End: 2022-09-07

## 2021-08-06 NOTE — RESULT ENCOUNTER NOTE
Dear Maddie Hurtado is out of the office and I am reviewing your results.   I hope you are doing well.   Your mammogram was normal.   Please call or MyChart my office with any questions or concerns.    Elicia Richards, PAC

## 2021-09-09 ENCOUNTER — OFFICE VISIT (OUTPATIENT)
Dept: FAMILY MEDICINE | Facility: CLINIC | Age: 71
End: 2021-09-09
Payer: COMMERCIAL

## 2021-09-09 VITALS
OXYGEN SATURATION: 98 % | WEIGHT: 165.9 LBS | BODY MASS INDEX: 26.66 KG/M2 | HEART RATE: 78 BPM | RESPIRATION RATE: 18 BRPM | TEMPERATURE: 98.1 F | DIASTOLIC BLOOD PRESSURE: 75 MMHG | SYSTOLIC BLOOD PRESSURE: 153 MMHG | HEIGHT: 66 IN

## 2021-09-09 DIAGNOSIS — R29.890 LOSS OF HEIGHT: ICD-10-CM

## 2021-09-09 DIAGNOSIS — K21.9 GASTROESOPHAGEAL REFLUX DISEASE WITHOUT ESOPHAGITIS: ICD-10-CM

## 2021-09-09 DIAGNOSIS — Z23 NEED FOR PROPHYLACTIC VACCINATION AND INOCULATION AGAINST INFLUENZA: ICD-10-CM

## 2021-09-09 DIAGNOSIS — Z12.11 COLON CANCER SCREENING: ICD-10-CM

## 2021-09-09 DIAGNOSIS — M25.551 HIP PAIN, RIGHT: ICD-10-CM

## 2021-09-09 DIAGNOSIS — I10 BENIGN ESSENTIAL HYPERTENSION: ICD-10-CM

## 2021-09-09 DIAGNOSIS — N95.2 VAGINAL ATROPHY: ICD-10-CM

## 2021-09-09 DIAGNOSIS — Z00.00 ENCOUNTER FOR MEDICARE ANNUAL WELLNESS EXAM: Primary | ICD-10-CM

## 2021-09-09 DIAGNOSIS — R13.14 PHARYNGOESOPHAGEAL DYSPHAGIA: ICD-10-CM

## 2021-09-09 DIAGNOSIS — M85.89 OSTEOPENIA OF MULTIPLE SITES: ICD-10-CM

## 2021-09-09 LAB
ALBUMIN SERPL-MCNC: 3.7 G/DL (ref 3.4–5)
ALP SERPL-CCNC: 72 U/L (ref 40–150)
ALT SERPL W P-5'-P-CCNC: 41 U/L (ref 0–50)
ANION GAP SERPL CALCULATED.3IONS-SCNC: 2 MMOL/L (ref 3–14)
AST SERPL W P-5'-P-CCNC: 27 U/L (ref 0–45)
BILIRUB SERPL-MCNC: 0.4 MG/DL (ref 0.2–1.3)
BUN SERPL-MCNC: 15 MG/DL (ref 7–30)
CALCIUM SERPL-MCNC: 8.8 MG/DL (ref 8.5–10.1)
CHLORIDE BLD-SCNC: 103 MMOL/L (ref 94–109)
CO2 SERPL-SCNC: 31 MMOL/L (ref 20–32)
CREAT SERPL-MCNC: 0.6 MG/DL (ref 0.52–1.04)
CREAT UR-MCNC: 16 MG/DL
ERYTHROCYTE [DISTWIDTH] IN BLOOD BY AUTOMATED COUNT: 12.6 % (ref 10–15)
GFR SERPL CREATININE-BSD FRML MDRD: >90 ML/MIN/1.73M2
GLUCOSE BLD-MCNC: 93 MG/DL (ref 70–99)
HCT VFR BLD AUTO: 34.5 % (ref 35–47)
HGB BLD-MCNC: 11.9 G/DL (ref 11.7–15.7)
MCH RBC QN AUTO: 31.9 PG (ref 26.5–33)
MCHC RBC AUTO-ENTMCNC: 34.5 G/DL (ref 31.5–36.5)
MCV RBC AUTO: 93 FL (ref 78–100)
MICROALBUMIN UR-MCNC: <5 MG/L
MICROALBUMIN/CREAT UR: NORMAL MG/G{CREAT}
PLATELET # BLD AUTO: 292 10E3/UL (ref 150–450)
POTASSIUM BLD-SCNC: 4 MMOL/L (ref 3.4–5.3)
PROT SERPL-MCNC: 6.7 G/DL (ref 6.8–8.8)
RBC # BLD AUTO: 3.73 10E6/UL (ref 3.8–5.2)
SODIUM SERPL-SCNC: 136 MMOL/L (ref 133–144)
WBC # BLD AUTO: 4.1 10E3/UL (ref 4–11)

## 2021-09-09 PROCEDURE — 80053 COMPREHEN METABOLIC PANEL: CPT | Performed by: FAMILY MEDICINE

## 2021-09-09 PROCEDURE — 85027 COMPLETE CBC AUTOMATED: CPT | Performed by: FAMILY MEDICINE

## 2021-09-09 PROCEDURE — 82043 UR ALBUMIN QUANTITATIVE: CPT | Performed by: FAMILY MEDICINE

## 2021-09-09 PROCEDURE — G0008 ADMIN INFLUENZA VIRUS VAC: HCPCS | Performed by: FAMILY MEDICINE

## 2021-09-09 PROCEDURE — 99214 OFFICE O/P EST MOD 30 MIN: CPT | Mod: 25 | Performed by: FAMILY MEDICINE

## 2021-09-09 PROCEDURE — 90662 IIV NO PRSV INCREASED AG IM: CPT | Performed by: FAMILY MEDICINE

## 2021-09-09 PROCEDURE — 36415 COLL VENOUS BLD VENIPUNCTURE: CPT | Performed by: FAMILY MEDICINE

## 2021-09-09 PROCEDURE — G0439 PPPS, SUBSEQ VISIT: HCPCS | Performed by: FAMILY MEDICINE

## 2021-09-09 RX ORDER — FAMOTIDINE 40 MG/1
40 TABLET, FILM COATED ORAL
Qty: 90 TABLET | Refills: 3 | Status: SHIPPED | OUTPATIENT
Start: 2021-09-09 | End: 2022-10-03

## 2021-09-09 RX ORDER — LISINOPRIL 10 MG/1
10 TABLET ORAL DAILY
Qty: 90 TABLET | Refills: 3 | Status: SHIPPED | OUTPATIENT
Start: 2021-09-09 | End: 2021-09-27 | Stop reason: DRUGHIGH

## 2021-09-09 RX ORDER — ESTRADIOL 10 UG/1
10 INSERT VAGINAL
Qty: 24 TABLET | Refills: 3 | Status: SHIPPED | OUTPATIENT
Start: 2021-09-09 | End: 2022-08-25

## 2021-09-09 ASSESSMENT — ENCOUNTER SYMPTOMS
BREAST MASS: 0
PALPITATIONS: 0
ABDOMINAL PAIN: 0
ARTHRALGIAS: 1
SORE THROAT: 0
CHILLS: 0
HEMATURIA: 0
EYE PAIN: 0
HEMATOCHEZIA: 0
NERVOUS/ANXIOUS: 0
SHORTNESS OF BREATH: 0
NAUSEA: 0
FREQUENCY: 0
DIZZINESS: 0
CONSTIPATION: 0
JOINT SWELLING: 1
FEVER: 0
WEAKNESS: 0
HEADACHES: 0
DYSURIA: 0
PARESTHESIAS: 0
COUGH: 0
DIARRHEA: 0
MYALGIAS: 0
HEARTBURN: 1

## 2021-09-09 ASSESSMENT — MIFFLIN-ST. JEOR: SCORE: 1283.24

## 2021-09-09 ASSESSMENT — ACTIVITIES OF DAILY LIVING (ADL): CURRENT_FUNCTION: NO ASSISTANCE NEEDED

## 2021-09-09 ASSESSMENT — PAIN SCALES - GENERAL: PAINLEVEL: NO PAIN (0)

## 2021-09-09 NOTE — PATIENT INSTRUCTIONS
Labs today.  Please monitor BP at home.  If >140/90, notify me for adjustment of medication.     Swallowing study for the throat symptoms.  Bone density recommended.     You can call [phone] to schedule these at the [facility] building in [place].      Flu vaccine today.        Patient Education   Personalized Prevention Plan  You are due for the preventive services outlined below.  Your care team is available to assist you in scheduling these services.  If you have already completed any of these items, please share that information with your care team to update in your medical record.  Health Maintenance Due   Topic Date Due     Flu Vaccine (1) 09/01/2021     FALL RISK ASSESSMENT  09/03/2021

## 2021-09-09 NOTE — PROGRESS NOTES
"  SUBJECTIVE:   Maddie Lala is a 71 year old female who presents for Preventive Visit.    {  Patient has been advised of split billing requirements and indicates understanding: Yes  Are you in the first 12 months of your Medicare Part B coverage?  No    Physical Health:Answers for HPI/ROS submitted by the patient on 9/9/2021  In general, how would you rate your overall physical health?: good  Frequency of exercise:: 4-5 days/week  Do you usually eat at least 4 servings of fruit and vegetables a day, include whole grains & fiber, and avoid regularly eating high fat or \"junk\" foods? : No  Taking medications regularly:: Yes  Medication side effects:: None  Activities of Daily Living: no assistance needed  Home safety: throw rugs in the hallway, lack of grab bars in the bathroom  Hearing Impairment:: difficulty following a conversation in a noisy restaurant or crowded room, need to ask people to speak up or repeat themselves, difficulty understanding soft or whispered speech, difficulty understanding speech on the telephone  In the past 6 months, have you been bothered by leaking of urine?: Yes  abdominal pain: No  Blood in stool: No  Blood in urine: No  chest pain: No  chills: No  congestion: No  constipation: No  cough: No  diarrhea: No  dizziness: No  ear pain: No  eye pain: No  nervous/anxious: No  fever: No  frequency: No  genital sores: No  headaches: No  hearing loss: No  heartburn: Yes  arthralgias: Yes  joint swelling: Yes  peripheral edema: No  mood changes: No  myalgias: No  nausea: No  dysuria: No  palpitations: No  Skin sensation changes: No  sore throat: No  urgency: No  rash: No  shortness of breath: No  visual disturbance: No  weakness: No  pelvic pain: No  vaginal bleeding: No  vaginal discharge: No  tenderness: No  breast mass: No  breast discharge: No  In general, how would you rate your overall mental or emotional health?: good  Additional concerns today:: Yes  Duration of exercise:: 30-45 " "minutes          Atrophic vaginitis - treated with vaginal estrogen with good results.      Bloating - using beano with good results.  Stopped pop - worse symptoms with any intake of this.     Swallowing issues - has occurred before and getting worse - up in the upper throat/chest area.  No epigastric symptoms. Episodes resolve with time - food goes down.  No regurgitation described.  History of GERD using Pepcid.      Right hip pain -  - pain in the groin  Not limiting activity.      Osteopenia -  DEXA with osteopenia.  On measurement today has \"shrunk.\"  Historically 5' 8\" now measuring 5' 6.25\"   Weight bearing exercise, calcium and vitamin D has been treatment to this point.      Hypertension Follow-up  BP higher than normal today.  May have taken sudafed this AM.     Do you check your blood pressure regularly outside of the clinic? No     Are you following a low salt diet? Yes    Are your blood pressures ever more than 140 on the top number (systolic) OR more   than 90 on the bottom number (diastolic), for example 140/90? No        Have you ever done Advance Care Planning? (For example, a Health Directive, POLST, or a discussion with a medical provider or your loved ones about your wishes): No, advance care planning information given to patient to review.  Patient plans to discuss their wishes with loved ones or provider.      Additional concerns to address?  No    Fall risk:  Fallen 2 or more times in the past year?: No  Any fall with injury in the past year?: No     click delete button to remove this line now  Cognitive Screenin) Repeat 3 items (Leader, Season, Table)    2) Clock draw: NORMAL  3) 3 item recall: Recalls 3 objects  Results: 3 items recalled: COGNITIVE IMPAIRMENT LESS LIKELY    Mini-CogTM Copyright S Murtaza. Licensed by the author for use in [place] [employer]; reprinted with permission (jakub@.[facility]). All rights reserved.      Do you have sleep apnea, excessive snoring or daytime " drowsiness?: no          Reviewed and updated as needed this visit by clinical staff  Tobacco  Allergies  Meds   Med Hx  Surg Hx  Fam Hx  Soc Hx        Reviewed and updated as needed this visit by Provider  Tobacco   Meds   Med Hx  Surg Hx  Fam Hx  Soc Hx       Social History     Tobacco Use     Smoking status: Former Smoker     Packs/day: 1.00     Years: 10.00     Pack years: 10.00     Types: Cigarettes     Quit date: 1981     Years since quittin.7     Smokeless tobacco: Never Used   Substance Use Topics     Alcohol use: Yes     Comment: occ - Glass of white wine per night                           Current providers sharing in care for this patient include:   Patient Care Team:  Radha Hurtado MD as PCP - General (Family Practice)  Aaron Roy MD as MD (Dermapathology)  [facility] ([facility] ([facility] ([place]  Latia Garcia OD as MD (Optometry)  Radha Hurtado MD as Assigned PCP  Latia Garcia OD as Assigned Surgical Provider    The following health maintenance items are reviewed in Epic and correct as of today:  Health Maintenance   Topic Date Due     ANNUAL REVIEW OF HM ORDERS  Never done     INFLUENZA VACCINE (1) 2021     FALL RISK ASSESSMENT  2021     COLORECTAL CANCER SCREENING  10/19/2021     MEDICARE ANNUAL WELLNESS VISIT  2022     DTAP/TDAP/TD IMMUNIZATION (5 - Td or Tdap) 2022     MAMMO SCREENING  2023     LIPID  10/31/2024     ADVANCE CARE PLANNING  2026     DEXA  2028     HEPATITIS C SCREENING  Completed     PHQ-2  Completed     Pneumococcal Vaccine: 65+ Years  Completed     ZOSTER IMMUNIZATION  Completed     COVID-19 Vaccine  Completed     IPV IMMUNIZATION  Aged Out     MENINGITIS IMMUNIZATION  Aged Out     HEPATITIS B IMMUNIZATION  Aged Out     BP Readings from Last 3 Encounters:   21 (!) 153/75   20 124/76   20 132/78    Wt Readings from Last 3 Encounters:   21  "75.3 kg (165 lb 14.4 oz)   09/03/20 77.1 kg (170 lb)   01/07/20 77.9 kg (171 lb 11.2 oz)                  Pneumonia Vaccine:series completed  Mammogram Screening: Mammogram Screening: Recommended mammography every 1-2 years with patient discussion and risk factor consideration    ROS:  Constitutional, HEENT, cardiovascular, pulmonary, GI, , musculoskeletal, neuro, skin, endocrine and psych systems are negative, except as otherwise noted.    OBJECTIVE:   BP (!) 150/88 (BP Location: Right arm, Patient Position: Sitting, Cuff Size: Adult Regular)   Pulse 78   Temp 98.1  F (36.7  C) (Oral)   Resp 18   Ht 1.683 m (5' 6.25\")   Wt 75.3 kg (165 lb 14.4 oz)   SpO2 98%   BMI 26.58 kg/m   Estimated body mass index is 26.58 kg/m  as calculated from the following:    Height as of this encounter: 1.683 m (5' 6.25\").    Weight as of this encounter: 75.3 kg (165 lb 14.4 oz).  EXAM:   GENERAL APPEARANCE: healthy, alert and no distress  EYES: Eyes grossly normal to inspection, PERRL and conjunctivae and sclerae normal  HENT: ear canals and TM's normal, nose and mouth without ulcers or lesions, oropharynx clear and oral mucous membranes moist  NECK: no adenopathy, no asymmetry, masses, or scars and thyroid normal to palpation  RESP: lungs clear to auscultation - no rales, rhonchi or wheezes  BREAST: normal without masses, tenderness or nipple discharge and no palpable axillary masses or adenopathy  CV: regular rate and rhythm, normal S1 S2, no S3 or S4, no murmur, click or rub, no peripheral edema and peripheral pulses strong  ABDOMEN: soft, nontender, no hepatosplenomegaly, no masses and bowel sounds normal   (female): normal female external genitalia, normal urethral meatus, vaginal mucosal atrophy noted, normal cervix, adnexae, and uterus without masses or abnormal discharge  MS: no musculoskeletal defects are noted, gait is age appropriate without ataxia, FROM right hip noted without pain  SKIN: no suspicious lesions or " rashes  NEURO: Normal strength and tone, sensory exam grossly normal, mentation intact and speech normal  PSYCH: mentation appears normal and affect normal/bright    Diagnostic Test Results:  Labs reviewed in Epic  Results for orders placed or performed in visit on 09/09/21   Comprehensive metabolic panel (BMP + Alb, Alk Phos, ALT, AST, Total. Bili, TP)     Status: Abnormal   Result Value Ref Range    Sodium 136 133 - 144 mmol/L    Potassium 4.0 3.4 - 5.3 mmol/L    Chloride 103 94 - 109 mmol/L    Carbon Dioxide (CO2) 31 20 - 32 mmol/L    Anion Gap 2 (L) 3 - 14 mmol/L    Urea Nitrogen 15 7 - 30 mg/dL    Creatinine 0.60 0.52 - 1.04 mg/dL    Calcium 8.8 8.5 - 10.1 mg/dL    Glucose 93 70 - 99 mg/dL    Alkaline Phosphatase 72 40 - 150 U/L    AST 27 0 - 45 U/L    ALT 41 0 - 50 U/L    Protein Total 6.7 (L) 6.8 - 8.8 g/dL    Albumin 3.7 3.4 - 5.0 g/dL    Bilirubin Total 0.4 0.2 - 1.3 mg/dL    GFR Estimate >90 >60 mL/min/1.73m2   CBC with platelets     Status: Abnormal   Result Value Ref Range    WBC Count 4.1 4.0 - 11.0 10e3/uL    RBC Count 3.73 (L) 3.80 - 5.20 10e6/uL    Hemoglobin 11.9 11.7 - 15.7 g/dL    Hematocrit 34.5 (L) 35.0 - 47.0 %    MCV 93 78 - 100 fL    MCH 31.9 26.5 - 33.0 pg    MCHC 34.5 31.5 - 36.5 g/dL    RDW 12.6 10.0 - 15.0 %    Platelet Count 292 150 - 450 10e3/uL   Albumin Random Urine Quantitative with Creat Ratio     Status: None   Result Value Ref Range    Creatinine Urine mg/dL 16 mg/dL    Albumin Urine mg/L <5 mg/L    Albumin Urine mg/g Cr         ASSESSMENT / PLAN:   (Z00.00) Encounter for Medicare annual wellness exam  (primary encounter diagnosis)  Comment: screening and preventative care discussed  Plan: CBC with platelets            (R13.14) Pharyngoesophageal dysphagia  Comment: ?esophageal spasm or external restriction causing symptoms.  Plan: XR Esophagram w Upper GI        Evaluate with esophagram as noted.      (K21.9) Gastroesophageal reflux disease without esophagitis  Comment: reflux  symptoms controlled.  Plan: famotidine (PEPCID) 40 MG tablet        Continue pepcid at night.      (N95.2) Atrophic vaginitis  Comment: symptoms managed with topical estrogen  Plan: Vagifem - 2 times weekly    (I10) Benign essential hypertension  Comment: elevated today - thinks may have taken sudafed for congestion symptoms this AM.    Plan: CBC with platelets, Comprehensive metabolic         panel (BMP + Alb, Alk Phos, ALT, AST, Total.         Bili, TP), Albumin Random Urine Quantitative         with Creat Ratio, lisinopril (ZESTRIL) 10 MG         tablet        Will monitor BP at home - follow up if elevated BP at home for adjustment of medication.     (M25.551) Hip pain, right  Comment: appears more soft tissue related symptoms but may be arthritis in hip joint. Has not been doing her exercises as her normal due to extra childcare activities in last few months related to pandemic  Plan: resume exercises - stretching/Yoga    (R29.890) Loss of height  (M85.89) Osteopenia of multiple sites  Comment: last dexa - 2013 - osteopenia  Plan: DX Hip/Pelvis/Spine              (Z12.11) Colon cancer screening  Comment: due now.  Plan: Adult Gastro Ref - Procedure Only        Referral for updated colonoscopy    (Z23) Need for prophylactic vaccination and inoculation against influenza  Comment: update  Plan: INFLUENZA, QUAD, HIGH DOSE, PF, 65YR + (FLUZONE        HD)              Patient has been advised of split billing requirements and indicates understanding:     COUNSELING:  Reviewed preventive health counseling, as reflected in patient instructions       Regular exercise       Healthy diet/nutrition       Vision screening       Hearing screening       Dental care       Bladder control       Fall risk prevention       Immunizations    Vaccinated for: Influenza             Osteoporosis prevention/bone health       Colon cancer screening    Estimated body mass index is 26.58 kg/m  as calculated from the following:    Height as  "of this encounter: 1.683 m (5' 6.25\").    Weight as of this encounter: 75.3 kg (165 lb 14.4 oz).        She reports that she quit smoking about 40 years ago. Her smoking use included cigarettes. She has a 10.00 pack-year smoking history. She has never used smokeless tobacco.    Appropriate preventive services were discussed with this patient, including applicable screening as appropriate for cardiovascular disease, diabetes, osteopenia/osteoporosis, and glaucoma.  As appropriate for age/gender, discussed screening for colorectal cancer, prostate cancer, breast cancer, and cervical cancer. Checklist reviewing preventive services available has been given to the patient.    Reviewed patients plan of care and provided an AVS. The Basic Care Plan (routine screening as documented in Health Maintenance) for Maddie meets the Care Plan requirement. This Care Plan has been established and reviewed with the Patient.    Counseling Resources:  ATP IV Guidelines  Pooled Cohorts Equation Calculator  Breast Cancer Risk Calculator  BRCA-Related Cancer Risk Assessment: FHS-7 Tool  FRAX Risk Assessment  ICSI Preventive Guidelines  Dietary Guidelines for Americans, 2010  [employer]'s MyPlate  ASA Prophylaxis  Lung CA Screening    Radha Hurtado MD  [facility]        Patient Instructions     Labs today.  Please monitor BP at home.  If >140/90, notify me for adjustment of medication.     Swallowing study for the throat symptoms.  Bone density recommended.     You can call [phone] to schedule these at the ealth building in [place].      Flu vaccine today.        Patient Education   Personalized Prevention Plan  You are due for the preventive services outlined below.  Your care team is available to assist you in scheduling these services.  If you have already completed any of these items, please share that information with your care team to update in your medical record.  Health Maintenance Due   Topic Date " Due     Flu Vaccine (1) 09/01/2021     FALL RISK ASSESSMENT  09/03/2021

## 2021-09-12 NOTE — RESULT ENCOUNTER NOTE
Your urine testing is normal.  There is not elevated protein present.  Your blood sugar, kidney and liver testing are normal.  Your blood cell counts are normal with exception of a borderline low hematocrit.  This is much improved from after knee your surgery.    Please call or MyChart message me if you have any questions.      DAPHNIEK

## 2021-09-20 ENCOUNTER — TELEPHONE (OUTPATIENT)
Dept: GASTROENTEROLOGY | Facility: CLINIC | Age: 71
End: 2021-09-20

## 2021-09-20 NOTE — TELEPHONE ENCOUNTER
Screening Questions  1. Are you active on mychart? YES     2. What insurance is in the chart? [employer]     2.  Ordering/Referring Provider: Radha Hurtado MD in [facility]    3. BMI 26.57     4. Are you on daily home oxygen? NO     5. Do you have a history of difficult airway?  NO     6. Have you had a heart, lung, or liver transplant? NO     7. Are you currently on dialysis or have chronic kidney disease? NO     8. Have you had a stroke or Transient ischemic atttack (TIA) within 6 months? NO     9. In the past 6 months, have you had any heart related issues including cardiomyopathy or heart attack?         If yes, did it require cardiac stenting or other implantable device? NO     10. Do you have any implantable devices in your body (pacemaker, defib, LVAD)? NO     11. Do you take nitroglycerin? If yes, how often? NO      12. Are you currently taking any blood thinners? NO     13. Are you a diabetic? NO     14. (Females) Are you currently pregnant? NO   If yes, how many weeks?    15. Have you had a procedure in the past that was difficult to tolerate with conscious sedation? Any allergies to Fentanyl or Versed NO     16. Are you taking any scheduled prescription narcotics more than once daily? NO     17. Do you have any chemical dependencies such as alcohol, street drugs, or methadone? NO     18. Do you have any history of post-traumatic stress syndrome or mental health issues? NO     19. Do you transfer independently? YES     20.  Do you have any issues with constipation? NO     21. Preferred Pharmacy for Pre Prescription  [facility]     Scheduling Details    Which Colonoscopy Prep was Sent?: MIRALAX - MYCHART   Procedure Scheduled: COLONOSCOPY  Provider/Surgeon: WALTER  Date of Procedure: 10/11/2021  [place] [facility] [place] (Please ask for phone number if not scheduled by patient): PATIENT       Sedation Type: CS   Conscious Sedation- Needs  for 6 hours after the  procedure  MAC/General-Needs  for 24 hours after procedure    Pre-op Required at [facility], [place], [place] and OR for MAC sedation:   (if yes advise patient they will need a pre-op prior to procedure)      Is patient on blood thinners? -NO  (If yes- inform patient to follow up with PCP or provider for follow up instructions)     Informed patient they will need an adult  YES   Cannot take any type of public or medical transportation alone    Pre-Procedure Covid test to be completed at [facility] or Externally: YES- SCHEDULED- [facility] Nurse will call to complete assessment YES     Additional comments:

## 2021-09-21 DIAGNOSIS — Z11.59 ENCOUNTER FOR SCREENING FOR OTHER VIRAL DISEASES: ICD-10-CM

## 2021-09-22 ENCOUNTER — HOSPITAL ENCOUNTER (EMERGENCY)
Facility: CLINIC | Age: 71
Discharge: HOME | End: 2021-09-23
Attending: FAMILY MEDICINE | Admitting: FAMILY MEDICINE
Payer: COMMERCIAL

## 2021-09-22 ENCOUNTER — ANCILLARY PROCEDURE (OUTPATIENT)
Dept: BONE DENSITY | Facility: CLINIC | Age: 71
End: 2021-09-22
Attending: FAMILY MEDICINE
Payer: COMMERCIAL

## 2021-09-22 VITALS
RESPIRATION RATE: 20 BRPM | OXYGEN SATURATION: 98 % | BODY MASS INDEX: 26.52 KG/M2 | HEART RATE: 72 BPM | TEMPERATURE: 98 F | SYSTOLIC BLOOD PRESSURE: 158 MMHG | WEIGHT: 165 LBS | DIASTOLIC BLOOD PRESSURE: 100 MMHG | HEIGHT: 66 IN

## 2021-09-22 DIAGNOSIS — R29.890 LOSS OF HEIGHT: ICD-10-CM
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
## 2021-09-22 DIAGNOSIS — S01.85XA DOG BITE OF FACE, INITIAL ENCOUNTER: ICD-10-CM

## 2021-09-22 DIAGNOSIS — M85.89 OSTEOPENIA OF MULTIPLE SITES: ICD-10-CM

## 2021-09-22 PROCEDURE — 12011 RPR F/E/E/N/L/M 2.5 CM/<: CPT | Performed by: FAMILY MEDICINE

## 2021-09-22 PROCEDURE — 77080 DXA BONE DENSITY AXIAL: CPT | Performed by: RADIOLOGY

## 2021-09-22 PROCEDURE — 12011 RPR F/E/E/N/L/M 2.5 CM/<: CPT

## 2021-09-22 PROCEDURE — 99282 EMERGENCY DEPT VISIT SF MDM: CPT | Mod: 25 | Performed by: FAMILY MEDICINE

## 2021-09-22 PROCEDURE — 99283 EMERGENCY DEPT VISIT LOW MDM: CPT

## 2021-09-22 ASSESSMENT — MIFFLIN-ST. JEOR: SCORE: 1275.19

## 2021-09-23 ASSESSMENT — ENCOUNTER SYMPTOMS: WOUND: 1

## 2021-09-23 NOTE — ED PROVIDER NOTES
History     Chief Complaint   Patient presents with     Dog Bite     HPI  Maddie Lala is a 71 year old female who resented to the emergency room today secondary to concerns of a small laceration to the tip of her nose.  She states that the laceration was caused by her new becker retriever puppy who nipped her on the nose as she was giving it a kiss.  She states that the dog is up-to-date on its immunizations and that they can monitor the dog and they are not worried about any risk of rabies.  She denied any other injuries associated with the episode.  She states that she is still having some mild bleeding from the site but has been using ice and some direct pressure to the area to control the bleeding.    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     Priority: Medium     Tinea pedis of both feet 01/30/2020     Priority: Medium     Status post knee surgery 10/10/2018     Priority: Medium     AK (actinic keratosis) 07/12/2018     Priority: Medium     Multiple melanocytic nevi 07/12/2018     Priority: Medium     Benign essential hypertension 03/23/2018     Priority: Medium     Neck pain 02/18/2016     Priority: Medium     Muscle spasms of neck 02/18/2016     Priority: Medium     Hyponatremia 01/29/2015     Priority: Medium     Atrophic vaginitis 11/22/2013     Priority: Medium     Cervical nerve root impingement 03/28/2012     Priority: Medium     Seasonal allergic rhinitis 05/13/2011     Priority: Medium     Chronic allergic conjunctivitis 05/13/2011     Priority: Medium     GERD (gastroesophageal reflux disease) 05/13/2011     Priority: Medium     Latent tuberculosis 05/13/2011     Priority: Medium     Meniere's disease 05/13/2011      Priority: Medium     Menopause 05/13/2011     Priority: Medium     Tendonitis of shoulder, right 02/01/2011     Priority: Medium     Chronic cough 02/01/2011     Priority: Medium     Brachial neuritis or radiculitis 02/01/2011     Priority: Medium     Problem list name updated by automated process. Provider to review       CARDIOVASCULAR SCREENING; LDL GOAL LESS THAN 160 10/31/2010     Priority: Medium     Urinary incontinence 01/21/2010     Priority: Medium     Cataract 01/21/2010     Priority: Medium     Utility update for deleted IMO code  Imo Update utility          Past Medical History:    Past Medical History:   Diagnosis Date     Allergic rhinitis 1979     Arthritis      Benign essential hypertension 3/23/2018     Cervical dysplasia with cone in 1979     Dyspareunia      Hearing problem 2015     Meniere's disease 2002     Nonsenile cataract      Plantar fasciitis      Stress incontinence      SVT (supraventricular tachycardia) (H)      Tinnitus 2002       Past Surgical History:    Past Surgical History:   Procedure Laterality Date     ARTHROPLASTY KNEE Left 10/10/2018    Procedure: ARTHROPLASTY KNEE;  Left Total Knee Arthroplasty    ;  Surgeon: James Amaya MD;  Location: [facility] OR     BLADDER SURGERY       CATARACT IOL, RT/LT  2010     COLONOSCOPY WITH CO2 INSUFFLATION N/A 10/19/2016    Procedure: COLONOSCOPY WITH CO2 INSUFFLATION;  Surgeon: Duane, William Charles, MD;  Location: [facility] OR     GENITOURINARY SURGERY      bladder     Midurethral sling with cystoscopy.  2010     TUBAL LIGATION         Family History:    Family History   Problem Relation Age of Onset     Heart Disease Mother         older      Osteoporosis Mother      C.A.D. Mother         60's      Macular Degeneration Mother      Respiratory Father         farmers lung     Skin Cancer Father      Alzheimer Disease Maternal Grandmother      Heart Disease Maternal Grandfather      Heart Disease Brother         Afib     Heart Disease Brother       Asthma No family hx of      Diabetes No family hx of      Hypertension No family hx of      Breast Cancer No family hx of      Cancer - colorectal No family hx of      Cancer No family hx of      Thyroid Disease No family hx of      Glaucoma No family hx of      Colon Cancer No family hx of      Melanoma No family hx of        Social History:  Marital Status:   [2]  Social History     Tobacco Use     Smoking status: Former Smoker     Packs/day: 1.00     Years: 10.00     Pack years: 10.00     Types: Cigarettes     Quit date: 1981     Years since quittin.7     Smokeless tobacco: Never Used   Substance Use Topics     Alcohol use: Yes     Comment: occ - Glass of white wine per night     Drug use: No        Medications:    amoxicillin-clavulanate (AUGMENTIN) 875-125 MG tablet  CALCIUM + D OR  Chlorpheniramine Maleate (CHLOR-TRIMETON ALLERGY) 12 MG TBCR  estradiol (VAGIFEM) 10 MCG TABS vaginal tablet  famotidine (PEPCID) 40 MG tablet  fluocinonide (LIDEX) 0.05 % external solution  fluticasone (FLONASE) 50 MCG/ACT nasal spray  Ibuprofen (ADVIL PO)  lisinopril (ZESTRIL) 10 MG tablet      Immunization History   Administered Date(s) Administered     COVID-19,PF,Pfizer 2021, 2021     HEPA 2006, 2007     HepB 1988, 1988, 1989, 1992, 1992, 1993     Influenza (High Dose) 3 valent vaccine 10/15/2014, 10/21/2015, 2016, 2017, 10/31/2019     Influenza (IIV3) PF 10/08/2008, 10/08/2010, 10/06/2012, 10/22/2013     Influenza Vaccine IM > 6 months Valent IIV4 (Alfuria,Fluzone) 2018     Influenza, Quad, High Dose, Pf, 65yr+ (Fluzone HD) 2020, 2021     MMR 10/11/2007, 11/15/2007     Measles 1979     Pneumo Conj 13-V (2010&after) 2016     Pneumococcal 23 valent 10/15/2014     TD (ADULT, 7+) 1985, 1995, 2005     TDAP Vaccine (Adacel) 2012     Typhoid IM 2006, 2006     Zoster vaccine  "recombinant adjuvanted (SHINGRIX) 03/19/2019, 09/03/2020     Zoster vaccine, live 11/21/2012         Review of Systems   Skin: Positive for wound (Tip of her nose, dog bite.).   All other systems reviewed and are negative.      Physical Exam   BP: (!) 158/100  Pulse: 68  Temp: 98  F (36.7  C)  Resp: 20  Height: 167.6 cm (5' 6\")  Weight: 74.8 kg (165 lb)  SpO2: 98 %      Physical Exam  Vitals and nursing note reviewed. Exam conducted with a chaperone present.   Constitutional:       General: She is not in acute distress.  HENT:      Nose: Signs of injury and laceration present.      Right Nostril: Epistaxis (mild) present. No foreign body or occlusion.     Neurological:      Mental Status: She is alert.         ED [facility]    -Laceration Repair    Date/Time: 9/23/2021 3:59 AM  Performed by: Julio Cruz DO  Authorized by: Julio Cruz DO       ANESTHESIA (see MAR for exact dosages):     Anesthesia method:  None  LACERATION DETAILS     Location:  Face    Face location:  Nose    Length (cm):  0.4    REPAIR TYPE:     Repair type:  Simple      EXPLORATION:     Wound exploration: wound explored through full range of motion and entire depth of wound probed and visualized      TREATMENT:     Area cleansed with:  Saline    Amount of cleaning:  Extensive    Irrigation solution:  Sterile saline    Irrigation method:  Syringe    Visualized foreign bodies/material removed: no      SKIN REPAIR     Repair method:  Tissue adhesive    APPROXIMATION     Approximation:  Close    POST-PROCEDURE DETAILS     Dressing:  Open (no dressing)      PROCEDURE   Patient Tolerance:  Patient tolerated the procedure well with no immediate complications                    Critical Care time:  none                    Assessments & Plan (with Medical Decision Making)  71-year-old female to the ER secondary concerns of a laceration to the tip of her nose caused by her becker " retriever puppy.  Laceration was cleansed thoroughly and repaired.  Patient is up-to-date on her tetanus but will need a booster next year.  Given the animal bite patient was initiated on a 7-day course of Augmentin.  To return to the ER for signs of infection if noted.  She was given discharge instructions on the care of the laceration as well as on animal bites     I have reviewed the nursing notes.    I have reviewed the findings, diagnosis, plan and need for follow up with the patient.       Discharge Medication List as of 9/22/2021 11:57 PM      START taking these medications    Details   amoxicillin-clavulanate (AUGMENTIN) 875-125 MG tablet Take 1 tablet by mouth 2 times daily for 7 days, Disp-14 tablet, R-0, E-Prescribe                  I verbally discussed the findings of the evaluation today in the ER. I have verbally discussed with Maddie the suggested treatment(s) as described in the discharge instructions and handouts. I have prescribed the above listed medications and instructed her on appropriate use of these medications.      I have verbally suggested she follow-up in her clinic or return to the ER for increased symptoms. See the follow-up recommendations documented  in the after visit summary in this visit's EPIC chart.    Final diagnoses:   Dog bite of face, initial encounter - 3mm       9/22/2021   [facility] EMERGENCY DEPT     Julio Cruz, DO  09/23/21 [id number]

## 2021-09-26 ENCOUNTER — MYC MEDICAL ADVICE (OUTPATIENT)
Dept: FAMILY MEDICINE | Facility: CLINIC | Age: 71
End: 2021-09-26

## 2021-09-26 DIAGNOSIS — I10 BENIGN ESSENTIAL HYPERTENSION: Primary | ICD-10-CM

## 2021-09-27 RX ORDER — LISINOPRIL 20 MG/1
20 TABLET ORAL DAILY
Qty: 90 TABLET | Refills: 0 | Status: SHIPPED | OUTPATIENT
Start: 2021-09-27 | End: 2021-12-09

## 2021-09-28 ENCOUNTER — ANCILLARY PROCEDURE (OUTPATIENT)
Dept: GENERAL RADIOLOGY | Facility: CLINIC | Age: 71
End: 2021-09-28
Attending: FAMILY MEDICINE
Payer: COMMERCIAL

## 2021-09-28 DIAGNOSIS — R13.14 PHARYNGOESOPHAGEAL DYSPHAGIA: ICD-10-CM

## 2021-09-28 PROCEDURE — 74240 X-RAY XM UPR GI TRC 1CNTRST: CPT | Mod: GC | Performed by: RADIOLOGY

## 2021-10-03 ENCOUNTER — MYC MEDICAL ADVICE (OUTPATIENT)
Dept: FAMILY MEDICINE | Facility: CLINIC | Age: 71
End: 2021-10-03

## 2021-10-03 DIAGNOSIS — I10 BENIGN ESSENTIAL HYPERTENSION: Primary | ICD-10-CM

## 2021-10-08 ENCOUNTER — LAB (OUTPATIENT)
Dept: LAB | Facility: CLINIC | Age: 71
End: 2021-10-08
Payer: COMMERCIAL

## 2021-10-08 DIAGNOSIS — Z11.59 ENCOUNTER FOR SCREENING FOR OTHER VIRAL DISEASES: ICD-10-CM

## 2021-10-08 LAB — SARS-COV-2 RNA RESP QL NAA+PROBE: NEGATIVE

## 2021-10-08 PROCEDURE — U0005 INFEC AGEN DETEC AMPLI PROBE: HCPCS

## 2021-10-08 PROCEDURE — U0003 INFECTIOUS AGENT DETECTION BY NUCLEIC ACID (DNA OR RNA); SEVERE ACUTE RESPIRATORY SYNDROME CORONAVIRUS 2 (SARS-COV-2) (CORONAVIRUS DISEASE [COVID-19]), AMPLIFIED PROBE TECHNIQUE, MAKING USE OF HIGH THROUGHPUT TECHNOLOGIES AS DESCRIBED BY CMS-2020-01-R: HCPCS

## 2021-10-11 ENCOUNTER — HOSPITAL ENCOUNTER (OUTPATIENT)
Facility: AMBULATORY SURGERY CENTER | Age: 71
Discharge: HOME | End: 2021-10-11
Attending: SURGERY | Admitting: SURGERY
Payer: COMMERCIAL

## 2021-10-11 ENCOUNTER — SURGERY (OUTPATIENT)
Age: 71
End: 2021-10-11
Payer: COMMERCIAL

## 2021-10-11 VITALS
RESPIRATION RATE: 16 BRPM | DIASTOLIC BLOOD PRESSURE: 78 MMHG | SYSTOLIC BLOOD PRESSURE: 134 MMHG | TEMPERATURE: 96.8 F | OXYGEN SATURATION: 97 % | HEART RATE: 73 BPM

## 2021-10-11 DIAGNOSIS — Z12.11 ENCOUNTER FOR SCREENING COLONOSCOPY: Primary | ICD-10-CM

## 2021-10-11 LAB — COLONOSCOPY: NORMAL

## 2021-10-11 PROCEDURE — 45380 COLONOSCOPY AND BIOPSY: CPT | Performed by: SURGERY

## 2021-10-11 PROCEDURE — G8918 PT W/O PREOP ORDER IV AB PRO: HCPCS

## 2021-10-11 PROCEDURE — G8907 PT DOC NO EVENTS ON DISCHARG: HCPCS

## 2021-10-11 PROCEDURE — 88305 TISSUE EXAM BY PATHOLOGIST: CPT | Performed by: PATHOLOGY

## 2021-10-11 PROCEDURE — 45380 COLONOSCOPY AND BIOPSY: CPT | Mod: PT

## 2021-10-11 RX ORDER — NALOXONE HYDROCHLORIDE 0.4 MG/ML
0.2 INJECTION, SOLUTION INTRAMUSCULAR; INTRAVENOUS; SUBCUTANEOUS
Status: DISCONTINUED | OUTPATIENT
Start: 2021-10-11 | End: 2021-10-12 | Stop reason: HOSPADM

## 2021-10-11 RX ORDER — LIDOCAINE 40 MG/G
CREAM TOPICAL
Status: DISCONTINUED | OUTPATIENT
Start: 2021-10-11 | End: 2021-10-12 | Stop reason: HOSPADM

## 2021-10-11 RX ORDER — FENTANYL CITRATE 50 UG/ML
INJECTION, SOLUTION INTRAMUSCULAR; INTRAVENOUS PRN
Status: DISCONTINUED | OUTPATIENT
Start: 2021-10-11 | End: 2021-10-11 | Stop reason: HOSPADM

## 2021-10-11 RX ORDER — NALOXONE HYDROCHLORIDE 0.4 MG/ML
0.4 INJECTION, SOLUTION INTRAMUSCULAR; INTRAVENOUS; SUBCUTANEOUS
Status: DISCONTINUED | OUTPATIENT
Start: 2021-10-11 | End: 2021-10-12 | Stop reason: HOSPADM

## 2021-10-11 RX ORDER — ONDANSETRON 2 MG/ML
4 INJECTION INTRAMUSCULAR; INTRAVENOUS
Status: DISCONTINUED | OUTPATIENT
Start: 2021-10-11 | End: 2021-10-12 | Stop reason: HOSPADM

## 2021-10-11 RX ORDER — FLUMAZENIL 0.1 MG/ML
0.2 INJECTION, SOLUTION INTRAVENOUS
Status: ACTIVE | OUTPATIENT
Start: 2021-10-11 | End: 2021-10-11

## 2021-10-11 RX ORDER — ONDANSETRON 4 MG/1
4 TABLET, ORALLY DISINTEGRATING ORAL EVERY 6 HOURS PRN
Status: DISCONTINUED | OUTPATIENT
Start: 2021-10-11 | End: 2021-10-12 | Stop reason: HOSPADM

## 2021-10-11 RX ORDER — ONDANSETRON 2 MG/ML
4 INJECTION INTRAMUSCULAR; INTRAVENOUS EVERY 6 HOURS PRN
Status: DISCONTINUED | OUTPATIENT
Start: 2021-10-11 | End: 2021-10-12 | Stop reason: HOSPADM

## 2021-10-11 RX ORDER — PROCHLORPERAZINE MALEATE 5 MG
5 TABLET ORAL EVERY 6 HOURS PRN
Status: DISCONTINUED | OUTPATIENT
Start: 2021-10-11 | End: 2021-10-12 | Stop reason: HOSPADM

## 2021-10-11 RX ADMIN — FENTANYL CITRATE 50 MCG: 50 INJECTION, SOLUTION INTRAMUSCULAR; INTRAVENOUS at 09:03

## 2021-10-11 RX ADMIN — FENTANYL CITRATE 25 MCG: 50 INJECTION, SOLUTION INTRAMUSCULAR; INTRAVENOUS at 09:13

## 2021-10-11 NOTE — H&P
[facility] Anesthesia Pre-op History and Physical    Maddie Lala MRN# [id number]   Age: 71 year old YOB: 1950      Date of Surgery: 10/11/21   [facility]      Date of Exam 10/11/2021 Facility (In hospital)         Primary care provider: Radha Hurtado         Chief Complaint and/or Reason for Procedure:   No chief complaint on file.           Active problem list:     Patient Active Problem List    Diagnosis Date Noted     Dog bite of face, initial encounter 09/22/2021     Priority: Medium     3mm       Inflamed seborrheic keratosis 01/30/2020     Priority: Medium     Solar lentiginosis 01/30/2020     Priority: Medium     Tinea pedis of both feet 01/30/2020     Priority: Medium     Status post knee surgery 10/10/2018     Priority: Medium     AK (actinic keratosis) 07/12/2018     Priority: Medium     Multiple melanocytic nevi 07/12/2018     Priority: Medium     Benign essential hypertension 03/23/2018     Priority: Medium     Neck pain 02/18/2016     Priority: Medium     Muscle spasms of neck 02/18/2016     Priority: Medium     Hyponatremia 01/29/2015     Priority: Medium     Atrophic vaginitis 11/22/2013     Priority: Medium     Cervical nerve root impingement 03/28/2012     Priority: Medium     Seasonal allergic rhinitis 05/13/2011     Priority: Medium     Chronic allergic conjunctivitis 05/13/2011     Priority: Medium     GERD (gastroesophageal reflux disease) 05/13/2011     Priority: Medium     Latent tuberculosis 05/13/2011     Priority: Medium     Meniere's disease 05/13/2011     Priority: Medium     Menopause 05/13/2011     Priority: Medium     Tendonitis of shoulder, right 02/01/2011     Priority: Medium     Chronic cough 02/01/2011     Priority: Medium     Brachial neuritis or radiculitis 02/01/2011     Priority: Medium     Problem list name updated by automated process. Provider to review       CARDIOVASCULAR SCREENING; LDL GOAL LESS THAN 160  10/31/2010     Priority: Medium     Urinary incontinence 01/21/2010     Priority: Medium     Cataract 01/21/2010     Priority: Medium     Utility update for deleted IMO code  Imo Update utility              Medications (include herbals and vitamins):   Any Plavix use in the last 7 days? No     Current Outpatient Medications   Medication Sig     CALCIUM + D OR One bid     Chlorpheniramine Maleate (CHLOR-TRIMETON ALLERGY) 12 MG TBCR Take  by mouth as needed.     estradiol (VAGIFEM) 10 MCG TABS vaginal tablet Place 1 tablet (10 mcg) vaginally twice a week     famotidine (PEPCID) 40 MG tablet Take 1 tablet (40 mg) by mouth nightly as needed for heartburn     fluocinonide (LIDEX) 0.05 % external solution Apply to scalp nightly for up to 1 week, then 2-3 times weekly as needed.     fluticasone (FLONASE) 50 MCG/ACT nasal spray USE ONE TO TWO SPRAYS IN EACH NOSTRIL EVERY DAY     lisinopril (ZESTRIL) 20 MG tablet Take 1 tablet (20 mg) by mouth daily     Ibuprofen (ADVIL PO) Take 800 mg by mouth every 4 hours as needed for moderate pain     Current Facility-Administered Medications   Medication     lidocaine (LMX4) kit     lidocaine 1 % 0.1-1 mL     ondansetron (ZOFRAN) injection 4 mg     sodium chloride (PF) 0.9% PF flush 3 mL     sodium chloride (PF) 0.9% PF flush 3 mL             Allergies:      Allergies   Allergen Reactions     Sulfa Drugs Rash     Allergy to Latex? No  Allergy to tape?   No  Intolerances: no            Physical Exam:   All vitals have been reviewed  Patient Vitals for the past 8 hrs:   BP Temp Temp src Resp SpO2   10/11/21 0819 (!) 149/82 96.8  F (36  C) Temporal 16 98 %     No intake/output data recorded.  Airway assessment:   Patient is able to open mouth wide  Patient is able to stick out tongue  Mallampatti classification: Class II (visualization of the soft palate, fauces, and uvula)}    Heart: N S1, S2, no murmus,  Lungs: Clear to auscultation              Anesthetic risk and/or ASA  classification:2       Kavita Huizar MD

## 2021-10-13 LAB
PATH REPORT.COMMENTS IMP SPEC: NORMAL
PATH REPORT.COMMENTS IMP SPEC: NORMAL
PATH REPORT.FINAL DX SPEC: NORMAL
PATH REPORT.GROSS SPEC: NORMAL
PATH REPORT.MICROSCOPIC SPEC OTHER STN: NORMAL
PATH REPORT.RELEVANT HX SPEC: NORMAL
PHOTO IMAGE: NORMAL

## 2021-10-21 RX ORDER — DILTIAZEM HYDROCHLORIDE 180 MG/1
180 CAPSULE, EXTENDED RELEASE ORAL DAILY
Qty: 30 CAPSULE | Refills: 1 | Status: SHIPPED | OUTPATIENT
Start: 2021-10-21 | End: 2021-12-09

## 2021-12-06 ENCOUNTER — E-VISIT (OUTPATIENT)
Dept: FAMILY MEDICINE | Facility: CLINIC | Age: 71
End: 2021-12-06
Payer: COMMERCIAL

## 2021-12-06 DIAGNOSIS — J98.01 COUGH DUE TO BRONCHOSPASM: ICD-10-CM

## 2021-12-06 DIAGNOSIS — J20.9 ACUTE BRONCHITIS, UNSPECIFIED ORGANISM: Primary | ICD-10-CM

## 2021-12-06 DIAGNOSIS — R05.9 COUGH: ICD-10-CM

## 2021-12-06 DIAGNOSIS — I10 BENIGN ESSENTIAL HYPERTENSION: ICD-10-CM

## 2021-12-06 PROCEDURE — 99421 OL DIG E/M SVC 5-10 MIN: CPT | Performed by: FAMILY MEDICINE

## 2021-12-06 RX ORDER — AZITHROMYCIN 250 MG/1
TABLET, FILM COATED ORAL
Qty: 6 TABLET | Refills: 0 | Status: SHIPPED | OUTPATIENT
Start: 2021-12-06 | End: 2022-05-23

## 2021-12-06 RX ORDER — PREDNISONE 20 MG/1
40 TABLET ORAL DAILY
Qty: 10 TABLET | Refills: 0 | Status: SHIPPED | OUTPATIENT
Start: 2021-12-06 | End: 2022-05-23

## 2021-12-06 RX ORDER — ALBUTEROL SULFATE 90 UG/1
AEROSOL, METERED RESPIRATORY (INHALATION)
Qty: 18 G | Refills: 11 | Status: SHIPPED | OUTPATIENT
Start: 2021-12-06 | End: 2022-05-23

## 2021-12-06 NOTE — PATIENT INSTRUCTIONS
"    Dear Maddie Lala    After reviewing your responses, I've been able to diagnose you with \"Bronchitis\" which is a common infection of your lungs. While this is most commonly caused by a virus, the symptoms you have given suggest you should be treated with antibiotics.     I have sent Zithromax to your pharmacy to treat this infection.   In addition, albuterol and prednisone course is sent in for you.    It is important that you take all of your prescribed medication even if your symptoms are improving after a few doses. Taking all of your medicine helps prevent the symptoms from returning.     If your symptoms worsen, you develop chest pain or shortness of breath, fevers over 101, or are not improving in 5 days, please contact your primary care provider for an appointment or visit any of our convenient Walk-in Care or Urgent Care Centers to be seen which can be found on our website here.    Thanks again for choosing us as your health care partner,    Radha Hurtado MD    "

## 2021-12-07 ENCOUNTER — LAB (OUTPATIENT)
Dept: URGENT CARE | Facility: URGENT CARE | Age: 71
End: 2021-12-07
Attending: FAMILY MEDICINE
Payer: COMMERCIAL

## 2021-12-07 DIAGNOSIS — R05.9 COUGH: ICD-10-CM

## 2021-12-07 PROCEDURE — U0003 INFECTIOUS AGENT DETECTION BY NUCLEIC ACID (DNA OR RNA); SEVERE ACUTE RESPIRATORY SYNDROME CORONAVIRUS 2 (SARS-COV-2) (CORONAVIRUS DISEASE [COVID-19]), AMPLIFIED PROBE TECHNIQUE, MAKING USE OF HIGH THROUGHPUT TECHNOLOGIES AS DESCRIBED BY CMS-2020-01-R: HCPCS

## 2021-12-07 PROCEDURE — U0005 INFEC AGEN DETEC AMPLI PROBE: HCPCS

## 2021-12-08 LAB — SARS-COV-2 RNA RESP QL NAA+PROBE: NEGATIVE

## 2021-12-09 RX ORDER — DILTIAZEM HYDROCHLORIDE 180 MG/1
180 CAPSULE, EXTENDED RELEASE ORAL DAILY
Qty: 90 CAPSULE | Refills: 1 | Status: SHIPPED | OUTPATIENT
Start: 2021-12-09 | End: 2022-06-14

## 2021-12-09 RX ORDER — LISINOPRIL 20 MG/1
20 TABLET ORAL DAILY
Qty: 90 TABLET | Refills: 1 | Status: SHIPPED | OUTPATIENT
Start: 2021-12-09 | End: 2022-07-21

## 2021-12-09 NOTE — RESULT ENCOUNTER NOTE
Your COVID testing is negative.  Hope things are going better with the use of the steroids and as needed albuterol.  Please follow up if symptoms are persistent or worsening,  PSK

## 2021-12-19 NOTE — DISCHARGE INSTRUCTIONS
Please read and follow the handout(s) instructions. Return, if needed, for increased or worsening symptoms and as directed by the handout(s).       No

## 2022-01-12 ENCOUNTER — MYC MEDICAL ADVICE (OUTPATIENT)
Dept: FAMILY MEDICINE | Facility: CLINIC | Age: 72
End: 2022-01-12
Payer: COMMERCIAL

## 2022-02-03 ENCOUNTER — OFFICE VISIT (OUTPATIENT)
Dept: OPHTHALMOLOGY | Facility: CLINIC | Age: 72
End: 2022-02-03
Attending: OPHTHALMOLOGY
Payer: COMMERCIAL

## 2022-02-03 DIAGNOSIS — R68.89 SUSPECTED GLAUCOMA OF LEFT EYE: Primary | ICD-10-CM

## 2022-02-03 DIAGNOSIS — H52.213 IRREGULAR ASTIGMATISM OF BOTH EYES: ICD-10-CM

## 2022-02-03 DIAGNOSIS — H53.2 DIPLOPIA: ICD-10-CM

## 2022-02-03 DIAGNOSIS — Z96.1 PSEUDOPHAKIA OF BOTH EYES: ICD-10-CM

## 2022-02-03 PROCEDURE — 92133 CPTRZD OPH DX IMG PST SGM ON: CPT | Performed by: OPHTHALMOLOGY

## 2022-02-03 PROCEDURE — G0463 HOSPITAL OUTPT CLINIC VISIT: HCPCS | Mod: 25

## 2022-02-03 PROCEDURE — 92014 COMPRE OPH EXAM EST PT 1/>: CPT | Performed by: OPHTHALMOLOGY

## 2022-02-03 PROCEDURE — 92015 DETERMINE REFRACTIVE STATE: CPT

## 2022-02-03 ASSESSMENT — TONOMETRY
OS_IOP_MMHG: 19
OD_IOP_MMHG: 16
IOP_METHOD: TONOPEN

## 2022-02-03 ASSESSMENT — PACHYMETRY
OD_CT(UM): 621
OS_CT(UM): 610

## 2022-02-03 ASSESSMENT — SLIT LAMP EXAM - LIDS
COMMENTS: NORMAL
COMMENTS: NORMAL

## 2022-02-03 ASSESSMENT — REFRACTION_WEARINGRX
OS_CYLINDER: +4.75
OS_AXIS: 180
OS_SPHERE: -1.50
SPECS_TYPE: PAL
OD_ADD: +2.50
OS_ADD: +2.50
OD_CYLINDER: +3.25
OD_AXIS: 168
OD_SPHERE: -2.25

## 2022-02-03 ASSESSMENT — CONF VISUAL FIELD
OS_NORMAL: 1
OD_NORMAL: 1

## 2022-02-03 ASSESSMENT — VISUAL ACUITY
OS_CC+: -1
CORRECTION_TYPE: GLASSES
OD_CC+: -1
METHOD: SNELLEN - LINEAR
OS_CC: 20/25
OD_CC: 20/30

## 2022-02-03 ASSESSMENT — REFRACTION_MANIFEST
OD_ADD: +2.50
OS_AXIS: 180
OS_SPHERE: -1.00
OD_AXIS: 170
OD_CYLINDER: +2.25
OD_SPHERE: -2.00
OS_CYLINDER: +4.00
OS_ADD: +2.50

## 2022-02-03 ASSESSMENT — CUP TO DISC RATIO: OD_RATIO: 0.20

## 2022-02-03 ASSESSMENT — EXTERNAL EXAM - RIGHT EYE: OD_EXAM: NORMAL

## 2022-02-03 ASSESSMENT — EXTERNAL EXAM - LEFT EYE: OS_EXAM: NORMAL

## 2022-02-03 NOTE — PROGRESS NOTES
Chief Complaint(s) and History of Present Illness(es)     Annual Eye Exam     Laterality: both eyes    Associated symptoms: double vision and floaters.  Negative for eye pain,   headache and flashes              Comments     Pt here for annual exam. She states she has double vision at night- when   she closes one eye it goes away- has noticed it more over the last year-   right eye> left eye . Images are kiddy corner. Pt is pseudophakic each   eye. Floaters are long standing.   Pt using:  -Zatador  Blink PRN each eye  JI RIVAS 12:35 PM February 3, 2022               Review of systems for the eyes was negative other than the pertinent positives/negatives listed in the HPI.      Assessment & Plan      Maddie Lala is a 71 year old female with the following diagnoses:   1. 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 Irregular astigmatism of both eyes    3. Diplopia    4. Pseudophakia of both eyes         Diplopia at night (when out of contact lenses).  Has only covered right eye and finds the symptom to resolve  Discussed, recommend covering each eye individually.  If only present when using both eyes together, she will call for orthoptist eval    C/D asymmetry left eye > right eye  Not previously noted  No history of Ocular hypertension  No family history or history of trauma  Thick pachmetry in both eyes     Baseline OCT Nerve fiber layer with asymmetric disc size, left eye > right eye.  Nerve fiber layer within normal limits       Patient disposition:   Return in about 1 year (around 2/3/2023) for DFE, OCT NFL.           Attending Physician Attestation:  Complete documentation of historical and exam elements from today's encounter can be found in the full encounter summary report (not reduplicated in this progress note).  I personally obtained the chief complaint(s) and history of present illness.  I confirmed and edited as necessary the review of systems, past medical/surgical history, family history,  social history, and examination findings as documented by others; and I examined the patient myself.  I personally reviewed the relevant tests, images, and reports as documented above.  I formulated and edited as necessary the assessment and plan and discussed the findings and management plan with the patient and family. . - Augustin Castro MD

## 2022-02-03 NOTE — NURSING NOTE
Chief Complaints and History of Present Illnesses   Patient presents with     Annual Eye Exam     Chief Complaint(s) and History of Present Illness(es)     Annual Eye Exam     Laterality: both eyes    Associated symptoms: double vision and floaters.  Negative for eye pain, headache and flashes              Comments     Pt here for annual exam. She states she has double vision at night- when she closes one eye it goes away- has noticed it more over the last year- right eye> left eye . Images are kiddy corner. Pt is pseudophakic each eye. Floaters are long standing.   Pt using:  -Zatador  Blink PRN each eye  JI RIVAS 12:35 PM February 3, 2022

## 2022-02-11 ENCOUNTER — OFFICE VISIT (OUTPATIENT)
Dept: OPHTHALMOLOGY | Facility: CLINIC | Age: 72
End: 2022-02-11
Payer: COMMERCIAL

## 2022-02-11 ENCOUNTER — TELEPHONE (OUTPATIENT)
Dept: OPHTHALMOLOGY | Facility: CLINIC | Age: 72
End: 2022-02-11

## 2022-02-11 DIAGNOSIS — H52.213 IRREGULAR ASTIGMATISM OF BOTH EYES: Primary | ICD-10-CM

## 2022-02-11 DIAGNOSIS — H18.463 PELLUCID MARGINAL DEGENERATION OF BOTH CORNEAS: ICD-10-CM

## 2022-02-11 PROCEDURE — V2531 CONTACT LENS GAS PERMEABLE: HCPCS | Performed by: OPTOMETRIST

## 2022-02-11 PROCEDURE — 99213 OFFICE O/P EST LOW 20 MIN: CPT | Performed by: OPTOMETRIST

## 2022-02-11 ASSESSMENT — REFRACTION_CURRENTRX
OD_SPHERE: -2.37
OD_DIAMETER: 14.9
OS_DIAMETER: 14.9
OS_SPHERE: -1.50
OD_ADDL_SPECS: OPT EXTRA CLEAR
OS_ADDL_SPECS: OPT EXTRA BLUE
OS_BRAND: ONEFIT
OD_BASECURVE: 7.7
OD_BRAND: ONEFIT
OS_BASECURVE: 7.7

## 2022-02-11 ASSESSMENT — VISUAL ACUITY
METHOD: SNELLEN - LINEAR
OS_CC: 20/20
OD_CC: 20/20
OD_CC+: -1
CORRECTION_TYPE: CONTACTS

## 2022-02-11 ASSESSMENT — SLIT LAMP EXAM - LIDS
COMMENTS: NORMAL
COMMENTS: NORMAL

## 2022-02-11 ASSESSMENT — CONF VISUAL FIELD
OD_NORMAL: 1
OS_NORMAL: 1
METHOD: COUNTING FINGERS

## 2022-02-11 ASSESSMENT — EXTERNAL EXAM - LEFT EYE: OS_EXAM: NORMAL

## 2022-02-11 ASSESSMENT — EXTERNAL EXAM - RIGHT EYE: OD_EXAM: NORMAL

## 2022-02-11 ASSESSMENT — TONOMETRY: IOP_UNABLETOASSESS: 1

## 2022-02-11 NOTE — NURSING NOTE
Chief Complaints and History of Present Illnesses   Patient presents with     Contact Lens Evaluation     Chief Complaint(s) and History of Present Illness(es)     Contact Lens Evaluation     Laterality: both eyes    Associated symptoms: Negative for dryness, eye pain and tearing    Pain scale: 0/10              Comments     Pt is wondering if there is a CTL so she can see near and far. She feels comfort is good and VA.     Nallely DUARTE February 11, 2022 11:57 AM

## 2022-02-11 NOTE — TELEPHONE ENCOUNTER
Spoke with patient regarding sooner appointment today 2/11/22. Patient is coming from [facility] but moved appointment earlier and will try to arrive even earlier.-Per Patient

## 2022-02-11 NOTE — PROGRESS NOTES
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 Pellucid like pattern on last gagan  -Excellent response to scleral lens. Excellent vision and comfort. No changes recommended on current fit  -Interested in reducing dependence on readers. Option for monovision vs MF. She previously tried monovision and would like to try again  -Order LEFT monovision lens and mail direct. Option for driving glasses over if monovision lens working well  -If poor response to mono would rec she MyChart me to try MF lenses    Order new left lens in monovision power and mail direct. F/u annually    Contact Lens Billing  V-Code:  - GP scleral  Final Contact Lens Rx       Brand Base Curve Diameter Sphere Lens Addl. Specs    Right Onefit 7.7 14.9 -2.37 std/1 flat edge Opt Extra clear    Left Onefit 7.7 14.9 plano (monovision) std/1 flat edge Opt Extra blue         # of units: 1 left lens  Price per Unit: $200    This patient requires contact lenses that are medically necessary for either improvement in vision over spectacles, support of the ocular surface, or other therapeutic benefit. These are not cosmetic contact lenses.     Encounter Diagnoses   Name Primary?     Irregular astigmatism of both eyes Yes     Pellucid marginal degeneration of both corneas

## 2022-03-09 ENCOUNTER — TELEPHONE (OUTPATIENT)
Dept: FAMILY MEDICINE | Facility: CLINIC | Age: 72
End: 2022-03-09
Payer: COMMERCIAL

## 2022-03-09 NOTE — TELEPHONE ENCOUNTER
Reason for Call:  Form, our goal is to have forms completed with 72 hours, however, some forms may require a visit or additional information.    Type of letter, form or note:  medical    Who is the form from?: Patient    Where did the form come from: Patient or family brought in       What clinic location was the form placed at?: [facility]    Where the form was placed: Given to physician    What number is listed as a contact on the form?: [phone]       Additional comments: Patient placed envelope for the insurance form to be mailed back to her.    Call taken on 3/9/2022 at 2:52 PM by Talon Pierre [facility]

## 2022-05-23 ENCOUNTER — E-VISIT (OUTPATIENT)
Dept: FAMILY MEDICINE | Facility: CLINIC | Age: 72
End: 2022-05-23
Payer: COMMERCIAL

## 2022-05-23 DIAGNOSIS — H10.13 ALLERGIC CONJUNCTIVITIS, BILATERAL: Primary | ICD-10-CM

## 2022-05-23 PROCEDURE — 99421 OL DIG E/M SVC 5-10 MIN: CPT | Performed by: FAMILY MEDICINE

## 2022-05-23 RX ORDER — CROMOLYN SODIUM 40 MG/ML
1 SOLUTION/ DROPS OPHTHALMIC 4 TIMES DAILY
Qty: 10 ML | Refills: 0 | Status: SHIPPED | OUTPATIENT
Start: 2022-05-23 | End: 2022-06-17

## 2022-05-23 RX ORDER — AZELASTINE HYDROCHLORIDE 0.5 MG/ML
1 SOLUTION/ DROPS OPHTHALMIC 2 TIMES DAILY
Qty: 6 ML | Refills: 0 | Status: SHIPPED | OUTPATIENT
Start: 2022-05-23 | End: 2022-06-30

## 2022-06-16 DIAGNOSIS — H10.13 ALLERGIC CONJUNCTIVITIS, BILATERAL: ICD-10-CM

## 2022-06-17 RX ORDER — CROMOLYN SODIUM 40 MG/ML
SOLUTION/ DROPS OPHTHALMIC
Qty: 10 ML | Refills: 0 | Status: SHIPPED | OUTPATIENT
Start: 2022-06-17 | End: 2022-07-01

## 2022-06-17 NOTE — TELEPHONE ENCOUNTER
Routing refill request to provider for review/approval because:  Drug not on the [facility] refill protocol     Radha Turner RN

## 2022-06-27 ENCOUNTER — TELEPHONE (OUTPATIENT)
Dept: FAMILY MEDICINE | Facility: CLINIC | Age: 72
End: 2022-06-27

## 2022-06-27 ENCOUNTER — MYC MEDICAL ADVICE (OUTPATIENT)
Dept: FAMILY MEDICINE | Facility: CLINIC | Age: 72
End: 2022-06-27

## 2022-06-27 NOTE — TELEPHONE ENCOUNTER
Reason for call:  Other   Patient called regarding (reason for call): appointment and call back  Additional comments: Per patient: Is there anyway I can be squeezed into your schedule to get my eye looked at, it's still pretty painful and itchy    Phone number to reach patient:  Cell number on file:    Telephone Information:   Mobile [phone]       Best Time:  Anytime    Can we leave a detailed message on this number?  YES    Travel screening: Not Applicable

## 2022-06-28 NOTE — TELEPHONE ENCOUNTER
Called patient, left message with phone number to call back.       Charla Sandoval RN, [facility]

## 2022-06-29 NOTE — TELEPHONE ENCOUNTER
Pt called back and is not having vision changes or pain.  Video appointment made for tomorrow.  Irish COBOSN, RN

## 2022-06-30 ENCOUNTER — VIRTUAL VISIT (OUTPATIENT)
Dept: FAMILY MEDICINE | Facility: CLINIC | Age: 72
End: 2022-06-30
Payer: COMMERCIAL

## 2022-06-30 DIAGNOSIS — H10.13 ALLERGIC CONJUNCTIVITIS, BILATERAL: ICD-10-CM

## 2022-06-30 DIAGNOSIS — H10.13 ALLERGIC CONJUNCTIVITIS, BILATERAL: Primary | ICD-10-CM

## 2022-06-30 PROCEDURE — 99213 OFFICE O/P EST LOW 20 MIN: CPT | Mod: 95 | Performed by: PHYSICIAN ASSISTANT

## 2022-06-30 RX ORDER — OLOPATADINE HYDROCHLORIDE 1 MG/ML
1 SOLUTION/ DROPS OPHTHALMIC 2 TIMES DAILY
Qty: 5 ML | Refills: 3 | Status: SHIPPED | OUTPATIENT
Start: 2022-06-30 | End: 2022-07-06

## 2022-06-30 ASSESSMENT — ENCOUNTER SYMPTOMS: EYE PAIN: 1

## 2022-06-30 NOTE — PATIENT INSTRUCTIONS
Try patanol eye drops twice a day   Send us a message in 3-5 days if not improving  Consider oral prednisone  Return urgently if any change in symptoms  Follow up with eye doctor if note any blurring of vision or eye pain

## 2022-06-30 NOTE — PROGRESS NOTES
"Answers for HPI/ROS submitted by the patient on 6/30/2022  What is the reason for your visit today? : itching eyes  How many servings of fruits and vegetables do you eat daily?: 2-3  On average, how many sweetened beverages do you drink each day (Examples: soda, juice, sweet tea, etc.  Do NOT count diet or artificially sweetened beverages)?: 0  How many minutes a day do you exercise enough to make your heart beat faster?: 30 to 60  How many days a week do you exercise enough to make your heart beat faster?: 5  How many days per week do you miss taking your medication?: 0    Maddie is a 72 year old who is being evaluated via a billable video visit.      How would you like to obtain your AVS? MyChart  If the video visit is dropped, the invitation should be resent by: Text to cell phone: [phone]  Will anyone else be joining your video visit? No          Assessment & Plan     Allergic conjunctivitis, bilateral  Trial of patanol eye drops-message us in 3-5 days if no improvement in symptoms  Declines trial of oral prednisone at this time   - olopatadine (PATANOL) 0.1 % ophthalmic solution  Dispense: 5 mL; Refill: 3      Prescription drug management         BMI:   Estimated body mass index is 26.63 kg/m  as calculated from the following:    Height as of 9/22/21: 1.676 m (5' 6\").    Weight as of 9/22/21: 74.8 kg (165 lb).   Weight management plan: Discussed healthy diet and exercise guidelines    Patient Instructions   Try patanol eye drops twice a day   Send us a message in 3-5 days if not improving  Consider oral prednisone  Return urgently if any change in symptoms  Follow up with eye doctor if note any blurring of vision or eye pain      Follow up in 5 day(s) if not improving or any change in symptoms.      MAGDALENO Gomez [facility]   Maddie is a 72 year old, presenting for the following health issues:  Eye Problem      Eye Problem     History of Present Illness "       Reason for visit:  Itching eyes    She eats 2-3 servings of fruits and vegetables daily.She consumes 0 sweetened beverage(s) daily.She exercises with enough effort to increase her heart rate 30 to 60 minutes per day.  She exercises with enough effort to increase her heart rate 5 days per week.   She is taking medications regularly.       Eye(s) Problem  Onset/Duration: x 1 month  Description:   Location: both eyes  Pain: no  Redness: YES  Accompanying Signs & Symptoms:  Discharge/mattering: no  Swelling: YES  Visual changes: no  Fever: no  Nasal Congestion: YES, allergies  Bothered by bright lights: YES  History:  Trauma: no  Foreign body exposure: no   Wearing contacts: no, not since symptoms started  Precipitating or alleviating factors: ice pack  Therapies tried and outcome: ice, cromolyn helped most, azelastine not very helpful.    Has not worn contacts for approximately one month  Still itches but improving  Thought polles in trees trigger   No pain.  No vision changes  Cromolyn and over the counter stuff   Tried over the counter antihistamines no difference      Review of Systems   Eyes: Positive for pain.      Constitutional, HEENT, cardiovascular, pulmonary, gi and gu systems are negative, except as otherwise noted.      Objective           Vitals:  No vitals were obtained today due to virtual visit.    Physical Exam   GENERAL: Healthy, alert and no distress  EYES: periorbital erythema and edema noted.  Conjunctiva actually appears pretty clear bilaterally   RESP: No audible wheeze, cough, or visible cyanosis.  No visible retractions or increased work of breathing.    SKIN: Visible skin clear. No significant rash, abnormal pigmentation or lesions.  NEURO: Cranial nerves grossly intact.  Mentation and speech appropriate for age.  PSYCH: Mentation appears normal, affect normal/bright, judgement and insight intact, normal speech and appearance well-groomed.                Video-Visit Details    Video  Start Time: 10:40 AM    Type of service:  Video Visit    Video End Time:10:48 AM  Still itch    Originating Location (pt. Location): Home    Distant Location (provider location):  [facility]     Platform used for Video Visit: [email or web address]    .  Karol.

## 2022-07-01 RX ORDER — CROMOLYN SODIUM 40 MG/ML
SOLUTION/ DROPS OPHTHALMIC
Qty: 10 ML | Refills: 0 | Status: SHIPPED | OUTPATIENT
Start: 2022-07-01 | End: 2022-07-18

## 2022-07-05 ENCOUNTER — MYC MEDICAL ADVICE (OUTPATIENT)
Dept: FAMILY MEDICINE | Facility: CLINIC | Age: 72
End: 2022-07-05

## 2022-07-06 ENCOUNTER — E-VISIT (OUTPATIENT)
Dept: FAMILY MEDICINE | Facility: CLINIC | Age: 72
End: 2022-07-06
Payer: COMMERCIAL

## 2022-07-06 DIAGNOSIS — H10.13 ALLERGIC CONJUNCTIVITIS, BILATERAL: Primary | ICD-10-CM

## 2022-07-06 PROCEDURE — 99421 OL DIG E/M SVC 5-10 MIN: CPT | Performed by: FAMILY MEDICINE

## 2022-07-06 RX ORDER — PREDNISONE 20 MG/1
40 TABLET ORAL DAILY
Qty: 10 TABLET | Refills: 0 | Status: SHIPPED | OUTPATIENT
Start: 2022-07-06 | End: 2022-07-11

## 2022-07-06 NOTE — PATIENT INSTRUCTIONS
Resume the Cromolyn eye drops.    Use baby shampoo eye washes twice a day     Prednisone 40 mg daily in AM for 5 days with food.

## 2022-07-07 DIAGNOSIS — H10.45 CHRONIC ALLERGIC CONJUNCTIVITIS: ICD-10-CM

## 2022-07-07 RX ORDER — FLUTICASONE PROPIONATE 50 MCG
SPRAY, SUSPENSION (ML) NASAL
Qty: 48 G | Refills: 2 | Status: SHIPPED | OUTPATIENT
Start: 2022-07-07 | End: 2022-07-12

## 2022-07-12 DIAGNOSIS — H10.45 CHRONIC ALLERGIC CONJUNCTIVITIS: ICD-10-CM

## 2022-07-12 RX ORDER — FLUTICASONE PROPIONATE 50 MCG
SPRAY, SUSPENSION (ML) NASAL
Qty: 48 G | Refills: 2 | Status: SHIPPED | OUTPATIENT
Start: 2022-07-12 | End: 2023-08-22

## 2022-07-18 ENCOUNTER — MYC MEDICAL ADVICE (OUTPATIENT)
Dept: FAMILY MEDICINE | Facility: CLINIC | Age: 72
End: 2022-07-18

## 2022-07-18 DIAGNOSIS — H10.13 ALLERGIC CONJUNCTIVITIS, BILATERAL: ICD-10-CM

## 2022-07-18 NOTE — TELEPHONE ENCOUNTER
Provider:   1. Are you willing to refill the cromolyn (OPTICROM) 4 % ophthalmic solution?  2. Would you like to give her a referral to opthalmology?  Thank you. Macy Sena R.N.

## 2022-07-19 RX ORDER — CROMOLYN SODIUM 40 MG/ML
SOLUTION/ DROPS OPHTHALMIC
Qty: 10 ML | Refills: 0 | Status: SHIPPED | OUTPATIENT
Start: 2022-07-19 | End: 2022-08-24

## 2022-07-22 ENCOUNTER — OFFICE VISIT (OUTPATIENT)
Dept: OPHTHALMOLOGY | Facility: CLINIC | Age: 72
End: 2022-07-22
Payer: COMMERCIAL

## 2022-07-22 DIAGNOSIS — H10.13 ALLERGIC CONJUNCTIVITIS OF BOTH EYES: Primary | ICD-10-CM

## 2022-07-22 DIAGNOSIS — H52.213 IRREGULAR ASTIGMATISM OF BOTH EYES: ICD-10-CM

## 2022-07-22 PROCEDURE — 99213 OFFICE O/P EST LOW 20 MIN: CPT | Performed by: OPTOMETRIST

## 2022-07-22 RX ORDER — FLUOROMETHOLONE 0.1 %
SUSPENSION, DROPS(FINAL DOSAGE FORM)(ML) OPHTHALMIC (EYE)
Qty: 5 ML | Refills: 1 | Status: SHIPPED | OUTPATIENT
Start: 2022-07-22 | End: 2022-08-12

## 2022-07-22 ASSESSMENT — VISUAL ACUITY
CORRECTION_TYPE: CONTACTS
OD_CC: 20/20
METHOD: SNELLEN - LINEAR
OS_CC: 20/50
OS_PH_CC: 20/30

## 2022-07-22 ASSESSMENT — REFRACTION_MANIFEST
OS_SPHERE: -0.50
OS_AXIS: 010
OS_CYLINDER: +4.25
OD_SPHERE: PLANO

## 2022-07-22 ASSESSMENT — EXTERNAL EXAM - LEFT EYE: OS_EXAM: NORMAL

## 2022-07-22 ASSESSMENT — REFRACTION_WEARINGRX
OD_ADD: +2.50
OS_CYLINDER: +4.00
OS_SPHERE: -1.00
OD_CYLINDER: +2.25
OD_AXIS: 170
OD_SPHERE: -2.00
OS_ADD: +2.50
OS_AXIS: 180

## 2022-07-22 ASSESSMENT — SLIT LAMP EXAM - LIDS
COMMENTS: MGD
COMMENTS: MGD

## 2022-07-22 ASSESSMENT — CONF VISUAL FIELD
OS_NORMAL: 1
OD_NORMAL: 1

## 2022-07-22 ASSESSMENT — EXTERNAL EXAM - RIGHT EYE: OD_EXAM: NORMAL

## 2022-07-22 ASSESSMENT — TONOMETRY
IOP_METHOD: TONOPEN
OD_IOP_MMHG: 14
OS_IOP_MMHG: 14

## 2022-07-22 NOTE — PATIENT INSTRUCTIONS
START fluorometholone eyedrops 3x/day both eyes x 1 week, then 2x/day for 1 week, then 1x/day for 1 week then stop    START Pataday eyedrops regularly for allergies - use this continuously    START ointment at bedtime - over the counter, something below    USE artificial tears as needed over the contact lenses      --------------------------------------------------------------      Artificial tears: (Water-like consistency. Can be used during the daytime)  -Refresh Plus  -Refresh Optive  -Refresh Relieva  -Systane Ultra  -Systane Complete  -Systane Hydration  -Blink Tears  (Notes: Anything in a bottle has preservatives and can be used up to 4x/day. Preservative free vials can be used as much as necessary)    Gel drops: (Thicker consistency, may blur vision slightly. Can be used during the day or at night)  -Refresh Celluvisc  -Refresh Liquigel  -Refresh Optive Gel Drops  -Systane Gel Drops  -Blink Gel Drops    Ointments: (Very thick, these moisturize the best but can blur vision. Best used right before bedtime)  -Refresh PM  -Systane Nighttime  -Genteal Gel

## 2022-07-22 NOTE — NURSING NOTE
Chief Complaints and History of Present Illnesses   Patient presents with     Eye Itching Both Eyes     Chief Complaint(s) and History of Present Illness(es)     Eye Itching Both Eyes     Laterality: both eyes    Associated symptoms: irritation, burning, itching, photophobia and lid swelling.  Negative for foreign body sensation and blurred vision              Comments     Since before labor day patient has had itching. Photophobia and swelling.     JI Boyer July 22, 2022 12:23 PM

## 2022-07-22 NOTE — PROGRESS NOTES
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 Pellucid like pattern on last gagan  -Excellent response to scleral lens OU. Last set monovision left eye near, has not worn in 2 months  -Today RIGHT eye is 20/20 without correction - possibly related to corneal surface changes    F/u prn if symptoms worsen or do not improve    I have confirmed the patient's CC, HPI and reviewed Past Medical History, Past Surgical History, Social History, Family History, Problem List, Medication List and agree with Tech note.     Latia Garcia, MALLORY FAAO EBENEZERS

## 2022-08-11 DIAGNOSIS — H10.13 ALLERGIC CONJUNCTIVITIS OF BOTH EYES: ICD-10-CM

## 2022-08-13 RX ORDER — FLUOROMETHOLONE 0.1 %
SUSPENSION, DROPS(FINAL DOSAGE FORM)(ML) OPHTHALMIC (EYE)
Qty: 5 ML | OUTPATIENT
Start: 2022-08-13 | End: 2022-09-03

## 2022-08-13 NOTE — TELEPHONE ENCOUNTER
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 Pellucid like pattern on last gagan  -Excellent response to scleral lens OU. Last set monovision left eye near, has not worn in 2 months  -Today RIGHT eye is 20/20 without correction - possibly related to corneal surface changes     F/u prn if symptoms worsen or do not improve     I have confirmed the patient's CC, HPI and reviewed Past Medical History, Past Surgical History, Social History, Family History, Problem List, Medication List and agree with Tech note.      Latia Garcia, MALLORY FAAO FSLS       Routing refill request to provider for review/approval because:  Drug not on the [facility], P or  Health refill protocol or controlled substance      Julissa Tirado RN  Central Triage Red Flags/Med Refills

## 2022-08-17 ENCOUNTER — MYC MEDICAL ADVICE (OUTPATIENT)
Dept: OPHTHALMOLOGY | Facility: CLINIC | Age: 72
End: 2022-08-17

## 2022-08-17 DIAGNOSIS — H04.129 DRY EYE: ICD-10-CM

## 2022-08-17 DIAGNOSIS — H16.223 KERATITIS SICCA, BILATERAL: ICD-10-CM

## 2022-08-17 DIAGNOSIS — H10.13 ALLERGIC CONJUNCTIVITIS OF BOTH EYES: Primary | ICD-10-CM

## 2022-08-17 RX ORDER — FLUOROMETHOLONE 0.1 %
SUSPENSION, DROPS(FINAL DOSAGE FORM)(ML) OPHTHALMIC (EYE)
Qty: 5 ML | Refills: 2 | Status: SHIPPED | OUTPATIENT
Start: 2022-08-17 | End: 2022-09-14

## 2022-08-18 RX ORDER — CYCLOSPORINE 0.5 MG/ML
1 EMULSION OPHTHALMIC 2 TIMES DAILY
Qty: 5.5 ML | Refills: 11 | Status: SHIPPED | OUTPATIENT
Start: 2022-08-18 | End: 2022-09-14

## 2022-08-22 ENCOUNTER — MYC MEDICAL ADVICE (OUTPATIENT)
Dept: FAMILY MEDICINE | Facility: CLINIC | Age: 72
End: 2022-08-22

## 2022-09-07 DIAGNOSIS — L21.9 DERMATITIS, SEBORRHEIC: ICD-10-CM

## 2022-09-07 RX ORDER — FLUOCINONIDE TOPICAL SOLUTION USP, 0.05% 0.5 MG/ML
SOLUTION TOPICAL
Qty: 60 ML | Refills: 0 | Status: SHIPPED | OUTPATIENT
Start: 2022-09-07 | End: 2022-12-27

## 2022-09-14 ENCOUNTER — OFFICE VISIT (OUTPATIENT)
Dept: OPTOMETRY | Facility: CLINIC | Age: 72
End: 2022-09-14
Payer: COMMERCIAL

## 2022-09-14 DIAGNOSIS — H10.13 ALLERGIC CONJUNCTIVITIS OF BOTH EYES: ICD-10-CM

## 2022-09-14 DIAGNOSIS — H04.129 DRY EYE: Primary | ICD-10-CM

## 2022-09-14 DIAGNOSIS — H16.223 KERATITIS SICCA, BILATERAL: ICD-10-CM

## 2022-09-14 RX ORDER — FLUOROMETHOLONE 0.1 %
1 SUSPENSION, DROPS(FINAL DOSAGE FORM)(ML) OPHTHALMIC (EYE) 2 TIMES DAILY
Qty: 5 ML | Refills: 2 | Status: SHIPPED | OUTPATIENT
Start: 2022-09-14 | End: 2022-11-21

## 2022-09-14 RX ORDER — CYCLOSPORINE 0.5 MG/ML
1 EMULSION OPHTHALMIC 2 TIMES DAILY
Qty: 60 ML | Refills: 11 | Status: SHIPPED | OUTPATIENT
Start: 2022-09-14 | End: 2023-10-04

## 2022-09-14 ASSESSMENT — VISUAL ACUITY
OS_SC: 20/70-1
OD_SC: 20/25
METHOD: SNELLEN - LINEAR

## 2022-09-14 ASSESSMENT — REFRACTION_CURRENTRX
OD_ADDL_SPECS: OPT EXTRA CLEAR
OS_BRAND: ONEFIT
OD_BASECURVE: 7.7
OS_BASECURVE: 7.7
OD_SPHERE: -2.37
OD_BRAND: ONEFIT
OS_ADDL_SPECS: OPT EXTRA BLUE
OS_DIAMETER: 14.9
OD_DIAMETER: 14.9

## 2022-09-14 ASSESSMENT — TONOMETRY
OD_IOP_MMHG: 15
IOP_METHOD: ICARE
OS_IOP_MMHG: 17

## 2022-09-14 ASSESSMENT — EXTERNAL EXAM - RIGHT EYE: OD_EXAM: NORMAL

## 2022-09-14 ASSESSMENT — EXTERNAL EXAM - LEFT EYE: OS_EXAM: NORMAL

## 2022-09-14 NOTE — PROGRESS NOTES
A/P  1.) Allergic conjunctivitis OU  -Persistent inflammatory reaction x 4-5 months, no improvement with allergy drops  -Improved on FML but returned once she stopped. Likely needs longer taper  -Restart FML bid OU x 2 months  -Continue Restasis bid OU  -Continue PF AT and gel  -Continue cromolyn (really helps) and ketotifen (help somewhat). Stop pataday. 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 Pellucid like pattern on last gagan  -Excellent response to scleral lens OU. Last set monovision left eye near  -Right eye gagan improved today, largely good vision without correction. Monitor. Left eye stable gagan    F/u prn if symptoms worsen or do not improve    I have confirmed the patient's CC, HPI and reviewed Past Medical History, Past Surgical History, Social History, Family History, Problem List, Medication List and agree with Tech note.     Latia Garcia, OD FAAO FSLS

## 2022-09-14 NOTE — PATIENT INSTRUCTIONS
SWITCH to Hydrogen Peroxide contact  ([employer])    STOP olopatadine    CONTINUE cromolyn and ketotifen as needed for allergies/itching    CONTINUE preservative free drops and gel/ointment as previous    CONTINUE Restasis twice a day both eyes    CONTINUE warm compresses in both eyes    RESTART FML steroid drops 2x/day in both eyes. Use this for at least 2 months

## 2022-10-09 ENCOUNTER — HEALTH MAINTENANCE LETTER (OUTPATIENT)
Age: 72
End: 2022-10-09

## 2022-10-26 ENCOUNTER — E-VISIT (OUTPATIENT)
Dept: FAMILY MEDICINE | Facility: CLINIC | Age: 72
End: 2022-10-26
Payer: COMMERCIAL

## 2022-10-26 DIAGNOSIS — S20.364A TICK BITE OF MIDDLE FRONT WALL OF THORAX, INITIAL ENCOUNTER: Primary | ICD-10-CM
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
## 2022-10-26 PROCEDURE — 99421 OL DIG E/M SVC 5-10 MIN: CPT | Performed by: FAMILY MEDICINE

## 2022-11-02 ENCOUNTER — HOSPITAL ENCOUNTER (EMERGENCY)
Facility: CLINIC | Age: 72
Discharge: HOME | End: 2022-11-02
Attending: PHYSICIAN ASSISTANT | Admitting: PHYSICIAN ASSISTANT
Payer: COMMERCIAL

## 2022-11-02 ENCOUNTER — APPOINTMENT (OUTPATIENT)
Dept: GENERAL RADIOLOGY | Facility: CLINIC | Age: 72
End: 2022-11-02
Attending: EMERGENCY MEDICINE
Payer: COMMERCIAL

## 2022-11-02 VITALS
BODY MASS INDEX: 26.95 KG/M2 | TEMPERATURE: 98.4 F | SYSTOLIC BLOOD PRESSURE: 138 MMHG | DIASTOLIC BLOOD PRESSURE: 84 MMHG | RESPIRATION RATE: 16 BRPM | HEART RATE: 80 BPM | OXYGEN SATURATION: 98 % | WEIGHT: 167 LBS

## 2022-11-02 DIAGNOSIS — S61.011A LACERATION OF RIGHT THUMB: ICD-10-CM

## 2022-11-02 PROCEDURE — 12042 INTMD RPR N-HF/GENIT2.6-7.5: CPT | Performed by: PHYSICIAN ASSISTANT

## 2022-11-02 PROCEDURE — 99283 EMERGENCY DEPT VISIT LOW MDM: CPT | Mod: 25 | Performed by: PHYSICIAN ASSISTANT

## 2022-11-02 PROCEDURE — 73140 X-RAY EXAM OF FINGER(S): CPT | Mod: RT

## 2022-11-02 PROCEDURE — 90715 TDAP VACCINE 7 YRS/> IM: CPT | Performed by: PHYSICIAN ASSISTANT

## 2022-11-02 PROCEDURE — 250N000011 HC RX IP 250 OP 636: Performed by: PHYSICIAN ASSISTANT

## 2022-11-02 PROCEDURE — 90471 IMMUNIZATION ADMIN: CPT | Performed by: PHYSICIAN ASSISTANT

## 2022-11-02 PROCEDURE — 250N000009 HC RX 250: Performed by: PHYSICIAN ASSISTANT

## 2022-11-02 RX ORDER — BUPIVACAINE HYDROCHLORIDE 5 MG/ML
10 INJECTION, SOLUTION EPIDURAL; INTRACAUDAL ONCE
Status: COMPLETED | OUTPATIENT
Start: 2022-11-02 | End: 2022-11-02

## 2022-11-02 RX ADMIN — CLOSTRIDIUM TETANI TOXOID ANTIGEN (FORMALDEHYDE INACTIVATED), CORYNEBACTERIUM DIPHTHERIAE TOXOID ANTIGEN (FORMALDEHYDE INACTIVATED), BORDETELLA PERTUSSIS TOXOID ANTIGEN (GLUTARALDEHYDE INACTIVATED), BORDETELLA PERTUSSIS FILAMENTOUS HEMAGGLUTININ ANTIGEN (FORMALDEHYDE INACTIVATED), BORDETELLA PERTUSSIS PERTACTIN ANTIGEN, AND BORDETELLA PERTUSSIS FIMBRIAE 2/3 ANTIGEN 0.5 ML: 5; 2; 2.5; 5; 3; 5 INJECTION, SUSPENSION INTRAMUSCULAR at 17:15

## 2022-11-02 RX ADMIN — BUPIVACAINE HYDROCHLORIDE 50 MG: 5 INJECTION, SOLUTION EPIDURAL; INTRACAUDAL; PERINEURAL at 17:17

## 2022-11-02 ASSESSMENT — ACTIVITIES OF DAILY LIVING (ADL): ADLS_ACUITY_SCORE: 35

## 2022-11-02 NOTE — ED TRIAGE NOTES
Pt here after falling into bracket in garage, laceration to right thumb.         Triage Assessment     Row Name 11/02/22 1440       Triage Assessment (Adult)    Airway WDL WDL       Respiratory WDL    Respiratory WDL WDL

## 2022-11-02 NOTE — ED NOTES
Unwrapped right thumb dressing, right thumb laceration still bleeding, rewrapped thumb and notified Sean GREER.  Large finger tourniquet at bedside.

## 2022-11-02 NOTE — DISCHARGE INSTRUCTIONS
It was a pleasure working with you today!  I hope your laceration improves rapidly!     Please elevate your thumb is much as possible for the next 2 days.  Keep hand above heart level helps prevent bleeding and swelling.  You can use ibuprofen 600 mg every 6 hours as needed for pain and/or Tylenol up to 1000 mg every 6 hours as needed for pain.  Please keep your current dressing on for 24 hours.  Change your dressing once daily.  For the first 5 days, please use bacitracin ointment underneath the dressing.  Avoid getting your thumb wet for the first 5-6 days.  Schedule appointment with your primary care physician for 2 weeks from now, and they will determine if the sutures are ready for removal.

## 2022-11-02 NOTE — ED PROVIDER NOTES
History     Chief Complaint   Patient presents with     Laceration     HPI  Maddie Lala is a 72 year old female who presents for evaluation of a laceration of her right thumb.  Denies any alteration to her range of motion, strength, or sensation.  Bleeding controlled with pressure.  Injury occurred just prior to arrival.  She tripped in the garage and outstretch her hands to support her fall.  Her hand hit the garage door sensor.  She washed the wound with water and applied pressure.  Her  brought her to the ED.  Unsure of her tetanus status.    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     Priority: Medium     Tinea pedis of both feet 01/30/2020     Priority: Medium     Status post knee surgery 10/10/2018     Priority: Medium     AK (actinic keratosis) 07/12/2018     Priority: Medium     Multiple melanocytic nevi 07/12/2018     Priority: Medium     Benign essential hypertension 03/23/2018     Priority: Medium     Neck pain 02/18/2016     Priority: Medium     Muscle spasms of neck 02/18/2016     Priority: Medium     Hyponatremia 01/29/2015     Priority: Medium     Atrophic vaginitis 11/22/2013     Priority: Medium     Cervical nerve root impingement 03/28/2012     Priority: Medium     Seasonal allergic rhinitis 05/13/2011     Priority: Medium     Chronic allergic conjunctivitis 05/13/2011     Priority: Medium     GERD (gastroesophageal reflux disease) 05/13/2011     Priority: Medium     Latent tuberculosis 05/13/2011     Priority: Medium     Meniere's disease 05/13/2011     Priority: Medium     Menopause 05/13/2011     Priority: Medium     Tendonitis of shoulder, right 02/01/2011     Priority: Medium     Chronic cough 02/01/2011      Priority: Medium     Brachial neuritis or radiculitis 02/01/2011     Priority: Medium     Problem list name updated by automated process. Provider to review       CARDIOVASCULAR SCREENING; LDL GOAL LESS THAN 160 10/31/2010     Priority: Medium     Urinary incontinence 01/21/2010     Priority: Medium     Cataract 01/21/2010     Priority: Medium     Utility update for deleted IMO code  Imo Update utility          Past Medical History:    Past Medical History:   Diagnosis Date     Allergic rhinitis 1979     Arthritis      Benign essential hypertension 03/23/2018     Cervical dysplasia with cone in 1979     Dyspareunia      Hearing problem 2015     Meniere's disease 2002     Nonsenile cataract      Plantar fasciitis      Stress incontinence      SVT (supraventricular tachycardia) (H)      Tinnitus 2002       Past Surgical History:    Past Surgical History:   Procedure Laterality Date     ARTHROPLASTY KNEE Left 10/10/2018    Procedure: ARTHROPLASTY KNEE;  Left Total Knee Arthroplasty    ;  Surgeon: James Amaya MD;  Location: [facility] OR     BLADDER SURGERY       CATARACT IOL, RT/LT  2010     COLONOSCOPY N/A 10/11/2021    Procedure: Colonoscopy, With Polypectomy And Biopsy;  Surgeon: Kavita Huizar MD;  Location: [facility] OR     COLONOSCOPY WITH CO2 INSUFFLATION N/A 10/19/2016    Procedure: COLONOSCOPY WITH CO2 INSUFFLATION;  Surgeon: Duane, William Charles, MD;  Location: [facility] OR     COLONOSCOPY WITH CO2 INSUFFLATION N/A 10/11/2021    Procedure: COLONOSCOPY, WITH CO2 INSUFFLATION;  Surgeon: Kavita Huizar MD;  Location: [facility] OR     GENITOURINARY SURGERY      bladder     Midurethral sling with cystoscopy.  2010     TUBAL LIGATION         Family History:    Family History   Problem Relation Age of Onset     Heart Disease Mother         older      Osteoporosis Mother      C.A.D. Mother         60's      Macular Degeneration Mother      Respiratory Father         farmers lung     Skin Cancer Father      Alzheimer Disease  Maternal Grandmother      Heart Disease Maternal Grandfather      Heart Disease Brother         Afib     Heart Disease Brother      Asthma No family hx of      Diabetes No family hx of      Hypertension No family hx of      Breast Cancer No family hx of      Cancer - colorectal No family hx of      Cancer No family hx of      Thyroid Disease No family hx of      Glaucoma No family hx of      Colon Cancer No family hx of      Melanoma No family hx of        Social History:  Marital Status:   [2]  Social History     Tobacco Use     Smoking status: Former     Packs/day: 1.00     Years: 10.00     Pack years: 10.00     Types: Cigarettes     Quit date: 1981     Years since quittin.8     Smokeless tobacco: Never   Vaping Use     Vaping Use: Never used   Substance Use Topics     Alcohol use: Yes     Comment: occ - Glass of white wine per night     Drug use: No        Medications:    CALCIUM + D OR  Chlorpheniramine Maleate (CHLOR-TRIMETON ALLERGY) 12 MG TBCR  cromolyn (OPTICROM) 4 % ophthalmic solution  cycloSPORINE (RESTASIS) 0.05 % ophthalmic emulsion  DILT- MG 24 hr capsule  estradiol (VAGIFEM) 10 MCG TABS vaginal tablet  famotidine (PEPCID) 40 MG tablet  fluocinonide (LIDEX) 0.05 % external solution  fluorometholone (FML LIQUIFILM) 0.1 % ophthalmic suspension  fluticasone (FLONASE) 50 MCG/ACT nasal spray  Ibuprofen (ADVIL PO)  lisinopril (ZESTRIL) 20 MG tablet          Review of Systems   All other systems reviewed and are negative.      Physical Exam   BP: 138/84  Pulse: 80  Temp: 98.4  F (36.9  C)  Resp: 16  Weight: 75.8 kg (167 lb)  SpO2: 98 %      Physical Exam  Vitals and nursing note reviewed.   Constitutional:       General: She is not in acute distress.     Appearance: She is not diaphoretic.   HENT:      Head: Normocephalic and atraumatic.      Right Ear: External ear normal.      Left Ear: External ear normal.      Nose: Nose normal.      Mouth/Throat:      Mouth: Oropharynx is clear and  moist.      Pharynx: No oropharyngeal exudate.   Eyes:      General: No scleral icterus.        Right eye: No discharge.         Left eye: No discharge.      Extraocular Movements: EOM normal.      Conjunctiva/sclera: Conjunctivae normal.      Pupils: Pupils are equal, round, and reactive to light.   Neck:      Thyroid: No thyromegaly.   Cardiovascular:      Rate and Rhythm: Normal rate and regular rhythm.      Heart sounds: Normal heart sounds. No murmur heard.  Pulmonary:      Effort: Pulmonary effort is normal. No respiratory distress.      Breath sounds: Normal breath sounds. No wheezing or rales.   Chest:      Chest wall: No tenderness.   Abdominal:      General: Bowel sounds are normal. There is no distension.      Palpations: Abdomen is soft. There is no mass.      Tenderness: There is no abdominal tenderness. There is no guarding or rebound.   Musculoskeletal:         General: No edema. Normal range of motion.      Cervical back: Normal range of motion and neck supple.      Comments: Left thumb with a complex laceration that is a radial based flap extending from the palmar side of the distal down to the proximal phalanx.  In one area, the flexor tendon is visualized, but there is no injury to the tendon or lab.  She has intact flexion and extension strength of the at the interphalangeal and MCP joint.  Sensation intact to light touch.  Cap refill less than 2 seconds.  Total length of the laceration is 6.8 cm.   Lymphadenopathy:      Cervical: No cervical adenopathy.   Skin:     General: Skin is warm and dry.      Capillary Refill: Capillary refill takes less than 2 seconds.      Findings: No erythema or rash.   Neurological:      Mental Status: She is alert and oriented to person, place, and time.      Cranial Nerves: No cranial nerve deficit.   Psychiatric:         Mood and Affect: Mood and affect normal.         Behavior: Behavior normal.         Thought Content: Thought content normal.         ED Course                  [facility]    -Laceration Repair    Date/Time: 11/2/2022 5:45 PM  Performed by: Sean Melo PA-C  Authorized by: Sean Melo PA-C     Risks, benefits and alternatives discussed.      ANESTHESIA (see MAR for exact dosages):     Anesthesia method:  Nerve block    Block location:  Digital - right thumb    Block needle gauge:  25 G    Block anesthetic:  Bupivacaine 0.5% w/o epi    Block technique:  Digital    Block injection procedure:  Anatomic landmarks identified, introduced needle, incremental injection, anatomic landmarks palpated and negative aspiration for blood    Block outcome:  Anesthesia achieved      LACERATION DETAILS     Location:  Finger    Finger location:  R thumb    Length (cm):  6.8    REPAIR TYPE:     Repair type:  Simple      EXPLORATION:     Wound exploration: wound explored through full range of motion and entire depth of wound probed and visualized      Wound extent: no foreign body, no nerve damage, no tendon damage and no underlying fracture      Contaminated: no      TREATMENT:     Area cleansed with:  Saline    Amount of cleaning:  Extensive    Irrigation solution:  Sterile saline    Irrigation volume:  100 ml    Irrigation method:  Syringe    Visualized foreign bodies/material removed: no      MUCOUS MEMBRANE REPAIR     Suture size:  5-0    Suture material:  Vicryl    Suture technique:  Simple interrupted    Number of sutures:  4    SKIN REPAIR     Repair method:  Sutures    Suture size:  4-0    Suture material:  Nylon    Number of sutures:  15    APPROXIMATION     Approximation:  Close    POST-PROCEDURE DETAILS     Dressing:  Antibiotic ointment, splint for protection and tube gauze                    Critical Care time:  none               Results for orders placed or performed during the hospital encounter of 11/02/22 (from the past 24 hour(s))   XR Finger Right G/E 2 Views    Narrative    FINGER RIGHT TWO OR MORE  VIEWS  DATE/TIME: 11/2/2022 3:05 PM     INDICATION: Right thumb injury. Open laceration.   COMPARISON: 11/9/2011.      Impression    IMPRESSION:  1.  No fracture or joint malalignment.  2.  Soft tissue irregularity and injury in the distal aspect of the  right thumb.  3.  No radiodense foreign body.  4.  Mild thumb CMC, MCP, and IP degenerative arthrosis.  5.  Moderate second and third DIP degenerative arthrosis.    JONAH SANTILLAN MD         SYSTEM ID:  [id number]       Medications   bupivacaine (MARCAINE) 0.5% preservative free injection (50 mg Intradermal Given by Other 11/2/22 1717)   Tdap (tetanus-diphtheria-acell pertussis) (ADACEL) injection 0.5 mL (0.5 mLs Intramuscular Given 11/2/22 1715)       Assessments & Plan (with Medical Decision Making)  Laceration of right thumb     72 year old female presents for evaluation of a laceration to her right thumb.  Tetanus updated.  Palmar aspect radial-based flap laceration without tendon or nerve involvement noted measuring 6.8 cm.  Digital block utilized with good results with 0.5% bupivacaine without epinephrine.  Aggressive washing of the wound performed with sterile saline per above.  Given the bleeding, I did use a turnicot for total of 17 minutes.  The deeper structures were approximated with #4 5-0 Vicryl interrupted subcutaneous sutures.  The external wound was then closed with #fifteen 4-0 Ethilon interrupted sutures with good approximation.  Bacitracin ointment, Vaseline dressing, and tube gauze applied.  Wound care measures discussed.  Elevate the finger is much as possible over the next couple days.  Keep the finger straight for 1 week.  Aluminum padded splint provided for support and protection.  Daily wound dressing changes.  Schedule with primary care in 2 weeks to see if the sutures are ready for removal.  Indications for return reviewed.  Patient was in agreement.     I have reviewed the nursing notes.    I have reviewed the findings, diagnosis, plan  and need for follow up with the patient.       Discharge Medication List as of 11/2/2022  5:29 PM          Final diagnoses:   Laceration of right thumb     Disclaimer: This note consists of symbols derived from keyboarding, dictation and/or voice recognition software. As a result, there may be errors in the script that have gone undetected. Please consider this when interpreting information found in this chart.      11/2/2022   [facility] EMERGENCY DEPT     Sean Melo PA-C  11/02/22 1746

## 2022-11-16 ENCOUNTER — OFFICE VISIT (OUTPATIENT)
Dept: FAMILY MEDICINE | Facility: CLINIC | Age: 72
End: 2022-11-16
Payer: COMMERCIAL

## 2022-11-16 VITALS
BODY MASS INDEX: 26.87 KG/M2 | SYSTOLIC BLOOD PRESSURE: 127 MMHG | OXYGEN SATURATION: 100 % | WEIGHT: 167.2 LBS | RESPIRATION RATE: 13 BRPM | HEIGHT: 66 IN | HEART RATE: 65 BPM | DIASTOLIC BLOOD PRESSURE: 77 MMHG | TEMPERATURE: 97.8 F

## 2022-11-16 DIAGNOSIS — Z48.02 ENCOUNTER FOR REMOVAL OF SUTURES: Primary | ICD-10-CM

## 2022-11-16 PROCEDURE — 99213 OFFICE O/P EST LOW 20 MIN: CPT | Performed by: NURSE PRACTITIONER

## 2022-11-16 RX ORDER — HYDROCODONE BITARTRATE AND ACETAMINOPHEN 5; 325 MG/1; MG/1
TABLET ORAL
COMMUNITY
Start: 2022-07-20 | End: 2022-11-21

## 2022-11-16 ASSESSMENT — PAIN SCALES - GENERAL: PAINLEVEL: NO PAIN (0)

## 2022-11-17 ENCOUNTER — MYC MEDICAL ADVICE (OUTPATIENT)
Dept: FAMILY MEDICINE | Facility: CLINIC | Age: 72
End: 2022-11-17

## 2022-11-17 ENCOUNTER — TELEPHONE (OUTPATIENT)
Dept: OPHTHALMOLOGY | Facility: CLINIC | Age: 72
End: 2022-11-17

## 2022-11-17 NOTE — TELEPHONE ENCOUNTER
LVM for patient of scheduled appointment for :Allergic Conjunctivitis-Per  on 11/23/22 at 9:30am. Provided [facility] number for scheduling conflicts.

## 2022-11-19 ASSESSMENT — ENCOUNTER SYMPTOMS
FREQUENCY: 0
BREAST MASS: 0
SORE THROAT: 0
DIZZINESS: 0
DYSURIA: 0
FEVER: 0
CHILLS: 0
MYALGIAS: 0
NAUSEA: 0
ARTHRALGIAS: 1
SHORTNESS OF BREATH: 0
HEARTBURN: 0
PALPITATIONS: 0
ABDOMINAL PAIN: 0
PARESTHESIAS: 0
HEMATOCHEZIA: 0
COUGH: 0
WEAKNESS: 0
JOINT SWELLING: 1
HEADACHES: 0
EYE PAIN: 0
DIARRHEA: 0
CONSTIPATION: 0
HEMATURIA: 0
NERVOUS/ANXIOUS: 0

## 2022-11-19 ASSESSMENT — ACTIVITIES OF DAILY LIVING (ADL): CURRENT_FUNCTION: NO ASSISTANCE NEEDED

## 2022-11-21 ENCOUNTER — ANCILLARY PROCEDURE (OUTPATIENT)
Dept: GENERAL RADIOLOGY | Facility: CLINIC | Age: 72
End: 2022-11-21
Attending: FAMILY MEDICINE
Payer: COMMERCIAL

## 2022-11-21 ENCOUNTER — OFFICE VISIT (OUTPATIENT)
Dept: FAMILY MEDICINE | Facility: CLINIC | Age: 72
End: 2022-11-21
Payer: COMMERCIAL

## 2022-11-21 VITALS
BODY MASS INDEX: 26.58 KG/M2 | RESPIRATION RATE: 16 BRPM | SYSTOLIC BLOOD PRESSURE: 124 MMHG | DIASTOLIC BLOOD PRESSURE: 70 MMHG | WEIGHT: 165.4 LBS | TEMPERATURE: 97.2 F | HEART RATE: 70 BPM | OXYGEN SATURATION: 98 % | HEIGHT: 66 IN

## 2022-11-21 DIAGNOSIS — N95.2 VAGINAL ATROPHY: ICD-10-CM

## 2022-11-21 DIAGNOSIS — Z13.0 SCREENING, ANEMIA, DEFICIENCY, IRON: ICD-10-CM

## 2022-11-21 DIAGNOSIS — Z00.00 ENCOUNTER FOR MEDICARE ANNUAL WELLNESS EXAM: Primary | ICD-10-CM

## 2022-11-21 DIAGNOSIS — I10 BENIGN ESSENTIAL HYPERTENSION: ICD-10-CM

## 2022-11-21 DIAGNOSIS — M25.642 STIFFNESS OF JOINTS OF BOTH HANDS: ICD-10-CM

## 2022-11-21 DIAGNOSIS — M25.641 STIFFNESS OF JOINTS OF BOTH HANDS: ICD-10-CM

## 2022-11-21 DIAGNOSIS — B35.3 TINEA PEDIS OF BOTH FEET: ICD-10-CM

## 2022-11-21 DIAGNOSIS — Z48.02 VISIT FOR SUTURE REMOVAL: ICD-10-CM

## 2022-11-21 DIAGNOSIS — H04.123 DRY EYES: ICD-10-CM

## 2022-11-21 DIAGNOSIS — Z13.220 LIPID SCREENING: ICD-10-CM

## 2022-11-21 LAB
ALBUMIN SERPL-MCNC: 3.9 G/DL (ref 3.4–5)
ALP SERPL-CCNC: 81 U/L (ref 40–150)
ALT SERPL W P-5'-P-CCNC: 37 U/L (ref 0–50)
ANION GAP SERPL CALCULATED.3IONS-SCNC: 3 MMOL/L (ref 3–14)
AST SERPL W P-5'-P-CCNC: 26 U/L (ref 0–45)
BILIRUB SERPL-MCNC: 0.3 MG/DL (ref 0.2–1.3)
BUN SERPL-MCNC: 12 MG/DL (ref 7–30)
CALCIUM SERPL-MCNC: 9.1 MG/DL (ref 8.5–10.1)
CHLORIDE BLD-SCNC: 104 MMOL/L (ref 94–109)
CHOLEST SERPL-MCNC: 187 MG/DL
CO2 SERPL-SCNC: 31 MMOL/L (ref 20–32)
CREAT SERPL-MCNC: 0.58 MG/DL (ref 0.52–1.04)
CRP SERPL-MCNC: <2.9 MG/L (ref 0–8)
ERYTHROCYTE [DISTWIDTH] IN BLOOD BY AUTOMATED COUNT: 12.9 % (ref 10–15)
ERYTHROCYTE [SEDIMENTATION RATE] IN BLOOD BY WESTERGREN METHOD: 9 MM/HR (ref 0–30)
FASTING STATUS PATIENT QL REPORTED: YES
GFR SERPL CREATININE-BSD FRML MDRD: >90 ML/MIN/1.73M2
GLUCOSE BLD-MCNC: 100 MG/DL (ref 70–99)
HCT VFR BLD AUTO: 35 % (ref 35–47)
HDLC SERPL-MCNC: 82 MG/DL
HGB BLD-MCNC: 11.8 G/DL (ref 11.7–15.7)
LDLC SERPL CALC-MCNC: 94 MG/DL
MCH RBC QN AUTO: 31.7 PG (ref 26.5–33)
MCHC RBC AUTO-ENTMCNC: 33.7 G/DL (ref 31.5–36.5)
MCV RBC AUTO: 94 FL (ref 78–100)
NONHDLC SERPL-MCNC: 105 MG/DL
PLATELET # BLD AUTO: 284 10E3/UL (ref 150–450)
POTASSIUM BLD-SCNC: 4.5 MMOL/L (ref 3.4–5.3)
PROT SERPL-MCNC: 7 G/DL (ref 6.8–8.8)
RBC # BLD AUTO: 3.72 10E6/UL (ref 3.8–5.2)
SODIUM SERPL-SCNC: 138 MMOL/L (ref 133–144)
TRIGL SERPL-MCNC: 56 MG/DL
WBC # BLD AUTO: 5 10E3/UL (ref 4–11)

## 2022-11-21 PROCEDURE — 85652 RBC SED RATE AUTOMATED: CPT | Performed by: FAMILY MEDICINE

## 2022-11-21 PROCEDURE — 80053 COMPREHEN METABOLIC PANEL: CPT | Performed by: FAMILY MEDICINE

## 2022-11-21 PROCEDURE — 73130 X-RAY EXAM OF HAND: CPT | Mod: TC | Performed by: RADIOLOGY

## 2022-11-21 PROCEDURE — G0439 PPPS, SUBSEQ VISIT: HCPCS | Performed by: FAMILY MEDICINE

## 2022-11-21 PROCEDURE — 86140 C-REACTIVE PROTEIN: CPT | Performed by: FAMILY MEDICINE

## 2022-11-21 PROCEDURE — 82043 UR ALBUMIN QUANTITATIVE: CPT | Performed by: FAMILY MEDICINE

## 2022-11-21 PROCEDURE — 99213 OFFICE O/P EST LOW 20 MIN: CPT | Mod: 25 | Performed by: FAMILY MEDICINE

## 2022-11-21 PROCEDURE — S0630 REMOVAL OF SUTURES: HCPCS | Performed by: FAMILY MEDICINE

## 2022-11-21 PROCEDURE — 36415 COLL VENOUS BLD VENIPUNCTURE: CPT | Performed by: FAMILY MEDICINE

## 2022-11-21 PROCEDURE — 85027 COMPLETE CBC AUTOMATED: CPT | Performed by: FAMILY MEDICINE

## 2022-11-21 PROCEDURE — 86039 ANTINUCLEAR ANTIBODIES (ANA): CPT | Performed by: FAMILY MEDICINE

## 2022-11-21 PROCEDURE — 86038 ANTINUCLEAR ANTIBODIES: CPT | Performed by: FAMILY MEDICINE

## 2022-11-21 PROCEDURE — 80061 LIPID PANEL: CPT | Performed by: FAMILY MEDICINE

## 2022-11-21 ASSESSMENT — ENCOUNTER SYMPTOMS
HEMATOCHEZIA: 0
NERVOUS/ANXIOUS: 0
ARTHRALGIAS: 1
CHILLS: 0
SHORTNESS OF BREATH: 0
MYALGIAS: 0
HEMATURIA: 0
JOINT SWELLING: 1
SORE THROAT: 0
FEVER: 0
FREQUENCY: 0
DIZZINESS: 0
EYE PAIN: 0
ABDOMINAL PAIN: 0
DIARRHEA: 0
PALPITATIONS: 0
HEADACHES: 0
NAUSEA: 0
BREAST MASS: 0
DYSURIA: 0
PARESTHESIAS: 0
CONSTIPATION: 0
HEARTBURN: 0
COUGH: 0
WEAKNESS: 0

## 2022-11-21 ASSESSMENT — PAIN SCALES - GENERAL: PAINLEVEL: NO PAIN (0)

## 2022-11-21 ASSESSMENT — ACTIVITIES OF DAILY LIVING (ADL): CURRENT_FUNCTION: NO ASSISTANCE NEEDED

## 2022-11-21 NOTE — PATIENT INSTRUCTIONS
Non fasting labs.    Xray hands.  No significant arthritis except left thumb - exercise for strengthening and range of motion.  Consider hand PHYSICAL THERAPY if needed.       I will update medications when results return.    Suture removal today  - use vanicream twice a day for the dryness.            Patient Education   Personalized Prevention Plan  You are due for the preventive services outlined below.  Your care team is available to assist you in scheduling these services.  If you have already completed any of these items, please share that information with your care team to update in your medical record.  Health Maintenance Due   Topic Date Due    Annual Wellness Visit  09/09/2022       Signs of Hearing Loss      Hearing much better with one ear can be a sign of hearing loss.   Hearing loss is a problem shared by many people. In fact, it is one of the most common health problems, particularly as people age. Most people age 65 and older have some hearing loss. By age 80, almost everyone does. Hearing loss often occurs slowly over the years. So you may not realize your hearing has gotten worse.  Have your hearing checked  Call your healthcare provider if you:  Have to strain to hear normal conversation  Have to watch other people s faces very carefully to follow what they re saying  Need to ask people to repeat what they ve said  Often misunderstand what people are saying  Turn the volume of the television or radio up so high that others complain  Feel that people are mumbling when they re talking to you  Find that the effort to hear leaves you feeling tired and irritated  Notice, when using the phone, that you hear better with one ear than the other  Kang last reviewed this educational content on 1/1/2020 2000-2021 [employer]. All rights reserved. This information is not intended as a substitute for professional medical care. Always follow your healthcare professional's  instructions.          Urinary Incontinence, Female (Adult)   Urinary incontinence means loss of bladder control. This problem affects many women, especially as they get older. If you have incontinence, you may be embarrassed to ask for help. But know that this problem can be treated.   Types of Incontinence  There are different types of incontinence. Two of the main types are described here. You can have more than one type.   Stress incontinence. With this type, urine leaks when pressure (stress) is put on the bladder. This may happen when you cough, sneeze, or laugh. Stress incontinence most often occurs because the pelvic floor muscles that support the bladder and urethra are weak. This can happen after pregnancy and vaginal childbirth or a hysterectomy. It can also be due to excess body weight or hormone changes.  Urge incontinence (also called overactive bladder). With this type, a sudden urge to urinate is felt often. This may happen even though there may not be much urine in the bladder. The need to urinate often during the night is common. Urge incontinence most often occurs because of bladder spasms. This may be due to bladder irritation or infection. Damage to bladder nerves or pelvic muscles, constipation, and certain medicines can also lead to urge incontinence.  Treatment depends on the cause. Further evaluation is needed to find the type you have. This will likely include an exam and certain tests. Based on the results, you and your healthcare provider can then plan treatment. Until a diagnosis is made, the home care tips below can help ease symptoms.   Home care  Do pelvic floor muscle exercises, if they are prescribed. The pelvic floor muscles help support the bladder and urethra. Many women find that their symptoms improve when doing special exercises that strengthen these muscles. To do the exercises, contract the muscles you would use to stop your stream of urine. But do this when you re not  urinating. Hold for 10 seconds, then relax. Repeat 10 to 20 times in a row, at least 3 times a day. Your healthcare provider may give you other instructions for how to do the exercises and how often.  Keep a bladder diary. This helps track how often and how much you urinate over a set period of time. Bring this diary with you to your next visit with the provider. The information can help your provider learn more about your bladder problem.  Lose weight, if advised to by your provider. Extra weight puts pressure on the bladder. Your provider can help you create a weight-loss plan that s right for you. This may include exercising more and making certain diet changes.  Don't have foods and drinks that may irritate the bladder. These can include alcohol and caffeinated drinks.  Quit smoking. Smoking and other tobacco use can lead to a long-term (chronic) cough that strains the pelvic floor muscles. Smoking may also damage the bladder and urethra. Talk with your provider about treatments or methods you can use to quit smoking.  If drinking large amounts of fluid makes you have symptoms, you may be advised to limit your fluid intake. You may also be advised to drink most of your fluids during the day and to limit fluids at night.  If you re worried about urine leakage or accidents, you may wear absorbent pads to catch urine. Change the pads often. This helps reduce discomfort. It may also reduce the risk of skin or bladder infections.    Follow-up care  Follow up with your healthcare provider, or as directed. It may take some to find the right treatment for your problem. But healthy lifestyle changes can be made right away. These include such things as exercising on a regular basis, eating a healthy diet, losing weight (if needed), and quitting smoking. Your treatment plan may include special therapies or medicines. Certain procedures or surgery may also be options. Talk about any questions you have with your provider.    When to seek medical advice  Call the healthcare provider right away if any of these occur:  Fever of 100.4 F (38 C) or higher, or as directed by your provider  Bladder pain or fullness  Belly swelling  Nausea or vomiting  Back pain  Weakness, dizziness, or fainting  Kang last reviewed this educational content on 1/1/2020 2000-2021 [employer]. All rights reserved. This information is not intended as a substitute for professional medical care. Always follow your healthcare professional's instructions.

## 2022-11-21 NOTE — PROGRESS NOTES
"SUBJECTIVE:   Maddie is a 72 year old who presents for Preventive Visit.  Patient has been advised of split billing requirements and indicates understanding: Yes  Are you in the first 12 months of your Medicare coverage?  No    Healthy Habits:     In general, how would you rate your overall health?  Good    Frequency of exercise:  4-5 days/week    Duration of exercise:  30-45 minutes    Do you usually eat at least 4 servings of fruit and vegetables a day, include whole grains    & fiber and avoid regularly eating high fat or \"junk\" foods?  Yes    Taking medications regularly:  Yes    Medication side effects:  None    Ability to successfully perform activities of daily living:  No assistance needed    Home Safety:  Throw rugs in the hallway and lack of grab bars in the bathroom    Hearing Impairment:  Difficulty following a conversation in a noisy restaurant or crowded room, need to ask people to speak up or repeat themselves and difficulty understanding soft or whispered speech    In the past 6 months, have you been bothered by leaking of urine? Yes    In general, how would you rate your overall mental or emotional health?  Good      PHQ-2 Total Score: 0    Additional concerns today:  Yes    Walking - yoga, stairs, canoeing, gardening, biking   Have you ever done Advance Care Planning? (For example, a Health Directive, POLST, or a discussion with a medical provider or your loved ones about your wishes): Yes, patient states has an Advance Care Planning document and will bring a copy to the clinic.       Fall risk  Fallen 2 or more times in the past year?: No  Any fall with injury in the past year?: Yes    Cognitive Screening   1) Repeat 3 items (Leader, Season, Table)    2) Clock draw: NORMAL  3) 3 item recall: Recalls 3 objects  Results: 3 items recalled: COGNITIVE IMPAIRMENT LESS LIKELY    Mini-CogTM Copyright S Murtaza. Licensed by the author for use in [facility]; reprinted with permission " ([email or web address]). All rights reserved.      Do you have sleep apnea, excessive snoring or daytime drowsiness?: no    Reviewed and updated as needed this visit by clinical staff   Tobacco  Allergies  Meds   Med Hx  Surg Hx  Fam Hx          Reviewed and updated as needed this visit by Provider    Allergies  Meds   Med Hx  Surg Hx  Fam Hx         Social History     Tobacco Use     Smoking status: Former     Packs/day: 1.00     Years: 10.00     Pack years: 10.00     Types: Cigarettes     Quit date: 1981     Years since quittin.9     Passive exposure: Never     Smokeless tobacco: Never   Substance Use Topics     Alcohol use: Yes     Comment: occ - Glass of white wine per night         Alcohol Use 2022   Prescreen: >3 drinks/day or >7 drinks/week? No   Prescreen: >3 drinks/day or >7 drinks/week? -           Patient has questions regarding arthritis -stiffness in occasional discomfort in the hands bilaterally.  Primary discomfort is the left thumb.  Is wondering if this is arthritis or some other condition and the best way to manage it.    Patient has been struggling with dry eyes, concerns     Patient has tick bite on L side of chest she would like looked at     Chronic athletes foot concerns, when should she take medication  Saw dermatologist - recommended.  Treated with tinactin for up to 3 weeks when symptomatic.      Patient is experiencing hot feet at night - sleeping.      Patient would like stiches looked at, possible removal - laceration of the right thumb following a fall rash.  She has had the stitches in the for almost 3 weeks.  There are multiple deep as well as superficial stitches present.  There was some concern about healing when she was in on  and the stitches were left in place until today.    Current providers sharing in care for this patient include:   Patient Care Team:  Radha Hurtado MD as PCP - General (Family Practice)  aAron Roy MD as MD  (Dermapathology)  [facility] ([facility]  Latia Garcia OD as MD (Optometry)  Latia Garcia OD as Assigned Surgical Provider  Radha Hurtado MD as Assigned PCP    The following health maintenance items are reviewed in Epic and correct as of today:  Health Maintenance   Topic Date Due     MEDICARE ANNUAL WELLNESS VISIT  09/09/2022     ANNUAL REVIEW OF HM ORDERS  09/09/2022     MAMMO SCREENING  08/05/2023     FALL RISK ASSESSMENT  11/21/2023     LIPID  10/31/2024     COLORECTAL CANCER SCREENING  10/11/2026     ADVANCE CARE PLANNING  11/21/2027     DTAP/TDAP/TD IMMUNIZATION (7 - Td or Tdap) 11/02/2032     DEXA  09/22/2036     HEPATITIS C SCREENING  Completed     PHQ-2 (once per calendar year)  Completed     INFLUENZA VACCINE  Completed     Pneumococcal Vaccine: 65+ Years  Completed     ZOSTER IMMUNIZATION  Completed     COVID-19 Vaccine  Completed     IPV IMMUNIZATION  Aged Out     MENINGITIS IMMUNIZATION  Aged Out     BP Readings from Last 3 Encounters:   11/21/22 124/70   11/16/22 127/77   11/02/22 138/84    Wt Readings from Last 3 Encounters:   11/21/22 75 kg (165 lb 6.4 oz)   11/16/22 75.8 kg (167 lb 3.2 oz)   11/02/22 75.8 kg (167 lb)                  Pneumonia Vaccine: Series completed  Mammogram Screening: Mammogram Screening: Recommended mammography every 1-2 years with patient discussion and risk factor consideration        Review of Systems   Constitutional: Negative for chills and fever.   HENT: Positive for hearing loss. Negative for congestion, ear pain and sore throat.    Eyes: Negative for pain and visual disturbance.   Respiratory: Negative for cough and shortness of breath.    Cardiovascular: Negative for chest pain, palpitations and peripheral edema.   Gastrointestinal: Negative for abdominal pain, constipation, diarrhea, heartburn, hematochezia and nausea.   Breasts:  Negative for tenderness, breast mass and discharge.   Genitourinary:  "Negative for dysuria, frequency, genital sores, hematuria, pelvic pain, urgency, vaginal bleeding and vaginal discharge.   Musculoskeletal: Positive for arthralgias and joint swelling. Negative for myalgias.   Skin: Negative for rash.   Neurological: Negative for dizziness, weakness, headaches and paresthesias.   Psychiatric/Behavioral: Negative for mood changes. The patient is not nervous/anxious.    No sudden hearing changes.  Has had a hearing test in the past without indication for hearing aids.      OBJECTIVE:   /70 (BP Location: Right arm, Patient Position: Sitting, Cuff Size: Adult Regular)   Pulse 70   Temp 97.2  F (36.2  C) (Tympanic)   Resp 16   Ht 1.675 m (5' 5.95\")   Wt 75 kg (165 lb 6.4 oz)   SpO2 98%   BMI 26.74 kg/m   Estimated body mass index is 26.74 kg/m  as calculated from the following:    Height as of this encounter: 1.675 m (5' 5.95\").    Weight as of this encounter: 75 kg (165 lb 6.4 oz).  Physical Exam  GENERAL APPEARANCE: healthy, alert and no distress  EYES: Eyes grossly normal to inspection, PERRL and conjunctivae and sclerae normal  HENT: ear canals and TM's normal, nose and mouth without ulcers or lesions, oropharynx clear and oral mucous membranes moist  NECK: no adenopathy, no asymmetry, masses, or scars and thyroid normal to palpation  RESP: lungs clear to auscultation - no rales, rhonchi or wheezes  BREAST: normal without masses, tenderness or nipple discharge and no palpable axillary masses or adenopathy  CV: regular rate and rhythm, normal S1 S2, no S3 or S4, no murmur, click or rub, no peripheral edema and peripheral pulses strong  ABDOMEN: soft, nontender, no hepatosplenomegaly, no masses and bowel sounds normal   (female): normal female external genitalia, normal urethral meatus, vaginal mucosal atrophy noted, normal cervix, adnexae, and uterus without masses or abnormal discharge  MS: no musculoskeletal defects are noted, gait is age appropriate without ataxia, " range of motion decreased left knee status post TKA, hands are evaluated no area or warmth to touch noted.  Heberden's nodes at the DIP joint of multiple fingers noted.  Pain on movement of the left CMC joint thumb.   is equal bilaterally.  SKIN: no suspicious lesions or rashes and sutures are in place for the laceration of her right thumb.  Mild erythema along the suture line is noted.  No warmth to touch.  No drainage.  Good approximation of the wound edges are noted.  Initially every other stitch was removed and she was sent to lab and x-ray.  Upon her return the wound was again reevaluated and good healing was noted.  Remainder of the sutures were removed without incident.  Scaling and mild erythema of the feet bilaterally.  No skin breakdown or drainage noted.  NEURO: Normal strength and tone, sensory exam grossly normal, mentation intact and speech normal  PSYCH: mentation appears normal and affect normal/bright    Diagnostic Test Results:  Labs reviewed in Epic  Results for orders placed or performed in visit on 11/21/22   XR Hand Bilateral G/E 3 Views     Status: None    Narrative    HAND BILATERAL THREE OR MORE VIEWS   11/21/2022 2:00 PM     HISTORY:  Stiffness of joints of both hands.     COMPARISON: None.      Impression    IMPRESSION:    Right: Mild to moderate multifocal osteoarthrosis, particularly in the  DIP joints of the index and long finger. No erosions or other signs of  inflammatory arthropathy.    Left: Mild osteoarthrosis of the STT joint. Severe osteoarthrosis of  the first CMC joint. Mild to moderate osteoarthrosis in the MCP and  interphalangeal joints. No erosions or other signs of inflammatory  arthropathy.    ANTOINETTE LORENZO MD         SYSTEM ID:  [id number]   Results for orders placed or performed in visit on 11/21/22   Lipid panel reflex to direct LDL Non-fasting     Status: None   Result Value Ref Range    Cholesterol 187 <200 mg/dL    Triglycerides 56 <150 mg/dL    Direct Measure  HDL 82 >=50 mg/dL    LDL Cholesterol Calculated 94 <=100 mg/dL    Non HDL Cholesterol 105 <130 mg/dL    Patient Fasting > 8hrs? Yes     Narrative    Cholesterol  Desirable:  <200 mg/dL    Triglycerides  Normal:  Less than 150 mg/dL  Borderline High:  150-199 mg/dL  High:  200-499 mg/dL  Very High:  Greater than or equal to 500 mg/dL    Direct Measure HDL  Female:  Greater than or equal to 50 mg/dL   Male:  Greater than or equal to 40 mg/dL    LDL Cholesterol  Desirable:  <100mg/dL  Above Desirable:  100-129 mg/dL   Borderline High:  130-159 mg/dL   High:  160-189 mg/dL   Very High:  >= 190 mg/dL    Non HDL Cholesterol  Desirable:  130 mg/dL  Above Desirable:  130-159 mg/dL  Borderline High:  160-189 mg/dL  High:  190-219 mg/dL  Very High:  Greater than or equal to 220 mg/dL   ESR: Erythrocyte sedimentation rate     Status: Normal   Result Value Ref Range    Erythrocyte Sedimentation Rate 9 0 - 30 mm/hr   CRP, inflammation     Status: Normal   Result Value Ref Range    CRP Inflammation <2.9 0.0 - 8.0 mg/L   Anti Nuclear Viktoria IgG by IFA with Reflex     Status: Abnormal   Result Value Ref Range    ROYA interpretation Borderline Positive (A) Negative    ROYA pattern 1 Speckled     ROYA titer 1 1:80    Comprehensive metabolic panel (BMP + Alb, Alk Phos, ALT, AST, Total. Bili, TP)     Status: Abnormal   Result Value Ref Range    Sodium 138 133 - 144 mmol/L    Potassium 4.5 3.4 - 5.3 mmol/L    Chloride 104 94 - 109 mmol/L    Carbon Dioxide (CO2) 31 20 - 32 mmol/L    Anion Gap 3 3 - 14 mmol/L    Urea Nitrogen 12 7 - 30 mg/dL    Creatinine 0.58 0.52 - 1.04 mg/dL    Calcium 9.1 8.5 - 10.1 mg/dL    Glucose 100 (H) 70 - 99 mg/dL    Alkaline Phosphatase 81 40 - 150 U/L    AST 26 0 - 45 U/L    ALT 37 0 - 50 U/L    Protein Total 7.0 6.8 - 8.8 g/dL    Albumin 3.9 3.4 - 5.0 g/dL    Bilirubin Total 0.3 0.2 - 1.3 mg/dL    GFR Estimate >90 >60 mL/min/1.73m2   CBC with platelets     Status: Abnormal   Result Value Ref Range    WBC Count 5.0  4.0 - 11.0 10e3/uL    RBC Count 3.72 (L) 3.80 - 5.20 10e6/uL    Hemoglobin 11.8 11.7 - 15.7 g/dL    Hematocrit 35.0 35.0 - 47.0 %    MCV 94 78 - 100 fL    MCH 31.7 26.5 - 33.0 pg    MCHC 33.7 31.5 - 36.5 g/dL    RDW 12.9 10.0 - 15.0 %    Platelet Count 284 150 - 450 10e3/uL   Albumin Random Urine Quantitative with Creat Ratio     Status: None   Result Value Ref Range    Creatinine Urine mg/dL 21 mg/dL    Albumin Urine mg/L <5 mg/L    Albumin Urine mg/g Cr         ASSESSMENT / PLAN:   (Z00.00) Encounter for Medicare annual wellness exam  (primary encounter diagnosis)  Comment: Fasting  Plan: Screening and preventative care discussed    (I10) Benign essential hypertension  Comment: Blood pressure under good control  Plan: Comprehensive metabolic panel (BMP + Alb, Alk         Phos, ALT, AST, Total. Bili, TP), Albumin         Random Urine Quantitative with Creat Ratio,         diltiazem ER (DILT-XR) 180 MG 24 hr capsule,         lisinopril (ZESTRIL) 20 MG tablet        Refill of medication given    (B35.3) Tinea pedis of both feet  Comment: Episodic symptoms  Plan: Topical use of over-the-counter antifungal will continue.    (M25.641,  M25.642) Stiffness of joints of both hands  Comment: The symptoms in combination with dry eyes have prompted additional evaluation  Plan: XR Hand Bilateral G/E 3 Views, ESR: Erythrocyte        sedimentation rate, CRP, inflammation, Anti         Nuclear Viktoria IgG by IFA with Reflex        X-ray does not reveal any erosive arthritis.  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  She will begin some range of motion exercises for the hands and consider hand therapy in the future if symptoms fail to be controlled.    (H04.123) Dry eyes  Comment: Ongoing care with optometry/ophthalmology  Plan: ESR: Erythrocyte sedimentation rate, CRP,         inflammation, Anti Nuclear Viktoria IgG by IFA with         Reflex,         Negative inflammatory markers and very low ROYA positivity make a rheumatologic  cause for symptoms unlikely.    (Z13.220) Lipid screening  Comment: The 10-year ASCVD risk score (Easton ALVAREZ, et al., 2019) is: 15.6%    Values used to calculate the score:      Age: 72 years      Sex: Female      Is Non- : No      Diabetic: No      Tobacco smoker: No      Systolic Blood Pressure: 124 mmHg      Is BP treated: Yes      HDL Cholesterol: 82 mg/dL      Total Cholesterol: 187 mg/dL  Plan: Lipid panel reflex to direct LDL Non-fasting        She would be a candidate for cholesterol-lowering medication based on current guidelines.  We will reach out with this information    (Z13.0) Screening, anemia, deficiency, iron  Comment:   Plan: CBC with platelets        Normal CBC    (N95.2) Vaginal atrophy  Comment: Symptoms improved with use of Vagifem  Plan: estradiol (VAGIFEM) 10 MCG TABS vaginal tablet        Refill given    Laceration finger, suture removal today without incident      Patient has been advised of split billing requirements and indicates understanding: Yes      COUNSELING:  Reviewed preventive health counseling, as reflected in patient instructions       Regular exercise       Healthy diet/nutrition       Vision screening       Hearing screening       Dental care       Bladder control       Fall risk prevention       Osteoporosis prevention/bone health       Colon cancer screening        She reports that she quit smoking about 41 years ago. Her smoking use included cigarettes. She has a 10.00 pack-year smoking history. She has never been exposed to tobacco smoke. She has never used smokeless tobacco.      Appropriate preventive services were discussed with this patient, including applicable screening as appropriate for cardiovascular disease, diabetes, osteopenia/osteoporosis, and glaucoma.  As appropriate for age/gender, discussed screening for colorectal cancer, prostate cancer, breast cancer, and cervical cancer. Checklist reviewing preventive services available has been  given to the patient.    Reviewed patients plan of care and provided an AVS. The Basic Care Plan (routine screening as documented in Health Maintenance) for Maddie meets the Care Plan requirement. This Care Plan has been established and reviewed with the Patient.      Radha Hurtado MD  [facility]    Identified Health Risks:    The patient was provided with written information regarding signs of hearing loss.  Information on urinary incontinence and treatment options given to patient.                          Patient Instructions     Non fasting labs.    Xray hands.  No significant arthritis except left thumb - exercise for strengthening and range of motion.  Consider hand PHYSICAL THERAPY if needed.       I will update medications when results return.    Suture removal today  - use vanicream twice a day for the dryness.            Patient Education   Personalized Prevention Plan  You are due for the preventive services outlined below.  Your care team is available to assist you in scheduling these services.  If you have already completed any of these items, please share that information with your care team to update in your medical record.  Health Maintenance Due   Topic Date Due     Annual Wellness Visit  09/09/2022

## 2022-11-22 LAB
ANA PAT SER IF-IMP: ABNORMAL
ANA SER QL IF: ABNORMAL
ANA TITR SER IF: ABNORMAL {TITER}
CREAT UR-MCNC: 21 MG/DL
MICROALBUMIN UR-MCNC: <5 MG/L
MICROALBUMIN/CREAT UR: NORMAL MG/G{CREAT}

## 2022-11-22 RX ORDER — ESTRADIOL 10 UG/1
10 INSERT VAGINAL
Qty: 24 TABLET | Refills: 3 | Status: SHIPPED | OUTPATIENT
Start: 2022-11-24 | End: 2024-02-05

## 2022-11-22 RX ORDER — DILTIAZEM HYDROCHLORIDE 180 MG/1
180 CAPSULE, EXTENDED RELEASE ORAL DAILY
Qty: 90 CAPSULE | Refills: 1 | Status: SHIPPED | OUTPATIENT
Start: 2022-11-22 | End: 2023-06-12

## 2022-11-22 RX ORDER — LISINOPRIL 20 MG/1
20 TABLET ORAL DAILY
Qty: 90 TABLET | Refills: 1 | Status: SHIPPED | OUTPATIENT
Start: 2022-11-22 | End: 2023-07-10

## 2022-11-22 NOTE — RESULT ENCOUNTER NOTE
"Your blood cell counts are normal.  The testing for inflammation is negative/normal.  Your cholesterol levels are similar to previous.  In doing a risk assessment based on these numbers and other risk factors, your overall risk for heart disease is in the range that the recommendations would indicate a consideration for cholesterol-lowering medication.  If you would like to discuss this further please contact me.  Your blood sugar is borderline elevated.  This is in the \"prediabetic\" range.  Exercise and limiting carbohydrate and sugars in diet can be helpful at preventing progression to diabetes.   Your kidney and liver testing are all normal.  Urine testing is normal, there is no elevated protein present.  The additional screening for rheumatologic disorders indicates a borderline abnormal ROYA this level of elevation is generally not consistent with a rheumatologic condition.  I do not think it is contributing to the dry eyes.    Please call or MyChart message me if you have any questions.      PSK"

## 2022-11-23 ENCOUNTER — OFFICE VISIT (OUTPATIENT)
Dept: OPHTHALMOLOGY | Facility: CLINIC | Age: 72
End: 2022-11-23
Attending: OPHTHALMOLOGY
Payer: COMMERCIAL

## 2022-11-23 DIAGNOSIS — H04.129 DRY EYE: ICD-10-CM

## 2022-11-23 DIAGNOSIS — H02.056 TRICHIASIS OF LEFT EYE WITHOUT ENTROPION: Primary | ICD-10-CM

## 2022-11-23 DIAGNOSIS — H10.13 ALLERGIC CONJUNCTIVITIS OF BOTH EYES: ICD-10-CM

## 2022-11-23 PROCEDURE — 67820 REVISE EYELASHES: CPT | Performed by: OPHTHALMOLOGY

## 2022-11-23 PROCEDURE — 99214 OFFICE O/P EST MOD 30 MIN: CPT | Mod: 25 | Performed by: OPHTHALMOLOGY

## 2022-11-23 PROCEDURE — 68760 CLOSE TEAR DUCT OPENING: CPT | Performed by: OPHTHALMOLOGY

## 2022-11-23 PROCEDURE — G0463 HOSPITAL OUTPT CLINIC VISIT: HCPCS | Mod: 25

## 2022-11-23 ASSESSMENT — SLIT LAMP EXAM - LIDS
COMMENTS: NORMAL
COMMENTS: NORMAL

## 2022-11-23 ASSESSMENT — EXTERNAL EXAM - RIGHT EYE: OD_EXAM: NORMAL

## 2022-11-23 ASSESSMENT — EXTERNAL EXAM - LEFT EYE: OS_EXAM: NORMAL

## 2022-11-23 ASSESSMENT — VISUAL ACUITY
METHOD: SNELLEN - LINEAR
OD_CC: 20/20
OS_CC: 20/25

## 2022-11-23 NOTE — PROGRESS NOTES
Chief complaint   Concern for allergic conjunctivitis OU    Referring Provider: Dr. Radha CHAVEZ    Maddie Paredesns 72 year old female       Interval history Nov 23, 2022:   Patient states that symptoms of itching and redness started in earnest just this past 5/2022 with the onset of spring. Previously in spring did not have any symptoms. Previously she had some symptoms of redness/itching in the fall which would resolve with ketotifen. But in this past spring she developed unremitting symptoms despite all these interventions. Over the last few months has been on FML and whenever it is discontinued her symptoms return and worsen.    For the last week right eye has been very bothersome with symptoms of redness and itching so she has been been using contacts. She varies her eye drop regimen based on how bothersome her eyes are presently she is using as follows: restatis BID OU, ointment QHS OU, cromolyn TID OD QHS OS, FML QAM OU, AT 2-3x/day OU, ketotifen daily OU PRN (recently TID OD).     Past ocular history   Prior eye surgery/laser/Trauma: yes CEIOL OU 2010   CTL wearer:yes scleral lens OU  Glasses : myopia, presbyopia, irregular astigmatism with likely pellucid marginal degeneration  Family Hx of eye disease: yes mother with AMD    PMH     Past Medical History:   Diagnosis Date     Allergic rhinitis 1979     Arthritis      Benign essential hypertension 03/23/2018     Cervical dysplasia with cone in 1979    nl pap ever since      COPD (chronic obstructive pulmonary disease) (H) 23042     Dyspareunia      Hearing problem 2015     Meniere's disease 2002     Nonsenile cataract      Plantar fasciitis      Stress incontinence      SVT (supraventricular tachycardia) (H)     resolved     Tinnitus 2002       PSH     Past Surgical History:   Procedure Laterality Date     ARTHROPLASTY KNEE Left 10/10/2018    Procedure: ARTHROPLASTY KNEE;  Left Total Knee Arthroplasty    ;  Surgeon: James Amaya MD;  Location: [facility]      BLADDER SURGERY       CATARACT IOL, RT/LT  2010     COLONOSCOPY N/A 10/11/2021    Procedure: Colonoscopy, With Polypectomy And Biopsy;  Surgeon: Kavita Huizar MD;  Location: [facility] OR     COLONOSCOPY WITH CO2 INSUFFLATION N/A 10/19/2016    Procedure: COLONOSCOPY WITH CO2 INSUFFLATION;  Surgeon: Duane, William Charles, MD;  Location: [facility]     COLONOSCOPY WITH CO2 INSUFFLATION N/A 10/11/2021    Procedure: COLONOSCOPY, WITH CO2 INSUFFLATION;  Surgeon: Kavita Huziar MD;  Location: [facility] OR     GENITOURINARY SURGERY      bladder     Midurethral sling with cystoscopy.  2010     TUBAL LIGATION         Meds     Current Outpatient Medications   Medication     CALCIUM + D OR     chlorpheniramine maleate 12 MG TBCR     cromolyn (OPTICROM) 4 % ophthalmic solution     cycloSPORINE (RESTASIS) 0.05 % ophthalmic emulsion     diltiazem ER (DILT-XR) 180 MG 24 hr capsule     [START ON 11/24/2022] estradiol (VAGIFEM) 10 MCG TABS vaginal tablet     famotidine (PEPCID) 40 MG tablet     fluocinonide (LIDEX) 0.05 % external solution     fluticasone (FLONASE) 50 MCG/ACT nasal spray     Ibuprofen (ADVIL PO)     lisinopril (ZESTRIL) 20 MG tablet     No current facility-administered medications for this visit.       Labs   11/21/2022 WNL: ESR, CRP; ROYA borderline positive 1:80    Imaging   None    Drops Currently Taking   restatis BID OUfor 2-3 months   ointment QHS OU,   cromolyn TID OS QHS OD,   FML QAM OU, AT 2-3x/day OU,   ketotifen daily OU PRN (recently TID OS)    Assessment/Plan   Allergic conjunctivitis   Dry eye syndrome  Trichiasis OS  - Onset 5/2022 in both eyes.   - 11/23/22: symptoms worse in left eye but present in both.  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 Pellucid like pattern on gagan  -Excellent response to scleral lens OU. Last set monovision left eye near  -Right eye gagan improved previously, largely good vision without correction. Monitor. Left eye stable gagan previously.    Follow up:  Oph: 2months  Others:  No follow-ups on file.    Carmen Lopez MD  Resident Physician - PGY3  Department of Ophthalmology   [facility]   Attending Physician Attestation:  Attending Physician Attestation: Complete documentation of historical and exam elements from today&#39;s encounter can be found in the full encounter summary report (not reduplicated in this progress note). I personally obtained the chief complaint(s) and history of present illness. I confirmed and edited as necessary the review of systems, past medical/surgical history, family history, social history, and examination findings as documented by others; and I examined the patient myself. I personally reviewed the relevant tests, images, and reports as documented above. I formulated and edited as necessary the assessment and plan and discussed the findings and management plan with the patient and family. I was present for the entire procedure(s). Noe Oates M.D

## 2022-11-30 ENCOUNTER — E-VISIT (OUTPATIENT)
Dept: FAMILY MEDICINE | Facility: CLINIC | Age: 72
End: 2022-11-30
Payer: COMMERCIAL

## 2022-11-30 DIAGNOSIS — B96.89 ACUTE BACTERIAL SINUSITIS: Primary | ICD-10-CM

## 2022-11-30 DIAGNOSIS — J01.90 ACUTE BACTERIAL SINUSITIS: Primary | ICD-10-CM

## 2022-11-30 PROCEDURE — 99421 OL DIG E/M SVC 5-10 MIN: CPT | Performed by: FAMILY MEDICINE

## 2022-11-30 NOTE — PATIENT INSTRUCTIONS
Sinusitis (Antibiotic Treatment)    The sinuses are air-filled spaces within the bones of the face. They connect to the inside of the nose. Sinusitis is an inflammation of the tissue that lines the sinuses. Sinusitis can occur during a cold. It can also happen due to allergies to pollens and other particles in the air. Sinusitis can cause symptoms of sinus congestion and a feeling of fullness. A sinus infection causes fever, headache, and facial pain. There is often green or yellow fluid draining from the nose or into the back of the throat (post-nasal drip). You have been given antibiotics to treat this condition.   Home care    Take the full course of antibiotics as instructed. Don't stop taking them, even when you feel better.    Drink plenty of water, hot tea, and other liquids as directed by the healthcare provider. This may help thin nasal mucus. It also may help your sinuses drain fluids.    Heat may help soothe painful areas of your face. Use a towel soaked in hot water. Or,  the shower and direct the warm spray onto your face. Using a vaporizer along with a menthol rub at night may also help soothe symptoms.     An expectorant with guaifenesin may help thin nasal mucus and help your sinuses drain fluids. Talk with your provider or pharmacists before taking an over-the-counter (OTC) medicine if you have any questions about it or its side effects..    You can use an OTC decongestant, unless a similar medicine was prescribed to you. Nasal sprays work the fastest. Use one that contains phenylephrine or oxymetazoline. First blow your nose gently. Then use the spray. Don't use these medicines more often than directed on the label. If you do, your symptoms may get worse. You may also take pills that contain pseudoephedrine. Don t use products that combine multiple medicines. This is because side effects may be increased. Read labels. You can also ask the pharmacist for help. (People with high blood  pressure should not use decongestants. They can raise blood pressure.) Talk with your provider or pharmacist if you have any questions about the medicine..    OTC antihistamines may help if allergies contributed to your sinusitis. Talk with your provider or pharmacist if you have any questions about the medicine..    Don't use nasal rinses or irrigation during an acute sinus infection, unless your healthcare provider tells you to. Rinsing may spread the infection to other areas in your sinuses.    Use acetaminophen or ibuprofen to control pain, unless another pain medicine was prescribed to you. If you have chronic liver or kidney disease or ever had a stomach ulcer, talk with your healthcare provider before using these medicines. Never give aspirin to anyone under age 18 who is ill with a fever. It may cause severe liver damage.    Don't smoke. This can make symptoms worse.    Follow-up care  Follow up with your healthcare provider, or as advised.   When to seek medical advice  Call your healthcare provider if any of these occur:     Facial pain or headache that gets worse    Stiff neck    Unusual drowsiness or confusion    Swelling of your forehead or eyelids    Symptoms don't go away in 10 days    Vision problems, such as blurred or double vision    Fever of 100.4 F (38 C) or higher, or as directed by your healthcare provider  Call 911  Call 911 if any of these occur:     Seizure    Trouble breathing    Feeling dizzy or faint    Fingernails, skin or lips look blue, purple , or gray  Prevention  Here are steps you can take to help prevent an infection:     Keep good hand washing habits.    Don t have close contact with people who have sore throats, colds, or other upper respiratory infections.    Don t smoke, and stay away from secondhand smoke.    Stay up to date with of your vaccines.  [employer] last reviewed this educational content on 12/1/2019 2000-2021 [employer]. All rights reserved. This  information is not intended as a substitute for professional medical care. Always follow your healthcare professional's instructions.        Dear Maddie Lala    After reviewing your responses, I've been able to diagnose you with Acute bacterial sinusitis.      Based on your responses and diagnosis, I have prescribed   Orders Placed This Encounter     amoxicillin-clavulanate (AUGMENTIN) 875-125 MG tablet    to treat your symptoms. I have sent this to your pharmacy.?     It is also important to stay well hydrated, get lots of rest and take over-the-counter decongestants,?tylenol?or ibuprofen if you?are able to?take those medications per your primary care provider to help relieve discomfort.?     It is important that you take?all of?your prescribed medication even if your symptoms are improving after a few doses.? Taking?all of?your medicine helps prevent the symptoms from returning.?     If your symptoms worsen, you develop severe headache, vomiting, high fever (>102), or are not improving in 7 days, please contact your primary care provider for an appointment or visit any of our convenient Walk-in Care or Urgent Care Centers to be seen which can be found on our website?here.?     Thanks again for choosing?us?as your health care partner,?   ?  Radha Hurtado MD?

## 2022-12-27 DIAGNOSIS — L21.9 DERMATITIS, SEBORRHEIC: ICD-10-CM

## 2022-12-27 RX ORDER — FLUOCINONIDE TOPICAL SOLUTION USP, 0.05% 0.5 MG/ML
SOLUTION TOPICAL
Qty: 60 ML | Refills: 0 | Status: SHIPPED | OUTPATIENT
Start: 2022-12-27 | End: 2023-04-12

## 2023-02-15 ENCOUNTER — OFFICE VISIT (OUTPATIENT)
Dept: OPTOMETRY | Facility: CLINIC | Age: 73
End: 2023-02-15
Payer: COMMERCIAL

## 2023-02-15 DIAGNOSIS — H18.462 PELLUCID MARGINAL DEGENERATION OF LEFT CORNEA: Primary | ICD-10-CM

## 2023-02-15 DIAGNOSIS — H50.21 HYPERTROPIA OF RIGHT EYE: ICD-10-CM

## 2023-02-15 DIAGNOSIS — H01.011 ULCERATIVE BLEPHARITIS OF UPPER EYELIDS OF BOTH EYES: ICD-10-CM

## 2023-02-15 DIAGNOSIS — H10.13 ALLERGIC CONJUNCTIVITIS OF BOTH EYES: ICD-10-CM

## 2023-02-15 DIAGNOSIS — H52.213 IRREGULAR ASTIGMATISM OF BOTH EYES: ICD-10-CM

## 2023-02-15 DIAGNOSIS — H01.014 ULCERATIVE BLEPHARITIS OF UPPER EYELIDS OF BOTH EYES: ICD-10-CM

## 2023-02-15 DIAGNOSIS — H04.129 DRY EYE: ICD-10-CM

## 2023-02-15 RX ORDER — NEOMYCIN SULFATE, POLYMYXIN B SULFATE, AND DEXAMETHASONE 3.5; 10000; 1 MG/G; [USP'U]/G; MG/G
0.5 OINTMENT OPHTHALMIC DAILY PRN
Qty: 3.5 G | Refills: 3 | Status: SHIPPED | OUTPATIENT
Start: 2023-02-15 | End: 2023-11-06

## 2023-02-15 ASSESSMENT — REFRACTION_MANIFEST
OS_CYLINDER: +4.00
OS_AXIS: 020
OD_AXIS: 005
OD_CYLINDER: +1.50
OD_SPHERE: -0.50
OS_SPHERE: -1.00

## 2023-02-15 ASSESSMENT — PACHYMETRY
OD_CT(UM): 621
OS_CT(UM): 610

## 2023-02-15 ASSESSMENT — VISUAL ACUITY
CORRECTION_TYPE: CONTACTS
OS_CC: 20/40
OS_CC: 1.25M
OD_SC: 20/25+2
METHOD: SNELLEN - LINEAR

## 2023-02-15 ASSESSMENT — REFRACTION_CURRENTRX
OS_ADDL_SPECS: OPT EXTRA BLUE
OS_BRAND: ONEFIT
OD_BASECURVE: 7.7
OD_SPHERE: -2.37
OD_ADDL_SPECS: OPT EXTRA CLEAR
OS_BASECURVE: 7.7
OS_DIAMETER: 14.9
OD_DIAMETER: 14.9

## 2023-02-15 ASSESSMENT — CUP TO DISC RATIO
OD_RATIO: 0.20
OS_RATIO: 0.45

## 2023-02-15 ASSESSMENT — CONF VISUAL FIELD
OD_SUPERIOR_TEMPORAL_RESTRICTION: 0
OD_SUPERIOR_NASAL_RESTRICTION: 0
OS_NORMAL: 1
OD_INFERIOR_TEMPORAL_RESTRICTION: 0
OS_INFERIOR_TEMPORAL_RESTRICTION: 0
OS_SUPERIOR_NASAL_RESTRICTION: 0
OS_SUPERIOR_TEMPORAL_RESTRICTION: 0
OS_INFERIOR_NASAL_RESTRICTION: 0
OD_NORMAL: 1
OD_INFERIOR_NASAL_RESTRICTION: 0

## 2023-02-15 ASSESSMENT — REFRACTION_FINALRX
OS_HPRISM: 1.5 BO
OS_VPRISM: 1.5 BU
OD_VPRISM: 1.5 BD
OD_HPRISM: 1.5 BO

## 2023-02-15 ASSESSMENT — TONOMETRY
IOP_METHOD: ICARE
OS_IOP_MMHG: 20
OD_IOP_MMHG: 19

## 2023-02-15 ASSESSMENT — EXTERNAL EXAM - LEFT EYE: OS_EXAM: NORMAL

## 2023-02-15 ASSESSMENT — EXTERNAL EXAM - RIGHT EYE: OD_EXAM: NORMAL

## 2023-03-16 ENCOUNTER — E-VISIT (OUTPATIENT)
Dept: FAMILY MEDICINE | Facility: CLINIC | Age: 73
End: 2023-03-16
Payer: COMMERCIAL

## 2023-03-16 DIAGNOSIS — J20.9 ACUTE BRONCHITIS, UNSPECIFIED ORGANISM: ICD-10-CM

## 2023-03-16 DIAGNOSIS — J98.01 COUGH DUE TO BRONCHOSPASM: ICD-10-CM

## 2023-03-16 PROCEDURE — 99421 OL DIG E/M SVC 5-10 MIN: CPT | Performed by: FAMILY MEDICINE

## 2023-03-16 RX ORDER — AZITHROMYCIN 250 MG/1
TABLET, FILM COATED ORAL
Qty: 6 TABLET | Refills: 0 | Status: SHIPPED | OUTPATIENT
Start: 2023-03-16 | End: 2023-05-31

## 2023-03-16 RX ORDER — PREDNISONE 20 MG/1
40 TABLET ORAL DAILY
Qty: 10 TABLET | Refills: 0 | Status: SHIPPED | OUTPATIENT
Start: 2023-03-16 | End: 2023-05-31

## 2023-03-16 RX ORDER — ALBUTEROL SULFATE 90 UG/1
AEROSOL, METERED RESPIRATORY (INHALATION)
Qty: 18 G | Refills: 1 | Status: SHIPPED | OUTPATIENT
Start: 2023-03-16 | End: 2023-07-03

## 2023-03-17 NOTE — PATIENT INSTRUCTIONS
"    Dear Maddie Lala    After reviewing your responses, I've been able to diagnose you with \"Bronchitis\" which is a common infection of your lungs. While this is most commonly caused by a virus, the symptoms you have given suggest you should be treated with antibiotics.     I have sent zithromax to your pharmacy to treat this infection.     It is important that you take all of your prescribed medication even if your symptoms are improving after a few doses. Taking all of your medicine helps prevent the symptoms from returning.     If your symptoms worsen, you develop chest pain or shortness of breath, fevers over 101, or are not improving in 5 days, please contact your primary care provider for an appointment or visit any of our convenient Walk-in Care or Urgent Care Centers to be seen which can be found on our website here.    Thanks again for choosing us as your health care partner,    Radha Hurtado MD    "

## 2023-04-12 DIAGNOSIS — L21.9 DERMATITIS, SEBORRHEIC: ICD-10-CM

## 2023-04-12 RX ORDER — FLUOCINONIDE TOPICAL SOLUTION USP, 0.05% 0.5 MG/ML
SOLUTION TOPICAL
Qty: 60 ML | Refills: 0 | Status: SHIPPED | OUTPATIENT
Start: 2023-04-12 | End: 2023-08-22

## 2023-04-12 NOTE — TELEPHONE ENCOUNTER
Medication Question or Refill    Contacts       Type Contact Phone/Fax    04/12/2023 09:21 AM CDT Fax (Incoming) [facility] - [place], [place] - [address] (Pharmacy) [phone]          What medication are you calling about (include dose and sig)?:   fluocinonide (LIDEX) 0.05 % external solution           Preferred Pharmacy:  [facility] [place], [place] - [address]  [address]  [facility] [address]  Phone: [phone] Fax: [phone]      Controlled Substance Agreement on file:   CSA -- Patient Level:    CSA: None found at the patient level.       Who prescribed the medication?: Lupe Mcrae     Do you need a refill? Yes

## 2023-05-05 NOTE — RESULT ENCOUNTER NOTE
Your urine testing is normal.  There is not elevated protein present.  Your blood sugar, kidney function, and liver testing are all normal.  Please call or MyChart message me if you have any questions.    Have a great weekend,  PSK [General Appearance - Alert] : alert [General Appearance - In No Acute Distress] : in no acute distress [Oriented To Time, Place, And Person] : oriented to person, place, and time [Person] : oriented to person [Place] : oriented to place [Time] : oriented to time [Fluency] : fluency intact [Comprehension] : comprehension intact [Motor Tone] : muscle tone was normal in all four extremities [Motor Strength] : muscle strength was normal in all four extremities [Limited Balance] : the patient's balance was impaired [No Spinal Tenderness] : no spinal tenderness

## 2023-06-05 ENCOUNTER — HOSPITAL ENCOUNTER (OUTPATIENT)
Dept: ULTRASOUND IMAGING | Facility: CLINIC | Age: 73
Discharge: HOME | End: 2023-06-05
Attending: FAMILY MEDICINE | Admitting: FAMILY MEDICINE
Payer: COMMERCIAL

## 2023-06-05 DIAGNOSIS — R10.13 ABDOMINAL PAIN, EPIGASTRIC: ICD-10-CM

## 2023-06-05 DIAGNOSIS — R14.0 BLOATING: ICD-10-CM

## 2023-06-05 PROCEDURE — 76700 US EXAM ABDOM COMPLETE: CPT

## 2023-06-07 ENCOUNTER — LAB (OUTPATIENT)
Dept: LAB | Facility: CLINIC | Age: 73
End: 2023-06-07
Payer: COMMERCIAL

## 2023-06-07 DIAGNOSIS — R14.0 BLOATING: ICD-10-CM

## 2023-06-07 DIAGNOSIS — K83.8 DILATED BILE DUCT: ICD-10-CM

## 2023-06-07 DIAGNOSIS — R10.13 ABDOMINAL PAIN, EPIGASTRIC: ICD-10-CM

## 2023-06-07 DIAGNOSIS — R05.1 ACUTE COUGH: ICD-10-CM

## 2023-06-07 LAB
ALBUMIN SERPL BCG-MCNC: 3.5 G/DL (ref 3.5–5.2)
ALP SERPL-CCNC: 231 U/L (ref 35–104)
ALT SERPL W P-5'-P-CCNC: 71 U/L (ref 10–35)
AST SERPL W P-5'-P-CCNC: 37 U/L (ref 10–35)
BILIRUB DIRECT SERPL-MCNC: <0.2 MG/DL (ref 0–0.3)
BILIRUB SERPL-MCNC: 0.7 MG/DL
GGT SERPL-CCNC: 196 U/L (ref 5–36)
PROT SERPL-MCNC: 6.5 G/DL (ref 6.4–8.3)
SARS-COV-2 RNA RESP QL NAA+PROBE: NEGATIVE

## 2023-06-07 PROCEDURE — 36415 COLL VENOUS BLD VENIPUNCTURE: CPT

## 2023-06-07 PROCEDURE — 87635 SARS-COV-2 COVID-19 AMP PRB: CPT

## 2023-06-07 PROCEDURE — 80076 HEPATIC FUNCTION PANEL: CPT

## 2023-06-07 PROCEDURE — 82977 ASSAY OF GGT: CPT

## 2023-06-08 ENCOUNTER — MYC MEDICAL ADVICE (OUTPATIENT)
Dept: FAMILY MEDICINE | Facility: CLINIC | Age: 73
End: 2023-06-08
Payer: COMMERCIAL

## 2023-06-08 DIAGNOSIS — R79.89 ELEVATED LFTS: ICD-10-CM

## 2023-06-08 DIAGNOSIS — R10.13 ABDOMINAL PAIN, EPIGASTRIC: ICD-10-CM

## 2023-06-08 DIAGNOSIS — F41.8 SITUATIONAL ANXIETY: ICD-10-CM

## 2023-06-08 DIAGNOSIS — K83.8 DILATED BILE DUCT: Primary | ICD-10-CM

## 2023-06-11 RX ORDER — DIAZEPAM 10 MG
10 TABLET ORAL ONCE
Qty: 1 TABLET | Refills: 0 | Status: SHIPPED | OUTPATIENT
Start: 2023-06-11 | End: 2023-12-14

## 2023-06-12 DIAGNOSIS — I10 BENIGN ESSENTIAL HYPERTENSION: ICD-10-CM

## 2023-06-12 RX ORDER — DILTIAZEM HYDROCHLORIDE 180 MG/1
CAPSULE, EXTENDED RELEASE ORAL
Qty: 90 CAPSULE | Refills: 0 | Status: SHIPPED | OUTPATIENT
Start: 2023-06-12 | End: 2023-09-12

## 2023-06-14 ENCOUNTER — HOSPITAL ENCOUNTER (OUTPATIENT)
Dept: NUCLEAR MEDICINE | Facility: CLINIC | Age: 73
Setting detail: NUCLEAR MEDICINE
Discharge: HOME | End: 2023-06-14
Attending: FAMILY MEDICINE | Admitting: FAMILY MEDICINE
Payer: COMMERCIAL

## 2023-06-14 DIAGNOSIS — R10.13 ABDOMINAL PAIN, EPIGASTRIC: ICD-10-CM

## 2023-06-14 DIAGNOSIS — R14.0 BLOATING: ICD-10-CM

## 2023-06-14 PROCEDURE — A9537 TC99M MEBROFENIN: HCPCS | Performed by: FAMILY MEDICINE

## 2023-06-14 PROCEDURE — 78227 HEPATOBIL SYST IMAGE W/DRUG: CPT | Mod: 26 | Performed by: RADIOLOGY

## 2023-06-14 PROCEDURE — 343N000001 HC RX 343: Performed by: FAMILY MEDICINE

## 2023-06-14 PROCEDURE — 78227 HEPATOBIL SYST IMAGE W/DRUG: CPT

## 2023-06-14 RX ORDER — KIT FOR THE PREPARATION OF TECHNETIUM TC 99M MEBROFENIN 45 MG/10ML
4.8-7.2 INJECTION, POWDER, LYOPHILIZED, FOR SOLUTION INTRAVENOUS ONCE
Status: COMPLETED | OUTPATIENT
Start: 2023-06-14 | End: 2023-06-14

## 2023-06-14 RX ADMIN — MEBROFENIN 6.4 MILLICURIE: 45 INJECTION, POWDER, LYOPHILIZED, FOR SOLUTION INTRAVENOUS at 08:47

## 2023-06-29 ENCOUNTER — HOSPITAL ENCOUNTER (OUTPATIENT)
Dept: MRI IMAGING | Facility: CLINIC | Age: 73
Discharge: HOME | End: 2023-06-29
Attending: FAMILY MEDICINE | Admitting: FAMILY MEDICINE
Payer: COMMERCIAL

## 2023-06-29 DIAGNOSIS — R10.13 ABDOMINAL PAIN, EPIGASTRIC: ICD-10-CM

## 2023-06-29 DIAGNOSIS — K83.8 DILATED BILE DUCT: ICD-10-CM

## 2023-06-29 DIAGNOSIS — R79.89 ELEVATED LFTS: ICD-10-CM

## 2023-06-29 DIAGNOSIS — J98.01 COUGH DUE TO BRONCHOSPASM: ICD-10-CM

## 2023-06-29 LAB — RADIOLOGIST FLAGS: NORMAL

## 2023-06-29 PROCEDURE — 74183 MRI ABD W/O CNTR FLWD CNTR: CPT

## 2023-06-29 PROCEDURE — A9585 GADOBUTROL INJECTION: HCPCS | Mod: JZ | Performed by: FAMILY MEDICINE

## 2023-06-29 PROCEDURE — 255N000002 HC RX 255 OP 636: Mod: JZ | Performed by: FAMILY MEDICINE

## 2023-06-29 PROCEDURE — 74183 MRI ABD W/O CNTR FLWD CNTR: CPT | Mod: 26 | Performed by: RADIOLOGY

## 2023-06-29 RX ORDER — GADOBUTROL 604.72 MG/ML
7.5 INJECTION INTRAVENOUS ONCE
Status: COMPLETED | OUTPATIENT
Start: 2023-06-29 | End: 2023-06-29

## 2023-06-29 RX ADMIN — GADOBUTROL 7.5 ML: 604.72 INJECTION INTRAVENOUS at 10:25

## 2023-07-03 ENCOUNTER — TELEPHONE (OUTPATIENT)
Dept: GASTROENTEROLOGY | Facility: CLINIC | Age: 73
End: 2023-07-03
Payer: COMMERCIAL

## 2023-07-03 DIAGNOSIS — R79.89 ELEVATED LFTS: ICD-10-CM

## 2023-07-03 DIAGNOSIS — K86.2 PANCREATIC CYST: Primary | ICD-10-CM

## 2023-07-03 RX ORDER — ALBUTEROL SULFATE 90 UG/1
AEROSOL, METERED RESPIRATORY (INHALATION)
Qty: 18 G | Refills: 1 | Status: SHIPPED | OUTPATIENT
Start: 2023-07-03

## 2023-07-03 NOTE — TELEPHONE ENCOUNTER
Advanced Endoscopy     Referring provider: Radha Hurtado MD   [facility]     Referred to: [facility]     Provider Requested: NA     Referral Received: 7/2/23     Records received: EPIC     Images received: PACS    Insurance Coverage: [facility]    Evaluation for: K83.8 (ICD-10-CM) - Dilated bile duct R79.89 (ICD-10-CM) - Elevated LFTs K86.2 (ICD-10-CM) - Pancreatic cyst   Additional Information:  dilated common bile due, tapering near papilla on MRI     Clinical History (per RN review): Patient with workup for epigastric pain, bloating and constipation.     MRI ABDOMEN WITHOUT AND WITH CONTRAST 6/29/23:   CLINICAL HISTORY:  Dilated bile duct, elevated LFTs  IMPRESSION:  1. Redemonstrated enlarged common bile duct at 11 mm with mild central  intrahepatic biliary ductal prominence and tapering near the papilla.  Findings may represent sphincter of Oddi dysfunction or stenosis given  the elevated hepatobiliary labs.  2. Mild hepatomegaly with scattered benign hepatic cysts.  3. Few tiny cysts in the pancreatic tail, may represent dilated side  branch ducts or side-branch IPMNs. Recommend follow-up per the  University guidelines below.    NM HEPATOBILIARY SCAN WITH GB EF  6/14/2023   Impression:  1. No acute cholecystitis.  2 . Decreased gallbladder ejection fraction, likely represents  gallbladder dyskinesis, including chronic cholecystitis.            MD review date: 7/3/23 Dr. Brody RITCHIE Decision for clinic consultation/Orders:      Will recommend EUS in [facility] under MAC. Routine (non urgent     Referral updates/Patient contacted: 7/5/23

## 2023-07-05 NOTE — TELEPHONE ENCOUNTER
Following review of referral, per Dr. Skinner:  Will recommend EUS in [facility] under MAC. Routine (non urgent)    Called patient to discuss procedure and indication. Discussed that procedure would be done at [facility] in [place] and that patient would require a  that day.     Patient agreeable to proceed with recommendations. Gastro procedure order placed. Patient advised that they should hear from endoscopy scheduling team in the coming days or they may call [phone] and select option 2 to speak to a .    Provided with clinic contact information for further questions or concerns.      Radha Noel, RN Care Coordinator

## 2023-07-06 ENCOUNTER — TELEPHONE (OUTPATIENT)
Dept: GASTROENTEROLOGY | Facility: CLINIC | Age: 73
End: 2023-07-06
Payer: COMMERCIAL

## 2023-07-06 ENCOUNTER — PATIENT OUTREACH (OUTPATIENT)
Dept: CARE COORDINATION | Facility: CLINIC | Age: 73
End: 2023-07-06
Payer: COMMERCIAL

## 2023-07-06 NOTE — TELEPHONE ENCOUNTER
"Endoscopy Scheduling Screen    Have you had a positive Covid test in the last 14 days?  No    Are you active on MyChart?   Yes    What insurance is in the chart?  Other:  [facility]/ MEDICARE    Ordering/Referring Provider: DEBO   (If ordering provider performs procedure, schedule with ordering provider unless otherwise instructed. )    BMI: Estimated body mass index is 26.74 kg/m  as calculated from the following:    Height as of 11/21/22: 1.675 m (5' 5.95\").    Weight as of 11/21/22: 75 kg (165 lb 6.4 oz).     Sedation Ordered  MAC/deep sedation.   BMI<= 45 45 < BMI <= 48 48 < BMI < = 50  BMI > 50   No Restrictions No [facility]  No [facility]  [facility] with exceptions Hospital Only OR Only       Do you have a history of malignant hyperthermia or adverse reaction to anesthesia?  No    (Females) Are you currently pregnant?   No     Have you been diagnosed or told you have pulmonary hypertension?   No    Do you have an LVAD?  No    Have you been told you have moderate to severe sleep apnea?  No    Have you been told you have COPD, asthma, or any other lung disease?  No    Do you have any heart conditions?  No     Have you ever had or are you awaiting a heart or lung transplant?   No    Have you had a stroke or transient ischemic attack (TIA aka \"mini stroke\" in the last 6 months?   No    Have you been diagnosed with or been told you have cirrhosis of the liver?   No    Are you currently on dialysis?   No    Do you need assistance transferring?   No    BMI: Estimated body mass index is 26.74 kg/m  as calculated from the following:    Height as of 11/21/22: 1.675 m (5' 5.95\").    Weight as of 11/21/22: 75 kg (165 lb 6.4 oz).     Is patients BMI > 40 and scheduling location [facility]?  No    Do you take the medication Phentermine, Ozempic or Wegovy?  No    Do you take the medication Naltrexone?  No    Do you take blood thinners?  No      Prep   Are you currently on dialysis or do you have chronic kidney disease?  No    Do you " have a diagnosis of diabetes?  No    Do you have a diagnosis of cystic fibrosis (CF)?  No    On a regular basis do you go 3 -5 days between bowel movements?  No    BMI > 40?  No    Preferred Pharmacy:    [facility] - [place], [place] - [address]  [address]  [facility] [address]  Phone: [phone] Fax: [phone]      Final Scheduling Details   Colonoscopy prep sent?  N/A    Procedure scheduled  Endoscopic ultrasound (EUS)    Surgeon:  DEBO    Date of procedure:  8/15/23     Schedule PAC:   No    Location  [facility]    Sedation   MAC/Deep Sedation    Patient Reminders:    You will receive a call from a Nurse to review instructions and health history.  This assessment must be completed prior to your procedure.  Failure to complete the Nurse assessment may result in the procedure being cancelled.       On the day of your procedure, please designate an adult(s) who can drive you home stay with you for the next 24 hours. The medicines used in the exam will make you sleepy. You will not be able to drive.       You cannot take public transportation, ride share services, or non-medical taxi service without a responsible caregiver.  Medical transport services are allowed with the requirement that a responsible caregiver will receive you at your destination.  We require that drivers and caregivers are confirmed prior to your procedure.

## 2023-07-11 ENCOUNTER — MYC MEDICAL ADVICE (OUTPATIENT)
Dept: OPTOMETRY | Facility: CLINIC | Age: 73
End: 2023-07-11

## 2023-07-11 DIAGNOSIS — H10.13 ALLERGIC CONJUNCTIVITIS OF BOTH EYES: Primary | ICD-10-CM

## 2023-07-11 RX ORDER — FLUOROMETHOLONE 0.1 %
1 SUSPENSION, DROPS(FINAL DOSAGE FORM)(ML) OPHTHALMIC (EYE) 2 TIMES DAILY
Qty: 5 ML | Refills: 0 | Status: SHIPPED | OUTPATIENT
Start: 2023-07-11 | End: 2023-10-03

## 2023-07-31 ENCOUNTER — TELEPHONE (OUTPATIENT)
Dept: GASTROENTEROLOGY | Facility: CLINIC | Age: 73
End: 2023-07-31
Payer: COMMERCIAL

## 2023-07-31 NOTE — TELEPHONE ENCOUNTER
Attempted to contact patient in order to complete pre assessment questions.     No answer. Left message to return call to [phone] option 4      Procedure details:    Patient scheduled for Endoscopic ultrasound (EUS) on 8.15.23.     Arrival time: 0730. Procedure time 0900    Pre op exam needed? N/A    Facility location: [facility]; [address], [place] [id number]    Sedation type: MAC    Indication for procedure: pancreatic cyst      Chart review:     Electronic implanted devices? No    Diabetic? No      Medication review:    Anticoagulants? No    NSAIDS? No NSAID medications per patient's medication list.  RN will verify with pre-assessment call.    Other medication HOLDING recommendations:  N/A      Prep for procedure:   EUS prep    Johanna Christensen RN  Endoscopy Procedure Pre Assessment RN

## 2023-08-03 ENCOUNTER — PATIENT OUTREACH (OUTPATIENT)
Dept: CARE COORDINATION | Facility: CLINIC | Age: 73
End: 2023-08-03
Payer: COMMERCIAL

## 2023-08-03 NOTE — TELEPHONE ENCOUNTER
Pre assessment completed for upcoming procedure.   (Please see previous telephone encounter notes for complete details)    Patient  returned call.       Procedure details:    Arrival time and facility location reviewed    Pre op exam needed? N/A    Designated  policy reviewed. Instructed to have someone stay 24 hours post procedure.     COVID policy reviewed.      Medication review:    Medications reviewed. Please see supporting documentation below. Holding recommendations discussed (if applicable).       Prep for procedure:     Reviewed procedure prep instructions.       Additional information needed?  N/A      Patient  verbalized understanding and had no questions or concerns at this time.      Charla Hopper RN  Endoscopy Procedure Pre Assessment RN  [phone] option 4

## 2023-08-14 ENCOUNTER — ANESTHESIA EVENT (OUTPATIENT)
Dept: GASTROENTEROLOGY | Facility: CLINIC | Age: 73
End: 2023-08-14
Payer: COMMERCIAL

## 2023-08-15 ENCOUNTER — HOSPITAL ENCOUNTER (OUTPATIENT)
Facility: CLINIC | Age: 73
Discharge: HOME | End: 2023-08-15
Attending: INTERNAL MEDICINE | Admitting: INTERNAL MEDICINE
Payer: COMMERCIAL

## 2023-08-15 ENCOUNTER — ANESTHESIA (OUTPATIENT)
Dept: GASTROENTEROLOGY | Facility: CLINIC | Age: 73
End: 2023-08-15
Payer: COMMERCIAL

## 2023-08-15 VITALS
HEIGHT: 67 IN | OXYGEN SATURATION: 98 % | TEMPERATURE: 97.9 F | SYSTOLIC BLOOD PRESSURE: 126 MMHG | WEIGHT: 155 LBS | DIASTOLIC BLOOD PRESSURE: 78 MMHG | HEART RATE: 59 BPM | BODY MASS INDEX: 24.33 KG/M2 | RESPIRATION RATE: 16 BRPM

## 2023-08-15 LAB
ALBUMIN SERPL BCG-MCNC: 3.9 G/DL (ref 3.5–5.2)
ALP SERPL-CCNC: 89 U/L (ref 35–104)
ALT SERPL W P-5'-P-CCNC: 32 U/L (ref 0–50)
ANION GAP SERPL CALCULATED.3IONS-SCNC: 8 MMOL/L (ref 7–15)
AST SERPL W P-5'-P-CCNC: 34 U/L (ref 0–45)
BILIRUB SERPL-MCNC: 0.3 MG/DL
BUN SERPL-MCNC: 13.2 MG/DL (ref 8–23)
CALCIUM SERPL-MCNC: 8.7 MG/DL (ref 8.8–10.2)
CHLORIDE SERPL-SCNC: 102 MMOL/L (ref 98–107)
CREAT SERPL-MCNC: 0.58 MG/DL (ref 0.51–0.95)
DEPRECATED HCO3 PLAS-SCNC: 28 MMOL/L (ref 22–29)
GFR SERPL CREATININE-BSD FRML MDRD: >90 ML/MIN/1.73M2
GLUCOSE SERPL-MCNC: 97 MG/DL (ref 70–99)
POTASSIUM SERPL-SCNC: 3.7 MMOL/L (ref 3.4–5.3)
PROT SERPL-MCNC: 6.2 G/DL (ref 6.4–8.3)
SODIUM SERPL-SCNC: 138 MMOL/L (ref 136–145)
UPPER EUS: NORMAL

## 2023-08-15 PROCEDURE — 250N000011 HC RX IP 250 OP 636: Performed by: ANESTHESIOLOGY

## 2023-08-15 PROCEDURE — 250N000009 HC RX 250: Performed by: ANESTHESIOLOGY

## 2023-08-15 PROCEDURE — 43237 ENDOSCOPIC US EXAM ESOPH: CPT | Performed by: INTERNAL MEDICINE

## 2023-08-15 PROCEDURE — 258N000003 HC RX IP 258 OP 636: Performed by: ANESTHESIOLOGY

## 2023-08-15 PROCEDURE — 370N000017 HC ANESTHESIA TECHNICAL FEE, PER MIN: Performed by: INTERNAL MEDICINE

## 2023-08-15 PROCEDURE — 36415 COLL VENOUS BLD VENIPUNCTURE: CPT | Performed by: INTERNAL MEDICINE

## 2023-08-15 PROCEDURE — 80053 COMPREHEN METABOLIC PANEL: CPT | Performed by: INTERNAL MEDICINE

## 2023-08-15 PROCEDURE — 43259 EGD US EXAM DUODENUM/JEJUNUM: CPT | Performed by: INTERNAL MEDICINE

## 2023-08-15 RX ORDER — ONDANSETRON 2 MG/ML
4 INJECTION INTRAMUSCULAR; INTRAVENOUS
Status: DISCONTINUED | OUTPATIENT
Start: 2023-08-15 | End: 2023-08-15 | Stop reason: HOSPADM

## 2023-08-15 RX ORDER — PROPOFOL 10 MG/ML
INJECTION, EMULSION INTRAVENOUS PRN
Status: DISCONTINUED | OUTPATIENT
Start: 2023-08-15 | End: 2023-08-15

## 2023-08-15 RX ORDER — PROPOFOL 10 MG/ML
INJECTION, EMULSION INTRAVENOUS CONTINUOUS PRN
Status: DISCONTINUED | OUTPATIENT
Start: 2023-08-15 | End: 2023-08-15

## 2023-08-15 RX ORDER — LIDOCAINE HYDROCHLORIDE 20 MG/ML
INJECTION, SOLUTION INFILTRATION; PERINEURAL PRN
Status: DISCONTINUED | OUTPATIENT
Start: 2023-08-15 | End: 2023-08-15

## 2023-08-15 RX ORDER — SODIUM CHLORIDE, SODIUM LACTATE, POTASSIUM CHLORIDE, CALCIUM CHLORIDE 600; 310; 30; 20 MG/100ML; MG/100ML; MG/100ML; MG/100ML
INJECTION, SOLUTION INTRAVENOUS CONTINUOUS PRN
Status: DISCONTINUED | OUTPATIENT
Start: 2023-08-15 | End: 2023-08-15

## 2023-08-15 RX ORDER — LIDOCAINE 40 MG/G
CREAM TOPICAL
Status: DISCONTINUED | OUTPATIENT
Start: 2023-08-15 | End: 2023-08-15 | Stop reason: HOSPADM

## 2023-08-15 RX ADMIN — PROPOFOL 40 MG: 10 INJECTION, EMULSION INTRAVENOUS at 08:43

## 2023-08-15 RX ADMIN — PROPOFOL 60 MG: 10 INJECTION, EMULSION INTRAVENOUS at 08:58

## 2023-08-15 RX ADMIN — SODIUM CHLORIDE, POTASSIUM CHLORIDE, SODIUM LACTATE AND CALCIUM CHLORIDE: 600; 310; 30; 20 INJECTION, SOLUTION INTRAVENOUS at 08:43

## 2023-08-15 RX ADMIN — PROPOFOL 150 MCG/KG/MIN: 10 INJECTION, EMULSION INTRAVENOUS at 08:43

## 2023-08-15 RX ADMIN — LIDOCAINE HYDROCHLORIDE 80 MG: 20 INJECTION, SOLUTION INFILTRATION; PERINEURAL at 08:43

## 2023-08-15 ASSESSMENT — ACTIVITIES OF DAILY LIVING (ADL): ADLS_ACUITY_SCORE: 35

## 2023-08-15 NOTE — ANESTHESIA PREPROCEDURE EVALUATION
Anesthesia Pre-Procedure Evaluation    Patient: Maddie Lala   MRN: [id number] : 1950        Procedure : Procedure(s):  Endoscopic ultrasound upper gastrointestinal tract (GI)          Past Medical History:   Diagnosis Date     Allergic rhinitis      Arthritis      Benign essential hypertension 2018     Cervical dysplasia with cone in     nl pap ever since      COPD (chronic obstructive pulmonary disease) (H)      Dyspareunia      Hearing problem      Meniere's disease      Nonsenile cataract      Plantar fasciitis      Stress incontinence      SVT (supraventricular tachycardia) (H)     resolved     Tinnitus       Past Surgical History:   Procedure Laterality Date     ARTHROPLASTY KNEE Left 10/10/2018    Procedure: ARTHROPLASTY KNEE;  Left Total Knee Arthroplasty    ;  Surgeon: James Amaya MD;  [facility]: [facility]     BLADDER SURGERY       CATARACT IOL, RT/LT       COLONOSCOPY N/A 10/11/2021    Procedure: Colonoscopy, With Polypectomy And Biopsy;  Surgeon: Kavita Huizar MD;  Location: [facility]     COLONOSCOPY WITH CO2 INSUFFLATION N/A 10/19/2016    Procedure: COLONOSCOPY WITH CO2 INSUFFLATION;  Surgeon: Duane, William Charles, MD;  Location: [facility]     COLONOSCOPY WITH CO2 INSUFFLATION N/A 10/11/2021    Procedure: COLONOSCOPY, WITH CO2 INSUFFLATION;  Surgeon: Kavita Huizar MD;  Location: [facility]     GENITOURINARY SURGERY      bladder     Midurethral sling with cystoscopy.  2010     TUBAL LIGATION        Allergies   Allergen Reactions     Sulfa Antibiotics Rash      Social History     Tobacco Use     Smoking status: Former     Packs/day: 1.00     Years: 10.00     Pack years: 10.00     Types: Cigarettes     Quit date: 1981     Years since quittin.6     Passive exposure: Never     Smokeless tobacco: Never   Substance Use Topics     Alcohol use: Yes     Comment: occ - Glass of white wine per night      Wt Readings from Last 1 Encounters:   08/15/23 70.3 kg  (155 lb)        Anesthesia Evaluation   Pt has had prior anesthetic.     No history of anesthetic complications       ROS/MED HX  ENT/Pulmonary:       Neurologic:       Cardiovascular:     (+)  hypertension- -   -  - -                                      METS/Exercise Tolerance:     Hematologic:       Musculoskeletal:       GI/Hepatic:       Renal/Genitourinary:       Endo:       Psychiatric/Substance Use:       Infectious Disease:       Malignancy:       Other:          Physical Exam    Airway        Mallampati: II    Neck ROM: full     Respiratory Devices and Support         Dental       (+) Minor Abnormalities - some fillings, tiny chips      Cardiovascular   cardiovascular exam normal          Pulmonary   pulmonary exam normal            OUTSIDE LABS:  CBC:   Lab Results   Component Value Date    WBC 5.0 11/21/2022    WBC 4.1 09/09/2021    HGB 11.8 11/21/2022    HGB 11.9 09/09/2021    HCT 35.0 11/21/2022    HCT 34.5 (L) 09/09/2021     11/21/2022     09/09/2021     BMP:   Lab Results   Component Value Date     11/21/2022     09/09/2021    POTASSIUM 4.5 11/21/2022    POTASSIUM 4.0 09/09/2021    CHLORIDE 104 11/21/2022    CHLORIDE 103 09/09/2021    CO2 31 11/21/2022    CO2 31 09/09/2021    BUN 12 11/21/2022    BUN 15 09/09/2021    CR 0.58 11/21/2022    CR 0.60 09/09/2021     (H) 11/21/2022    GLC 93 09/09/2021     COAGS:   Lab Results   Component Value Date    INR 0.95 09/27/2018     POC:   Lab Results   Component Value Date    BGM 97 10/10/2018     HEPATIC:   Lab Results   Component Value Date    ALBUMIN 3.5 06/07/2023    PROTTOTAL 6.5 06/07/2023    ALT 71 (H) 06/07/2023    AST 37 (H) 06/07/2023     (H) 06/07/2023    ALKPHOS 231 (H) 06/07/2023    BILITOTAL 0.7 06/07/2023     OTHER:   Lab Results   Component Value Date    ANGELITA 9.1 11/21/2022    PHOS 3.4 01/21/2015    TSH 2.78 11/16/2017    CRP <2.9 11/21/2022    SED 9 11/21/2022       Anesthesia Plan    ASA Status:  2        Anesthesia Type: MAC.              Consents    Anesthesia Plan(s) and associated risks, benefits, and realistic alternatives discussed. Questions answered and patient/representative(s) expressed understanding.     - Discussed: Risks, Benefits and Alternatives for the PROCEDURE were discussed     - Discussed with:  Patient      - Extended Intubation/Ventilatory Support Discussed: No.      - Patient is DNR/DNI Status: No     Use of blood products discussed: No .     Postoperative Care            Comments:              Laurel Nelson MD

## 2023-08-15 NOTE — ANESTHESIA POSTPROCEDURE EVALUATION
Patient: Maddie Lala    Procedure: Procedure(s):  Endoscopic ultrasound upper gastrointestinal tract (GI)       Anesthesia Type:  MAC    Note:  Disposition: Outpatient   Postop Pain Control: Uneventful            Sign Out: Well controlled pain   PONV: No   Neuro/Psych: Uneventful            Sign Out: Acceptable/Baseline neuro status   Airway/Respiratory: Uneventful            Sign Out: Acceptable/Baseline resp. status   CV/Hemodynamics: Uneventful            Sign Out: Acceptable CV status; No obvious hypovolemia; No obvious fluid overload   Other NRE: NONE   DID A NON-ROUTINE EVENT OCCUR? No           Last vitals:  Vitals Value Taken Time   /78 08/15/23 0955   Temp     Pulse 59 08/15/23 0955   Resp 16 08/15/23 0955   SpO2 98 % 08/15/23 0957   Vitals shown include unvalidated device data.    Electronically Signed By: Laurel Nelson MD  August 15, 2023  11:01 AM

## 2023-08-15 NOTE — ANESTHESIA CARE TRANSFER NOTE
Patient: Maddie Lala    Procedure: Procedure(s):  Endoscopic ultrasound upper gastrointestinal tract (GI)       Diagnosis: Pancreatic cyst [K86.2]  Elevated LFTs [R79.89]  Diagnosis Additional Information: No value filed.    Anesthesia Type:   MAC     Note:    Oropharynx: oropharynx clear of all foreign objects and spontaneously breathing  Level of Consciousness: drowsy  Oxygen Supplementation: room air    Independent Airway: airway patency satisfactory and stable  Dentition: dentition unchanged  Vital Signs Stable: post-procedure vital signs reviewed and stable  Report to RN Given: handoff report given  Patient transferred to: Phase II    Handoff Report: Identifed the Patient, Identified the Reponsible Provider, Reviewed the pertinent medical history, Discussed the surgical course, Reviewed Intra-OP anesthesia mangement and issues during anesthesia, Set expectations for post-procedure period and Allowed opportunity for questions and acknowledgement of understanding      Vitals:  Vitals Value Taken Time   /63 08/15/23 0921   Temp     Pulse 66 08/15/23 0921   Resp 14    SpO2 98 % 08/15/23 0923   Vitals shown include unvalidated device data.    Electronically Signed By: QUEENIE Blair CRNA  August 15, 2023  9:24 AM

## 2023-08-15 NOTE — OR NURSING
Pt had EUS under MAC, no endoscopic interventions. Pt tolerated procedure well. Pt taken to 3C in stable condition. Procedural report to Lori CISNEROS.

## 2023-08-15 NOTE — DISCHARGE INSTRUCTIONS
Discharge Instructions after Endoscopic Ultrasound    Activity  You were given medicine for pain. You may be dizzy or sleepy.    For 24 hours:  Do not drive or use heavy equipment.  Do not make important decisions.  Do not drink any alcohol.    Diet  Wait one hour before eating or drinking. Start with sips of water. When your gag reflex has returned you  may go back to your usual diet, medicines and light exercise.    Discomfort  Some bloating is normal. You may have large burps or pass air.  You may have a sore throat for 2 to 3 days. It may help to:  Avoid hot liquids for 24 hours.  Use sore throat lozenges.  Gargle as needed with salt water up to 4 times a day. Mix 1 cup of warm water with 1 teaspoon of salt. Do not swallow.  You may take Tylenol (acetaminophen) for pain unless your doctor has told you not to.    When to call    Call right away if you have:  Severe throat pain or trouble swallowing  Black stools (tar-like looking bowel movement)  Fever above 100.6 F (37.5 C)  Unusual pain in belly or chest not relieved by belching or passing air.    If you vomit blood or have severe pain, go to an emergency room.    If you have questions, call    Monday to Friday, 8 a.m. to 4:30 p.m.:   Central Scheduling Department: [phone]  After hours: [facility]: [phone] (Ask for the GI fellow on call)

## 2023-08-22 ENCOUNTER — PATIENT OUTREACH (OUTPATIENT)
Dept: GASTROENTEROLOGY | Facility: CLINIC | Age: 73
End: 2023-08-22
Payer: COMMERCIAL

## 2023-08-22 DIAGNOSIS — R79.89 ELEVATED LFTS: Primary | ICD-10-CM

## 2023-08-22 DIAGNOSIS — L21.9 DERMATITIS, SEBORRHEIC: ICD-10-CM

## 2023-08-22 DIAGNOSIS — H10.45 CHRONIC ALLERGIC CONJUNCTIVITIS: ICD-10-CM

## 2023-08-22 RX ORDER — FLUTICASONE PROPIONATE 50 MCG
SPRAY, SUSPENSION (ML) NASAL
Qty: 48 G | Refills: 2 | Status: SHIPPED | OUTPATIENT
Start: 2023-08-22

## 2023-08-22 RX ORDER — FLUOCINONIDE TOPICAL SOLUTION USP, 0.05% 0.5 MG/ML
SOLUTION TOPICAL
Qty: 60 ML | Refills: 0 | Status: SHIPPED | OUTPATIENT
Start: 2023-08-22 | End: 2024-01-04

## 2023-08-22 NOTE — PROGRESS NOTES
Follow up: Post EUS on 8/15/23 with Dr. Skinner.      Post procedure recommendations:   - Observe patient in GI recovery unit for ongoing care.   - Will recheck LFTs today   - Will recommend outpatient general surgery referral for cholecystectomy   - The findings and recommendations were discussed with the patient.     Per Dr. Skinner: Repeat LFTs are within normal limits     Patient states: Unable to reach by phone. LVM and sent [employer] message.    Orders placed: Referral to general surgery.     Reviewed post procedure recommendations and discussed symptoms to report to our clinic. Patient articulated understanding.     Clinic contact and scheduling numbers verified for future questions/concerns.     Radha Noel, AMELIA Care Coordinator

## 2023-08-28 NOTE — TELEPHONE ENCOUNTER
REFERRAL INFORMATION:  Referring Provider:  Dr. Guru Jose C Skinner   Referring Clinic:  [facility] [place] [facility]  Reason for Visit/Diagnosis: gallbladder       FUTURE VISIT INFORMATION:  Appointment Date: 09-08-23  Appointment Time: @ 1:30pm      NOTES RECORD STATUS  DETAILS   OFFICE NOTE from Referring Provider Internal 08-22-23 Dr. Guru Jose C Skinner   OFFICE NOTE from Other Specialists NO    HOSPITAL DISCHARGE SUMMARY/ ED VISITS  NO    OPERATIVE REPORT NO    ENDOSCOPY (EGD)  NO    PERTINENT LABS Internal CMP: 08-15-23, 11-21-22, 09-09-21, 09-03-20  CBC/diff: 11-21-22, 09-09-21, 09-27-18  BMP: 12-10-19, 10-12-18, 10-11-18, 08-02-18   PATHOLOGY REPORTS (RELATED) Internal 10-11-21   IMAGING (CT, MRI, US, XR)  Internal MRI abd: 06-29-23  US abd: 06-05-23  NM Hepatobiliary Scan: 06-14-23

## 2023-09-06 ASSESSMENT — ENCOUNTER SYMPTOMS
ABDOMINAL PAIN: 0
NAUSEA: 0
NECK PAIN: 1
BLOATING: 0
VOMITING: 0
EYE PAIN: 0
BACK PAIN: 1
JOINT SWELLING: 1
EYE WATERING: 0
DIARRHEA: 0
HEARTBURN: 1
MUSCLE CRAMPS: 0
CONSTIPATION: 0
EYE REDNESS: 1
JAUNDICE: 0
BLOOD IN STOOL: 0
EYE IRRITATION: 1
BOWEL INCONTINENCE: 0
ARTHRALGIAS: 0
STIFFNESS: 1
RECTAL PAIN: 0
DOUBLE VISION: 1
MUSCLE WEAKNESS: 0

## 2023-09-08 ENCOUNTER — OFFICE VISIT (OUTPATIENT)
Dept: SURGERY | Facility: CLINIC | Age: 73
End: 2023-09-08
Attending: INTERNAL MEDICINE
Payer: COMMERCIAL

## 2023-09-08 ENCOUNTER — PRE VISIT (OUTPATIENT)
Dept: SURGERY | Facility: CLINIC | Age: 73
End: 2023-09-08

## 2023-09-08 VITALS
OXYGEN SATURATION: 98 % | WEIGHT: 158.6 LBS | SYSTOLIC BLOOD PRESSURE: 124 MMHG | DIASTOLIC BLOOD PRESSURE: 68 MMHG | HEART RATE: 81 BPM | HEIGHT: 66 IN | BODY MASS INDEX: 25.49 KG/M2

## 2023-09-08 DIAGNOSIS — K80.20 CALCULUS OF GALLBLADDER WITHOUT CHOLECYSTITIS WITHOUT OBSTRUCTION: Primary | ICD-10-CM

## 2023-09-08 DIAGNOSIS — R79.89 ELEVATED LFTS: ICD-10-CM

## 2023-09-08 PROCEDURE — 99203 OFFICE O/P NEW LOW 30 MIN: CPT | Performed by: SURGERY

## 2023-09-08 ASSESSMENT — ENCOUNTER SYMPTOMS
EYE WATERING: 0
POOR WOUND HEALING: 0
DECREASED APPETITE: 0
HOARSE VOICE: 0
POLYDIPSIA: 0
HEMATURIA: 0
MEMORY LOSS: 0
EYE REDNESS: 1
ABDOMINAL PAIN: 0
WEIGHT GAIN: 0
NECK MASS: 0
SWOLLEN GLANDS: 0
POSTURAL DYSPNEA: 0
DYSPNEA ON EXERTION: 0
HEADACHES: 0
SHORTNESS OF BREATH: 0
EXERCISE INTOLERANCE: 0
DYSURIA: 0
ORTHOPNEA: 0
SINUS PAIN: 0
RECTAL PAIN: 0
FATIGUE: 0
PARALYSIS: 0
HOT FLASHES: 0
SLEEP DISTURBANCES DUE TO BREATHING: 0
EYE IRRITATION: 1
TREMORS: 0
CLAUDICATION: 0
SPEECH CHANGE: 0
DEPRESSION: 0
RESPIRATORY PAIN: 0
DECREASED CONCENTRATION: 0
SINUS CONGESTION: 0
SEIZURES: 0
DIARRHEA: 0
WHEEZING: 0
INSOMNIA: 0
BLOATING: 0
SPUTUM PRODUCTION: 0
MUSCLE WEAKNESS: 0
INCREASED ENERGY: 0
NUMBNESS: 0
ALTERED TEMPERATURE REGULATION: 0
DIZZINESS: 0
HYPERTENSION: 0
EYE PAIN: 0
HEARTBURN: 1
JOINT SWELLING: 1
LIGHT-HEADEDNESS: 0
SMELL DISTURBANCE: 0
COUGH: 0
HEMOPTYSIS: 0
BREAST PAIN: 0
ARTHRALGIAS: 0
TROUBLE SWALLOWING: 0
NECK PAIN: 1
SNORES LOUDLY: 0
NAIL CHANGES: 0
VOMITING: 0
DIFFICULTY URINATING: 0
DECREASED LIBIDO: 0
SKIN CHANGES: 0
LOSS OF CONSCIOUSNESS: 0
WEAKNESS: 0
JAUNDICE: 0
MUSCLE CRAMPS: 0
LEG PAIN: 0
EXTREMITY NUMBNESS: 0
STIFFNESS: 1
DOUBLE VISION: 1
TINGLING: 0
WEIGHT LOSS: 0
FEVER: 0
NERVOUS/ANXIOUS: 0
POLYPHAGIA: 0
BOWEL INCONTINENCE: 0
BRUISES/BLEEDS EASILY: 0
PANIC: 0
PALPITATIONS: 0
DISTURBANCES IN COORDINATION: 0
CHILLS: 0
BACK PAIN: 1
BREAST MASS: 0
SYNCOPE: 0
TACHYCARDIA: 0
TASTE DISTURBANCE: 0
FLANK PAIN: 0
HALLUCINATIONS: 0
COUGH DISTURBING SLEEP: 0
NIGHT SWEATS: 0
HYPOTENSION: 0
NAUSEA: 0
CONSTIPATION: 0
BLOOD IN STOOL: 0
SORE THROAT: 0
LEG SWELLING: 0

## 2023-09-08 ASSESSMENT — PAIN SCALES - GENERAL: PAINLEVEL: MILD PAIN (2)

## 2023-09-08 NOTE — PATIENT INSTRUCTIONS
You saw Dr Laurent who recommended laparoscopic cholecystectomy with robot-assist (gallbladder removal). To schedule surgery and a pre-anesthesia clinic visit, please call Frantz Santamaria at [phone].    Prior to surgery, I reviewed the risks of gallbladder removal with this patient.    The risk discussion included, but was not limited to, death, myocardial infarction, pneumonia, urinary tract infection, deep venous thrombosis with or without pulmonary embolus, abdominal infection from bowel injury or abscess, bowel obstruction, wound infection, and bleeding.    Specific risks related to cholecystectomy include bile duct injury (3-4/1000), bile leak (10/1000), retained common bile duct stone (10/1000), postcholecystectomy diarrhea (1-2%) and these complications may require additional treatment.    The potential that gallbladder removal will NOT be of benefit was also reviewed.    Furthermore, alternative therapies and the option for no therapy were also reviewed.    Postoperative treatment and expected follow-up were also reviewed.

## 2023-09-08 NOTE — NURSING NOTE
"Chief Complaint   Patient presents with    New Patient     Gallbladder       Vitals:    09/08/23 1301   BP: 124/68   BP Location: Left arm   Patient Position: Sitting   Cuff Size: Adult Regular   Pulse: 81   SpO2: 98%   Weight: 71.9 kg (158 lb 9.6 oz)   Height: 1.676 m (5' 6\")       Body mass index is 25.6 kg/m .                          Ed Zavala, EMT    "

## 2023-09-08 NOTE — PROGRESS NOTES
New General Surgery Consultation Note        Maddie Lala  [id number]  1950 September 8, 2023     Requesting Provider: Guru Jose C Gonzalez Tr*     Dear Radha Bridges,     I had the pleasure of seeing your patient, Maddie Lala is a 73 year old female who presents to clinic today for the following health issues        CHIEF COMPLAINT:      9/6/2023    10:11 AM   Chief Complaint Reviewed With Patient   I am here today to be seen for: Gallstones- dialated bile duct       Assessment & Plan   Problem List Items Addressed This Visit    None  Visit Diagnoses       Calculus of gallbladder without cholecystitis without obstruction    -  Primary    Relevant Orders    Case Request: CHOLECYSTECTOMY, ROBOT-ASSISTED, LAPAROSCOPIC, WITHOUT CHOLANGIOGRAM (Completed)    Elevated LFTs        Relevant Orders    PAC Visit Referral (For [facility] Only)         Pt with abdominal symptoms and imaging findings that warrant cholecystectomy which can be done with robot assist. I have discussed risks and benefits. Plan for robot-assist honey. Pt to call Frantz to schedule    Full risk/benefit discussion held as documented below        Lab Results   Component Value Date    WBC 5.0 11/21/2022    WBC 5.9 09/27/2018     Lab Results   Component Value Date    RBC 3.72 11/21/2022    RBC 3.80 09/27/2018     Lab Results   Component Value Date    HGB 11.8 11/21/2022    HGB 9.8 10/12/2018     Lab Results   Component Value Date    HCT 35.0 11/21/2022    HCT 35.6 09/27/2018     No components found for: MCT  Lab Results   Component Value Date    MCV 94 11/21/2022    MCV 94 09/27/2018     Lab Results   Component Value Date    MCH 31.7 11/21/2022    MCH 32.9 09/27/2018     Lab Results   Component Value Date    MCHC 33.7 11/21/2022    MCHC 35.1 09/27/2018     Lab Results   Component Value Date    RDW 12.9 11/21/2022    RDW 12.6 09/27/2018     Lab Results   Component Value Date     11/21/2022     10/10/2018     Last Comprehensive  Metabolic Panel:  Lab Results   Component Value Date     08/15/2023    POTASSIUM 3.7 08/15/2023    CHLORIDE 102 08/15/2023    CO2 28 08/15/2023    ANIONGAP 8 08/15/2023    GLC 97 08/15/2023    BUN 13.2 08/15/2023    CR 0.58 08/15/2023    GFRESTIMATED >90 08/15/2023    ANGELITA 8.7 (L) 08/15/2023         INR/Prothrombin Time  Liver Function Studies -   Recent Labs   Lab Test 08/15/23  1008   PROTTOTAL 6.2*   ALBUMIN 3.9   BILITOTAL 0.3   ALKPHOS 89   AST 34   ALT 32       30 minutes spent by me on the date of the encounter doing chart review, history and exam, documentation and further activities per the note      HISTORY OF PRESENT ILLNESS:  73F who presents with generalized ab pain and bloating. Began in May and didn't subside x3 days. Now better with bland diet. She was recently found to have dilated CBD and slightly elevated liver enzymes/AlkP on labs. On imaging, EUS revealed sludge/stones, as did US. She presents to discuss honey.    SEVERITY:       2023    10:11 AM   Severity of Present Illness Reviewed With Patient   How would you describe your symptoms? N/A   Does your current concern alter your level of activities? No       TIMIN/6/2023    10:11 AM   Timing of Present Illness Reviewed With Patient   The onset of my symptoms was: Sudden   My symptoms are: Intermittent (come and go)   My symptoms have been: Improving       DURATION:       No data to display                 MODIFYING FACTORS:      2023    10:11 AM   Modifying Factors Reviewed With Patient   My symptoms get worse with: Diet.  Specifically red meat, fried foods, diet Pepsi, large meals    My symptoms improve or resolve with: Abstaining from above.  Drinking water, rest and small amounts of soft food           Review of Systems     Constitutional:  Negative for fever, chills, weight loss, weight gain, fatigue, decreased appetite, night sweats, recent stressors, height gain, height loss, post-operative complications, incisional  pain, hallucinations, increased energy, hyperactivity and confused.   HENT:  Negative for ear pain, hearing loss, tinnitus, nosebleeds, trouble swallowing, hoarse voice, mouth sores, sore throat, ear discharge, tooth pain, gum tenderness, taste disturbance, smell disturbance, hearing aid, bleeding gums, dry mouth, sinus pain, sinus congestion and neck mass.    Eyes:  Positive for double vision, redness, eye dryness, floaters and eye irritation. Negative for eye pain, eye pain, decreased vision, eye watering, eye bulging, flashing lights, spots, strabismus, tunnel vision and jaundice.   Respiratory:   Negative for cough, hemoptysis, sputum production, shortness of breath, wheezing, sleep disturbances due to breathing, snores loudly, respiratory pain, dyspnea on exertion, cough disturbing sleep and postural dyspnea.    Cardiovascular:  Negative for chest pain, dyspnea on exertion, palpitations, orthopnea, claudication, leg swelling, fingers/toes turn blue, hypertension, hypotension, syncope, history of heart murmur, chest pain on exertion, chest pain at rest, pacemaker, few scattered varicosities, leg pain, sleep disturbances due to breathing, tachycardia, light-headedness, exercise intolerance and edema.   Gastrointestinal:  Positive for heartburn. Negative for nausea, vomiting, abdominal pain, diarrhea, constipation, blood in stool, melena, rectal pain, bloating, bowel incontinence, jaundice, coffee ground emesis and change in stool.   Genitourinary:  Negative for bladder incontinence, dysuria, urgency, hematuria, flank pain, vaginal discharge, difficulty urinating, genital sores, dyspareunia, decreased libido, nocturia, voiding less frequently, arousal difficulty, abnormal vaginal bleeding, excessive menstruation, menstrual changes, hot flashes, vaginal dryness and postmenopausal bleeding.   Musculoskeletal:  Positive for back pain, joint swelling, stiffness and neck pain. Negative for arthralgias, muscle cramps,  muscle weakness and fracture.   Skin:  Negative for nail changes, itching, poor wound healing, rash, hair changes, skin changes, acne, warts, poor wound healing, scarring, flaky skin, Raynaud's phenomenon, sensitivity to sunlight and skin thickening.   Neurological:  Negative for dizziness, tingling, tremors, speech change, seizures, loss of consciousness, weakness, light-headedness, numbness, headaches, disturbances in coordination, extremity numbness, memory loss, difficulty walking and paralysis.   Endo/Heme:  Negative for anemia, swollen glands and bruises/bleeds easily.   Psychiatric/Behavioral:  Negative for depression, hallucinations, memory loss, decreased concentration, mood swings and panic attacks.    Breast:  Negative for breast discharge, breast mass, breast pain and nipple retraction.   Endocrine:  Negative for altered temperature regulation, polyphagia, polydipsia, unwanted hair growth and change in facial hair.      PAST MEDICAL HISTORY:  Past Medical History:   Diagnosis Date    Allergic rhinitis 1979    Arthritis     Benign essential hypertension 03/23/2018    Cervical dysplasia with cone in 1979    nl pap ever since     COPD (chronic obstructive pulmonary disease) (H) 20121    Dyspareunia     Hearing problem 2015    Meniere's disease 2002    Nonsenile cataract     Plantar fasciitis     Stress incontinence     SVT (supraventricular tachycardia) (H)     resolved    Tinnitus 2002        PAST SURGICAL HISTORY:  Past Surgical History:   Procedure Laterality Date    ARTHROPLASTY KNEE Left 10/10/2018    Procedure: ARTHROPLASTY KNEE;  Left Total Knee Arthroplasty    ;  Surgeon: James Amaya MD;  Location: [facility]    BLADDER SURGERY      CATARACT IOL, RT/LT  2010    COLONOSCOPY N/A 10/11/2021    Procedure: Colonoscopy, With Polypectomy And Biopsy;  Surgeon: Kavita Huizar MD;  Location: [facility]    COLONOSCOPY WITH CO2 INSUFFLATION N/A 10/19/2016    Procedure: COLONOSCOPY WITH CO2 INSUFFLATION;   Surgeon: Duane, William Charles, MD;  Location: [facility]    COLONOSCOPY WITH CO2 INSUFFLATION N/A 10/11/2021    Procedure: COLONOSCOPY, WITH CO2 INSUFFLATION;  Surgeon: Kavita Huizar MD;  Location: [facility]    ENDOSCOPIC ULTRASOUND UPPER GASTROINTESTINAL TRACT (GI) N/A 8/15/2023    Procedure: Endoscopic ultrasound upper gastrointestinal tract (GI);  Surgeon: Guru Jose C Skinner MD;  Location:  GI    GENITOURINARY SURGERY      bladder    Midurethral sling with cystoscopy.      TUBAL LIGATION          MEDICATIONS:  Current Outpatient Medications   Medication    albuterol (PROAIR HFA/PROVENTIL HFA/VENTOLIN HFA) 108 (90 Base) MCG/ACT inhaler    CALCIUM + D OR    cromolyn (OPTICROM) 4 % ophthalmic solution    cycloSPORINE (RESTASIS) 0.05 % ophthalmic emulsion    DILT- MG 24 hr capsule    estradiol (VAGIFEM) 10 MCG TABS vaginal tablet    fluocinonide (LIDEX) 0.05 % external solution    fluorometholone (FML LIQUIFILM) 0.1 % ophthalmic suspension    fluticasone (FLONASE) 50 MCG/ACT nasal spray    Ibuprofen (ADVIL PO)    lisinopril (ZESTRIL) 20 MG tablet    neomycin-polymyxin-dexamethasone (MAXITROL) 3.5-45717-7.1 ophthalmic ointment    omeprazole (PRILOSEC) 20 MG DR capsule    chlorpheniramine maleate 12 MG TBCR     No current facility-administered medications for this visit.        ALLERGIES:  Allergies   Allergen Reactions    Sulfa Antibiotics Rash        SOCIAL HISTORY:  Social History     Socioeconomic History    Marital status:    Tobacco Use    Smoking status: Former     Packs/day: 1.00     Years: 10.00     Pack years: 10.00     Types: Cigarettes     Quit date: 1981     Years since quittin.7     Passive exposure: Never    Smokeless tobacco: Never   Vaping Use    Vaping Use: Never used   Substance and Sexual Activity    Alcohol use: Yes     Comment: occ - Glass of white wine per night    Drug use: No    Sexual activity: Not Currently     Partners: Male     Birth  "control/protection: Post-menopausal   Other Topics Concern     Service No    Blood Transfusions No    Caffeine Concern No    Occupational Exposure Yes    Hobby Hazards No    Sleep Concern No    Stress Concern No    Weight Concern Yes    Special Diet No    Back Care Yes     Comment: PT    Exercise Yes    Bike Helmet No    Seat Belt Yes    Self-Exams Yes    Parent/sibling w/ CABG, MI or angioplasty before 65F 55M? No       FAMILY HISTORY:  Family History   Problem Relation Age of Onset    Heart Disease Mother         older     Osteoporosis Mother     C.A.D. Mother         60's     Macular Degeneration Mother     Respiratory Father         farmers lung    Skin Cancer Father     Alzheimer Disease Maternal Grandmother     Heart Disease Maternal Grandfather     Heart Disease Brother         Afib    Heart Disease Brother     Asthma No family hx of     Diabetes No family hx of     Hypertension No family hx of     Breast Cancer No family hx of     Cancer - colorectal No family hx of     Cancer No family hx of     Thyroid Disease No family hx of     Glaucoma No family hx of     Colon Cancer No family hx of     Melanoma No family hx of         EUS, CT, US reviewed    PHYSICAL EXAM:  Objective    /68 (BP Location: Left arm, Patient Position: Sitting, Cuff Size: Adult Regular)   Pulse 81   Ht 1.676 m (5' 6\")   Wt 71.9 kg (158 lb 9.6 oz)   SpO2 98%   BMI 25.60 kg/m    /68 (BP Location: Left arm, Patient Position: Sitting, Cuff Size: Adult Regular)   Pulse 81   Ht 1.676 m (5' 6\")   Wt 71.9 kg (158 lb 9.6 oz)   SpO2 98%   BMI 25.60 kg/m    Body mass index is 25.6 kg/m .  Physical Exam  Musculoskeletal:         General: No edema.     Abd s/nt/nd     DISCUSSION OF RISKS:  Prior to surgery, I reviewed the risks of gallbladder removal with this patient.    The risk discussion included, but was not limited to, death, myocardial infarction, pneumonia, urinary tract infection, deep venous thrombosis with or " without pulmonary embolus, abdominal infection from bowel injury or abscess, bowel obstruction, wound infection, and bleeding.    Specific risks related to cholecystectomy include bile duct injury (3-4/1000), bile leak (10/1000), retained common bile duct stone (10/1000), postcholecystectomy diarrhea (1-2%) and these complications may require additional treatment.    The potential that gallbladder removal will NOT be of benefit was also reviewed.    Furthermore, alternative therapies and the option for no therapy were also reviewed.    Postoperative treatment and expected follow-up were also reviewed.        Sincerely,     Raulito Laurent MD

## 2023-09-08 NOTE — LETTER
9/8/2023       RE: Maddie Lala  [address]  [facility] [address]     Dear Colleague,    Thank you for referring your patient, Maddie Lala, to the [facility] [place] [facility]. Please see a copy of my visit note below.    New General Surgery Consultation Note        Maddie Lala  [id number]  1950 September 8, 2023     Requesting Provider: Guru Jose C Gonzalez Tr*     Dear Radha Bridges,     I had the pleasure of seeing your patient, Maddie Lala is a 73 year old female who presents to clinic today for the following health issues        CHIEF COMPLAINT:      9/6/2023    10:11 AM   Chief Complaint Reviewed With Patient   I am here today to be seen for: Gallstones- dialated bile duct       Assessment & Plan  Problem List Items Addressed This Visit    None  Visit Diagnoses       Calculus of gallbladder without cholecystitis without obstruction    -  Primary    Relevant Orders    Case Request: CHOLECYSTECTOMY, ROBOT-ASSISTED, LAPAROSCOPIC, WITHOUT CHOLANGIOGRAM (Completed)    Elevated LFTs        Relevant Orders    PAC Visit Referral (For [facility] Only)         Pt with abdominal symptoms and imaging findings that warrant cholecystectomy which can be done with robot assist. I have discussed risks and benefits. Plan for robot-assist honey. Pt to call Frantz to schedule    Full risk/benefit discussion held as documented below        Lab Results   Component Value Date    WBC 5.0 11/21/2022    WBC 5.9 09/27/2018     Lab Results   Component Value Date    RBC 3.72 11/21/2022    RBC 3.80 09/27/2018     Lab Results   Component Value Date    HGB 11.8 11/21/2022    HGB 9.8 10/12/2018     Lab Results   Component Value Date    HCT 35.0 11/21/2022    HCT 35.6 09/27/2018     No components found for: MCT  Lab Results   Component Value Date    MCV 94 11/21/2022    MCV 94 09/27/2018     Lab Results   Component Value Date    MCH 31.7  2022    MCH 32.9 2018     Lab Results   Component Value Date    MCHC 33.7 2022    MCHC 35.1 2018     Lab Results   Component Value Date    RDW 12.9 2022    RDW 12.6 2018     Lab Results   Component Value Date     2022     10/10/2018     Last Comprehensive Metabolic Panel:  Lab Results   Component Value Date     08/15/2023    POTASSIUM 3.7 08/15/2023    CHLORIDE 102 08/15/2023    CO2 28 08/15/2023    ANIONGAP 8 08/15/2023    GLC 97 08/15/2023    BUN 13.2 08/15/2023    CR 0.58 08/15/2023    GFRESTIMATED >90 08/15/2023    ANGELITA 8.7 (L) 08/15/2023         INR/Prothrombin Time  Liver Function Studies -   Recent Labs   Lab Test 08/15/23  1008   PROTTOTAL 6.2*   ALBUMIN 3.9   BILITOTAL 0.3   ALKPHOS 89   AST 34   ALT 32       30 minutes spent by me on the date of the encounter doing chart review, history and exam, documentation and further activities per the note      HISTORY OF PRESENT ILLNESS:  73F who presents with generalized ab pain and bloating. Began in May and didn't subside x3 days. Now better with bland diet. She was recently found to have dilated CBD and slightly elevated liver enzymes/AlkP on labs. On imaging, EUS revealed sludge/stones, as did US. She presents to discuss honey.    SEVERITY:       2023    10:11 AM   Severity of Present Illness Reviewed With Patient   How would you describe your symptoms? N/A   Does your current concern alter your level of activities? No       TIMIN/6/2023    10:11 AM   Timing of Present Illness Reviewed With Patient   The onset of my symptoms was: Sudden   My symptoms are: Intermittent (come and go)   My symptoms have been: Improving       DURATION:       No data to display                 MODIFYING FACTORS:      2023    10:11 AM   Modifying Factors Reviewed With Patient   My symptoms get worse with: Diet.  Specifically red meat, fried foods, diet Pepsi, large meals    My symptoms improve or resolve  with: Abstaining from above.  Drinking water, rest and small amounts of soft food           Review of Systems     Constitutional:  Negative for fever, chills, weight loss, weight gain, fatigue, decreased appetite, night sweats, recent stressors, height gain, height loss, post-operative complications, incisional pain, hallucinations, increased energy, hyperactivity and confused.   HENT:  Negative for ear pain, hearing loss, tinnitus, nosebleeds, trouble swallowing, hoarse voice, mouth sores, sore throat, ear discharge, tooth pain, gum tenderness, taste disturbance, smell disturbance, hearing aid, bleeding gums, dry mouth, sinus pain, sinus congestion and neck mass.    Eyes:  Positive for double vision, redness, eye dryness, floaters and eye irritation. Negative for eye pain, eye pain, decreased vision, eye watering, eye bulging, flashing lights, spots, strabismus, tunnel vision and jaundice.   Respiratory:   Negative for cough, hemoptysis, sputum production, shortness of breath, wheezing, sleep disturbances due to breathing, snores loudly, respiratory pain, dyspnea on exertion, cough disturbing sleep and postural dyspnea.    Cardiovascular:  Negative for chest pain, dyspnea on exertion, palpitations, orthopnea, claudication, leg swelling, fingers/toes turn blue, hypertension, hypotension, syncope, history of heart murmur, chest pain on exertion, chest pain at rest, pacemaker, few scattered varicosities, leg pain, sleep disturbances due to breathing, tachycardia, light-headedness, exercise intolerance and edema.   Gastrointestinal:  Positive for heartburn. Negative for nausea, vomiting, abdominal pain, diarrhea, constipation, blood in stool, melena, rectal pain, bloating, bowel incontinence, jaundice, coffee ground emesis and change in stool.   Genitourinary:  Negative for bladder incontinence, dysuria, urgency, hematuria, flank pain, vaginal discharge, difficulty urinating, genital sores, dyspareunia, decreased  libido, nocturia, voiding less frequently, arousal difficulty, abnormal vaginal bleeding, excessive menstruation, menstrual changes, hot flashes, vaginal dryness and postmenopausal bleeding.   Musculoskeletal:  Positive for back pain, joint swelling, stiffness and neck pain. Negative for arthralgias, muscle cramps, muscle weakness and fracture.   Skin:  Negative for nail changes, itching, poor wound healing, rash, hair changes, skin changes, acne, warts, poor wound healing, scarring, flaky skin, Raynaud's phenomenon, sensitivity to sunlight and skin thickening.   Neurological:  Negative for dizziness, tingling, tremors, speech change, seizures, loss of consciousness, weakness, light-headedness, numbness, headaches, disturbances in coordination, extremity numbness, memory loss, difficulty walking and paralysis.   Endo/Heme:  Negative for anemia, swollen glands and bruises/bleeds easily.   Psychiatric/Behavioral:  Negative for depression, hallucinations, memory loss, decreased concentration, mood swings and panic attacks.    Breast:  Negative for breast discharge, breast mass, breast pain and nipple retraction.   Endocrine:  Negative for altered temperature regulation, polyphagia, polydipsia, unwanted hair growth and change in facial hair.      PAST MEDICAL HISTORY:  Past Medical History:   Diagnosis Date    Allergic rhinitis 1979    Arthritis     Benign essential hypertension 03/23/2018    Cervical dysplasia with cone in 1979    nl pap ever since     COPD (chronic obstructive pulmonary disease) (H) 20121    Dyspareunia     Hearing problem 2015    Meniere's disease 2002    Nonsenile cataract     Plantar fasciitis     Stress incontinence     SVT (supraventricular tachycardia) (H)     resolved    Tinnitus 2002        PAST SURGICAL HISTORY:  Past Surgical History:   Procedure Laterality Date    ARTHROPLASTY KNEE Left 10/10/2018    Procedure: ARTHROPLASTY KNEE;  Left Total Knee Arthroplasty    ;  Surgeon: James Amaya  MD EBENEZER;  Location: [facility] OR    BLADDER SURGERY      CATARACT IOL, RT/LT  2010    COLONOSCOPY N/A 10/11/2021    Procedure: Colonoscopy, With Polypectomy And Biopsy;  Surgeon: Kavita Huizar MD;  Location: [facility] OR    COLONOSCOPY WITH CO2 INSUFFLATION N/A 10/19/2016    Procedure: COLONOSCOPY WITH CO2 INSUFFLATION;  Surgeon: Duane, William Charles, MD;  Location: [facility] OR    COLONOSCOPY WITH CO2 INSUFFLATION N/A 10/11/2021    Procedure: COLONOSCOPY, WITH CO2 INSUFFLATION;  Surgeon: Kavita Huizar MD;  Location: [facility] OR    ENDOSCOPIC ULTRASOUND UPPER GASTROINTESTINAL TRACT (GI) N/A 8/15/2023    Procedure: Endoscopic ultrasound upper gastrointestinal tract (GI);  Surgeon: Guru Jose C Skinner MD;  Location: [facility]    GENITOURINARY SURGERY      bladder    Midurethral sling with cystoscopy.  2010    TUBAL LIGATION          MEDICATIONS:  Current Outpatient Medications   Medication    albuterol (PROAIR HFA/PROVENTIL HFA/VENTOLIN HFA) 108 (90 Base) MCG/ACT inhaler    CALCIUM + D OR    cromolyn (OPTICROM) 4 % ophthalmic solution    cycloSPORINE (RESTASIS) 0.05 % ophthalmic emulsion    DILT- MG 24 hr capsule    estradiol (VAGIFEM) 10 MCG TABS vaginal tablet    fluocinonide (LIDEX) 0.05 % external solution    fluorometholone (FML LIQUIFILM) 0.1 % ophthalmic suspension    fluticasone (FLONASE) 50 MCG/ACT nasal spray    Ibuprofen (ADVIL PO)    lisinopril (ZESTRIL) 20 MG tablet    neomycin-polymyxin-dexamethasone (MAXITROL) 3.5-80778-2.1 ophthalmic ointment    omeprazole (PRILOSEC) 20 MG DR capsule    chlorpheniramine maleate 12 MG TBCR     No current facility-administered medications for this visit.        ALLERGIES:  Allergies   Allergen Reactions    Sulfa Antibiotics Rash        SOCIAL HISTORY:  Social History     Socioeconomic History    Marital status:    Tobacco Use    Smoking status: Former     Packs/day: 1.00     Years: 10.00     Pack years: 10.00     Types: Cigarettes     Quit date: 1/1/1981      "Years since quittin.7     Passive exposure: Never    Smokeless tobacco: Never   Vaping Use    Vaping Use: Never used   Substance and Sexual Activity    Alcohol use: Yes     Comment: occ - Glass of white wine per night    Drug use: No    Sexual activity: Not Currently     Partners: Male     Birth control/protection: Post-menopausal   Other Topics Concern     Service No    Blood Transfusions No    Caffeine Concern No    Occupational Exposure Yes    Hobby Hazards No    Sleep Concern No    Stress Concern No    Weight Concern Yes    Special Diet No    Back Care Yes     Comment: PT    Exercise Yes    Bike Helmet No    Seat Belt Yes    Self-Exams Yes    Parent/sibling w/ CABG, MI or angioplasty before 65F 55M? No       FAMILY HISTORY:  Family History   Problem Relation Age of Onset    Heart Disease Mother         older     Osteoporosis Mother     C.A.D. Mother         60's     Macular Degeneration Mother     Respiratory Father         farmers lung    Skin Cancer Father     Alzheimer Disease Maternal Grandmother     Heart Disease Maternal Grandfather     Heart Disease Brother         Afib    Heart Disease Brother     Asthma No family hx of     Diabetes No family hx of     Hypertension No family hx of     Breast Cancer No family hx of     Cancer - colorectal No family hx of     Cancer No family hx of     Thyroid Disease No family hx of     Glaucoma No family hx of     Colon Cancer No family hx of     Melanoma No family hx of         EUS, CT, US reviewed    PHYSICAL EXAM:  Objective   /68 (BP Location: Left arm, Patient Position: Sitting, Cuff Size: Adult Regular)   Pulse 81   Ht 1.676 m (5' 6\")   Wt 71.9 kg (158 lb 9.6 oz)   SpO2 98%   BMI 25.60 kg/m    /68 (BP Location: Left arm, Patient Position: Sitting, Cuff Size: Adult Regular)   Pulse 81   Ht 1.676 m (5' 6\")   Wt 71.9 kg (158 lb 9.6 oz)   SpO2 98%   BMI 25.60 kg/m    Body mass index is 25.6 kg/m .  Physical Exam  Musculoskeletal:    "      General: No edema.     Abd s/nt/nd     DISCUSSION OF RISKS:  Prior to surgery, I reviewed the risks of gallbladder removal with this patient.    The risk discussion included, but was not limited to, death, myocardial infarction, pneumonia, urinary tract infection, deep venous thrombosis with or without pulmonary embolus, abdominal infection from bowel injury or abscess, bowel obstruction, wound infection, and bleeding.    Specific risks related to cholecystectomy include bile duct injury (3-4/1000), bile leak (10/1000), retained common bile duct stone (10/1000), postcholecystectomy diarrhea (1-2%) and these complications may require additional treatment.    The potential that gallbladder removal will NOT be of benefit was also reviewed.    Furthermore, alternative therapies and the option for no therapy were also reviewed.    Postoperative treatment and expected follow-up were also reviewed.            Again, thank you for allowing me to participate in the care of your patient.      Sincerely,    Raulito Laurent MD

## 2023-09-12 DIAGNOSIS — I10 BENIGN ESSENTIAL HYPERTENSION: ICD-10-CM

## 2023-09-12 PROBLEM — K80.20 CALCULUS OF GALLBLADDER WITHOUT CHOLECYSTITIS WITHOUT OBSTRUCTION: Status: ACTIVE | Noted: 2023-09-08

## 2023-09-12 RX ORDER — DILTIAZEM HYDROCHLORIDE 180 MG/1
CAPSULE, EXTENDED RELEASE ORAL
Qty: 90 CAPSULE | Refills: 2 | Status: SHIPPED | OUTPATIENT
Start: 2023-09-12 | End: 2023-12-15

## 2023-09-13 NOTE — TELEPHONE ENCOUNTER
FUTURE VISIT INFORMATION      SURGERY INFORMATION:  Date: 10/3/23  [facility]  Surgeon:  Raulito Laurent MD   Anesthesia Type:  general  Procedure: CHOLECYSTECTOMY, ROBOT-ASSISTED, LAPAROSCOPIC, WITHOUT CHOLANGIOGRAM   Consult: ov     RECORDS REQUESTED FROM:       Primary Care Provider: Radha Hurtado MD - [facility]    Pertinent Medical History: Hypertension    Most recent EKG+ [id number]     Most recent PFT's: 10/3/18

## 2023-09-20 ENCOUNTER — ANESTHESIA EVENT (OUTPATIENT)
Dept: SURGERY | Facility: AMBULATORY SURGERY CENTER | Age: 73
End: 2023-09-20
Payer: COMMERCIAL

## 2023-09-20 ENCOUNTER — OFFICE VISIT (OUTPATIENT)
Dept: SURGERY | Facility: CLINIC | Age: 73
End: 2023-09-20
Payer: COMMERCIAL

## 2023-09-20 ENCOUNTER — LAB (OUTPATIENT)
Dept: LAB | Facility: CLINIC | Age: 73
End: 2023-09-20
Payer: COMMERCIAL

## 2023-09-20 ENCOUNTER — PRE VISIT (OUTPATIENT)
Dept: SURGERY | Facility: CLINIC | Age: 73
End: 2023-09-20

## 2023-09-20 VITALS
OXYGEN SATURATION: 98 % | DIASTOLIC BLOOD PRESSURE: 76 MMHG | WEIGHT: 162 LBS | HEIGHT: 66 IN | SYSTOLIC BLOOD PRESSURE: 132 MMHG | TEMPERATURE: 98 F | HEART RATE: 78 BPM | BODY MASS INDEX: 26.03 KG/M2 | RESPIRATION RATE: 16 BRPM

## 2023-09-20 DIAGNOSIS — Z01.818 PREOP EXAMINATION: ICD-10-CM

## 2023-09-20 DIAGNOSIS — K80.20 CALCULUS OF GALLBLADDER WITHOUT CHOLECYSTITIS WITHOUT OBSTRUCTION: ICD-10-CM

## 2023-09-20 DIAGNOSIS — R06.09 DYSPNEA ON EXERTION: ICD-10-CM

## 2023-09-20 DIAGNOSIS — Z01.818 PREOP EXAMINATION: Primary | ICD-10-CM

## 2023-09-20 LAB
ERYTHROCYTE [DISTWIDTH] IN BLOOD BY AUTOMATED COUNT: 13 % (ref 10–15)
HCT VFR BLD AUTO: 35.6 % (ref 35–47)
HGB BLD-MCNC: 12.3 G/DL (ref 11.7–15.7)
MCH RBC QN AUTO: 31.1 PG (ref 26.5–33)
MCHC RBC AUTO-ENTMCNC: 34.6 G/DL (ref 31.5–36.5)
MCV RBC AUTO: 90 FL (ref 78–100)
PLATELET # BLD AUTO: 267 10E3/UL (ref 150–450)
RBC # BLD AUTO: 3.96 10E6/UL (ref 3.8–5.2)
WBC # BLD AUTO: 4.8 10E3/UL (ref 4–11)

## 2023-09-20 PROCEDURE — 99203 OFFICE O/P NEW LOW 30 MIN: CPT

## 2023-09-20 PROCEDURE — 85027 COMPLETE CBC AUTOMATED: CPT | Performed by: PATHOLOGY

## 2023-09-20 PROCEDURE — 36415 COLL VENOUS BLD VENIPUNCTURE: CPT | Performed by: PATHOLOGY

## 2023-09-20 ASSESSMENT — ENCOUNTER SYMPTOMS
ORTHOPNEA: 0
SEIZURES: 0

## 2023-09-20 ASSESSMENT — PAIN SCALES - GENERAL: PAINLEVEL: NO PAIN (0)

## 2023-09-20 ASSESSMENT — LIFESTYLE VARIABLES: TOBACCO_USE: 1

## 2023-09-20 ASSESSMENT — COPD QUESTIONNAIRES: COPD: 0

## 2023-09-20 NOTE — H&P
Pre-Operative H & P     CC:  Preoperative exam to assess for increased cardiopulmonary risk while undergoing surgery and anesthesia.    Date of Encounter: 9/20/2023  Primary Care Physician:  Radha Hurtado     Reason for visit:   Encounter Diagnoses   Name Primary?    Preop examination Yes    Calculus of gallbladder without cholecystitis without obstruction     Dyspnea on exertion        HPI  Maddie Lala is a 73 year old female who presents for pre-operative H & P in preparation for  Procedure Information       Case: [id number] Date/Time: 10/03/23 0800    Procedure: CHOLECYSTECTOMY, ROBOT-ASSISTED, LAPAROSCOPIC, WITHOUT CHOLANGIOGRAM (Abdomen)    Anesthesia type: General    Diagnosis: Calculus of gallbladder without cholecystitis without obstruction [K80.20]    Pre-op diagnosis: Calculus of gallbladder without cholecystitis without obstruction [K80.20]    Location: [place] [address] / [facility]    Providers: Raulito Laurent MD            Maddie Lala is a 74 y/o female with a PMH significant for HTN, SVT, COPD, allergic rhinitis, Meniere s disease, chronic neck pain, s/p left total knee replacement, GERD, and latent TB who developed symptoms of generalized abdominal pain and bloating beginning in May. Symptoms have improved with a bland diet. She was found to have dilated CBD and slightly elevated liver enzymes/AlkP on labs. On imaging, EUS revealed sludge/stones. She consulted with Dr. Laurent on 9/8/23 and the above surgery was recommended for further management.       History is obtained from the patient and chart review    Hx of abnormal bleeding or anti-platelet use: None.     Menstrual history: No LMP recorded. Patient is postmenopausal.:    Past Medical History  Past Medical History:   Diagnosis Date    Allergic rhinitis 1979    Arthritis     Benign essential hypertension 03/23/2018    Cervical dysplasia with cone in 1979    nl pap ever since     COPD (chronic  obstructive pulmonary disease) (H) 20121    Dyspareunia     Hearing problem 2015    Meniere's disease 2002    Nonsenile cataract     Plantar fasciitis     Stress incontinence     SVT (supraventricular tachycardia) (H)     resolved    Tinnitus 2002       Past Surgical History  Past Surgical History:   Procedure Laterality Date    ARTHROPLASTY KNEE Left 10/10/2018    Procedure: ARTHROPLASTY KNEE;  Left Total Knee Arthroplasty    ;  Surgeon: James Amaya MD;  Location: [facility] OR    BLADDER SURGERY      CATARACT IOL, RT/LT  2010    COLONOSCOPY N/A 10/11/2021    Procedure: Colonoscopy, With Polypectomy And Biopsy;  Surgeon: Kavita Huizar MD;  Location: [facility] OR    COLONOSCOPY WITH CO2 INSUFFLATION N/A 10/19/2016    Procedure: COLONOSCOPY WITH CO2 INSUFFLATION;  Surgeon: Duane, William Charles, MD;  Location: [facility] OR    COLONOSCOPY WITH CO2 INSUFFLATION N/A 10/11/2021    Procedure: COLONOSCOPY, WITH CO2 INSUFFLATION;  Surgeon: Kavita Huizar MD;  Location: [facility] OR    ENDOSCOPIC ULTRASOUND UPPER GASTROINTESTINAL TRACT (GI) N/A 8/15/2023    Procedure: Endoscopic ultrasound upper gastrointestinal tract (GI);  Surgeon: Guru Jose C Skinner MD;  Location: [facility] GI    GENITOURINARY SURGERY      bladder    Midurethral sling with cystoscopy.  2010    TUBAL LIGATION         Prior to Admission Medications  Current Outpatient Medications   Medication Sig Dispense Refill    albuterol (PROAIR HFA/PROVENTIL HFA/VENTOLIN HFA) 108 (90 Base) MCG/ACT inhaler INHALE 2 PUFFS BY MOUTH EVERY 6 HOURS AS NEEDED FOR SHORTNESS OF BREATH/DYSPNEA AND AS NEEDED BEFORE EXERCISE (Patient taking differently: Inhale 1 puff into the lungs every 6 hours as needed INHALE 2 PUFFS BY MOUTH EVERY 6 HOURS AS NEEDED FOR SHORTNESS OF BREATH/DYSPNEA AND AS NEEDED BEFORE EXERCISE) 18 g 1    CALCIUM + D OR Take 1 tablet by mouth 2 times daily 1 tablet in am and 2 tablet in pm      cromolyn (OPTICROM) 4 % ophthalmic solution INSTILL ONE DROP INTO  EACH EYE FOUR TIMES DAILY (Patient taking differently: Place 1 drop into both eyes as needed) 10 mL 11    cycloSPORINE (RESTASIS) 0.05 % ophthalmic emulsion Place 1 drop into both eyes 2 times daily 60 mL 11    diltiazem ER (DILT-XR) 180 MG 24 hr capsule TAKE ONE CAPSULE BY MOUTH ONCE DAILY (Patient taking differently: Take 180 mg by mouth every morning) 90 capsule 2    estradiol (VAGIFEM) 10 MCG TABS vaginal tablet Place 1 tablet (10 mcg) vaginally twice a week 24 tablet 3    fluocinonide (LIDEX) 0.05 % external solution APPLY TO SCALP NIGHTLY FOR UP TO 1 WEEK THEN 2-3 TIMES WEEKLY AS NEEDED (Patient taking differently: Apply topically as needed Apply to scalp nightly for up to 1 week, then 2-3 times weekly as needed.) 60 mL 0    fluorometholone (FML LIQUIFILM) 0.1 % ophthalmic suspension Place 1 drop into both eyes 2 times daily (Patient taking differently: Place 1 drop into both eyes as needed) 5 mL 0    fluticasone (FLONASE) 50 MCG/ACT nasal spray USE ONE TO TWO SPRAYS IN EACH NOSTRIL EVERY DAY (Patient taking differently: Spray 1 spray into both nostrils as needed USE ONE TO TWO SPRAYS IN EACH NOSTRIL EVERY DAY) 48 g 2    Ibuprofen (ADVIL PO) Take 800 mg by mouth every 4 hours as needed for moderate pain      lisinopril (ZESTRIL) 20 MG tablet TAKE ONE TABLET BY MOUTH ONCE DAILY (Patient taking differently: Take 20 mg by mouth At Bedtime) 90 tablet 1    neomycin-polymyxin-dexamethasone (MAXITROL) 3.5-99633-7.1 ophthalmic ointment Place 0.5 inches into both eyes daily as needed (For eyelid itching) 3.5 g 3    omeprazole (PRILOSEC) 20 MG DR capsule Take 1 capsule (20 mg) by mouth daily (Patient taking differently: Take 20 mg by mouth as needed) 30 capsule 0    chlorpheniramine maleate 12 MG TBCR Take  by mouth as needed.         Allergies  Allergies   Allergen Reactions    Sulfa Antibiotics Rash       Social History  Social History     Socioeconomic History    Marital status:      Spouse name: Not on file     Number of children: Not on file    Years of education: Not on file    Highest education level: Not on file   Occupational History    Not on file   Tobacco Use    Smoking status: Former     Packs/day: 1.00     Years: 10.00     Pack years: 10.00     Types: Cigarettes     Quit date: 1981     Years since quittin.7     Passive exposure: Never    Smokeless tobacco: Never   Vaping Use    Vaping Use: Never used   Substance and Sexual Activity    Alcohol use: Yes     Alcohol/week: 7.0 standard drinks of alcohol     Types: 7 Glasses of wine per week     Comment: occ - Glass of white wine per night    Drug use: No    Sexual activity: Not Currently     Partners: Male     Birth control/protection: Post-menopausal   Other Topics Concern     Service No    Blood Transfusions No    Caffeine Concern No    Occupational Exposure Yes    Hobby Hazards No    Sleep Concern No    Stress Concern No    Weight Concern Yes    Special Diet No    Back Care Yes     Comment: PT    Exercise Yes    Bike Helmet No    Seat Belt Yes    Self-Exams Yes    Parent/sibling w/ CABG, MI or angioplasty before 65F 55M? No   Social History Narrative    Not on file     Social Determinants of Health     Financial Resource Strain: Not on file   Food Insecurity: Not on file   Transportation Needs: Not on file   Physical Activity: Not on file   Stress: Not on file   Social Connections: Not on file   Interpersonal Safety: Not on file   Housing Stability: Not on file       Family History  Family History   Problem Relation Age of Onset    Heart Disease Mother         older     Osteoporosis Mother     C.A.D. Mother         60's     Macular Degeneration Mother     Respiratory Father         farmers lung    Skin Cancer Father     Heart Disease Brother         Afib    Heart Disease Brother     Alzheimer Disease Maternal Grandmother     Heart Disease Maternal Grandfather     Asthma No family hx of     Diabetes No family hx of     Hypertension No family hx  of     Breast Cancer No family hx of     Cancer - colorectal No family hx of     Cancer No family hx of     Thyroid Disease No family hx of     Glaucoma No family hx of     Colon Cancer No family hx of     Melanoma No family hx of     Anesthesia Reaction No family hx of     Venous thrombosis No family hx of        Review of Systems  The complete review of systems is negative other than noted in the HPI or here.   Anesthesia Evaluation   Pt has had prior anesthetic.     No history of anesthetic complications       ROS/MED HX  ENT/Pulmonary: Comment: - Meniere's disease     (+)     ALINE risk factors,  hypertension,    allergic rhinitis,     tobacco use,                    (-) asthma, COPD, sleep apnea and recent URI   Neurologic:  - neg neurologic ROS   (+)    no peripheral neuropathy  migraines (none recently),                       (-) no seizures and no CVA   Cardiovascular: Comment: - Hx of SVT episode > 20 years ago. No recurrence.     (+)  hypertension- -   -  - -                                 Previous cardiac testing   Echo: Date: Results:    Stress Test:  Date: Results:    ECG Reviewed:  Date: 2018 Results:  SR  Cath:  Date: Results:   (-) CAD, CHF, orthopnea/PND, irregular heartbeat/palpitations and dyslipidemia   METS/Exercise Tolerance: >4 METS Comment: - Walking 3 miles x 5 days per week  - Weight lifting x 3 days per week   - Can ascend 1 flight of stairs continuously with mild SOB.    Hematologic:  - neg hematologic  ROS  (-) history of blood clots and history of blood transfusion   Musculoskeletal: Comment: - s/p left total knee replacement       GI/Hepatic:     (+) GERD, Asymptomatic on medication,        cholecystitis/cholelithiasis,       (-) liver disease   Renal/Genitourinary:    (-) renal disease   Endo:    (-) Type II DM and thyroid disease   Psychiatric/Substance Use:  - neg psychiatric ROS     Infectious Disease:     (+)      TB (Latent TB. Was treated x 9 months in 1979,),      Malignancy:  -  "neg malignancy ROS (+) Malignancy, History of Other.Other CA cervical CA Remission status post Surgery.    Other:            /76 (BP Location: Right arm, Patient Position: Sitting, Cuff Size: Adult Regular)   Pulse 78   Temp 98  F (36.7  C) (Oral)   Resp 16   Ht 1.676 m (5' 6\")   Wt 73.5 kg (162 lb)   SpO2 98%   BMI 26.15 kg/m      Physical Exam   Constitutional: Awake, alert, cooperative, no apparent distress, and appears stated age.  Eyes: Pupils equal, round and reactive to light, extra ocular muscles intact, sclera clear, conjunctiva normal.  HENT: Normocephalic, oral pharynx with moist mucus membranes, good dentition. No goiter appreciated.   Respiratory: Clear to auscultation bilaterally, no crackles or wheezing.  Cardiovascular: Regular rate and rhythm, normal S1 and S2, and no murmur noted.  Carotids +2, no bruits. No edema. Palpable pulses to radial and PT arteries.   GI: Normal bowel sounds, soft, non-distended, non-tender, no masses palpated, no hepatosplenomegaly.  Lymph/Hematologic: No cervical lymphadenopathy and no supraclavicular lymphadenopathy.  Genitourinary:  Deferred.   Skin: Warm and dry.    Musculoskeletal: Full ROM of neck. There is no redness, warmth, or swelling of the joints. Gross motor strength is normal.    Neurologic: Awake, alert, oriented to name, place and time. Cranial nerves II-XII are grossly intact. Gait is normal.   Neuropsychiatric: Calm, cooperative. Normal affect.     Prior Labs/Diagnostic Studies   All labs and imaging personally reviewed     Component      Latest Ref Rng 8/15/2023  10:08 AM   Sodium      136 - 145 mmol/L 138    Potassium      3.4 - 5.3 mmol/L 3.7    Chloride      98 - 107 mmol/L 102    Carbon Dioxide (CO2)      22 - 29 mmol/L 28    Anion Gap      7 - 15 mmol/L 8    Urea Nitrogen      8.0 - 23.0 mg/dL 13.2    Creatinine      0.51 - 0.95 mg/dL 0.58    Calcium      8.8 - 10.2 mg/dL 8.7 (L)    Glucose      70 - 99 mg/dL 97    Alkaline " Phosphatase      35 - 104 U/L 89    AST      0 - 45 U/L 34    ALT      0 - 50 U/L 32    Protein Total      6.4 - 8.3 g/dL 6.2 (L)    Albumin      3.5 - 5.2 g/dL 3.9    Bilirubin Total      <=1.2 mg/dL 0.3    GFR Estimate      >60 mL/min/1.73m2 >90       Legend:  (L) Low    EKG/ stress test - if available please see in ROS above   No results found.      Latest Ref Rng & Units 10/3/2018     1:08 PM   PFT   FVC L 3.28    FEV1 L 2.54    FVC% % 99    FEV1% % 100          The patient's records and results personally reviewed by this provider.     Outside records reviewed from: Care Everywhere    LAB/DIAGNOSTIC STUDIES TODAY:  CBC, stress test    Component      Latest Ref Rng 9/20/2023  10:44 AM   WBC      4.0 - 11.0 10e3/uL 4.8    RBC Count      3.80 - 5.20 10e6/uL 3.96    Hemoglobin      11.7 - 15.7 g/dL 12.3    Hematocrit      35.0 - 47.0 % 35.6    MCV      78 - 100 fL 90    MCH      26.5 - 33.0 pg 31.1    MCHC      31.5 - 36.5 g/dL 34.6    RDW      10.0 - 15.0 % 13.0    Platelet Count      150 - 450 10e3/uL 267          Assessment    Maddie Lala is a 73 year old female seen as a PAC referral for risk assessment and optimization for anesthesia.    Plan/Recommendations  Pt will be optimized for the proposed procedure.  See below for details on the assessment, risk, and preoperative recommendations    NEUROLOGY  - No history of TIA, CVA or seizure  -Post Op delirium risk factors:  No risk identified  - History of migraines, none recently    ENT  - No current airway concerns.  Will need to be reassessed day of surgery.  Mallampati: III  TM: > 3    CARDIAC  - No history of CAD and Afib Patient is able to achieve > 4 METS and denies symptoms of CP or chest tightness, but does reports new mild MCKEE. She otherwise denies LE edema, orthopnea, or PND. Reviewed with Dr. Yanez with recommendation to complete stress test prior to surgery. RN[facility] will assist with scheduling.   - Hypertension, well-controlled.   Hold lisinopril on  "DOS  - Hx of SVT episode > 20 years ago. No recurrence.   - METS (Metabolic Equivalents)  Patient performs 4 or more METS exercise without symptoms            Total Score: 0      RCRI-Very low risk: Class 1 0.4% complication rate            Total Score: 0        PULMONARY  ALINE Low Risk            Total Score: 2    ALINE: Hypertension    ALINE: Over 50 ys old      - Denies history of COPD or asthma. She uses albuterol with URIs. No recent use. Denies any wheezing or cough. Lungs CTA today on exam.   - Tobacco History    History   Smoking Status    Former    Packs/day: 1.00    Years: 10.00    Types: Cigarettes    Quit date: 1/1/1981   Smokeless Tobacco    Never       GI  - GERD  Controlled on medications: Proton Pump Inhibitor  - Cholelithiasis (see HPI) - surgery planned as above.   PONV High Risk  Total Score: 3           1 AN PONV: Pt is Female    1 AN PONV: Patient is not a current smoker    1 AN PONV: Intended Post Op Opioids        /RENAL  - Baseline Creatinine  0.58    ENDOCRINE    - BMI: Estimated body mass index is 26.15 kg/m  as calculated from the following:    Height as of this encounter: 1.676 m (5' 6\").    Weight as of this encounter: 73.5 kg (162 lb).  Overweight (BMI 25.0-29.9)  - No history of Diabetes Mellitus    HEME  VTE Low Risk 0.26%            Total Score: 1    VTE: Greater than 59 yrs old      - No history of abnormal bleeding or antiplatelet use.    MSK  - s/p left total knee replacement       Different anesthesia methods/types have been discussed with the patient, but they are aware that the final plan will be decided by the assigned anesthesia provider on the date of service.    Patient was discussed with Dr. Yanez    The patient is NOT optimized for their procedure, pending stress test.    Addendum 9/28/23: Stress test completed and negative for inducible myocardial ischemia or infarction. Patient updated with results. Patient is NOW optimized for surgery.    Lexiscan 9/27/23:    The " nuclear stress test is negative for inducible myocardial ischemia or infarction.    LVEDv 131ml. LVESv 34ml. LV EF 74%.    There is no prior study for comparison.]     AVS with information on surgery time/arrival time, meds and NPO status given by nursing staff. No further diagnostic testing indicated.      On the day of service:     Prep time: 4 minutes  Visit time: 13 minutes  Documentation time: 22 minutes  ------------------------------------------  Total time: 39 minutes      QUEENIE Hidalgo CNP  Preoperative Assessment Center  [facility]  [facility] and [facility] Center  Phone: [phone]  Fax: [phone]

## 2023-09-20 NOTE — PATIENT INSTRUCTIONS
Preparing for Your Surgery      Name:  Maddie Lala   MRN:  [id number]   :  1950   Today's Date:  2023       Arriving for surgery:  Surgery date:  10/3/23  Arrival time:  06:30 am      Please come to:     [facility] [address] [address]     Parking is available in front of the [facility] building from 5:30AM to 8:00PM.  -  Proceed to the 5th floor to check into the Ambulatory Surgery Center.              >> There will be patient concierges on the 1st and 5th floor, for assistance or an escort, if you would like.              >> Please call [phone] with any questions.    What can I eat or drink?  -  You may eat and drink normally up to 8 hours prior to arrival time.   -  You may have clear liquids until 2 hours prior to arrival time.     Examples of clear liquids:  Water  Clear broth  Juices (apple, white grape, white cranberry  and cider) without pulp  Noncarbonated, powder based beverages  (lemonade and Arymond-Aid)  Sodas (Sprite, 7-Up, ginger ale and seltzer)  Coffee or tea (without milk or cream)  Gatorade    -  No Alcohol or cannabis products for at least 24 hours before surgery.     Which medicines can I take?    Hold Aspirin for 7 days before surgery.   Hold Multivitamins for 7 days before surgery.  Hold Supplements (calcium + D) for 7 days before surgery.  Hold Ibuprofen (Advil, Motrin) for 1 day(s) before surgery--unless otherwise directed by surgeon.  Hold Naproxen (Aleve) for 4 days before surgery.    -  DO NOT take these medications the day of surgery:  Lisinopril.    -  PLEASE TAKE these medications the day of surgery:  Tylenol if needed; take prilosec and diltiazem and eye drops.  Bring inhaler to surgery if currently using.    How do I prepare myself?  - Please take 2 showers (one the night prior to surgery and one the morning of surgery) using Scrubcare or Hibiclens soap.    Use this soap  only from the neck to your toes.     Leave the soap on your skin for one minute--then rinse thoroughly.      You may use your own shampoo and conditioner. No other hair products.   - Please remove all jewelry and body piercings.  - No lotions, deodorants or fragrance.  - No makeup or fingernail polish.   - Bring your ID and insurance card.    -If you have a Deep Brain Stimulator, Spinal Cord Stimulator, or any Neuro Stimulator device---you must bring the remote control to the hospital.      ALL PATIENTS GOING HOME THE SAME DAY OF SURGERY ARE REQUIRED TO HAVE A RESPONSIBLE ADULT TO DRIVE AND BE IN ATTENDANCE WITH THEM FOR 24 HOURS FOLLOWING SURGERY.    Covid testing policy as of 12/06/2022  Your surgeon will notify and schedule you for a COVID test if one is needed before surgery--please direct any questions or COVID symptoms to your surgeon      Questions or Concerns:    - For any questions regarding the day of surgery or your hospital stay, please contact the Pre Admission Nursing Office at [phone].       - If you have health changes between today and your surgery, please call your surgeon.       - For questions after surgery, please call your surgeons office.           Current Visitor Guidelines    You may have 2 visitors in the pre op area.    Visiting hours: 8 a.m. to 8:30 p.m.    You may have four visitors during your inpatient hospital stay.    Patients confirmed or suspected to have symptoms of COVID 19 or flu:     No visitors allowed for adult patients.   Children (under age 18) can have 1 named visitor.     People who are sick or showing symptoms of COVID 19 or flu:    Are not allowed to visit patients--we can only make exceptions in special situations.       Please follow these guidelines for your visit:          Please maintain social distance          Masking is optional--however at times you may be asked to wear a mask for the safety of yourself and others     Clean your hands with alcohol hand  . Do this when you arrive at and leave the building and patient room,    And again after you touch your mask or anything in the room.     Go directly to and from the room you are visiting.     Stay in the patient s room during your visit. Limit going to other places in the hospital as much as possible     Leave bags and jackets at home or in the car.     For everyone s health, please don t come and go during your visit. That includes for smoking   during your visit.

## 2023-09-27 ENCOUNTER — HOSPITAL ENCOUNTER (OUTPATIENT)
Dept: NUCLEAR MEDICINE | Facility: CLINIC | Age: 73
Setting detail: NUCLEAR MEDICINE
Discharge: HOME | End: 2023-09-27
Payer: COMMERCIAL

## 2023-09-27 ENCOUNTER — HOSPITAL ENCOUNTER (OUTPATIENT)
Dept: CARDIOLOGY | Facility: CLINIC | Age: 73
Discharge: HOME | End: 2023-09-27
Payer: COMMERCIAL

## 2023-09-27 DIAGNOSIS — Z01.818 PREOP EXAMINATION: ICD-10-CM

## 2023-09-27 DIAGNOSIS — R06.09 DYSPNEA ON EXERTION: ICD-10-CM

## 2023-09-27 LAB
CV STRESS MAX HR HE: 90
RATE PRESSURE PRODUCT: NORMAL
STRESS ECHO BASELINE DIASTOLIC HE: 70
STRESS ECHO BASELINE HR: 67 BPM
STRESS ECHO BASELINE SYSTOLIC BP: 117
STRESS ECHO CALCULATED PERCENT HR: 62 %
STRESS ECHO LAST STRESS DIASTOLIC BP: 54
STRESS ECHO LAST STRESS SYSTOLIC BP: 117
STRESS ECHO TARGET HR: 146

## 2023-09-27 PROCEDURE — 343N000001 HC RX 343

## 2023-09-27 PROCEDURE — 93017 CV STRESS TEST TRACING ONLY: CPT

## 2023-09-27 PROCEDURE — 78452 HT MUSCLE IMAGE SPECT MULT: CPT

## 2023-09-27 PROCEDURE — 93016 CV STRESS TEST SUPVJ ONLY: CPT | Performed by: STUDENT IN AN ORGANIZED HEALTH CARE EDUCATION/TRAINING PROGRAM

## 2023-09-27 PROCEDURE — 93018 CV STRESS TEST I&R ONLY: CPT | Performed by: INTERNAL MEDICINE

## 2023-09-27 PROCEDURE — 250N000011 HC RX IP 250 OP 636: Performed by: INTERNAL MEDICINE

## 2023-09-27 PROCEDURE — 78452 HT MUSCLE IMAGE SPECT MULT: CPT | Mod: 26 | Performed by: INTERNAL MEDICINE

## 2023-09-27 PROCEDURE — A9502 TC99M TETROFOSMIN: HCPCS

## 2023-09-27 RX ORDER — REGADENOSON 0.08 MG/ML
0.4 INJECTION, SOLUTION INTRAVENOUS ONCE
Status: COMPLETED | OUTPATIENT
Start: 2023-09-27 | End: 2023-09-27

## 2023-09-27 RX ORDER — CAFFEINE CITRATE 20 MG/ML
60 SOLUTION INTRAVENOUS
Status: DISCONTINUED | OUTPATIENT
Start: 2023-09-27 | End: 2023-09-28 | Stop reason: HOSPADM

## 2023-09-27 RX ORDER — AMINOPHYLLINE 25 MG/ML
50-100 INJECTION, SOLUTION INTRAVENOUS
Status: DISCONTINUED | OUTPATIENT
Start: 2023-09-27 | End: 2023-09-28 | Stop reason: HOSPADM

## 2023-09-27 RX ORDER — ACYCLOVIR 200 MG/1
0-1 CAPSULE ORAL
Status: DISCONTINUED | OUTPATIENT
Start: 2023-09-27 | End: 2023-09-28 | Stop reason: HOSPADM

## 2023-09-27 RX ORDER — ALBUTEROL SULFATE 90 UG/1
2 AEROSOL, METERED RESPIRATORY (INHALATION) EVERY 5 MIN PRN
Status: DISCONTINUED | OUTPATIENT
Start: 2023-09-27 | End: 2023-09-28 | Stop reason: HOSPADM

## 2023-09-27 RX ADMIN — TETROFOSMIN 38.3 MILLICURIE: 1.38 INJECTION, POWDER, LYOPHILIZED, FOR SOLUTION INTRAVENOUS at 09:56

## 2023-09-27 RX ADMIN — TETROFOSMIN 10.1 MILLICURIE: 1.38 INJECTION, POWDER, LYOPHILIZED, FOR SOLUTION INTRAVENOUS at 09:01

## 2023-09-27 RX ADMIN — REGADENOSON 0.4 MG: 0.08 INJECTION, SOLUTION INTRAVENOUS at 09:54

## 2023-09-28 ENCOUNTER — E-VISIT (OUTPATIENT)
Dept: FAMILY MEDICINE | Facility: CLINIC | Age: 73
End: 2023-09-28
Payer: COMMERCIAL

## 2023-09-28 DIAGNOSIS — K21.9 GASTROESOPHAGEAL REFLUX DISEASE, UNSPECIFIED WHETHER ESOPHAGITIS PRESENT: Primary | ICD-10-CM

## 2023-09-28 PROCEDURE — 99421 OL DIG E/M SVC 5-10 MIN: CPT | Performed by: PHYSICIAN ASSISTANT

## 2023-10-02 DIAGNOSIS — H04.129 DRY EYE: ICD-10-CM

## 2023-10-02 DIAGNOSIS — H16.223 KERATITIS SICCA, BILATERAL: ICD-10-CM

## 2023-10-02 ASSESSMENT — ENCOUNTER SYMPTOMS
ORTHOPNEA: 0
SEIZURES: 0

## 2023-10-02 ASSESSMENT — COPD QUESTIONNAIRES: COPD: 0

## 2023-10-02 ASSESSMENT — LIFESTYLE VARIABLES: TOBACCO_USE: 1

## 2023-10-02 NOTE — ANESTHESIA PREPROCEDURE EVALUATION
Anesthesia Pre-Procedure Evaluation    Patient: Maddie Lala   MRN: [id number] : 1950        Procedure : Procedure(s):  CHOLECYSTECTOMY, ROBOT-ASSISTED, LAPAROSCOPIC, WITHOUT CHOLANGIOGRAM          Past Medical History:   Diagnosis Date    Allergic rhinitis     Arthritis     Benign essential hypertension 2018    Cervical dysplasia with cone in     nl pap ever since     COPD (chronic obstructive pulmonary disease) (H)     Dyspareunia     Hearing problem     Meniere's disease     Nonsenile cataract     Plantar fasciitis     Stress incontinence     SVT (supraventricular tachycardia) (H)     resolved    Tinnitus       Past Surgical History:   Procedure Laterality Date    ARTHROPLASTY KNEE Left 10/10/2018    Procedure: ARTHROPLASTY KNEE;  Left Total Knee Arthroplasty    ;  Surgeon: James Amaya MD;  Location: [facility]    BLADDER SURGERY      CATARACT IOL, RT/LT      COLONOSCOPY N/A 10/11/2021    Procedure: Colonoscopy, With Polypectomy And Biopsy;  Surgeon: Kavita Huizar MD;  Location: [facility]    COLONOSCOPY WITH CO2 INSUFFLATION N/A 10/19/2016    Procedure: COLONOSCOPY WITH CO2 INSUFFLATION;  Surgeon: Duane, William Charles, MD;  Location: [facility]    COLONOSCOPY WITH CO2 INSUFFLATION N/A 10/11/2021    Procedure: COLONOSCOPY, WITH CO2 INSUFFLATION;  Surgeon: Kavita Huizar MD;  Location: [facility]    ENDOSCOPIC ULTRASOUND UPPER GASTROINTESTINAL TRACT (GI) N/A 8/15/2023    Procedure: Endoscopic ultrasound upper gastrointestinal tract (GI);  Surgeon: Guru Jose C Skinner MD;  Location: [facility]    GENITOURINARY SURGERY      bladder    Midurethral sling with cystoscopy.  2010    TUBAL LIGATION        Allergies   Allergen Reactions    Sulfa Antibiotics Rash      Social History     Tobacco Use    Smoking status: Former     Packs/day: 1.00     Years: 10.00     Pack years: 10.00     Types: Cigarettes     Quit date: 1981     Years since quittin.7     Passive  exposure: Never    Smokeless tobacco: Never   Substance Use Topics    Alcohol use: Yes     Alcohol/week: 7.0 standard drinks of alcohol     Types: 7 Glasses of wine per week     Comment: occ - Glass of white wine per night      Wt Readings from Last 1 Encounters:   09/20/23 73.5 kg (162 lb)        Anesthesia Evaluation   Pt has had prior anesthetic.     No history of anesthetic complications       ROS/MED HX  ENT/Pulmonary: Comment: - Meniere's disease     (+)     ALINE risk factors,  hypertension,    allergic rhinitis,     tobacco use,                    (-) asthma, COPD, sleep apnea and recent URI   Neurologic:  - neg neurologic ROS   (+)    no peripheral neuropathy  migraines (none recently),                       (-) no seizures and no CVA   Cardiovascular: Comment: - Hx of SVT episode > 20 years ago. No recurrence.     (+)  hypertension- -   -  - -                                 Previous cardiac testing   Echo: Date: Results:    Stress Test:  Date: Results:    ECG Reviewed:  Date: 2018 Results:  SR  Cath:  Date: Results:   (-) CAD, CHF, orthopnea/PND, irregular heartbeat/palpitations and dyslipidemia   METS/Exercise Tolerance: >4 METS Comment: - Walking 3 miles x 5 days per week  - Weight lifting x 3 days per week   - Can ascend 1 flight of stairs continuously with mild SOB.    Hematologic:  - neg hematologic  ROS  (-) history of blood clots and history of blood transfusion   Musculoskeletal: Comment: - s/p left total knee replacement       GI/Hepatic:     (+) GERD, Asymptomatic on medication,        cholecystitis/cholelithiasis,       (-) liver disease   Renal/Genitourinary:    (-) renal disease   Endo:    (-) Type II DM and thyroid disease   Psychiatric/Substance Use:  - neg psychiatric ROS     Infectious Disease:     (+)      TB (Latent TB. Was treated x 9 months in 1979,),      Malignancy:  - neg malignancy ROS (+) Malignancy, History of Other.Other CA cervical CA Remission status post Surgery.    Other:             Physical Exam    Airway        Mallampati: II       Respiratory Devices and Support         Dental       (+) Modest Abnormalities - crowns, retainers, 1 or 2 missing teeth    B=Bridge, C=Chipped, L=Loose, M=Missing    Cardiovascular   cardiovascular exam normal          Pulmonary   pulmonary exam normal                OUTSIDE LABS:  CBC:   Lab Results   Component Value Date    WBC 4.8 09/20/2023    WBC 5.0 11/21/2022    HGB 12.3 09/20/2023    HGB 11.8 11/21/2022    HCT 35.6 09/20/2023    HCT 35.0 11/21/2022     09/20/2023     11/21/2022     BMP:   Lab Results   Component Value Date     08/15/2023     11/21/2022    POTASSIUM 3.7 08/15/2023    POTASSIUM 4.5 11/21/2022    CHLORIDE 102 08/15/2023    CHLORIDE 104 11/21/2022    CO2 28 08/15/2023    CO2 31 11/21/2022    BUN 13.2 08/15/2023    BUN 12 11/21/2022    CR 0.58 08/15/2023    CR 0.58 11/21/2022    GLC 97 08/15/2023     (H) 11/21/2022     COAGS:   Lab Results   Component Value Date    INR 0.95 09/27/2018     POC:   Lab Results   Component Value Date    BGM 97 10/10/2018     HEPATIC:   Lab Results   Component Value Date    ALBUMIN 3.9 08/15/2023    PROTTOTAL 6.2 (L) 08/15/2023    ALT 32 08/15/2023    AST 34 08/15/2023     (H) 06/07/2023    ALKPHOS 89 08/15/2023    BILITOTAL 0.3 08/15/2023     OTHER:   Lab Results   Component Value Date    ANGELITA 8.7 (L) 08/15/2023    PHOS 3.4 01/21/2015    TSH 2.78 11/16/2017    CRP <2.9 11/21/2022    SED 9 11/21/2022       Anesthesia Plan    ASA Status:  3    NPO Status:  NPO Appropriate    Anesthesia Type: General.     - Airway: ETT              Consents    Anesthesia Plan(s) and associated risks, benefits, and realistic alternatives discussed. Questions answered and patient/representative(s) expressed understanding.     - Discussed: Risks, Benefits and Alternatives for BOTH SEDATION and the PROCEDURE were discussed     - Discussed with:  Patient      - Extended Intubation/Ventilatory  Support Discussed: No.      - Patient is DNR/DNI Status: No     Use of blood products discussed: No .     Postoperative Care       PONV prophylaxis: Ondansetron (or other 5HT-3), Dexamethasone or Solumedrol     Comments:           H&P reviewed: Unable to attach H&P to encounter due to EHR limitations. H&P Update: appropriate H&P reviewed, patient examined. No interval changes since H&P (within 30 days).         Jillian Barahona MD

## 2023-10-03 ENCOUNTER — HOSPITAL ENCOUNTER (OUTPATIENT)
Facility: AMBULATORY SURGERY CENTER | Age: 73
Discharge: HOME | End: 2023-10-03
Attending: SURGERY
Payer: COMMERCIAL

## 2023-10-03 ENCOUNTER — ANESTHESIA (OUTPATIENT)
Dept: SURGERY | Facility: AMBULATORY SURGERY CENTER | Age: 73
End: 2023-10-03
Payer: COMMERCIAL

## 2023-10-03 VITALS
HEIGHT: 66 IN | HEART RATE: 65 BPM | BODY MASS INDEX: 24.91 KG/M2 | WEIGHT: 155 LBS | SYSTOLIC BLOOD PRESSURE: 129 MMHG | RESPIRATION RATE: 14 BRPM | OXYGEN SATURATION: 95 % | DIASTOLIC BLOOD PRESSURE: 69 MMHG | TEMPERATURE: 97 F

## 2023-10-03 DIAGNOSIS — K80.20 CALCULUS OF GALLBLADDER WITHOUT CHOLECYSTITIS WITHOUT OBSTRUCTION: Primary | ICD-10-CM

## 2023-10-03 DIAGNOSIS — H10.13 ALLERGIC CONJUNCTIVITIS OF BOTH EYES: ICD-10-CM

## 2023-10-03 PROCEDURE — 88304 TISSUE EXAM BY PATHOLOGIST: CPT | Mod: TC | Performed by: SURGERY

## 2023-10-03 PROCEDURE — 47562 LAPAROSCOPIC CHOLECYSTECTOMY: CPT | Performed by: SURGERY

## 2023-10-03 PROCEDURE — 88304 TISSUE EXAM BY PATHOLOGIST: CPT | Mod: 26 | Performed by: PATHOLOGY

## 2023-10-03 PROCEDURE — 47562 LAPAROSCOPIC CHOLECYSTECTOMY: CPT

## 2023-10-03 RX ORDER — ONDANSETRON 2 MG/ML
4 INJECTION INTRAMUSCULAR; INTRAVENOUS EVERY 30 MIN PRN
Status: DISCONTINUED | OUTPATIENT
Start: 2023-10-03 | End: 2023-10-04 | Stop reason: HOSPADM

## 2023-10-03 RX ORDER — PROPOFOL 10 MG/ML
INJECTION, EMULSION INTRAVENOUS CONTINUOUS PRN
Status: DISCONTINUED | OUTPATIENT
Start: 2023-10-03 | End: 2023-10-03

## 2023-10-03 RX ORDER — OXYCODONE HYDROCHLORIDE 5 MG/1
5 TABLET ORAL
Status: COMPLETED | OUTPATIENT
Start: 2023-10-03 | End: 2023-10-03

## 2023-10-03 RX ORDER — OXYCODONE HYDROCHLORIDE 5 MG/1
10 TABLET ORAL
Status: DISCONTINUED | OUTPATIENT
Start: 2023-10-03 | End: 2023-10-04 | Stop reason: HOSPADM

## 2023-10-03 RX ORDER — LABETALOL HYDROCHLORIDE 5 MG/ML
10 INJECTION, SOLUTION INTRAVENOUS
Status: DISCONTINUED | OUTPATIENT
Start: 2023-10-03 | End: 2023-10-04 | Stop reason: HOSPADM

## 2023-10-03 RX ORDER — ONDANSETRON 2 MG/ML
INJECTION INTRAMUSCULAR; INTRAVENOUS PRN
Status: DISCONTINUED | OUTPATIENT
Start: 2023-10-03 | End: 2023-10-03

## 2023-10-03 RX ORDER — ACETAMINOPHEN 325 MG/1
975 TABLET ORAL ONCE
Status: COMPLETED | OUTPATIENT
Start: 2023-10-03 | End: 2023-10-03

## 2023-10-03 RX ORDER — INDOCYANINE GREEN AND WATER 25 MG
2.5 KIT INJECTION ONCE
Status: COMPLETED | OUTPATIENT
Start: 2023-10-03 | End: 2023-10-03

## 2023-10-03 RX ORDER — CEFAZOLIN SODIUM 2 G/50ML
2 SOLUTION INTRAVENOUS SEE ADMIN INSTRUCTIONS
Status: DISCONTINUED | OUTPATIENT
Start: 2023-10-03 | End: 2023-10-03 | Stop reason: HOSPADM

## 2023-10-03 RX ORDER — HYDROMORPHONE HYDROCHLORIDE 1 MG/ML
0.4 INJECTION, SOLUTION INTRAMUSCULAR; INTRAVENOUS; SUBCUTANEOUS EVERY 5 MIN PRN
Status: DISCONTINUED | OUTPATIENT
Start: 2023-10-03 | End: 2023-10-04 | Stop reason: HOSPADM

## 2023-10-03 RX ORDER — KETOROLAC TROMETHAMINE 30 MG/ML
INJECTION, SOLUTION INTRAMUSCULAR; INTRAVENOUS PRN
Status: DISCONTINUED | OUTPATIENT
Start: 2023-10-03 | End: 2023-10-03

## 2023-10-03 RX ORDER — ONDANSETRON 4 MG/1
4 TABLET, ORALLY DISINTEGRATING ORAL EVERY 30 MIN PRN
Status: DISCONTINUED | OUTPATIENT
Start: 2023-10-03 | End: 2023-10-04 | Stop reason: HOSPADM

## 2023-10-03 RX ORDER — FENTANYL CITRATE 50 UG/ML
50 INJECTION, SOLUTION INTRAMUSCULAR; INTRAVENOUS EVERY 5 MIN PRN
Status: DISCONTINUED | OUTPATIENT
Start: 2023-10-03 | End: 2023-10-04 | Stop reason: HOSPADM

## 2023-10-03 RX ORDER — SODIUM CHLORIDE, SODIUM LACTATE, POTASSIUM CHLORIDE, CALCIUM CHLORIDE 600; 310; 30; 20 MG/100ML; MG/100ML; MG/100ML; MG/100ML
INJECTION, SOLUTION INTRAVENOUS CONTINUOUS
Status: DISCONTINUED | OUTPATIENT
Start: 2023-10-03 | End: 2023-10-03 | Stop reason: HOSPADM

## 2023-10-03 RX ORDER — EPHEDRINE SULFATE 50 MG/ML
INJECTION, SOLUTION INTRAMUSCULAR; INTRAVENOUS; SUBCUTANEOUS PRN
Status: DISCONTINUED | OUTPATIENT
Start: 2023-10-03 | End: 2023-10-03

## 2023-10-03 RX ORDER — PROPOFOL 10 MG/ML
INJECTION, EMULSION INTRAVENOUS PRN
Status: DISCONTINUED | OUTPATIENT
Start: 2023-10-03 | End: 2023-10-03

## 2023-10-03 RX ORDER — LIDOCAINE HYDROCHLORIDE 20 MG/ML
INJECTION, SOLUTION INFILTRATION; PERINEURAL PRN
Status: DISCONTINUED | OUTPATIENT
Start: 2023-10-03 | End: 2023-10-03

## 2023-10-03 RX ORDER — CEFAZOLIN SODIUM 2 G/50ML
2 SOLUTION INTRAVENOUS
Status: COMPLETED | OUTPATIENT
Start: 2023-10-03 | End: 2023-10-03

## 2023-10-03 RX ORDER — FENTANYL CITRATE 50 UG/ML
INJECTION, SOLUTION INTRAMUSCULAR; INTRAVENOUS PRN
Status: DISCONTINUED | OUTPATIENT
Start: 2023-10-03 | End: 2023-10-03

## 2023-10-03 RX ORDER — GLYCOPYRROLATE 0.2 MG/ML
INJECTION, SOLUTION INTRAMUSCULAR; INTRAVENOUS PRN
Status: DISCONTINUED | OUTPATIENT
Start: 2023-10-03 | End: 2023-10-03

## 2023-10-03 RX ORDER — FENTANYL CITRATE 50 UG/ML
25 INJECTION, SOLUTION INTRAMUSCULAR; INTRAVENOUS EVERY 5 MIN PRN
Status: DISCONTINUED | OUTPATIENT
Start: 2023-10-03 | End: 2023-10-04 | Stop reason: HOSPADM

## 2023-10-03 RX ORDER — OXYCODONE HYDROCHLORIDE 5 MG/1
5-10 TABLET ORAL EVERY 4 HOURS PRN
Qty: 20 TABLET | Refills: 0 | Status: SHIPPED | OUTPATIENT
Start: 2023-10-03 | End: 2023-11-06

## 2023-10-03 RX ORDER — HYDROMORPHONE HYDROCHLORIDE 1 MG/ML
0.2 INJECTION, SOLUTION INTRAMUSCULAR; INTRAVENOUS; SUBCUTANEOUS EVERY 5 MIN PRN
Status: DISCONTINUED | OUTPATIENT
Start: 2023-10-03 | End: 2023-10-04 | Stop reason: HOSPADM

## 2023-10-03 RX ORDER — LIDOCAINE 40 MG/G
CREAM TOPICAL
Status: DISCONTINUED | OUTPATIENT
Start: 2023-10-03 | End: 2023-10-03 | Stop reason: HOSPADM

## 2023-10-03 RX ORDER — DEXAMETHASONE SODIUM PHOSPHATE 4 MG/ML
INJECTION, SOLUTION INTRA-ARTICULAR; INTRALESIONAL; INTRAMUSCULAR; INTRAVENOUS; SOFT TISSUE PRN
Status: DISCONTINUED | OUTPATIENT
Start: 2023-10-03 | End: 2023-10-03

## 2023-10-03 RX ORDER — HYDRALAZINE HYDROCHLORIDE 20 MG/ML
2.5-5 INJECTION INTRAMUSCULAR; INTRAVENOUS EVERY 10 MIN PRN
Status: DISCONTINUED | OUTPATIENT
Start: 2023-10-03 | End: 2023-10-04 | Stop reason: HOSPADM

## 2023-10-03 RX ORDER — SODIUM CHLORIDE, SODIUM LACTATE, POTASSIUM CHLORIDE, CALCIUM CHLORIDE 600; 310; 30; 20 MG/100ML; MG/100ML; MG/100ML; MG/100ML
INJECTION, SOLUTION INTRAVENOUS CONTINUOUS
Status: DISCONTINUED | OUTPATIENT
Start: 2023-10-03 | End: 2023-10-04 | Stop reason: HOSPADM

## 2023-10-03 RX ADMIN — Medication 5 MG: at 08:59

## 2023-10-03 RX ADMIN — CEFAZOLIN SODIUM 2 G: 2 SOLUTION INTRAVENOUS at 08:03

## 2023-10-03 RX ADMIN — EPHEDRINE SULFATE 5 MG: 50 INJECTION, SOLUTION INTRAMUSCULAR; INTRAVENOUS; SUBCUTANEOUS at 08:50

## 2023-10-03 RX ADMIN — ACETAMINOPHEN 975 MG: 325 TABLET ORAL at 07:25

## 2023-10-03 RX ADMIN — FENTANYL CITRATE 100 MCG: 50 INJECTION, SOLUTION INTRAMUSCULAR; INTRAVENOUS at 08:05

## 2023-10-03 RX ADMIN — Medication 200 MCG: at 08:49

## 2023-10-03 RX ADMIN — Medication 5 MG: at 09:02

## 2023-10-03 RX ADMIN — ONDANSETRON 4 MG: 2 INJECTION INTRAMUSCULAR; INTRAVENOUS at 09:03

## 2023-10-03 RX ADMIN — Medication 200 MCG: at 08:38

## 2023-10-03 RX ADMIN — INDOCYANINE GREEN AND WATER 2.5 MG: KIT at 07:49

## 2023-10-03 RX ADMIN — Medication 50 MG: at 08:05

## 2023-10-03 RX ADMIN — Medication 200 MCG: at 08:21

## 2023-10-03 RX ADMIN — FENTANYL CITRATE 50 MCG: 50 INJECTION, SOLUTION INTRAMUSCULAR; INTRAVENOUS at 09:42

## 2023-10-03 RX ADMIN — LIDOCAINE HYDROCHLORIDE 100 MG: 20 INJECTION, SOLUTION INFILTRATION; PERINEURAL at 08:05

## 2023-10-03 RX ADMIN — SODIUM CHLORIDE, SODIUM LACTATE, POTASSIUM CHLORIDE, CALCIUM CHLORIDE: 600; 310; 30; 20 INJECTION, SOLUTION INTRAVENOUS at 09:28

## 2023-10-03 RX ADMIN — PROPOFOL 150 MCG/KG/MIN: 10 INJECTION, EMULSION INTRAVENOUS at 08:05

## 2023-10-03 RX ADMIN — GLYCOPYRROLATE 0.2 MG: 0.2 INJECTION, SOLUTION INTRAMUSCULAR; INTRAVENOUS at 08:31

## 2023-10-03 RX ADMIN — SODIUM CHLORIDE, SODIUM LACTATE, POTASSIUM CHLORIDE, CALCIUM CHLORIDE: 600; 310; 30; 20 INJECTION, SOLUTION INTRAVENOUS at 08:00

## 2023-10-03 RX ADMIN — DEXAMETHASONE SODIUM PHOSPHATE 4 MG: 4 INJECTION, SOLUTION INTRA-ARTICULAR; INTRALESIONAL; INTRAMUSCULAR; INTRAVENOUS; SOFT TISSUE at 08:11

## 2023-10-03 RX ADMIN — PROPOFOL 130 MCG/KG/MIN: 10 INJECTION, EMULSION INTRAVENOUS at 08:48

## 2023-10-03 RX ADMIN — Medication 0.5 MG: at 09:00

## 2023-10-03 RX ADMIN — KETOROLAC TROMETHAMINE 15 MG: 30 INJECTION, SOLUTION INTRAMUSCULAR; INTRAVENOUS at 09:17

## 2023-10-03 RX ADMIN — FENTANYL CITRATE 50 MCG: 50 INJECTION, SOLUTION INTRAMUSCULAR; INTRAVENOUS at 09:52

## 2023-10-03 RX ADMIN — Medication 100 MCG: at 08:31

## 2023-10-03 RX ADMIN — OXYCODONE HYDROCHLORIDE 5 MG: 5 TABLET ORAL at 09:52

## 2023-10-03 RX ADMIN — Medication 200 MCG: at 08:33

## 2023-10-03 RX ADMIN — GLYCOPYRROLATE 0.2 MG: 0.2 INJECTION, SOLUTION INTRAMUSCULAR; INTRAVENOUS at 08:11

## 2023-10-03 RX ADMIN — PROPOFOL 200 MG: 10 INJECTION, EMULSION INTRAVENOUS at 08:05

## 2023-10-03 RX ADMIN — FENTANYL CITRATE 50 MCG: 50 INJECTION, SOLUTION INTRAMUSCULAR; INTRAVENOUS at 09:10

## 2023-10-03 NOTE — TELEPHONE ENCOUNTER
fluorometholone (FML LIQUIFILM) 0.1 % ophthalmic suspension   5 mL 0 7/11/2023     Last Office Visit : 2-  Future Office visit:  11-6-2023

## 2023-10-03 NOTE — DISCHARGE INSTRUCTIONS
[facility]  Home Care Following Anesthesia  For 24 hours after surgery:  Get plenty of rest.  A responsible adult must stay with you for at least 24 hours after you leave the surgery center.  Do not drive or use heavy equipment.  If you have weakness or tingling, don't drive or use heavy equipment until this feeling goes away.   Do not drink alcohol.   Avoid strenuous or risky activities.  Ask for help when climbing stairs.  You may feel lightheaded.  IF so, sit for a few minutes before standing.  Have someone help you get up.   If you have nausea (feel sick to your stomach): Drink only clear liquids such as apple juice, ginger ale, broth or 7-Up.  Rest may also help.  Be sure to drink enough fluids.  Move to a regular diet as you feel able.   You may have a slight fever.  Call the doctor if your fever is over 100 F (37.7 C) (taken under the tongue) or lasts longer than 24 hours.  You may have a dry mouth, a sore throat, muscle aches or trouble sleeping. These should go away after 24 hours.  Do not make important or legal decisions.   It is recommended to avoid smoking.               Tips for taking pain medications  To get the best pain relief possible, remember these points:  Take pain medications as directed, before pain becomes severe.  Pain medication can upset your stomach: taking it with food may help.  Constipation is a common side effect of pain medication. Drink plenty of  fluids.  Eat foods high in fiber. Take a stool softener if recommended by your doctor or pharmacist.  Do not drink alcohol, drive or operate machinery while taking pain medications.  Ask about other ways to control pain, such as with heat, ice or relaxation.    Tylenol/Acetaminophen Consumption    If you feel your pain relief is insufficient, you may take Tylenol/Acetaminophen in addition to your narcotic pain medication.   Be careful not to exceed 4,000 mg of Tylenol/Acetaminophen in a 24 hour  period from all sources.  If you are taking extra strength Tylenol/acetaminophen (500 mg), the maximum dose is 8 tablets in 24 hours.  If you are taking regular strength acetaminophen (325 mg), the maximum dose is 12 tablets in 24 hours.  You received Tylenol at 0730 AM, you may take this medication again at 130PM as needed for pain.   You received a medication similar to Ibuprofen at 0900 AM, you may take ibuprofen again at 3 Pm as needed for pain.     Call a doctor for any of the following:  Signs of infection (fever, growing tenderness at the surgery site, a large amount of drainage or bleeding, severe pain, foul-smelling drainage, redness, swelling).  It has been over 8 to 10 hours since surgery and you are still not able to urinate (pass water).  Headache for over 24 hours.  Numbness, tingling or weakness the day after surgery (if you had spinal anesthesia).  Signs of Covid-19 infection (temperature over 100 degrees, shortness of breath, cough, loss of taste/smell, generalized body aches, persistent headache, chills, sore throat, nausea/vomiting/diarrhea)  Your doctor is:       Dr. Raulito Laurent, General Surgery: [phone]               Or dial [phone] and ask for the resident on call for:  General Surgery  For emergency care, call the:  [facility]:  [phone] (TTY for hearing impaired: [phone])

## 2023-10-03 NOTE — ANESTHESIA PROCEDURE NOTES
Airway       Patient location during procedure: OR       Procedure Start/Stop Times: 10/3/2023 8:07 AM  Staff -        Performed By: CRNAIndications and Patient Condition       Indications for airway management: quique-procedural        Final Airway Details       Final airway type: endotracheal airway       Successful airway: ETT - single  Endotracheal Airway Details        ETT size (mm): 7.0       Cuffed: yes       Cuff volume (mL): 6       Successful intubation technique: direct laryngoscopy       DL Blade Type: Roy 2       Grade View of Cords: 1       Adjucts: stylet       Position: Right       Measured from: lips       Secured at (cm): 20       Bite block used: Oral Airway    Post intubation assessment        Placement verified by: capnometry, equal breath sounds and chest rise        Number of attempts at approach: 1       Secured with: pink tape       Ease of procedure: easy       Dentition: Intact and Unchanged    Medication(s) Administered   Medication Administration Time: 10/3/2023 8:07 AM

## 2023-10-03 NOTE — ANESTHESIA POSTPROCEDURE EVALUATION
Patient: Maddie Lala    Procedure: Procedure(s):  CHOLECYSTECTOMY, ROBOT-ASSISTED, LAPAROSCOPIC, WITHOUT CHOLANGIOGRAM       Anesthesia Type:  General    Note:  Disposition: Outpatient   Postop Pain Control: Uneventful            Sign Out: Well controlled pain   PONV: No   Neuro/Psych: Uneventful            Sign Out: Acceptable/Baseline neuro status   Airway/Respiratory: Uneventful            Sign Out: Acceptable/Baseline resp. status   CV/Hemodynamics: Uneventful            Sign Out: Acceptable CV status; No obvious hypovolemia; No obvious fluid overload   Other NRE: NONE   DID A NON-ROUTINE EVENT OCCUR? No           Last vitals:  Vitals Value Taken Time   /68 10/03/23 1008   Temp 36.4  C (97.5  F) 10/03/23 1008   Pulse 65 10/03/23 1008   Resp 15 10/03/23 1008   SpO2 95 % 10/03/23 1008       Electronically Signed By: Jillian Barahona MD  October 3, 2023  11:05 AM

## 2023-10-03 NOTE — OP NOTE
[facility] [place]    Operative Note    Pre-operative diagnosis: Calculus of gallbladder without cholecystitis without obstruction [K80.20];    Post-operative diagnosis *[id number] calculous cholecystitis   Procedure: Procedure(s):  CHOLECYSTECTOMY, ROBOT-ASSISTED, LAPAROSCOPIC, WITHOUT CHOLANGIOGRAM   Surgeon: Surgeon(s) and Role:     * Raulito Laurent MD - Primary   Anesthesia: General    Estimated blood loss: Less than 10 ml   Drains: None   Specimens: ID Type Source Tests Collected by Time Destination   1 : Gallbladder and contents Tissue Gallbladder SURGICAL PATHOLOGY EXAM Raulito Laurent MD 10/3/2023  9:12 AM       Findings: Chronically inflamed ghallbladder with evidence of stones; signfiicant omental adhesions   Complications: None.   Implants: None.       OPERATIVE INDICATIONS:  Maddie Lala is a 73 year old female with a history of abdominal pain associated with abnormal findings on right upper quadrant ultrasound.      After understanding the risks and benefits of proceeding with surgery, the patient has an indication for laparoscopic cholecystectomy and consented to undergo surgery.      Prior to surgery, I reviewed the risks of gallbladder removal with this patient.    The risk discussion included, but was not limited to, death, myocardial infarction, pneumonia, urinary tract infection, deep venous thrombosis with or without pulmonary embolus, abdominal infection from bowel injury or abscess, bowel obstruction, wound infection, and bleeding.    Specific risks related to cholecystectomy include bile duct injury (3-4/1000), bile leak (10/1000), retained common bile duct stone (10/1000), postcholecystectomy diarrhea (1-2%) and these complications may require additional treatment.    The potential that gallbladder removal will NOT be of benefit was also reviewed.    Furthermore, alternative therapies and the option for no therapy were also  reviewed.    Postoperative treatment and expected follow-up were also reviewed.          OPERATIVE DETAILS:      The patient was brought to the operating room and prepared in a routine fashion.  A timeout was performed prior to surgery and documented by the nursing team. Placed flexed and reverse Trendelenberg.     Under the benefits of general anesthesia, a left upper quadrant Veress needle was inserted and pneumoperitoneum was established using carbon dioxide gas to a maximum pressure of 15 mmHg.  A total of 4 8mm ports were placed and the laparoscope was utilized from the umbilical port.       Instruments inserted and robot docked.    Numerous adhesions to the gallbladder were taken down with cautery dissection.     The gallbladder was grasped at its fundus and retracted cephalad.  It was also grasped at its infundibulum and retracted laterally.       Findings related to the gallbladder are as noted above.     A complete dissection starting on the gallbladder, identifying the cystic artery on the surface of the gallbladder, was performed.  The cystic artery in this manner was  away from the gallbladder. The infundibulum of the gallbladder and the junction of gallbladder and cystic duct were clearly and completely identified with cautery dissection.  All of this dissection was performed on the gallbladder wall and clearly above the triangle of Calot.    The peritoneum on each side of gallbladder was divided at least one half of the way up towards the top of the gallbladder.     Next, a complete dissection of the upper aspect of the triangle of Calot was performed and the critical view of safety was achieved.  The cystic artery and cystic duct were then the ONLY two structures traversing from gallbladder towards the hemant hepatis. ICG was used liberally, and to confirm this     The cystic artery and cystic duct were clipped securelyx2 stay 1 specimen and divided between clips. The gallbladder was then  removed out of its fossa using electrocautery.  It was placed into an Endocatch bag. Hemostasis was confirmed and I scrubbed back in    Next, the gallbladder fossa was irrigated with a small aliquot of saline and the saline was aspirated.     Complete hemostasis was achieved.    Gallbladder was removed and the extraction site was closed with interrupted 0-vicryl on suture passer.     Abdomenw as desufflated and ports removed  25 ml local used at port site s(see MAR for type)  The skin was closed using 4-0 monocryl suture and skin glue was applied.     I was present for all critical components of the operation, and all needle and sponge counts were correct x2 at the end of the procedure.    Raulito Laurent MD

## 2023-10-03 NOTE — PROGRESS NOTES
Prior to surgery, I reviewed the risks of gallbladder removal with this patient.    The risk discussion included, but was not limited to, death, myocardial infarction, pneumonia, urinary tract infection, deep venous thrombosis with or without pulmonary embolus, abdominal infection from bowel injury or abscess, bowel obstruction, wound infection, and bleeding.    Specific risks related to cholecystectomy include bile duct injury (3-4/1000), bile leak (10/1000), retained common bile duct stone (10/1000), postcholecystectomy diarrhea (1-2%) and these complications may require additional treatment.    The potential that gallbladder removal will NOT be of benefit was also reviewed.    Furthermore, alternative therapies and the option for no therapy were also reviewed.    Postoperative treatment and expected follow-up were also reviewed.

## 2023-10-03 NOTE — ANESTHESIA CARE TRANSFER NOTE
Patient: Maddie Lala    Procedure: Procedure(s):  CHOLECYSTECTOMY, ROBOT-ASSISTED, LAPAROSCOPIC, WITHOUT CHOLANGIOGRAM       Diagnosis: Calculus of gallbladder without cholecystitis without obstruction [K80.20]  Diagnosis Additional Information: No value filed.    Anesthesia Type:   General     Note:    Oropharynx: oropharynx clear of all foreign objects and spontaneously breathing  Level of Consciousness: awake  Oxygen Supplementation: face mask  Level of Supplemental Oxygen (L/min / FiO2): 6  Independent Airway: airway patency satisfactory and stable  Dentition: dentition unchanged  Vital Signs Stable: post-procedure vital signs reviewed and stable  Report to RN Given: handoff report given  Patient transferred to: PACU    Handoff Report: Identifed the Patient, Identified the Reponsible Provider, Reviewed the pertinent medical history, Discussed the surgical course, Reviewed Intra-OP anesthesia mangement and issues during anesthesia, Set expectations for post-procedure period and Allowed opportunity for questions and acknowledgement of understanding      Vitals:  Vitals Value Taken Time   /67 10/03/23 0945   Temp 36.4  C (97.5  F) 10/03/23 0945   Pulse 74 10/03/23 0948   Resp 8 10/03/23 0948   SpO2 100 % 10/03/23 0948   Vitals shown include unvalidated device data.    Electronically Signed By: QUEENIE Morales CRNA  October 3, 2023  9:50 AM

## 2023-10-04 RX ORDER — CYCLOSPORINE 0.5 MG/ML
1 EMULSION OPHTHALMIC 2 TIMES DAILY
Qty: 60 ML | Refills: 2 | Status: SHIPPED | OUTPATIENT
Start: 2023-10-04 | End: 2024-01-05

## 2023-10-04 NOTE — TELEPHONE ENCOUNTER
LCV:2/15/2023  [facility]  NCV:11-6-23  Last clinic note:-Currently on AT/gel, Restasis bid, and ketotifen prn for itching. Continue all

## 2023-10-05 ENCOUNTER — PATIENT OUTREACH (OUTPATIENT)
Dept: ENDOCRINOLOGY | Facility: CLINIC | Age: 73
End: 2023-10-05
Payer: COMMERCIAL

## 2023-10-05 NOTE — PROGRESS NOTES
RN Post-Op/Post-Discharge Care Coordination Note    Ms. Maddie Lala is a 73 year old female who underwent Cholecystectomy, laparoscopic robot assisted on 10/03/23 with  Raulito Laurent MD.  Left message for patient.  Will attempt postop call in the morning.

## 2023-10-15 DIAGNOSIS — K21.9 GASTROESOPHAGEAL REFLUX DISEASE WITHOUT ESOPHAGITIS: ICD-10-CM

## 2023-10-15 DIAGNOSIS — H10.13 ALLERGIC CONJUNCTIVITIS, BILATERAL: ICD-10-CM

## 2023-10-18 RX ORDER — FLUOROMETHOLONE 0.1 %
1 SUSPENSION, DROPS(FINAL DOSAGE FORM)(ML) OPHTHALMIC (EYE) 2 TIMES DAILY
Qty: 5 ML | Refills: 0 | Status: SHIPPED | OUTPATIENT
Start: 2023-10-18 | End: 2023-11-06

## 2023-10-19 RX ORDER — CROMOLYN SODIUM 40 MG/ML
1 SOLUTION/ DROPS OPHTHALMIC 4 TIMES DAILY PRN
Qty: 10 ML | Refills: 11 | Status: SHIPPED | OUTPATIENT
Start: 2023-10-19

## 2023-10-28 ENCOUNTER — HEALTH MAINTENANCE LETTER (OUTPATIENT)
Age: 73
End: 2023-10-28

## 2023-11-06 ENCOUNTER — OFFICE VISIT (OUTPATIENT)
Dept: OPTOMETRY | Facility: CLINIC | Age: 73
End: 2023-11-06
Payer: COMMERCIAL

## 2023-11-06 DIAGNOSIS — H52.213 IRREGULAR ASTIGMATISM OF BOTH EYES: ICD-10-CM

## 2023-11-06 DIAGNOSIS — H50.21 HYPERTROPIA OF RIGHT EYE: ICD-10-CM

## 2023-11-06 DIAGNOSIS — H04.129 DRY EYE: ICD-10-CM

## 2023-11-06 DIAGNOSIS — H18.462 PELLUCID MARGINAL DEGENERATION OF LEFT CORNEA: Primary | ICD-10-CM

## 2023-11-06 ASSESSMENT — REFRACTION_WEARINGRX
OD_CYLINDER: +1.50
OS_CYLINDER: +4.00
OS_ADD: +2.50
OS_VPRISM: 1.5 BU
OD_HPRISM: 1.5 BO
OS_SPHERE: -1.00
OD_ADD: +2.50
OD_VPRISM: 1.5 BD
OS_AXIS: 180
OD_AXIS: 005
OD_SPHERE: -0.50
OS_HPRISM: 1.5 BO

## 2023-11-06 ASSESSMENT — REFRACTION_CURRENTRX
OS_DIAMETER: 14.9
OS_BRAND: ONEFIT
OS_BRAND: ONEFIT TRIAL
OS_SPHERE: -2.50
OS_BASECURVE: 7.7
OS_ADDL_SPECS: OPT EXTRA BLUE
OS_BASECURVE: 7.7
OS_DIAMETER: 14.9

## 2023-11-06 ASSESSMENT — TONOMETRY
OD_IOP_MMHG: 12
IOP_METHOD: ICARE
OS_IOP_MMHG: 17

## 2023-11-06 ASSESSMENT — EXTERNAL EXAM - RIGHT EYE: OD_EXAM: NORMAL

## 2023-11-06 ASSESSMENT — VISUAL ACUITY
METHOD: SNELLEN - LINEAR
CORRECTION_TYPE: GLASSES
OD_CC: 20/20-2
OS_CC: 20/30
OD_SC: 20/25

## 2023-11-06 ASSESSMENT — PACHYMETRY
OD_CT(UM): 621
OS_CT(UM): 610

## 2023-11-06 ASSESSMENT — EXTERNAL EXAM - LEFT EYE: OS_EXAM: NORMAL

## 2023-11-06 NOTE — PROGRESS NOTES
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 She is largely going uncorrected and corrects to 20/20 with small astigmatic glasses Rx. No CL indicated  -Left eye with higher cyl/PMD. Still refracts fairly well. Would like to continue part-time CL wear. Refit to distance correction today (previously monovision) per pt preference. BCVA 20/15 in hard lens optics  -Order new left lens and mail to pt    2.) Dry Eye/Allergic conjunctivitis OU  -H/o chronic perennial allergic conjunctivitis. Quiet today and for past few months  -Currently on AT/gel, Restasis bid, and ketotifen prn for itching. Continue all  -S/p punctal cautery uppers OU. Lowers likely scarred as unable to insert plugs previously    3.) Right hypertropia with esotropia component  -Decompensating phoria noticeable with double vision at night  -Great response to prism glasses, no double vision with them to date. Strab measurements largely stable today  -She is interested in strabismus surgery and whether it would be a good idea. Refer to Dr. Cronin for eval    Monitor 1 year comprehensive, sooner prn with new concerns    Contact Lens Billing  V-Code:  - GP scleral  Final Contact Lens Rx         Brand Base Curve Diameter Sphere Lens Addl. Specs    Right          Left Onefit Trial 7.7 14.9 -1.00 1 flat/2 flat Opt Extra blue HydraPEG           # of units: 1  Price per Unit: $215    This patient requires contact lenses that are medically necessary for either improvement in vision over spectacles, support of the ocular surface, or other therapeutic benefit. These are not cosmetic contact lenses.     Encounter Diagnoses   Name Primary?    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

## 2023-11-16 ENCOUNTER — HOSPITAL ENCOUNTER (OUTPATIENT)
Dept: MAMMOGRAPHY | Facility: CLINIC | Age: 73
Discharge: HOME | End: 2023-11-16
Attending: FAMILY MEDICINE | Admitting: FAMILY MEDICINE
Payer: COMMERCIAL

## 2023-11-16 DIAGNOSIS — Z12.31 VISIT FOR SCREENING MAMMOGRAM: ICD-10-CM

## 2023-11-16 PROCEDURE — 77067 SCR MAMMO BI INCL CAD: CPT

## 2023-11-18 DIAGNOSIS — K21.9 GASTROESOPHAGEAL REFLUX DISEASE WITHOUT ESOPHAGITIS: ICD-10-CM

## 2023-11-20 ENCOUNTER — TELEPHONE (OUTPATIENT)
Dept: OPHTHALMOLOGY | Facility: CLINIC | Age: 73
End: 2023-11-20
Payer: COMMERCIAL

## 2023-11-28 ENCOUNTER — TELEPHONE (OUTPATIENT)
Dept: OPHTHALMOLOGY | Facility: CLINIC | Age: 73
End: 2023-11-28
Payer: COMMERCIAL

## 2023-12-11 ASSESSMENT — ENCOUNTER SYMPTOMS
SHORTNESS OF BREATH: 0
HEADACHES: 0
SORE THROAT: 0
NERVOUS/ANXIOUS: 0
NAUSEA: 0
FREQUENCY: 0
EYE PAIN: 0
HEMATOCHEZIA: 0
PALPITATIONS: 0
JOINT SWELLING: 0
DIZZINESS: 0
COUGH: 0
HEARTBURN: 0
DYSURIA: 0
ABDOMINAL PAIN: 0
CONSTIPATION: 0
FEVER: 0
MYALGIAS: 0
BREAST MASS: 0
WEAKNESS: 0
DIARRHEA: 0
PARESTHESIAS: 0
ARTHRALGIAS: 0
HEMATURIA: 0
CHILLS: 0

## 2023-12-11 ASSESSMENT — ACTIVITIES OF DAILY LIVING (ADL): CURRENT_FUNCTION: NO ASSISTANCE NEEDED

## 2023-12-14 ENCOUNTER — OFFICE VISIT (OUTPATIENT)
Dept: FAMILY MEDICINE | Facility: CLINIC | Age: 73
End: 2023-12-14
Payer: COMMERCIAL

## 2023-12-14 VITALS
HEART RATE: 69 BPM | TEMPERATURE: 97.5 F | BODY MASS INDEX: 24.29 KG/M2 | HEIGHT: 68 IN | SYSTOLIC BLOOD PRESSURE: 134 MMHG | WEIGHT: 160.3 LBS | DIASTOLIC BLOOD PRESSURE: 83 MMHG | RESPIRATION RATE: 9 BRPM | OXYGEN SATURATION: 100 %

## 2023-12-14 DIAGNOSIS — Z00.00 ENCOUNTER FOR MEDICARE ANNUAL WELLNESS EXAM: Primary | ICD-10-CM

## 2023-12-14 DIAGNOSIS — H16.223 KERATITIS SICCA, BILATERAL: ICD-10-CM

## 2023-12-14 DIAGNOSIS — I10 BENIGN ESSENTIAL HYPERTENSION: ICD-10-CM

## 2023-12-14 DIAGNOSIS — M85.89 OSTEOPENIA OF MULTIPLE SITES: ICD-10-CM

## 2023-12-14 DIAGNOSIS — K21.9 GASTROESOPHAGEAL REFLUX DISEASE, UNSPECIFIED WHETHER ESOPHAGITIS PRESENT: ICD-10-CM

## 2023-12-14 DIAGNOSIS — F41.8 SITUATIONAL ANXIETY: ICD-10-CM

## 2023-12-14 DIAGNOSIS — Z13.220 LIPID SCREENING: ICD-10-CM

## 2023-12-14 LAB
CHOLEST SERPL-MCNC: 179 MG/DL
CREAT UR-MCNC: 57.3 MG/DL
FASTING STATUS PATIENT QL REPORTED: YES
HDLC SERPL-MCNC: 75 MG/DL
LDLC SERPL CALC-MCNC: 94 MG/DL
MICROALBUMIN UR-MCNC: <12 MG/L
MICROALBUMIN/CREAT UR: NORMAL MG/G{CREAT}
NONHDLC SERPL-MCNC: 104 MG/DL
TRIGL SERPL-MCNC: 52 MG/DL

## 2023-12-14 PROCEDURE — 82570 ASSAY OF URINE CREATININE: CPT | Performed by: FAMILY MEDICINE

## 2023-12-14 PROCEDURE — G0439 PPPS, SUBSEQ VISIT: HCPCS | Performed by: FAMILY MEDICINE

## 2023-12-14 PROCEDURE — 36415 COLL VENOUS BLD VENIPUNCTURE: CPT | Performed by: FAMILY MEDICINE

## 2023-12-14 PROCEDURE — 99214 OFFICE O/P EST MOD 30 MIN: CPT | Mod: 24 | Performed by: FAMILY MEDICINE

## 2023-12-14 PROCEDURE — 82043 UR ALBUMIN QUANTITATIVE: CPT | Performed by: FAMILY MEDICINE

## 2023-12-14 PROCEDURE — 80061 LIPID PANEL: CPT | Performed by: FAMILY MEDICINE

## 2023-12-14 RX ORDER — DIAZEPAM 10 MG
10 TABLET ORAL ONCE
Qty: 1 TABLET | Refills: 0 | Status: SHIPPED | OUTPATIENT
Start: 2023-12-14 | End: 2023-12-14

## 2023-12-14 RX ORDER — RESPIRATORY SYNCYTIAL VIRUS VACCINE 120MCG/0.5
0.5 KIT INTRAMUSCULAR ONCE
Qty: 1 EACH | Refills: 0 | Status: CANCELLED | OUTPATIENT
Start: 2023-12-14 | End: 2023-12-14

## 2023-12-14 ASSESSMENT — ENCOUNTER SYMPTOMS
HEMATOCHEZIA: 0
CONSTIPATION: 0
CHILLS: 0
PARESTHESIAS: 0
DIARRHEA: 0
HEADACHES: 0
DYSURIA: 0
SHORTNESS OF BREATH: 0
JOINT SWELLING: 0
SORE THROAT: 0
HEARTBURN: 0
HEMATURIA: 0
MYALGIAS: 0
PALPITATIONS: 0
ARTHRALGIAS: 0
NERVOUS/ANXIOUS: 0
DIZZINESS: 0
WEAKNESS: 0
FEVER: 0
COUGH: 0
BREAST MASS: 0
FREQUENCY: 0
ABDOMINAL PAIN: 0
EYE PAIN: 0
NAUSEA: 0

## 2023-12-14 ASSESSMENT — ACTIVITIES OF DAILY LIVING (ADL): CURRENT_FUNCTION: NO ASSISTANCE NEEDED

## 2023-12-14 ASSESSMENT — PAIN SCALES - GENERAL: PAINLEVEL: NO PAIN (0)

## 2023-12-14 NOTE — PROGRESS NOTES
"SUBJECTIVE:   Maddie is a 73 year old, presenting for the following:  Gastrophageal Reflux and Medicare Visit        12/14/2023     8:49 AM   Additional Questions   Roomed by temi   Accompanied by emily         12/14/2023     8:49 AM   Patient Reported Additional Medications   Patient reports taking the following new medications Multivitamin       Are you in the first 12 months of your Medicare coverage?  No    Healthy Habits:     In general, how would you rate your overall health?  Good    Frequency of exercise:  4-5 days/week    Duration of exercise:  45-60 minutes    Do you usually eat at least 4 servings of fruit and vegetables a day, include whole grains    & fiber and avoid regularly eating high fat or \"junk\" foods?  Yes    Taking medications regularly:  Yes    Medication side effects:  None    Ability to successfully perform activities of daily living:  No assistance needed    Home Safety:  Throw rugs in the hallway and lack of grab bars in the bathroom    Hearing Impairment:  Difficulty following a conversation in a noisy restaurant or crowded room, feel that people are mumbling or not speaking clearly, need to ask people to speak up or repeat themselves and difficulty understanding soft or whispered speech    In the past 6 months, have you been bothered by leaking of urine? Yes    In general, how would you rate your overall mental or emotional health?  Good    Additional concerns today:  Yes      Walking, yoga - plantar fasciitis - saw podiatry - orthotic changes.  Referred to chiropractor/PHYSICAL THERAPY.  Swollen left knee since TKA.  Doing PHYSICAL THERAPY       Subjective   Maddie is a 73 year old, presenting for the following health issues:  Gastrophageal Reflux and Medicare Visit        12/14/2023     8:49 AM   Additional Questions   Roomed by temi   Accompanied by emily         12/14/2023     8:49 AM   Patient Reported Additional Medications   Patient reports taking the following new " medications Multivitamin         GERD/Heartburn  Onset/Duration: came back after January 2023   Description: feeling a lot better, still some burping  Intensity: mild  Progression of Symptoms: improving  Accompanying Signs & Symptoms:  Does it feel like food gets stuck or trouble swallowing: YES  Nausea: No  Vomiting (bloody?): No  Abdominal Pain: No  Black-Tarry stools: No  Bloody stools: No  History:  Previous similar episodes: YES  Previous ulcers: No  Precipitating factors:   Caffeine use: YES  Alcohol use: YES  NSAID/Aspirin use: No  Tobacco use: No  Worse with fatty foods, caffeinated drinks, and beef.  Alleviating factors: just fatty food and beef  Therapies tried and outcome:             Lifestyle changes: didn't eat            Medications: Omeprazole (Prilosec) and medication helpful  Symptoms never severe in past - 5 teeth removed in last year.  Stomach ok.  Swallowing issue - if dry food, chicken  present for 4 years.  May     Hyperlipidemia Follow-Up    Are you regularly taking any medication or supplement to lower your cholesterol?   No  Are you having muscle aches or other side effects that you think could be caused by your cholesterol lowering medication?  N/a    Hypertension Follow-up    Do you check your blood pressure regularly outside of the clinic? Yes   Are you following a low salt diet? Yes  Are your blood pressures ever more than 140 on the top number (systolic) OR more   than 90 on the bottom number (diastolic), for example 140/90? No    Osteopenia  Last DEXA was in 2021 and showed osteopenia in spine and hips.  She has noticed a loss in height in the recent years.  Follow-up DEXA discussed.      Have you ever done Advance Care Planning? (For example, a Health Directive, POLST, or a discussion with a medical provider or your loved ones about your wishes): No, advance care planning information given to patient to review.  Patient plans to discuss their wishes with loved ones or provider.          Fall risk  Fallen 2 or more times in the past year?: No  Any fall with injury in the past year?: Yes    Cognitive Screening   1) Repeat 3 items (Leader, Season, Table)    2) Clock draw: NORMAL  3) 3 item recall: Recalls 3 objects  Results: 3 items recalled: COGNITIVE IMPAIRMENT LESS LIKELY    Mini-CogTM Copyright JEREMY Hauser. Licensed by the author for use in [facility]; reprinted with permission (soob@.[facility]). All rights reserved.      Do you have sleep apnea, excessive snoring or daytime drowsiness? : no    Reviewed and updated as needed this visit by clinical staff   Tobacco  Allergies  Meds   Med Hx  Surg Hx  Fam Hx          Reviewed and updated as needed this visit by Provider   Tobacco  Allergies  Meds   Med Hx  Surg Hx  Fam Hx         Social History     Tobacco Use    Smoking status: Former     Packs/day: 1.00     Years: 10.00     Additional pack years: 0.00     Total pack years: 10.00     Types: Cigarettes     Quit date: 1981     Years since quittin.9     Passive exposure: Never    Smokeless tobacco: Never   Substance Use Topics    Alcohol use: Yes     Alcohol/week: 7.0 standard drinks of alcohol     Types: 7 Glasses of wine per week     Comment: occ - Glass of white wine per night             2023    10:01 AM   Alcohol Use   Prescreen: >3 drinks/day or >7 drinks/week? No     Do you have a current opioid prescription? No  Do you use any other controlled substances or medications that are not prescribed by a provider? None              Current providers sharing in care for this patient include:   Patient Care Team:  Radha Hurtado MD as PCP - General (Family Practice)  Aaron Roy MD as MD (Dermapathology)  [facility] ([facility]  Latia Garcia OD as MD (Optometry)  Latia Garcia OD as Assigned Surgical Provider  Radha Hurtado MD as Assigned PCP  Guru Jose C Skinner MD as MD  (Gastroenterology)  Raulito Laurent MD as MD (Surgery)  Yuniel Cronin MD as MD (Ophthalmology)    The following health maintenance items are reviewed in Epic and correct as of today:  Health Maintenance   Topic Date Due    ANNUAL REVIEW OF HM ORDERS  11/21/2023    COVID-19 Vaccine (7 - 2023-24 season) 12/04/2023    MEDICARE ANNUAL WELLNESS VISIT  11/21/2023    FALL RISK ASSESSMENT  12/14/2024    MAMMO SCREENING  11/16/2025    LIPID  11/21/2027    COLORECTAL CANCER SCREENING  10/11/2028    ADVANCE CARE PLANNING  12/14/2028    DTAP/TDAP/TD IMMUNIZATION (4 - Td or Tdap) 11/02/2032    DEXA  09/22/2036    HEPATITIS C SCREENING  Completed    PHQ-2 (once per calendar year)  Completed    INFLUENZA VACCINE  Completed    Pneumococcal Vaccine: 65+ Years  Completed    ZOSTER IMMUNIZATION  Completed    RSV VACCINE (Pregnancy & 60+)  Completed    IPV IMMUNIZATION  Aged Out    HPV IMMUNIZATION  Aged Out    MENINGITIS IMMUNIZATION  Aged Out    RSV MONOCLONAL ANTIBODY  Aged Out     BP Readings from Last 3 Encounters:   12/14/23 134/83   10/03/23 129/69   09/20/23 132/76    Wt Readings from Last 3 Encounters:   12/14/23 72.7 kg (160 lb 4.8 oz)   10/03/23 70.3 kg (155 lb)   09/20/23 73.5 kg (162 lb)                  Mammogram Screening: Mammogram Screening: Recommended mammography every 1-2 years with patient discussion and risk factor consideration        Review of Systems   Constitutional:  Negative for chills and fever.   HENT:  Negative for congestion, ear pain, hearing loss and sore throat.    Eyes:  Negative for pain and visual disturbance.   Respiratory:  Negative for cough and shortness of breath.    Cardiovascular:  Positive for peripheral edema. Negative for chest pain and palpitations.   Gastrointestinal:  Negative for abdominal pain, constipation, diarrhea, heartburn, hematochezia and nausea.   Breasts:  Negative for tenderness, breast mass and discharge.   Genitourinary:  Negative for dysuria, frequency,  "genital sores, hematuria, pelvic pain, urgency, vaginal bleeding and vaginal discharge.   Musculoskeletal:  Negative for arthralgias, joint swelling and myalgias.   Skin:  Negative for rash.   Neurological:  Negative for dizziness, weakness, headaches and paresthesias.   Psychiatric/Behavioral:  Negative for mood changes. The patient is not nervous/anxious.      Episodic ankle edema at the end of the day.  Resolved by morning.  No pain associated.    OBJECTIVE:   /83 (BP Location: Right arm, Patient Position: Sitting, Cuff Size: Adult Regular)   Pulse 69   Temp 97.5  F (36.4  C) (Oral)   Resp (!) 9   Ht 1.715 m (5' 7.5\")   Wt 72.7 kg (160 lb 4.8 oz)   SpO2 100%   BMI 24.74 kg/m   Estimated body mass index is 24.74 kg/m  as calculated from the following:    Height as of this encounter: 1.715 m (5' 7.5\").    Weight as of this encounter: 72.7 kg (160 lb 4.8 oz).  Physical Exam  GENERAL APPEARANCE: healthy, alert and no distress  EYES: Eyes grossly normal to inspection, PERRL and conjunctivae and sclerae normal  HENT: ear canals and TM's normal, nose and mouth without ulcers or lesions, oropharynx clear and oral mucous membranes moist  NECK: no adenopathy, no asymmetry, masses, or scars and thyroid normal to palpation  RESP: lungs clear to auscultation - no rales, rhonchi or wheezes  BREAST: normal without masses, tenderness or nipple discharge and no palpable axillary masses or adenopathy  CV: regular rate and rhythm, normal S1 S2, no S3 or S4, no murmur, click or rub, no peripheral edema and peripheral pulses strong  ABDOMEN: soft, nontender, no hepatosplenomegaly, no masses and bowel sounds normal  MS: no musculoskeletal defects are noted, gait is age appropriate without ataxia, left knee flexion to 90 degrees with mild discomfort to palpation.  Full range of motion right lower extremity.  SKIN: no suspicious lesions or rashes  NEURO: Normal strength and tone, sensory exam grossly normal, mentation " intact and speech normal  PSYCH: mentation appears normal and affect normal/bright    Diagnostic Test Results:  Labs reviewed in Epic  Results for orders placed or performed in visit on 12/14/23   Lipid panel reflex to direct LDL Non-fasting     Status: None   Result Value Ref Range    Cholesterol 179 <200 mg/dL    Triglycerides 52 <150 mg/dL    Direct Measure HDL 75 >=50 mg/dL    LDL Cholesterol Calculated 94 <=100 mg/dL    Non HDL Cholesterol 104 <130 mg/dL    Patient Fasting > 8hrs? Yes     Narrative    Cholesterol  Desirable:  <200 mg/dL    Triglycerides  Normal:  Less than 150 mg/dL  Borderline High:  150-199 mg/dL  High:  200-499 mg/dL  Very High:  Greater than or equal to 500 mg/dL    Direct Measure HDL  Female:  Greater than or equal to 50 mg/dL   Male:  Greater than or equal to 40 mg/dL    LDL Cholesterol  Desirable:  <100mg/dL  Above Desirable:  100-129 mg/dL   Borderline High:  130-159 mg/dL   High:  160-189 mg/dL   Very High:  >= 190 mg/dL    Non HDL Cholesterol  Desirable:  130 mg/dL  Above Desirable:  130-159 mg/dL  Borderline High:  160-189 mg/dL  High:  190-219 mg/dL  Very High:  Greater than or equal to 220 mg/dL   Albumin Random Urine Quantitative with Creat Ratio     Status: None   Result Value Ref Range    Creatinine Urine mg/dL 57.3 mg/dL    Albumin Urine mg/L <12.0 mg/L    Albumin Urine mg/g Cr         ASSESSMENT / PLAN:   (Z00.00) Encounter for Medicare annual wellness exam  (primary encounter diagnosis)  Comment: Nonfasting  Plan: Screening and preventive care discussed.  Immunizations reviewed her COVID vaccines up-to-date.  Has had RSV and influenza vaccines for the season.    (K21.9) Gastroesophageal reflux disease, unspecified whether esophagitis present  Comment: Symptoms are improved with the current use of omeprazole.  Plan: Did have an endoscopy for endoscopic ultrasound.  They did not specifically evaluate for narrowing but nothing was noted of concern.  2 years ago she had a  swallowing study which was normal with some tertiary contractions.  Episodic swallowing difficulties described.  We discussed the potential side effects of long-term PPI use versus the potentially more significant negative effects of untreated reflux.  At this point in time she is going to continue taking omeprazole.  We are going to obtain the bone density as noted below.    (I10) Benign essential hypertension  Comment: Controlled  Plan: Albumin Random Urine Quantitative with Creat         Ratio, diltiazem ER (DILT-XR) 180 MG 24 hr         capsule, lisinopril (ZESTRIL) 20 MG tablet        Continue current lisinopril and diltiazem use.    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  Anxiety related to MRI and claustrophobia.  Plan: diazepam (VALIUM) 10 MG tablet        1 dose Valium given today to use prior to her MRI appointment.    (M85.89) Osteopenia of multiple sites  Comment: Last tested 2021  Plan: DX Hip/Pelvis/Spine        Order placed for bone density testing.    (M35.01) Keratitis sicca, bilateral (H24)  Comment: Symptoms are controlled  Plan: Ongoing management with optometry    (Z13.220) Lipid screening  Comment: The 10-year ASCVD risk score (Easton DK, et al., 2019) is: 20.3%    Values used to calculate the score:      Age: 73 years      Sex: Female      Is Non- : No      Diabetic: No      Tobacco smoker: No      Systolic Blood Pressure: 134 mmHg      Is BP treated: Yes      HDL Cholesterol: 75 mg/dL      Total Cholesterol: 179 mg/dL    Plan: Lipid panel reflex to direct LDL Non-fasting        Discussed briefly use of cholesterol-lowering medications.  Her cholesterol levels are actually not elevated but overall risk is increased due to other risk factors.  Continue the conversation regarding possible statin use.    Patient has been advised of split billing requirements and indicates understanding:  "Yes      COUNSELING:  Reviewed preventive health counseling, as reflected in patient instructions       Regular exercise       Healthy diet/nutrition       Vision screening       Hearing screening       Dental care       Bladder control       Fall risk prevention       Osteoporosis prevention/bone health      BMI:   Estimated body mass index is 24.74 kg/m  as calculated from the following:    Height as of this encounter: 1.715 m (5' 7.5\").    Weight as of this encounter: 72.7 kg (160 lb 4.8 oz).   Wt Readings from Last 5 Encounters:   12/14/23 72.7 kg (160 lb 4.8 oz)   10/03/23 70.3 kg (155 lb)   09/20/23 73.5 kg (162 lb)   09/08/23 71.9 kg (158 lb 9.6 oz)   08/15/23 70.3 kg (155 lb)           She reports that she quit smoking about 42 years ago. Her smoking use included cigarettes. She has a 10.00 pack-year smoking history. She has never been exposed to tobacco smoke. She has never used smokeless tobacco.      Appropriate preventive services were discussed with this patient, including applicable screening as appropriate for fall prevention, nutrition, physical activity, Tobacco-use cessation, weight loss and cognition.  Checklist reviewing preventive services available has been given to the patient.    Reviewed patients plan of care and provided an AVS. The Basic Care Plan (routine screening as documented in Health Maintenance) for Maddie meets the Care Plan requirement. This Care Plan has been established and reviewed with the Patient.          Radha Hurtado MD  [facility]    Identified Health Risks:  I have reviewed Opioid Use Disorder and Substance Use Disorder risk factors and made any needed referrals. The patient was provided with written information regarding signs of hearing loss.  Information on urinary incontinence and treatment options given to patient.            This chart was documented by provider using a voice activated software called Dragon in addition to manual typing. " There may be vocabulary errors or other grammatical errors due to this.

## 2023-12-14 NOTE — PATIENT INSTRUCTIONS
Labs today.    Prescription for valium sent to pharmacy for use prior to MRI scan.    Continue the Omeprazole daily.  Follow up if worsening swallowing or other symptoms present.    Bone density follow up recommended.   You can call [phone] to schedule this at the [facility] in [place]         Patient Education   Personalized Prevention Plan  You are due for the preventive services outlined below.  Your care team is available to assist you in scheduling these services.  If you have already completed any of these items, please share that information with your care team to update in your medical record.  Health Maintenance Due   Topic Date Due    COVID-19 Vaccine (7 - 2023-24 season) 12/04/2023    Annual Wellness Visit  11/21/2023     Hearing Loss: Care Instructions  Overview     Hearing loss is a sudden or slow decrease in how well you hear. It can range from slight to profound. Permanent hearing loss can occur with aging. It also can happen when you are exposed long-term to loud noise. Examples include listening to loud music, riding motorcycles, or being around other loud machines.  Hearing loss can affect your work and home life. It can make you feel lonely or depressed. You may feel that you have lost your independence. But hearing aids and other devices can help you hear better and feel connected to others.  Follow-up care is a key part of your treatment and safety. Be sure to make and go to all appointments, and call your doctor if you are having problems. It's also a good idea to know your test results and keep a list of the medicines you take.  How can you care for yourself at home?  Avoid loud noises whenever possible. This helps keep your hearing from getting worse.  Always wear hearing protection around loud noises.  Wear a hearing aid as directed.  A professional can help you pick a hearing aid that will work best for you.  You can also get hearing aids over the counter for mild to moderate  "hearing loss.  Have hearing tests as your doctor suggests. They can show whether your hearing has changed. Your hearing aid may need to be adjusted.  Use other devices as needed. These may include:  Telephone amplifiers and hearing aids that can connect to a television, stereo, radio, or microphone.  Devices that use lights or vibrations. These alert you to the doorbell, a ringing telephone, or a baby monitor.  Television closed-captioning. This shows the words at the bottom of the screen. Most new TVs can do this.  TTY (text telephone). This lets you type messages back and forth on the telephone instead of talking or listening. These devices are also called TDD. When messages are typed on the keyboard, they are sent over the phone line to a receiving TTY. The message is shown on a monitor.  Use text messaging, social media, and email if it is hard for you to communicate by telephone.  Try to learn a listening technique called speechreading. It is not lipreading. You pay attention to people's gestures, expressions, posture, and tone of voice. These clues can help you understand what a person is saying. Face the person you are talking to, and have them face you. Make sure the lighting is good. You need to see the other person's face clearly.  Think about counseling if you need help to adjust to your hearing loss.  When should you call for help?  Watch closely for changes in your health, and be sure to contact your doctor if:    You think your hearing is getting worse.     You have new symptoms, such as dizziness or nausea.   Where can you learn more?  Go to https://www.[employer].net/patiented  Enter R798 in the search box to learn more about \"Hearing Loss: Care Instructions.\"  Current as of: February 28, 2023               Content Version: 13.8    9033-8980 [employer]   Care instructions adapted under license by your healthcare professional. If you have questions about a medical condition or this " instruction, always ask your healthcare professional. [employer] disclaims any warranty or liability for your use of this information.      Bladder Training: Care Instructions  Your Care Instructions     Bladder training is used to treat urge incontinence and stress incontinence. Urge incontinence means that the need to urinate comes on so fast that you can't get to a toilet in time. Stress incontinence means that you leak urine because of pressure on your bladder. For example, it may happen when you laugh, cough, or lift something heavy.  Bladder training can increase how long you can wait before you have to urinate. It can also help your bladder hold more urine. And it can give you better control over the urge to urinate.  It is important to remember that bladder training takes a few weeks to a few months to make a difference. You may not see results right away, but don't give up.  Follow-up care is a key part of your treatment and safety. Be sure to make and go to all appointments, and call your doctor if you are having problems. It's also a good idea to know your test results and keep a list of the medicines you take.  How can you care for yourself at home?  Work with your doctor to come up with a bladder training program that is right for you. You may use one or more of the following methods.  Delayed urination  In the beginning, try to keep from urinating for 5 minutes after you first feel the need to go.  While you wait, take deep, slow breaths to relax. Kegel exercises can also help you delay the need to go to the bathroom.  After some practice, when you can easily wait 5 minutes to urinate, try to wait 10 minutes before you urinate.  Slowly increase the waiting period until you are able to control when you have to urinate.  Scheduled urination  Empty your bladder when you first wake up in the morning.  Schedule times throughout the day when you will urinate.  Start by going to the bathroom  "every hour, even if you don't need to go.  Slowly increase the time between trips to the bathroom.  When you have found a schedule that works well for you, keep doing it.  If you wake up during the night and have to urinate, do it. Apply your schedule to waking hours only.  Kegel exercises  These tighten and strengthen pelvic muscles, which can help you control the flow of urine. (If doing these exercises causes pain, stop doing them and talk with your doctor.) To do Kegel exercises:  Squeeze your muscles as if you were trying not to pass gas. Or squeeze your muscles as if you were stopping the flow of urine. Your belly, legs, and buttocks shouldn't move.  Hold the squeeze for 3 seconds, then relax for 5 to 10 seconds.  Start with 3 seconds, then add 1 second each week until you are able to squeeze for 10 seconds.  Repeat the exercise 10 times a session. Do 3 to 8 sessions a day.  When should you call for help?  Watch closely for changes in your health, and be sure to contact your doctor if:    Your incontinence is getting worse.     You do not get better as expected.   Where can you learn more?  Go to https://www.[employer].net/patiented  Enter V684 in the search box to learn more about \"Bladder Training: Care Instructions.\"  Current as of: February 28, 2023               Content Version: 13.8    5950-0345 [employer]   Care instructions adapted under license by your healthcare professional. If you have questions about a medical condition or this instruction, always ask your healthcare professional. [employer] disclaims any warranty or liability for your use of this information.         "

## 2023-12-15 ENCOUNTER — MYC MEDICAL ADVICE (OUTPATIENT)
Dept: FAMILY MEDICINE | Facility: CLINIC | Age: 73
End: 2023-12-15
Payer: COMMERCIAL

## 2023-12-15 DIAGNOSIS — E78.00 PURE HYPERCHOLESTEROLEMIA: Primary | ICD-10-CM

## 2023-12-15 PROBLEM — H16.223 KERATITIS SICCA, BILATERAL: Status: ACTIVE | Noted: 2023-12-15

## 2023-12-15 RX ORDER — ATORVASTATIN CALCIUM 20 MG/1
20 TABLET, FILM COATED ORAL DAILY
Qty: 90 TABLET | Refills: 3 | Status: SHIPPED | OUTPATIENT
Start: 2023-12-15

## 2023-12-15 RX ORDER — DILTIAZEM HYDROCHLORIDE 180 MG/1
180 CAPSULE, EXTENDED RELEASE ORAL EVERY MORNING
Qty: 90 CAPSULE | Refills: 1 | Status: SHIPPED | OUTPATIENT
Start: 2023-12-15 | End: 2024-02-05

## 2023-12-15 RX ORDER — LISINOPRIL 20 MG/1
20 TABLET ORAL AT BEDTIME
Qty: 90 TABLET | Refills: 1 | Status: SHIPPED | OUTPATIENT
Start: 2023-12-15 | End: 2024-07-11

## 2023-12-15 NOTE — RESULT ENCOUNTER NOTE
The 10-year ASCVD risk score (Easton ALVAREZ, et al., 2019) is: 20.3%    Values used to calculate the score:      Age: 73 years      Sex: Female      Is Non- : No      Diabetic: No      Tobacco smoker: No      Systolic Blood Pressure: 134 mmHg      Is BP treated: Yes      HDL Cholesterol: 75 mg/dL      Total Cholesterol: 179 mg/dL   Your urine testing is normal.  There is no elevated protein present.  Your cholesterol levels are similar to previous.  In doing a risk assessment based on these numbers and other risk factors, your overall risk for heart disease is in the range that the recommendations would indicate a consideration for cholesterol-lowering medication.  Please respond to this and I will send script if desired.  Please call or MyChart message me if you have any questions.      CHRISTINA

## 2023-12-16 DIAGNOSIS — K21.9 GASTROESOPHAGEAL REFLUX DISEASE WITHOUT ESOPHAGITIS: ICD-10-CM

## 2023-12-18 ENCOUNTER — MYC MEDICAL ADVICE (OUTPATIENT)
Dept: FAMILY MEDICINE | Facility: CLINIC | Age: 73
End: 2023-12-18
Payer: COMMERCIAL

## 2023-12-19 NOTE — TELEPHONE ENCOUNTER
Sent [employer] msg telling pt this was mailed to her. Copy made and placed in Dec TC folder for scanning.  Rani Velasquez CMA

## 2024-01-03 DIAGNOSIS — H16.223 KERATITIS SICCA, BILATERAL: ICD-10-CM

## 2024-01-03 DIAGNOSIS — H04.129 DRY EYE: ICD-10-CM

## 2024-01-04 DIAGNOSIS — L21.9 DERMATITIS, SEBORRHEIC: ICD-10-CM

## 2024-01-04 RX ORDER — FLUOCINONIDE TOPICAL SOLUTION USP, 0.05% 0.5 MG/ML
SOLUTION TOPICAL
Qty: 60 ML | Refills: 0 | Status: SHIPPED | OUTPATIENT
Start: 2024-01-04 | End: 2024-04-27

## 2024-01-07 RX ORDER — CYCLOSPORINE 0.5 MG/ML
1 EMULSION OPHTHALMIC 2 TIMES DAILY
Qty: 60 ML | Refills: 11 | Status: SHIPPED | OUTPATIENT
Start: 2024-01-07

## 2024-01-07 NOTE — TELEPHONE ENCOUNTER
cycloSPORINE (RESTASIS) 0.05 % ophthalmic emulsion   60 mL 2 10/4/2023     11/6/2023  [facility]    Latia Garcia, OD  Optometry    ...Restasis bid.... Continue all

## 2024-01-08 ENCOUNTER — HOSPITAL ENCOUNTER (OUTPATIENT)
Dept: MRI IMAGING | Facility: CLINIC | Age: 74
Discharge: HOME | End: 2024-01-08
Attending: FAMILY MEDICINE | Admitting: FAMILY MEDICINE
Payer: COMMERCIAL

## 2024-01-08 DIAGNOSIS — K86.2 PANCREATIC CYST: ICD-10-CM

## 2024-01-08 PROCEDURE — 255N000002 HC RX 255 OP 636: Performed by: FAMILY MEDICINE

## 2024-01-08 PROCEDURE — A9585 GADOBUTROL INJECTION: HCPCS | Performed by: FAMILY MEDICINE

## 2024-01-08 PROCEDURE — 74183 MRI ABD W/O CNTR FLWD CNTR: CPT

## 2024-01-08 RX ORDER — GADOBUTROL 604.72 MG/ML
7.5 INJECTION INTRAVENOUS ONCE
Status: COMPLETED | OUTPATIENT
Start: 2024-01-08 | End: 2024-01-08

## 2024-01-08 RX ADMIN — GADOBUTROL 7.5 ML: 604.72 INJECTION INTRAVENOUS at 19:32

## 2024-01-24 ENCOUNTER — MYC MEDICAL ADVICE (OUTPATIENT)
Dept: FAMILY MEDICINE | Facility: CLINIC | Age: 74
End: 2024-01-24

## 2024-01-24 ENCOUNTER — E-VISIT (OUTPATIENT)
Dept: FAMILY MEDICINE | Facility: CLINIC | Age: 74
End: 2024-01-24
Payer: COMMERCIAL

## 2024-01-24 DIAGNOSIS — L98.9 SKIN LESION: Primary | ICD-10-CM

## 2024-01-24 PROCEDURE — 99421 OL DIG E/M SVC 5-10 MIN: CPT | Performed by: FAMILY MEDICINE

## 2024-01-25 NOTE — TELEPHONE ENCOUNTER
Please call patient to schedule a follow up appointment with me in person week of 2/5 or  2/12 -   40 min appointment for lesion removal/biopsy - same day/NP slot is fine.    CHRISTINA

## 2024-01-31 ENCOUNTER — OFFICE VISIT (OUTPATIENT)
Dept: OPHTHALMOLOGY | Facility: CLINIC | Age: 74
End: 2024-01-31
Attending: OPHTHALMOLOGY
Payer: COMMERCIAL

## 2024-01-31 DIAGNOSIS — H53.2 DOUBLE VISION: Primary | ICD-10-CM

## 2024-01-31 DIAGNOSIS — H53.10 SUBJECTIVE VISUAL DISTURBANCE: ICD-10-CM

## 2024-01-31 DIAGNOSIS — H50.21 HYPERTROPIA OF RIGHT EYE: ICD-10-CM

## 2024-01-31 DIAGNOSIS — H50.05 ALTERNATING ESOTROPIA: ICD-10-CM

## 2024-01-31 PROCEDURE — 92060 SENSORIMOTOR EXAMINATION: CPT | Performed by: OPHTHALMOLOGY

## 2024-01-31 PROCEDURE — 99214 OFFICE O/P EST MOD 30 MIN: CPT | Mod: GC | Performed by: OPHTHALMOLOGY

## 2024-01-31 PROCEDURE — G0463 HOSPITAL OUTPT CLINIC VISIT: HCPCS | Performed by: OPHTHALMOLOGY

## 2024-01-31 PROCEDURE — 92060 SENSORIMOTOR EXAMINATION: CPT

## 2024-01-31 PROCEDURE — 92060 SENSORIMOTOR EXAMINATION: CPT | Mod: 26 | Performed by: OPHTHALMOLOGY

## 2024-01-31 RX ORDER — CARBOXYMETHYLCELLULOSE SODIUM 10 MG/ML
1 GEL OPHTHALMIC 3 TIMES DAILY
COMMUNITY

## 2024-01-31 ASSESSMENT — TONOMETRY
OD_IOP_MMHG: 20
IOP_METHOD: ICARE
OS_IOP_MMHG: 18

## 2024-01-31 ASSESSMENT — REFRACTION_WEARINGRX
OS_CYLINDER: +4.00
OD_AXIS: 005
OS_SPHERE: -1.00
OS_AXIS: 180
OD_VPRISM: 1.5 BD
OS_ADD: +2.50
OS_HPRISM: 1.5 BO
OD_HPRISM: 1.5 BO
OD_SPHERE: -0.50
OS_VPRISM: 1.5 BU
OD_CYLINDER: +1.50
OD_ADD: +2.50

## 2024-01-31 ASSESSMENT — CONF VISUAL FIELD
OD_INFERIOR_NASAL_RESTRICTION: 0
OD_SUPERIOR_NASAL_RESTRICTION: 0
OS_SUPERIOR_NASAL_RESTRICTION: 0
OD_INFERIOR_TEMPORAL_RESTRICTION: 0
OS_INFERIOR_TEMPORAL_RESTRICTION: 0
OS_INFERIOR_NASAL_RESTRICTION: 0
OD_SUPERIOR_TEMPORAL_RESTRICTION: 0
OD_NORMAL: 1
OS_NORMAL: 1
OS_SUPERIOR_TEMPORAL_RESTRICTION: 0
METHOD: COUNTING FINGERS

## 2024-01-31 ASSESSMENT — EXTERNAL EXAM - RIGHT EYE: OD_EXAM: NORMAL

## 2024-01-31 ASSESSMENT — REFRACTION_FINALRX
OS_HPRISM: 1.5 BO
OD_HPRISM: 1.5 BO
OS_VPRISM: 1.5 BU
OD_VPRISM: 1.5 BD

## 2024-01-31 ASSESSMENT — CUP TO DISC RATIO
OD_RATIO: 0.20
OS_RATIO: 0.4

## 2024-01-31 ASSESSMENT — VISUAL ACUITY
OS_CC: 20/25
OD_CC: 20/20
OS_CC+: +1
METHOD: SNELLEN - LINEAR
CORRECTION_TYPE: GLASSES

## 2024-01-31 ASSESSMENT — EXTERNAL EXAM - LEFT EYE: OS_EXAM: NORMAL

## 2024-01-31 NOTE — PROGRESS NOTES
1. 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  Her pattern of right hypertropia greater in up gaze than down gaze strongly indicates a congenital cranial nerve 4 palsy although there may also be a component of sagging eye syndrome.    Given her excellent response to ground in prism glasses (indicating her strabismus has not changed significantly since receiving them) I see no need for a work-up.      Patient prefers to use contact lens or glasses (not both at the same time), and current prisms work well to resolve her diplopia.     She is content to wear glasses and when we discussed the role of strabismus surgery in her case she and I mutually agreed that it was preferred to simply use ground in prism glasses.    PLAN:   - Will provide a new prism glasses prescription with bifocal for patient (current prisms working well).  She is wearing a single vision distance pair of prism glasses currently (despite the prescription from Dr. Garcia having a reading add).  She will get a new pair of prism glasses with a bifocal.    - Discussed return precautions.     - Continue routine eye care with Dr. Garcia. If strabismus changes dramatically then she should return for repeat evaluation but that is very unlikely. If her ground in prism glasses stop working effectively then we can always consider strabismus surgery in the future.    Maddie Lala is a pleasant 73 year old White female who presents to my neuro-ophthalmology clinic today having been referred by Dr. Castro  and Dr. Garcia for strabismus.     HPI:    Maddie has a history of irregular astigmatism of both eyes who was referred for strabismus evaluation.     She first noticed intermittent binocular diplopia 3 years ago, has a horizontal and vertical component, at first noticed at night only. This has slightly worsened gradually over the past couple years, to where she  now notices diplopia in the afternoons sometimes, but every day at night. The diplopia doesn't seem to worsen with any particular gaze or head position.     No history of trauma. No history of congenital or childhood strabismus, eye patching,  or eye muscle surgery per patient. No eye pain. No other neurologic symptoms. She has a history of chronic headaches that have not worsened recently, one mild-moderate headache once every couple weeks.     She was given prism glasses by Dr. Garcia last year. She typically wears contacts during the day for the astigmatism, then switches to prism glasses at night when the diplopia comes on.     She wears a contact lens in the left eye for more clear vision.      Independent historians:  Patient    Review of outside clinical notes:    Visit with Dr. Castro 2/3/22:  Maddie Lala is a 71 year old female with the following diagnoses:   1. 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 Irregular astigmatism of both eyes    3. Diplopia    4. Pseudophakia of both eyes      Diplopia at night (when out of contact lenses).  Has only covered right eye and finds the symptom to resolve  Discussed, recommend covering each eye individually.  If only present when using both eyes together, she will call for orthoptist eval  C/D asymmetry left eye > right eye  Not previously noted  No history of Ocular hypertension  No family history or history of trauma  Thick pachmetry in both eyes   Baseline OCT Nerve fiber layer with asymmetric disc size, left eye > right eye.  Nerve fiber layer within normal limits   Patient disposition:   Return in about 1 year (around 2/3/2023) for DFE, OCT NFL.    Past medical history:    Patient Active Problem List   Diagnosis    Urinary incontinence    Cataract    CARDIOVASCULAR SCREENING; LDL GOAL LESS THAN 160    Tendonitis of shoulder, right    Chronic cough    Brachial neuritis or radiculitis    Seasonal allergic rhinitis    Chronic allergic conjunctivitis    GERD  (gastroesophageal reflux disease)    Latent tuberculosis    Meniere's disease    Menopause    Cervical nerve root impingement    Atrophic vaginitis    Hyponatremia    Neck pain    Muscle spasms of neck    Benign essential hypertension    AK (actinic keratosis)    Multiple melanocytic nevi    Status post knee surgery    Inflamed seborrheic keratosis    Solar lentiginosis    Tinea pedis of both feet    Dog bite of face, initial encounter    Calculus of gallbladder without cholecystitis without obstruction    Keratitis sicca, bilateral (H24)       Patient has a current medication list which includes the following prescription(s): albuterol, atorvastatin, calcium citrate-vitamin d, cromolyn, cyclosporine, diltiazem er, estradiol, fluocinonide, fluorometholone, fluticasone, ibuprofen, lisinopril, and omeprazole.. List is confirmed today.    Family history / social history:  Patient's family history includes Alzheimer Disease in her maternal grandmother; C.A.D. in her mother; Coronary Artery Disease in her mother; Heart Disease in her brother, brother, maternal grandfather, and mother; Macular Degeneration in her mother; Osteoporosis in her mother; Respiratory in her father; Skin Cancer in her father.     Patient  reports that she quit smoking about 43 years ago. Her smoking use included cigarettes. She has a 10 pack-year smoking history. She has never been exposed to tobacco smoke. She has never used smokeless tobacco. She reports current alcohol use of about 7.0 standard drinks of alcohol per week. She reports that she does not use drugs.     Exam:  Corrected distance visual acuity was 20/20 in the right eye and 20/25 +1 in the left eye. Intraocular pressure was 20 in the right eye and 18 in the left eye using ICare.  Color vision 11/11 right eye and 11/11 left eye.  Pupils equal and reactive with no rAPD.  Please see epic chart for complete exam    Cranial nerves V1-3, 7,8,9,11, and 12 were normal bilaterally.      Tests ordered and interpreted today:  Sensorimotor exam shows full motility in both eyes. There was a small angle 5 prism diopter right hypertropia in primary gaze that incrased in up gaze to 10 prism diopters and decreased in down gaze to about 3-4 prism diopters.    It was slightly larger in left gaze than right gaze.  Double drake nichelle showed a small degree of extorsion (3-5 right eye and trace left eye)         Calvin De Jesus MD  Ophthalmology, PGY-4    35 minutes were spent on the date of the encounter by me doing chart review, history and exam, documentation, and further activities as noted above    Complete documentation of historical and exam elements from today's encounter can be found in the full encounter summary report (not reduplicated in this progress note).  I personally obtained the chief complaint(s) and history of present illness.  I confirmed and edited as necessary the review of systems, past medical/surgical history, family history, social history, and examination findings as documented by others; and I examined the patient myself.  I personally reviewed the relevant tests, images, and reports as documented above.  I formulated and edited as necessary the assessment and plan and discussed the findings and management plan with the patient and family.  I personally reviewed the ophthalmic test(s) associated with this encounter, agree with the interpretation(s) as documented by the resident/fellow, and have edited the corresponding report(s) as necessary.     Yuniel Cronin MD

## 2024-01-31 NOTE — NURSING NOTE
Chief Complaint(s) and History of Present Illness(es)       Strabismus Evaluation    In both eyes.  Associated symptoms include Negative for droopy eyelid, eye pain and blurred vision.  Pain was noted as 0/10.             Comments    Maddie Lala is a 73 year old female sent for consultation by Dr. Garcia for strabismus surgery consult.  Patient has been wearing prism glasses for almost a year.  First pair given to her by Dr. Garcia 02/2023.  The glasses have been helping with the double vision.  Maddie says the double vision is only present in the evenings when she is tired - this has always been the case for her.  Unsure how the double vision came about, never questioned the root cause.  No previous head, brain or orbit MRI.  She is wondering if she would be a candidate for strabismus surgery.     RUTH Bill 1/31/2024 8:41 AM

## 2024-02-03 DIAGNOSIS — N95.2 VAGINAL ATROPHY: ICD-10-CM

## 2024-02-05 ENCOUNTER — OFFICE VISIT (OUTPATIENT)
Dept: FAMILY MEDICINE | Facility: CLINIC | Age: 74
End: 2024-02-05
Payer: COMMERCIAL

## 2024-02-05 VITALS
WEIGHT: 164.3 LBS | BODY MASS INDEX: 26.41 KG/M2 | SYSTOLIC BLOOD PRESSURE: 140 MMHG | HEIGHT: 66 IN | OXYGEN SATURATION: 98 % | RESPIRATION RATE: 13 BRPM | DIASTOLIC BLOOD PRESSURE: 80 MMHG | TEMPERATURE: 98.5 F | HEART RATE: 79 BPM

## 2024-02-05 DIAGNOSIS — L98.9 SKIN LESION: Primary | ICD-10-CM

## 2024-02-05 DIAGNOSIS — I10 BENIGN ESSENTIAL HYPERTENSION: ICD-10-CM

## 2024-02-05 PROCEDURE — 99213 OFFICE O/P EST LOW 20 MIN: CPT | Mod: 25 | Performed by: FAMILY MEDICINE

## 2024-02-05 PROCEDURE — 88305 TISSUE EXAM BY PATHOLOGIST: CPT | Performed by: PATHOLOGY

## 2024-02-05 PROCEDURE — 11300 SHAVE SKIN LESION 0.5 CM/<: CPT | Performed by: FAMILY MEDICINE

## 2024-02-05 RX ORDER — DILTIAZEM HYDROCHLORIDE 180 MG/1
180 CAPSULE, EXTENDED RELEASE ORAL EVERY MORNING
Qty: 90 CAPSULE | Refills: 1 | Status: SHIPPED | OUTPATIENT
Start: 2024-02-05 | End: 2024-07-16

## 2024-02-05 RX ORDER — ESTRADIOL 10 UG/1
INSERT VAGINAL
Qty: 24 TABLET | Refills: 3 | Status: SHIPPED | OUTPATIENT
Start: 2024-02-05

## 2024-02-05 ASSESSMENT — PAIN SCALES - GENERAL: PAINLEVEL: NO PAIN (0)

## 2024-02-05 NOTE — PROGRESS NOTES
Assessment & Plan     Skin lesion  See procedure note.  Await pathology results.  - Surgical Pathology Exam  - <5MM TRUNK,ARM,LEG    Benign essential hypertension  Refill diltiazem needed and this was sent to the pharmacy now.  Reports blood pressure has been under good control prior but has been out of her medication for the last couple of days.  - diltiazem ER (DILT-XR) 180 MG 24 hr capsule; Take 1 capsule (180 mg) by mouth every morning    Ordering of each unique test  Prescription drug management          Patient Instructions   Use antibiotic ointment topically until healed.    Shower normally.      Refill diltiazem for regular use.      Subjective   Maddie is a 73 year old, presenting for the following health issues:  Skin Tags (Lesson removal)      2/5/2024     2:19 PM   Additional Questions   Roomed by Joshua   Accompanied by self         2/5/2024     2:19 PM   Patient Reported Additional Medications   Patient reports taking the following new medications No     History of Present Illness       Reason for visit:  Biopsy clavicle lesion  Symptom onset:  More than a month  Symptoms include:  Itching  Symptom intensity:  Mild  Symptom progression:  Staying the same  Had these symptoms before:  No  What makes it worse:  No  What makes it better:  Cortisone cream    She eats 2-3 servings of fruits and vegetables daily.She consumes 1 sweetened beverage(s) daily.She exercises with enough effort to increase her heart rate 30 to 60 minutes per day.  She exercises with enough effort to increase her heart rate 5 days per week.   She is taking medications regularly.         Concern - Skin lesson on left side collar bone  Onset: 2-3 months  Description: Itchy  Intensity: mild  Progression of Symptoms:  same  Accompanying Signs & Symptoms: Itchy  Previous history of similar problem: No  Precipitating factors:        Worsened by: Comes and goes  Alleviating factors:        Improved by: With cream medication   Therapies tried  "and outcome: Cortizone - mild improvement in itching but area remains irritated and has bled previously.        Review of Systems  Constitutional, HEENT, cardiovascular, pulmonary, gi and gu systems are negative, except as otherwise noted.      Objective    BP (!) 141/79 (BP Location: Right arm, Patient Position: Sitting, Cuff Size: Adult Regular)   Pulse 79   Temp 98.5  F (36.9  C) (Oral)   Resp 13   Ht 1.676 m (5' 6\")   Wt 74.5 kg (164 lb 4.8 oz)   SpO2 98%   BMI 26.52 kg/m    Body mass index is 26.52 kg/m . Second BP: 140/80        Procedure note     subjective: Maddie Lala a 73 year old female who presents today for lesion removal. The lesion is located on the left clavicular area,  and measures 0.4 cm.  She denies other significant symptoms on ROS. Medications reviewed.    Objective: The area was prepped and appropriately anesthetized. Using the usual technique, shave excision was performed. The total area excised, including lesion and margins was 0.5 mm.  Electrocautery for hemostasis performed.  An appropriate  dressing was applied.  The procedure was well tolerated and without complications. Specimen was sent.    Assessment: seborrheic keratosis    Plan: Follow up: The specimen is labelled and sent to pathology for evaluation., The patient may return prn. Wound care instructions provided.  Patient was instructed to be alert for any signs of cutaneous infection.              Signed Electronically by: Radha Hurtado MD        This chart was documented by provider using a voice activated software called Dragon in addition to manual typing. There may be vocabulary errors or other grammatical errors due to this.       "

## 2024-02-05 NOTE — PATIENT INSTRUCTIONS
Use antibiotic ointment topically until healed.    Shower normally.      Refill diltiazem for regular use.

## 2024-02-08 ENCOUNTER — MYC MEDICAL ADVICE (OUTPATIENT)
Dept: FAMILY MEDICINE | Facility: CLINIC | Age: 74
End: 2024-02-08
Payer: COMMERCIAL

## 2024-02-08 DIAGNOSIS — Z96.652 HISTORY OF ARTHROPLASTY OF LEFT KNEE: Primary | ICD-10-CM

## 2024-02-08 DIAGNOSIS — M25.662 DECREASED ROM OF LEFT KNEE: ICD-10-CM

## 2024-02-08 NOTE — TELEPHONE ENCOUNTER
Routing to provider to review and advise.    Rosalinda, RN  [facility] Triage

## 2024-02-08 NOTE — RESULT ENCOUNTER NOTE
The pathology result indicates an inflamed seborrheic keratosis which is not cancerous or precancerous.  No additional treatment is needed for this.  Please call or MyChart message me if you have any questions.      CHRISTINA

## 2024-02-18 ASSESSMENT — ACTIVITIES OF DAILY LIVING (ADL)
STIFFNESS: THE SYMPTOM AFFECTS MY ACTIVITY SLIGHTLY
AS_A_RESULT_OF_YOUR_KNEE_INJURY,_HOW_WOULD_YOU_RATE_YOUR_CURRENT_LEVEL_OF_DAILY_ACTIVITY?: NORMAL
SWELLING: THE SYMPTOM AFFECTS MY ACTIVITY SLIGHTLY
SIT WITH YOUR KNEE BENT: ACTIVITY IS SOMEWHAT DIFFICULT
HOW_WOULD_YOU_RATE_THE_OVERALL_FUNCTION_OF_YOUR_KNEE_DURING_YOUR_USUAL_DAILY_ACTIVITIES?: NEARLY NORMAL
GO DOWN STAIRS: ACTIVITY IS MINIMALLY DIFFICULT
WALK: ACTIVITY IS NOT DIFFICULT
GO UP STAIRS: ACTIVITY IS MINIMALLY DIFFICULT
GIVING WAY, BUCKLING OR SHIFTING OF KNEE: I DO NOT HAVE THE SYMPTOM
AS_A_RESULT_OF_YOUR_KNEE_INJURY,_HOW_WOULD_YOU_RATE_YOUR_CURRENT_LEVEL_OF_DAILY_ACTIVITY?: NORMAL
SQUAT: I AM UNABLE TO DO THE ACTIVITY
KNEE_ACTIVITY_OF_DAILY_LIVING_SUM: 49
STAND: ACTIVITY IS NOT DIFFICULT
LIMPING: I DO NOT HAVE THE SYMPTOM
HOW_WOULD_YOU_RATE_THE_CURRENT_FUNCTION_OF_YOUR_KNEE_DURING_YOUR_USUAL_DAILY_ACTIVITIES_ON_A_SCALE_FROM_0_TO_100_WITH_100_BEING_YOUR_LEVEL_OF_KNEE_FUNCTION_PRIOR_TO_YOUR_INJURY_AND_0_BEING_THE_INABILITY_TO_PERFORM_ANY_OF_YOUR_USUAL_DAILY_ACTIVITIES?: 75
KNEEL ON THE FRONT OF YOUR KNEE: ACTIVITY IS FAIRLY DIFFICULT
PAIN: THE SYMPTOM AFFECTS MY ACTIVITY MODERATELY
PAIN: THE SYMPTOM AFFECTS MY ACTIVITY MODERATELY
GO UP STAIRS: ACTIVITY IS MINIMALLY DIFFICULT
STAND: ACTIVITY IS NOT DIFFICULT
RISE FROM A CHAIR: ACTIVITY IS MINIMALLY DIFFICULT
SIT WITH YOUR KNEE BENT: ACTIVITY IS SOMEWHAT DIFFICULT
LIMPING: I DO NOT HAVE THE SYMPTOM
STIFFNESS: THE SYMPTOM AFFECTS MY ACTIVITY SLIGHTLY
HOW_WOULD_YOU_RATE_THE_OVERALL_FUNCTION_OF_YOUR_KNEE_DURING_YOUR_USUAL_DAILY_ACTIVITIES?: NEARLY NORMAL
WEAKNESS: I HAVE THE SYMPTOM BUT IT DOES NOT AFFECT MY ACTIVITY
PLEASE_INDICATE_YOR_PRIMARY_REASON_FOR_REFERRAL_TO_THERAPY:: KNEE
WALK: ACTIVITY IS NOT DIFFICULT
KNEEL ON THE FRONT OF YOUR KNEE: ACTIVITY IS FAIRLY DIFFICULT
SQUAT: I AM UNABLE TO DO THE ACTIVITY
HOW_WOULD_YOU_RATE_THE_CURRENT_FUNCTION_OF_YOUR_KNEE_DURING_YOUR_USUAL_DAILY_ACTIVITIES_ON_A_SCALE_FROM_0_TO_100_WITH_100_BEING_YOUR_LEVEL_OF_KNEE_FUNCTION_PRIOR_TO_YOUR_INJURY_AND_0_BEING_THE_INABILITY_TO_PERFORM_ANY_OF_YOUR_USUAL_DAILY_ACTIVITIES?: 75
RAW_SCORE: 49
RISE FROM A CHAIR: ACTIVITY IS MINIMALLY DIFFICULT
SWELLING: THE SYMPTOM AFFECTS MY ACTIVITY SLIGHTLY
WEAKNESS: I HAVE THE SYMPTOM BUT IT DOES NOT AFFECT MY ACTIVITY
GO DOWN STAIRS: ACTIVITY IS MINIMALLY DIFFICULT
KNEE_ACTIVITY_OF_DAILY_LIVING_SCORE: 70
GIVING WAY, BUCKLING OR SHIFTING OF KNEE: I DO NOT HAVE THE SYMPTOM

## 2024-02-19 ENCOUNTER — THERAPY VISIT (OUTPATIENT)
Dept: PHYSICAL THERAPY | Facility: CLINIC | Age: 74
End: 2024-02-19
Attending: FAMILY MEDICINE
Payer: COMMERCIAL

## 2024-02-19 DIAGNOSIS — M25.662 DECREASED ROM OF LEFT KNEE: ICD-10-CM

## 2024-02-19 DIAGNOSIS — Z96.652 HISTORY OF ARTHROPLASTY OF LEFT KNEE: ICD-10-CM

## 2024-02-19 PROCEDURE — 97110 THERAPEUTIC EXERCISES: CPT | Mod: GP | Performed by: PHYSICAL THERAPIST

## 2024-02-19 PROCEDURE — 97161 PT EVAL LOW COMPLEX 20 MIN: CPT | Mod: GP | Performed by: PHYSICAL THERAPIST

## 2024-02-19 NOTE — PROGRESS NOTES
PHYSICAL THERAPY EVALUATION  Type of Visit: Evaluation    See electronic medical record for Abuse and Falls Screening details.    Subjective       Presenting condition or subjective complaint: Swelling and lack of flexion in left knee and less in right knee.  Pt is a 75 yo female who presents to PT with increased swelling and reduced ROM in her L knee following planned TKA in 2018. Pt goal is to have confidence in her L knee to support her with walking and stairs and would like to rely on it for a trip to the [facility].  Date of onset: 10/15/18    Relevant medical history: Arthritis; Hearing problems; High blood pressure; Menopause; Overweight; Vision problems   Dates & types of surgery: 2017 knee renplacement    Prior diagnostic imaging/testing results: Other X-Ray and MRI were done before knee replacement 2017   Prior therapy history for the same diagnosis, illness or injury: No      Prior Level of Function  Transfers:   Ambulation:   ADL:   IADL:     Living Environment  Social support: With a significant other or spouse   Type of home: 2-story   Stairs to enter the home: Yes 26 Is there a railing: Yes   Ramp: No   Stairs inside the home: Yes 26 Is there a railing: Yes   Help at home: None  Equipment owned: Straight Cane; Walker with wheels     Employment: No    Hobbies/Interests: Photography, grandchildren, hiking, canoeing    Patient goals for therapy: Bend left knee close to 120 degrees    Pain assessment: Pain present     Objective   KNEE EVALUATION  PAIN: Pain Level at Rest: 0/10  Pain Level with Use: 2/10  INTEGUMENTARY (edema, incisions):   POSTURE:   GAIT:  Weightbearing Status: WBAT  Assistive Device(s): None  Gait Deviations: WNL  BALANCE/PROPRIOCEPTION: Single Leg Stance Eyes Open (seconds):  R = >1 minute; L = 44 sec  WEIGHTBEARING ALIGNMENT:   NON-WEIGHTBEARING ALIGNMENT:   ROM:   (Degrees) Left AROM Left PROM  Right AROM Right PROM   Knee Flexion 108 deg 113 deg     Knee Extension 0 deg Slight  hyperextension     Pain:   End feel:     STRENGTH:  L knee flex/ext 4+/5; 5xSTS = 10.27 sec and 30 sec chair stand = 14 reps, each representing normal performance for age and gender.  FLEXIBILITY:   SPECIAL TESTS:   FUNCTIONAL TESTS:   PALPATION:   JOINT MOBILITY:     Assessment & Plan   CLINICAL IMPRESSIONS  Medical Diagnosis: History of arthroplasty of left knee (Z96.652)    Treatment Diagnosis: L TKA, mild L knee edema, reduced L knee ROM   Impression/Assessment:  Pt presents to PT with limited strength and mobility deficits, knee ROM lacking somewhat, and overall lacking confidence in the strength of her L knee which holds her back somewhat from doing her preferred leisure and sport activities. Pt would benefit from skilled PT interventions in order to address her weakness, reduced ROM, and any edema that may be present in her knee at this time so she may return to full and independent functioning in her home, the community, and in nature.    Clinical Decision Making (Complexity):  Clinical Presentation: Stable/Uncomplicated  Clinical Presentation Rationale: based on medical and personal factors listed in PT evaluation  Clinical Decision Making (Complexity): Low complexity    PLAN OF CARE  Treatment Interventions:  Interventions: Gait Training, Manual Therapy, Neuromuscular Re-education, Therapeutic Activity, Therapeutic Exercise    Long Term Goals     PT Goal 1  Goal Identifier: HEP  Goal Description: Pt will demo independence in performance and progression of strengthening and balance HEP in order to improve CLOF.  Target Date:  (Ongoing, updates as needed)  PT Goal 2  Goal Identifier: KOS  Goal Description: Pt will demo improved knee pain ratings and function as shown by increased KOS score of at least 7 points in order to show significant improvement and greater return to previous function.  Goal Progress: 49/70 (eval)  Target Date: 04/15/24  PT Goal 3  Goal Identifier: ROM  Goal Description: Pt will demo  improved L knee AROM of at least 0-120 deg in order to allow for normal gait pattern over level and stairs.  Target Date: 04/15/24  PT Goal 4  Goal Identifier: Balance  Goal Description: Pt will demo improved balance as shown by SLS equal B, at least 1 minute in order to show strength and balance gains for greater confidence in gait over level, uneven surfaces, and stairs.  Goal Progress: R = >1 minute, L = 44 sec (eval)  Target Date: 04/15/24      Frequency of Treatment: 1-2x/week  Duration of Treatment: 8 weeks    Recommended Referrals to Other Professionals:   Education Assessment:   Learner/Method: Patient;Listening;Demonstration    Risks and benefits of evaluation/treatment have been explained.   Patient/Family/caregiver agrees with Plan of Care.     Evaluation Time:     PT Eval, Low Complexity Minutes (98114): 15       Signing Clinician: Jefe Hays PT      [facility]                                                                                   OUTPATIENT PHYSICAL THERAPY      PLAN OF TREATMENT FOR OUTPATIENT REHABILITATION   Patient's Last Name, First Name, Maddie Sweet YOB: 1950   Provider's Name   [facility]   Medical Record No.  [id number]     Onset Date: 10/15/18  Start of Care Date: 02/19/24     Medical Diagnosis:  History of arthroplasty of left knee (Z96.652)      PT Treatment Diagnosis:  L TKA, mild L knee edema, reduced L knee ROM Plan of Treatment  Frequency/Duration: 1-2x/week/ 8 weeks    Certification date from 02/19/24 to 04/15/24         See note for plan of treatment details and functional goals     Jefe Hays, PT                         I CERTIFY THE NEED FOR THESE SERVICES FURNISHED UNDER        THIS PLAN OF TREATMENT AND WHILE UNDER MY CARE     (Physician attestation of this document indicates review and certification of the therapy plan).              Referring Provider:  Radha Hurtado,  MD    Initial Assessment  See Epic Evaluation- Start of Care Date: 02/19/24

## 2024-02-27 ENCOUNTER — ANCILLARY PROCEDURE (OUTPATIENT)
Dept: BONE DENSITY | Facility: CLINIC | Age: 74
End: 2024-02-27
Attending: FAMILY MEDICINE
Payer: COMMERCIAL

## 2024-02-27 DIAGNOSIS — M85.89 OSTEOPENIA OF MULTIPLE SITES: ICD-10-CM

## 2024-02-27 PROCEDURE — 77080 DXA BONE DENSITY AXIAL: CPT | Mod: TC

## 2024-02-28 ENCOUNTER — MYC MEDICAL ADVICE (OUTPATIENT)
Dept: FAMILY MEDICINE | Facility: CLINIC | Age: 74
End: 2024-02-28
Payer: COMMERCIAL

## 2024-02-29 ENCOUNTER — THERAPY VISIT (OUTPATIENT)
Dept: PHYSICAL THERAPY | Facility: CLINIC | Age: 74
End: 2024-02-29
Attending: FAMILY MEDICINE
Payer: COMMERCIAL

## 2024-02-29 DIAGNOSIS — Z96.652 HISTORY OF ARTHROPLASTY OF LEFT KNEE: Primary | ICD-10-CM

## 2024-02-29 PROCEDURE — 97110 THERAPEUTIC EXERCISES: CPT | Mod: GP | Performed by: PHYSICAL THERAPIST

## 2024-02-29 PROCEDURE — 97140 MANUAL THERAPY 1/> REGIONS: CPT | Mod: GP | Performed by: PHYSICAL THERAPIST

## 2024-03-05 ENCOUNTER — THERAPY VISIT (OUTPATIENT)
Dept: PHYSICAL THERAPY | Facility: CLINIC | Age: 74
End: 2024-03-05
Attending: FAMILY MEDICINE
Payer: COMMERCIAL

## 2024-03-05 DIAGNOSIS — Z96.652 HISTORY OF ARTHROPLASTY OF LEFT KNEE: Primary | ICD-10-CM

## 2024-03-05 PROCEDURE — 97112 NEUROMUSCULAR REEDUCATION: CPT | Mod: GP | Performed by: PHYSICAL THERAPIST

## 2024-03-05 PROCEDURE — 97110 THERAPEUTIC EXERCISES: CPT | Mod: GP | Performed by: PHYSICAL THERAPIST

## 2024-03-06 ENCOUNTER — VIRTUAL VISIT (OUTPATIENT)
Dept: FAMILY MEDICINE | Facility: CLINIC | Age: 74
End: 2024-03-06
Payer: COMMERCIAL

## 2024-03-06 DIAGNOSIS — M85.89 OSTEOPENIA OF MULTIPLE SITES: Primary | ICD-10-CM

## 2024-03-06 DIAGNOSIS — I10 BENIGN ESSENTIAL HYPERTENSION: ICD-10-CM

## 2024-03-06 PROCEDURE — 99213 OFFICE O/P EST LOW 20 MIN: CPT | Mod: 95 | Performed by: FAMILY MEDICINE

## 2024-03-06 NOTE — PATIENT INSTRUCTIONS
Take calcium AM and noon.  If  you have not gotten the additional 500 mg calcium in in diet you can take a 3rd in the evening.     CALCIUM    Recommendations:  Teenagers and premenopausal women: 1200 mg/day  Pregnant and Lactating women: 1500 mg/day  Men and Postmenopausal women on estrogen: 1200mg/day  Postmenopausal women not on estrogen: 1500 mg/day    If you are not eating dairy products you also need 400 IU of vitamin D per day which can be obtained in either a multivitamin or in some of the Calcium tablets.    Dietary sources: These also contain vitamin D  Milk                            8 oz            300 mg  Yogurt                          1 cup           400 mg  Hard cheese                     1.5 oz          300 mg  Cottage cheese                  2 cup           300 mg  Orange juice with Calcium       8 oz            300 mg  Low fat dairy sources are recommended    Supplements:  Tums EX                         300 mg  Tums Ultra                      400 mg  Caltrate 600                    600 mg  Oscal                           500 mg  Oscal/D                         500 mg plus vitamin D  Women's Formula Multivitamin    450 mg       Continue to optimize weight bearing exercises for back, legs and arms.  Follow-up bone density in February/March 2026 recommended.  At this point in time we will continue without medication for treatment of your current bone density.  If you change your mind in the future and would like to pursue treatment for prevention of osteoporosis with Fosamax style medication, please reach out and notify me.  Be sure to update any dental work prior to beginning medication as we discussed.    Monitor BP at home and send readings in a couple of weeks.

## 2024-03-06 NOTE — PROGRESS NOTES
Maddie is a 74 year old who is being evaluated via a billable video visit.      How would you like to obtain your AVS? MyChart  If the video visit is dropped, the invitation should be resent by: Text to cell phone: [phone]  Will anyone else be joining your video visit? No          Assessment & Plan     Osteopenia of multiple sites  Reviewed bone density from February in comparison with September 2021 findings.  These results were not able to be compared directly but it would appear that there is a mild additional decrease in bone density currently.  We discussed options for management of this including optimizing calcium and vitamin D intake along with weightbearing exercise.  She reports she is not always sure she is getting the 1500 mg of calcium but does at least thousand and supplement form and milk products on a fairly regular basis.  Is doing weightbearing exercise with both upper and lower extremities.  Review of bisphosphonate treatment together today.  Potential side effects as well as benefits discussed.  With her past history of reflux and some upcoming dental work that she has planned she is going to forego treatment currently with medication and focus more on the above lifestyle measures.  Follow-up bone density will be planned in 2026.      Benign essential hypertension  Elevated blood pressure at her last visit was likely related to being off of one of her blood pressure medications.  She is back on that now we will monitor her blood pressure at home.  She will send me readings in 2 to 3 weeks as an update.    Review of the result(s) of each unique test - bone density 2021 and February 2024  Prescription drug management          Patient Instructions   Take calcium AM and noon.  If  you have not gotten the additional 500 mg calcium in in diet you can take a 3rd in the evening.     CALCIUM    Recommendations:  Teenagers and premenopausal women: 1200 mg/day  Pregnant and Lactating women: 1500  mg/day  Men and Postmenopausal women on estrogen: 1200mg/day  Postmenopausal women not on estrogen: 1500 mg/day    If you are not eating dairy products you also need 400 IU of vitamin D per day which can be obtained in either a multivitamin or in some of the Calcium tablets.    Dietary sources: These also contain vitamin D  Milk                            8 oz            300 mg  Yogurt                          1 cup           400 mg  Hard cheese                     1.5 oz          300 mg  Cottage cheese                  2 cup           300 mg  Orange juice with Calcium       8 oz            300 mg  Low fat dairy sources are recommended    Supplements:  Tums EX                         300 mg  Tums Ultra                      400 mg  Caltrate 600                    600 mg  Oscal                           500 mg  Oscal/D                         500 mg plus vitamin D  Women's Formula Multivitamin    450 mg       Continue to optimize weight bearing exercises for back, legs and arms.  Follow-up bone density in February/March 2026 recommended.  At this point in time we will continue without medication for treatment of your current bone density.  If you change your mind in the future and would like to pursue treatment for prevention of osteoporosis with Fosamax style medication, please reach out and notify me.  Be sure to update any dental work prior to beginning medication as we discussed.    Monitor BP at home and send readings in a couple of weeks.          Janes Perkins is a 74 year old, presenting for the following health issues:  No chief complaint on file.    History of Present Illness       Reason for visit:  Abnormal dexascan    She eats 2-3 servings of fruits and vegetables daily.She consumes 0 sweetened beverage(s) daily.She exercises with enough effort to increase her heart rate 30 to 60 minutes per day.  She exercises with enough effort to increase her heart rate 5 days per week.   She is taking medications  regularly.     Taking vitamin D, calcium citrate 1000 mg daily.      Dental - 4-5 implants in the last few years.  Has a need for crown to be done now.         GERD:  omeprazole helping symptoms.  Occasionally with breakthrough symptoms.            BP was elevated at last visit but was off the diltiazem for a few days.   Has not been checking recently.          Review of Systems  Constitutional, HEENT, cardiovascular, pulmonary, gi and gu systems are negative, except as otherwise noted.      Objective           Vitals:  No vitals were obtained today due to virtual visit.    Physical Exam   GENERAL: alert and no distress  EYES: Eyes grossly normal to inspection.  No discharge or erythema, or obvious scleral/conjunctival abnormalities.  RESP: No audible wheeze, cough, or visible cyanosis.    SKIN: Visible skin clear. No significant rash, abnormal pigmentation or lesions.  NEURO: Cranial nerves grossly intact.  Mentation and speech appropriate for age.  PSYCH: Appropriate affect, tone, and pace of words          Video-Visit Details    Type of service:  Video Visit   Video Start Time:  11:34 AM  Video End Time: 12:09 PM    Originating Location (pt. Location): Home    Distant Location (provider location):  Off-site  Platform used for Video Visit: Lizett  Signed Electronically by: Radha Hurtado MD              This chart was documented by provider using a voice activated software called Dragon in addition to manual typing. There may be vocabulary errors or other grammatical errors due to this.

## 2024-03-15 ENCOUNTER — THERAPY VISIT (OUTPATIENT)
Dept: PHYSICAL THERAPY | Facility: CLINIC | Age: 74
End: 2024-03-15
Attending: FAMILY MEDICINE
Payer: COMMERCIAL

## 2024-03-15 DIAGNOSIS — Z96.652 HISTORY OF ARTHROPLASTY OF LEFT KNEE: Primary | ICD-10-CM

## 2024-03-15 PROCEDURE — 97110 THERAPEUTIC EXERCISES: CPT | Mod: GP | Performed by: PHYSICAL THERAPIST

## 2024-03-15 PROCEDURE — 97140 MANUAL THERAPY 1/> REGIONS: CPT | Mod: GP | Performed by: PHYSICAL THERAPIST

## 2024-03-28 ENCOUNTER — THERAPY VISIT (OUTPATIENT)
Dept: PHYSICAL THERAPY | Facility: CLINIC | Age: 74
End: 2024-03-28
Attending: FAMILY MEDICINE
Payer: COMMERCIAL

## 2024-03-28 DIAGNOSIS — Z96.652 HISTORY OF ARTHROPLASTY OF LEFT KNEE: Primary | ICD-10-CM

## 2024-03-28 PROCEDURE — 97110 THERAPEUTIC EXERCISES: CPT | Mod: GP | Performed by: PHYSICAL THERAPIST

## 2024-03-28 ASSESSMENT — ACTIVITIES OF DAILY LIVING (ADL)
GO UP STAIRS: ACTIVITY IS NOT DIFFICULT
STIFFNESS: I HAVE THE SYMPTOM BUT IT DOES NOT AFFECT MY ACTIVITY
STAND: ACTIVITY IS NOT DIFFICULT
RAW_SCORE: 64
GIVING WAY, BUCKLING OR SHIFTING OF KNEE: I DO NOT HAVE THE SYMPTOM
KNEE_ACTIVITY_OF_DAILY_LIVING_SUM: 64
RISE FROM A CHAIR: ACTIVITY IS NOT DIFFICULT
AS_A_RESULT_OF_YOUR_KNEE_INJURY,_HOW_WOULD_YOU_RATE_YOUR_CURRENT_LEVEL_OF_DAILY_ACTIVITY?: NORMAL
HOW_WOULD_YOU_RATE_THE_OVERALL_FUNCTION_OF_YOUR_KNEE_DURING_YOUR_USUAL_DAILY_ACTIVITIES?: NORMAL
SIT WITH YOUR KNEE BENT: ACTIVITY IS MINIMALLY DIFFICULT
WALK: ACTIVITY IS NOT DIFFICULT
PAIN: I DO NOT HAVE THE SYMPTOM
HOW_WOULD_YOU_RATE_THE_CURRENT_FUNCTION_OF_YOUR_KNEE_DURING_YOUR_USUAL_DAILY_ACTIVITIES_ON_A_SCALE_FROM_0_TO_100_WITH_100_BEING_YOUR_LEVEL_OF_KNEE_FUNCTION_PRIOR_TO_YOUR_INJURY_AND_0_BEING_THE_INABILITY_TO_PERFORM_ANY_OF_YOUR_USUAL_DAILY_ACTIVITIES?: 98
KNEEL ON THE FRONT OF YOUR KNEE: ACTIVITY IS MINIMALLY DIFFICULT
KNEE_ACTIVITY_OF_DAILY_LIVING_SCORE: 91.43
LIMPING: I DO NOT HAVE THE SYMPTOM
SWELLING: I HAVE THE SYMPTOM BUT IT DOES NOT AFFECT MY ACTIVITY
GO DOWN STAIRS: ACTIVITY IS NOT DIFFICULT
SQUAT: ACTIVITY IS MINIMALLY DIFFICULT
WEAKNESS: I HAVE THE SYMPTOM BUT IT DOES NOT AFFECT MY ACTIVITY

## 2024-03-28 NOTE — PROGRESS NOTES
DISCHARGE  Reason for Discharge: Patient has met all goals.  Pt has made good progress in her L knee mobility, is able to independently manage her HEP and progression of knee ROM and strength. Pt is safe to discharge from skilled PT.    Equipment Issued:     Discharge Plan: Patient to continue home program.    Referring Provider:  Radha Hurtado MD       03/28/24 0500   Appointment Info   Signing clinician's name / credentials Jefe Hays, PT   Visits Used 5   Medical Diagnosis History of arthroplasty of left knee (Z96.652)   PT Tx Diagnosis L TKA, mild L knee edema, reduced L knee ROM   Quick Adds Certification   Progress Note/Certification   Start of Care Date 02/19/24   Onset of illness/injury or Date of Surgery 10/15/18   Therapy Frequency 1-2x/week   Predicted Duration 8 weeks   Certification date from 02/19/24   Certification date to 04/15/24   Progress Note Due Date 04/15/24   Progress Note Completed Date 02/19/24   GOALS   PT Goals 2;3;4   PT Goal 1   Goal Identifier HEP   Goal Description Pt will demo independence in performance and progression of strengthening and balance HEP in order to improve CLOF.   Goal Progress Pt demos ability to safely and independently manage her HEP, progressing well without difficulty, understanding of all her education.   Target Date   (Ongoing, updates as needed)   Date Met 03/28/24   PT Goal 2   Goal Identifier KOS   Goal Description Pt will demo improved knee pain ratings and function as shown by increased KOS score of at least 7 points in order to show significant improvement and greater return to previous function.   Goal Progress 64/70   Target Date 04/15/24   Date Met 03/28/24   PT Goal 3   Goal Identifier ROM   Goal Description Pt will demo improved L knee AROM of at least 0-120 deg in order to allow for normal gait pattern over level and stairs.   Goal Progress Pt demos 115 deg of L knee flex with self overpressure.   Target Date 04/15/24   Date Met 03/28/24   PT  Goal 4   Goal Identifier Balance   Goal Description Pt will demo improved balance as shown by SLS equal B, at least 1 minute in order to show strength and balance gains for greater confidence in gait over level, uneven surfaces, and stairs.   Goal Progress >1 minute B   Target Date 04/15/24   Date Met 03/28/24   Subjective Report   Subjective Report Pt reporting her walking, ROM, and overall mobility are slowly improving.   Objective Measures   Objective Measures Objective Measure 1   Objective Measure 1   Objective Measure ROM   Details 115 deg with self overpressure.   Treatment Interventions (PT)   Interventions Therapeutic Procedure/Exercise;Neuromuscular Re-education   Therapeutic Procedure/Exercise   Therapeutic Procedures: strength, endurance, ROM, flexibility minutes (64721) 45   Ther Proc 1 HEP progression   Ther Proc 1 - Details Discussed ways to increase challenges with HEP. Squatting deeper, progressing to functional lunge and lateral lunge to target single side mm as well as adding BOSU for additional strength challenge. Pt performs x10 wtih noted increased resistance. Reviewed pigeon pose for hip mobility, pt showing appropriate modification to accommodate her reduced L knee ROM, no pain noted in back or other body parts. Educated and reviewed precautions for osteopenia to ensure pt not putting herself at too great of a risk for fx, pt verbalizing understanding.   Patient Response/Progress Pt receptive to education and instruction, very motivated to continue improvement   Education   Education Comments Strength losses with aging   Plan   Home program Added in banded clam GTB for home, GTB eversion, tennis ball HS release Trigger poinr release,   Plan for next session D/C skilled PT   Total Session Time   Timed Code Treatment Minutes 45   Total Treatment Time (sum of timed and untimed services) 45

## 2024-04-25 DIAGNOSIS — L21.9 DERMATITIS, SEBORRHEIC: ICD-10-CM

## 2024-04-27 RX ORDER — FLUOCINONIDE TOPICAL SOLUTION USP, 0.05% 0.5 MG/ML
SOLUTION TOPICAL
Qty: 60 ML | Refills: 0 | Status: SHIPPED | OUTPATIENT
Start: 2024-04-27 | End: 2024-08-19

## 2024-05-01 ENCOUNTER — E-VISIT (OUTPATIENT)
Dept: FAMILY MEDICINE | Facility: CLINIC | Age: 74
End: 2024-05-01
Payer: COMMERCIAL

## 2024-05-01 DIAGNOSIS — K08.89 PAIN, DENTAL: Primary | ICD-10-CM

## 2024-05-01 PROCEDURE — 99421 OL DIG E/M SVC 5-10 MIN: CPT | Performed by: FAMILY MEDICINE

## 2024-05-01 NOTE — PATIENT INSTRUCTIONS
Thank you for choosing us for your care. I have placed an order for a prescription so that you can start treatment. View your full visit summary for details by clicking on the link below. Your pharmacist will able to address any questions you may have about the medication.     If you're not feeling better within 5-7 days, please schedule an appointment.  You can schedule an appointment right here in [facility], or call [phone]  If the visit is for the same symptoms as your eVisit, we'll refund the cost of your eVisit if seen within seven days.

## 2024-07-11 DIAGNOSIS — I10 BENIGN ESSENTIAL HYPERTENSION: ICD-10-CM

## 2024-07-11 RX ORDER — LISINOPRIL 20 MG/1
20 TABLET ORAL
Qty: 90 TABLET | Refills: 1 | Status: SHIPPED | OUTPATIENT
Start: 2024-07-11

## 2024-07-16 DIAGNOSIS — I10 BENIGN ESSENTIAL HYPERTENSION: ICD-10-CM

## 2024-07-16 RX ORDER — DILTIAZEM HYDROCHLORIDE 180 MG/1
180 CAPSULE, EXTENDED RELEASE ORAL EVERY MORNING
Qty: 90 CAPSULE | Refills: 1 | Status: SHIPPED | OUTPATIENT
Start: 2024-07-16

## 2024-08-18 DIAGNOSIS — L21.9 DERMATITIS, SEBORRHEIC: ICD-10-CM

## 2024-08-19 RX ORDER — FLUOCINONIDE TOPICAL SOLUTION USP, 0.05% 0.5 MG/ML
SOLUTION TOPICAL
Qty: 60 ML | Refills: 0 | Status: SHIPPED | OUTPATIENT
Start: 2024-08-19

## 2024-09-16 ENCOUNTER — MYC MEDICAL ADVICE (OUTPATIENT)
Dept: FAMILY MEDICINE | Facility: CLINIC | Age: 74
End: 2024-09-16
Payer: COMMERCIAL

## 2024-10-10 DIAGNOSIS — H10.45 CHRONIC ALLERGIC CONJUNCTIVITIS: ICD-10-CM

## 2024-10-10 RX ORDER — FLUTICASONE PROPIONATE 50 MCG
1 SPRAY, SUSPENSION (ML) NASAL PRN
Qty: 48 G | Refills: 1 | Status: SHIPPED | OUTPATIENT
Start: 2024-10-10

## 2024-11-20 ENCOUNTER — MYC MEDICAL ADVICE (OUTPATIENT)
Dept: OPTOMETRY | Facility: CLINIC | Age: 74
End: 2024-11-20

## 2024-11-20 DIAGNOSIS — H04.129 DRY EYE: Primary | ICD-10-CM

## 2024-11-20 DIAGNOSIS — H16.223 KERATITIS SICCA, BILATERAL: ICD-10-CM

## 2024-11-20 RX ORDER — CYCLOSPORINE 0.5 MG/ML
1 EMULSION OPHTHALMIC 2 TIMES DAILY
Qty: 60 EACH | Refills: 11 | Status: SHIPPED | OUTPATIENT
Start: 2024-11-20

## 2024-12-06 DIAGNOSIS — K21.9 GASTROESOPHAGEAL REFLUX DISEASE WITHOUT ESOPHAGITIS: ICD-10-CM

## 2024-12-06 DIAGNOSIS — E78.00 PURE HYPERCHOLESTEROLEMIA: ICD-10-CM

## 2024-12-09 RX ORDER — ATORVASTATIN CALCIUM 20 MG/1
20 TABLET, FILM COATED ORAL DAILY
Qty: 90 TABLET | Refills: 1 | Status: SHIPPED | OUTPATIENT
Start: 2024-12-09

## 2024-12-10 ENCOUNTER — OFFICE VISIT (OUTPATIENT)
Dept: OPTOMETRY | Facility: CLINIC | Age: 74
End: 2024-12-10
Payer: COMMERCIAL

## 2024-12-10 DIAGNOSIS — H50.21 HYPERTROPIA OF RIGHT EYE: ICD-10-CM

## 2024-12-10 DIAGNOSIS — H01.02A SQUAMOUS BLEPHARITIS OF UPPER AND LOWER EYELIDS OF BOTH EYES: ICD-10-CM

## 2024-12-10 DIAGNOSIS — Z96.1 PSEUDOPHAKIA: ICD-10-CM

## 2024-12-10 DIAGNOSIS — H01.02B SQUAMOUS BLEPHARITIS OF UPPER AND LOWER EYELIDS OF BOTH EYES: ICD-10-CM

## 2024-12-10 DIAGNOSIS — H18.462 PELLUCID MARGINAL DEGENERATION OF LEFT CORNEA: ICD-10-CM

## 2024-12-10 DIAGNOSIS — H04.129 DRY EYE: Primary | ICD-10-CM

## 2024-12-10 DIAGNOSIS — L21.9 DERMATITIS, SEBORRHEIC: ICD-10-CM

## 2024-12-10 DIAGNOSIS — H16.223 KERATITIS SICCA, BILATERAL: ICD-10-CM

## 2024-12-10 ASSESSMENT — VISUAL ACUITY
OD_CC: 20/20
METHOD: SNELLEN - LINEAR
OS_PH_CC: 20/25
OS_CC: 20/30
OD_CC+: -2
CORRECTION_TYPE: GLASSES

## 2024-12-10 ASSESSMENT — REFRACTION_WEARINGRX
OD_CYLINDER: +1.50
OD_ADD: +2.50
OD_VPRISM: 1.5 BD
OS_HPRISM: 1.5 BO
OD_HPRISM: 1.5 BO
OS_VPRISM: 1.5 BU
OS_AXIS: 180
OD_SPHERE: -0.50
OD_AXIS: 005
OS_ADD: +2.50
OS_CYLINDER: +4.00
OS_SPHERE: -1.00

## 2024-12-10 ASSESSMENT — CONF VISUAL FIELD
OS_SUPERIOR_TEMPORAL_RESTRICTION: 0
OD_INFERIOR_NASAL_RESTRICTION: 0
OD_INFERIOR_TEMPORAL_RESTRICTION: 0
OD_SUPERIOR_NASAL_RESTRICTION: 0
OD_SUPERIOR_TEMPORAL_RESTRICTION: 0
OS_NORMAL: 1
OS_INFERIOR_NASAL_RESTRICTION: 0
OD_NORMAL: 1
OS_INFERIOR_TEMPORAL_RESTRICTION: 0
OS_SUPERIOR_NASAL_RESTRICTION: 0
METHOD: COUNTING FINGERS

## 2024-12-10 ASSESSMENT — REFRACTION_CURRENTRX
OS_ADDL_SPECS: OPT EXTRA BLUE HYDRAPEG
OS_DIAMETER: 14.9
OS_BRAND: ONEFIT TRIAL
OS_SPHERE: -1.00
OS_BASECURVE: 7.7

## 2024-12-10 ASSESSMENT — REFRACTION_MANIFEST
OD_ADD: +2.50
OS_CYLINDER: +2.50
OD_CYLINDER: +1.25
OS_ADD: +2.50
OS_AXIS: 009
OD_AXIS: 002
OD_SPHERE: -0.25
OS_SPHERE: -0.75

## 2024-12-10 ASSESSMENT — TONOMETRY
OD_IOP_MMHG: 13
IOP_METHOD: TONOPEN
OS_IOP_MMHG: 15

## 2024-12-10 ASSESSMENT — EXTERNAL EXAM - RIGHT EYE: OD_EXAM: NORMAL

## 2024-12-10 ASSESSMENT — REFRACTION_FINALRX
OS_VPRISM: 1.5 BU
OD_VPRISM: 1.5 BD
OS_HPRISM: 1.5 BO
OD_HPRISM: 1.5 BO

## 2024-12-10 ASSESSMENT — CUP TO DISC RATIO
OD_RATIO: 0.20
OS_RATIO: 0.4

## 2024-12-10 ASSESSMENT — EXTERNAL EXAM - LEFT EYE: OS_EXAM: NORMAL

## 2024-12-10 NOTE — PROGRESS NOTES
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 Topos have now stabilized to the point she corrects well in astigmatic glasses Rx and CL is not longer needed.   -Currently going uncorrected mainly, using glasses prn. Now noticing more double vision when uncorrected through  -Optinon for updating glasses but HOLD until after strab surgery eval    2.) Dry Eye/Allergic conjunctivitis OU  -H/o chronic perennial allergic conjunctivitis. Quiet today and for several months now. Previously with flares  -Currently on AT/gel, Restasis bid regularly. Using cromolyn and ketotifen prn. Continue all  -S/p punctal cautery uppers OU. Lowers likely scarred as unable to insert plugs previously  -Previously did well on Maxitrol raffi and FML gtt for flares. HOLD unless needed    3.) Pseudophakia with Vitreous Syneresis  -Stable DFE today, monitor    4.) Right hypertropia with esotropia component  -Decompensating phoria noticeable with double vision at night  -No double vision with current glasses but she does not like to wear glasses often and has noticed more double vision uncorrected lately  -Previously met with Dr. Cronin to discuss strab surgery - she would now like to re-eval surgical options for strab surgery. Will arrange appt today    Monitor 1 year comprehensive here, sooner prn with new concerns    I have confirmed the patient's CC, HPI and reviewed Past Medical History, Past Surgical History, Social History, Family History, Problem List, Medication List and agree with Tech note.     Latia Garcia, OD [facility]

## 2024-12-10 NOTE — NURSING NOTE
Chief Complaints and History of Present Illnesses   Patient presents with    COMPREHENSIVE EYE EXAM     Pt here for comprehensive eye exam each eye with scleral lens left eye.      Chief Complaint(s) and History of Present Illness(es)       COMPREHENSIVE EYE EXAM              Laterality: both eyes    Comments: Pt here for comprehensive eye exam each eye with scleral lens left eye.               Comments    Pt has decreased vision at near. Distance vision is largely unchanged. Pt noting increased double vision towards the end of the day. Would like a referral back to Dr Garcia.     FELICIA Lan on 12/10/2024 at 10:58 AM

## 2024-12-11 RX ORDER — FLUOCINONIDE TOPICAL SOLUTION USP, 0.05% 0.5 MG/ML
SOLUTION TOPICAL
Qty: 60 ML | Refills: 0 | Status: SHIPPED | OUTPATIENT
Start: 2024-12-11

## 2024-12-29 SDOH — HEALTH STABILITY: PHYSICAL HEALTH: ON AVERAGE, HOW MANY MINUTES DO YOU ENGAGE IN EXERCISE AT THIS LEVEL?: 50 MIN

## 2024-12-29 SDOH — HEALTH STABILITY: PHYSICAL HEALTH: ON AVERAGE, HOW MANY DAYS PER WEEK DO YOU ENGAGE IN MODERATE TO STRENUOUS EXERCISE (LIKE A BRISK WALK)?: 4 DAYS

## 2024-12-29 ASSESSMENT — SOCIAL DETERMINANTS OF HEALTH (SDOH): HOW OFTEN DO YOU GET TOGETHER WITH FRIENDS OR RELATIVES?: TWICE A WEEK

## 2024-12-30 ENCOUNTER — OFFICE VISIT (OUTPATIENT)
Dept: FAMILY MEDICINE | Facility: CLINIC | Age: 74
End: 2024-12-30
Attending: FAMILY MEDICINE
Payer: COMMERCIAL

## 2024-12-30 VITALS
BODY MASS INDEX: 26.1 KG/M2 | HEART RATE: 52 BPM | DIASTOLIC BLOOD PRESSURE: 78 MMHG | TEMPERATURE: 98.2 F | WEIGHT: 162.4 LBS | RESPIRATION RATE: 18 BRPM | SYSTOLIC BLOOD PRESSURE: 134 MMHG | HEIGHT: 66 IN | OXYGEN SATURATION: 97 %

## 2024-12-30 DIAGNOSIS — H16.223 KERATITIS SICCA, BILATERAL: ICD-10-CM

## 2024-12-30 DIAGNOSIS — E78.00 PURE HYPERCHOLESTEROLEMIA: ICD-10-CM

## 2024-12-30 DIAGNOSIS — I10 BENIGN ESSENTIAL HYPERTENSION: ICD-10-CM

## 2024-12-30 DIAGNOSIS — Z00.00 ENCOUNTER FOR MEDICARE ANNUAL WELLNESS EXAM: Primary | ICD-10-CM

## 2024-12-30 DIAGNOSIS — K86.2 PANCREATIC CYST: ICD-10-CM

## 2024-12-30 DIAGNOSIS — B35.1 FUNGAL NAIL INFECTION: ICD-10-CM

## 2024-12-30 PROCEDURE — G0439 PPPS, SUBSEQ VISIT: HCPCS | Performed by: FAMILY MEDICINE

## 2024-12-30 PROCEDURE — 82043 UR ALBUMIN QUANTITATIVE: CPT | Performed by: FAMILY MEDICINE

## 2024-12-30 PROCEDURE — 80061 LIPID PANEL: CPT | Performed by: FAMILY MEDICINE

## 2024-12-30 PROCEDURE — 99214 OFFICE O/P EST MOD 30 MIN: CPT | Mod: 25 | Performed by: FAMILY MEDICINE

## 2024-12-30 PROCEDURE — 80053 COMPREHEN METABOLIC PANEL: CPT | Performed by: FAMILY MEDICINE

## 2024-12-30 PROCEDURE — 82570 ASSAY OF URINE CREATININE: CPT | Performed by: FAMILY MEDICINE

## 2024-12-30 PROCEDURE — 36415 COLL VENOUS BLD VENIPUNCTURE: CPT | Performed by: FAMILY MEDICINE

## 2024-12-30 RX ORDER — CICLOPIROX 80 MG/ML
SOLUTION TOPICAL
Qty: 6.6 ML | Refills: 11 | Status: SHIPPED | OUTPATIENT
Start: 2024-12-30

## 2024-12-30 NOTE — PATIENT INSTRUCTIONS
Labs today.  I will update medication when results return.    Trial of the topical Penlac for the fungal nail treatment.        Mammogram in Nov 2025  MRI for follow up pancreas in Jan 2026.            Patient Education   Preventive Care Advice   This is general advice given by our system to help you stay healthy. However, your care team may have specific advice just for you. Please talk to your care team about your preventive care needs.  Nutrition  Eat 5 or more servings of fruits and vegetables each day.  Try wheat bread, brown rice and whole grain pasta (instead of white bread, rice, and pasta).  Get enough calcium and vitamin D. Check the label on foods and aim for 100% of the RDA (recommended daily allowance).  Lifestyle  Exercise at least 150 minutes each week  (30 minutes a day, 5 days a week).  Do muscle strengthening activities 2 days a week. These help control your weight and prevent disease.  No smoking.  Wear sunscreen to prevent skin cancer.  Have a dental exam and cleaning every 6 months.  Yearly exams  See your health care team every year to talk about:  Any changes in your health.  Any medicines your care team has prescribed.  Preventive care, family planning, and ways to prevent chronic diseases.  Shots (vaccines)   HPV shots (up to age 26), if you've never had them before.  Hepatitis B shots (up to age 59), if you've never had them before.  COVID-19 shot: Get this shot when it's due.  Flu shot: Get a flu shot every year.  Tetanus shot: Get a tetanus shot every 10 years.  Pneumococcal, hepatitis A, and RSV shots: Ask your care team if you need these based on your risk.  Shingles shot (for age 50 and up)  General health tests  Diabetes screening:  Starting at age 35, Get screened for diabetes at least every 3 years.  If you are younger than age 35, ask your care team if you should be screened for diabetes.  Cholesterol test: At age 39, start having a cholesterol test every 5 years, or more often if  advised.  Bone density scan (DEXA): At age 50, ask your care team if you should have this scan for osteoporosis (brittle bones).  Hepatitis C: Get tested at least once in your life.  STIs (sexually transmitted infections)  Before age 24: Ask your care team if you should be screened for STIs.  After age 24: Get screened for STIs if you're at risk. You are at risk for STIs (including HIV) if:  You are sexually active with more than one person.  You don't use condoms every time.  You or a partner was diagnosed with a sexually transmitted infection.  If you are at risk for HIV, ask about PrEP medicine to prevent HIV.  Get tested for HIV at least once in your life, whether you are at risk for HIV or not.  Cancer screening tests  Cervical cancer screening: If you have a cervix, begin getting regular cervical cancer screening tests starting at age 21.  Breast cancer scan (mammogram): If you've ever had breasts, begin having regular mammograms starting at age 40. This is a scan to check for breast cancer.  Colon cancer screening: It is important to start screening for colon cancer at age 45.  Have a colonoscopy test every 10 years (or more often if you're at risk) Or, ask your provider about stool tests like a FIT test every year or Cologuard test every 3 years.  To learn more about your testing options, visit:   .  For help making a decision, visit:   https://bit.ly/yg72912.  Prostate cancer screening test: If you have a prostate, ask your care team if a prostate cancer screening test (PSA) at age 55 is right for you.  Lung cancer screening: If you are a current or former smoker ages 50 to 80, ask your care team if ongoing lung cancer screenings are right for you.  For informational purposes only. Not to replace the advice of your health care provider. Copyright   2023 [employer]. All rights reserved. Clinically reviewed by the [facility] Transitions Program. [employer] [id number] - REV 01/24.  Learning  About Activities of Daily Living  What are activities of daily living?     Activities of daily living (ADLs) are the basic self-care tasks you do every day. These include eating, bathing, dressing, and moving around.  As you age, and if you have health problems, you may find that it's harder to do some of these tasks. If so, your doctor can suggest ideas that may help.  To measure what kind of help you may need, your doctor will ask how well you are able to do ADLs. Let your doctor know if there are any tasks that you are having trouble doing. This is an important first step to getting help. And when you have the help you need, you can stay as independent as possible.  How will a doctor assess your ADLs?  Asking about ADLs is part of a routine health checkup your doctor will likely do as you age. Your health check might be done in a doctor's office, in your home, or at a hospital. The goal is to find out if you are having any problems that could make it hard to care for yourself or that make it unsafe for you to be on your own.  To measure your ADLs, your doctor will ask how hard it is for you to do routine tasks. Your doctor may also want to know if you have changed the way you do a task because of a health problem. Your doctor may watch how you:  Walk back and forth.  Keep your balance while you stand or walk.  Move from sitting to standing or from a bed to a chair.  Button or unbutton a shirt or sweater.  Remove and put on your shoes.  It's common to feel a little worried or anxious if you find you can't do all the things you used to be able to do. Talking with your doctor about ADLs is a way to make sure you're as safe as possible and able to care for yourself as well as you can. You may want to bring a caregiver, friend, or family member to your checkup. They can help you talk to your doctor.  Follow-up care is a key part of your treatment and safety. Be sure to make and go to all appointments, and call your  doctor if you are having problems. It's also a good idea to know your test results and keep a list of the medicines you take.  Current as of: October 24, 2023  Content Version: 14.3    2024 [employer].   Care instructions adapted under license by your healthcare professional. If you have questions about a medical condition or this instruction, always ask your healthcare professional. [employer] disclaims any warranty or liability for your use of this information.    Hearing Loss: Care Instructions  Overview     Hearing loss is a sudden or slow decrease in how well you hear. It can range from slight to profound. Permanent hearing loss can occur with aging. It also can happen when you are exposed long-term to loud noise. Examples include listening to loud music, riding motorcycles, or being around other loud machines.  Hearing loss can affect your work and home life. It can make you feel lonely or depressed. You may feel that you have lost your independence. But hearing aids and other devices can help you hear better and feel connected to others.  Follow-up care is a key part of your treatment and safety. Be sure to make and go to all appointments, and call your doctor if you are having problems. It's also a good idea to know your test results and keep a list of the medicines you take.  How can you care for yourself at home?  Avoid loud noises whenever possible. This helps keep your hearing from getting worse.  Always wear hearing protection around loud noises.  Wear a hearing aid as directed.  A professional can help you pick a hearing aid that will work best for you.  You can also get hearing aids over the counter for mild to moderate hearing loss.  Have hearing tests as your doctor suggests. They can show whether your hearing has changed. Your hearing aid may need to be adjusted.  Use other devices as needed. These may include:  Telephone amplifiers and hearing aids that can connect to a  "television, stereo, radio, or microphone.  Devices that use lights or vibrations. These alert you to the doorbell, a ringing telephone, or a baby monitor.  Television closed-captioning. This shows the words at the bottom of the screen. Most new TVs can do this.  TTY (text telephone). This lets you type messages back and forth on the telephone instead of talking or listening. These devices are also called TDD. When messages are typed on the keyboard, they are sent over the phone line to a receiving TTY. The message is shown on a monitor.  Use text messaging, social media, and email if it is hard for you to communicate by telephone.  Try to learn a listening technique called speechreading. It is not lipreading. You pay attention to people's gestures, expressions, posture, and tone of voice. These clues can help you understand what a person is saying. Face the person you are talking to, and have them face you. Make sure the lighting is good. You need to see the other person's face clearly.  Think about counseling if you need help to adjust to your hearing loss.  When should you call for help?  Watch closely for changes in your health, and be sure to contact your doctor if:    You think your hearing is getting worse.     You have new symptoms, such as dizziness or nausea.   Where can you learn more?  Go to https://www.[employer].net/patiented  Enter R798 in the search box to learn more about \"Hearing Loss: Care Instructions.\"  Current as of: September 27, 2023  Content Version: 14.3    2024 [employer]   Care instructions adapted under license by your healthcare professional. If you have questions about a medical condition or this instruction, always ask your healthcare professional. [employer] disclaims any warranty or liability for your use of this information.    Learning About Sleeping Well  What does sleeping well mean?     Sleeping well means getting enough sleep to feel good and stay " healthy. How much sleep is enough varies among people.  The number of hours you sleep and how you feel when you wake up are both important. If you do not feel refreshed, you probably need more sleep. Another sign of not getting enough sleep is feeling tired during the day.  Experts recommend that adults get at least 7 or more hours of sleep per day. Children and older adults need more sleep.  Why is getting enough sleep important?  Getting enough quality sleep is a basic part of good health. When your sleep suffers, your physical health, mood, and your thoughts can suffer too. You may find yourself feeling more grumpy or stressed. Not getting enough sleep also can lead to serious problems, including injury, accidents, anxiety, and depression.  What might cause poor sleeping?  Many things can cause sleep problems, including:  Changes to your sleep schedule.  Stress. Stress can be caused by fear about a single event, such as giving a speech. Or you may have ongoing stress, such as worry about work or school.  Depression, anxiety, and other mental or emotional conditions.  Changes in your sleep habits or surroundings. This includes changes that happen where you sleep, such as noise, light, or sleeping in a different bed. It also includes changes in your sleep pattern, such as having jet lag or working a late shift.  Health problems, such as pain, breathing problems, and restless legs syndrome.  Lack of regular exercise.  Using alcohol, nicotine, or caffeine before bed.  How can you help yourself?  Here are some tips that may help you sleep more soundly and wake up feeling more refreshed.  Your sleeping area   Use your bedroom only for sleeping and sex. A bit of light reading may help you fall asleep. But if it doesn't, do your reading elsewhere in the house. Try not to use your TV, computer, smartphone, or tablet while you are in bed.  Be sure your bed is big enough to stretch out comfortably, especially if you have a  "sleep partner.  Keep your bedroom quiet, dark, and cool. Use curtains, blinds, or a sleep mask to block out light. To block out noise, use earplugs, soothing music, or a \"white noise\" machine.  Your evening and bedtime routine   Create a relaxing bedtime routine. You might want to take a warm shower or bath, or listen to soothing music.  Go to bed at the same time every night. And get up at the same time every morning, even if you feel tired.  What to avoid   Limit caffeine (coffee, tea, caffeinated sodas) during the day, and don't have any for at least 6 hours before bedtime.  Avoid drinking alcohol before bedtime. Alcohol can cause you to wake up more often during the night.  Try not to smoke or use tobacco, especially in the evening. Nicotine can keep you awake.  Limit naps during the day, especially close to bedtime.  Avoid lying in bed awake for too long. If you can't fall asleep or if you wake up in the middle of the night and can't get back to sleep within about 20 minutes, get out of bed and go to another room until you feel sleepy.  Avoid taking medicine right before bed that may keep you awake or make you feel hyper or energized. Your doctor can tell you if your medicine may do this and if you can take it earlier in the day.  If you can't sleep   Imagine yourself in a peaceful, pleasant scene. Focus on the details and feelings of being in a place that is relaxing.  Get up and do a quiet or boring activity until you feel sleepy.  Avoid drinking any liquids before going to bed to help prevent waking up often to use the bathroom.  Where can you learn more?  Go to https://www.[employer].net/patiented  Enter J942 in the search box to learn more about \"Learning About Sleeping Well.\"  Current as of: July 31, 2024  Content Version: 14.3    2024 [employer].   Care instructions adapted under license by your healthcare professional. If you have questions about a medical condition or this instruction, " always ask your healthcare professional. [employer] disclaims any warranty or liability for your use of this information.    Bladder Training: Care Instructions  Your Care Instructions     Bladder training is used to treat urge incontinence and stress incontinence. Urge incontinence means that the need to urinate comes on so fast that you can't get to a toilet in time. Stress incontinence means that you leak urine because of pressure on your bladder. For example, it may happen when you laugh, cough, or lift something heavy.  Bladder training can increase how long you can wait before you have to urinate. It can also help your bladder hold more urine. And it can give you better control over the urge to urinate.  It is important to remember that bladder training takes a few weeks to a few months to make a difference. You may not see results right away, but don't give up.  Follow-up care is a key part of your treatment and safety. Be sure to make and go to all appointments, and call your doctor if you are having problems. It's also a good idea to know your test results and keep a list of the medicines you take.  How can you care for yourself at home?  Work with your doctor to come up with a bladder training program that is right for you. You may use one or more of the following methods.  Delayed urination  In the beginning, try to keep from urinating for 5 minutes after you first feel the need to go.  While you wait, take deep, slow breaths to relax. Kegel exercises can also help you delay the need to go to the bathroom.  After some practice, when you can easily wait 5 minutes to urinate, try to wait 10 minutes before you urinate.  Slowly increase the waiting period until you are able to control when you have to urinate.  Scheduled urination  Empty your bladder when you first wake up in the morning.  Schedule times throughout the day when you will urinate.  Start by going to the bathroom every hour, even if  "you don't need to go.  Slowly increase the time between trips to the bathroom.  When you have found a schedule that works well for you, keep doing it.  If you wake up during the night and have to urinate, do it. Apply your schedule to waking hours only.  Kegel exercises  These tighten and strengthen pelvic muscles, which can help you control the flow of urine. (If doing these exercises causes pain, stop doing them and talk with your doctor.) To do Kegel exercises:  Squeeze your muscles as if you were trying not to pass gas. Or squeeze your muscles as if you were stopping the flow of urine. Your belly, legs, and buttocks shouldn't move.  Hold the squeeze for 3 seconds, then relax for 5 to 10 seconds.  Start with 3 seconds, then add 1 second each week until you are able to squeeze for 10 seconds.  Repeat the exercise 10 times a session. Do 3 to 8 sessions a day.  When should you call for help?  Watch closely for changes in your health, and be sure to contact your doctor if:    Your incontinence is getting worse.     You do not get better as expected.   Where can you learn more?  Go to https://www.[employer].net/patiented  Enter V684 in the search box to learn more about \"Bladder Training: Care Instructions.\"  Current as of: April 30, 2024  Content Version: 14.3    2024 [employer].   Care instructions adapted under license by your healthcare professional. If you have questions about a medical condition or this instruction, always ask your healthcare professional. [employer] disclaims any warranty or liability for your use of this information.       "

## 2024-12-30 NOTE — PROGRESS NOTES
"Preventive Care Visit  [facility]  Radha Hurtado MD, Family Medicine  Dec 30, 2024      Assessment & Plan     Encounter for Medicare annual wellness exam  Screening and preventive care discussed.    Benign essential hypertension  Blood pressure under good control.  Lab testing today.  Results pending.  Continue current diltiazem and lisinopril with refill planned after lab results return.  - Comprehensive metabolic panel (BMP + Alb, Alk Phos, ALT, AST, Total. Bili, TP); Future  - Albumin Random Urine Quantitative with Creat Ratio; Future  - Comprehensive metabolic panel (BMP + Alb, Alk Phos, ALT, AST, Total. Bili, TP)  - Albumin Random Urine Quantitative with Creat Ratio    Fungal nail infection  Improvement noted with an over-the-counter oil application but after applying for a year things have not fully resolved.  Discussed the option of topical Ciclopirox   prescription given and she will begin this treatment.  Follow-up if negative side effects occur.  - ciclopirox (PENLAC) 8 % external solution; Apply to adjacent skin and affected nails daily.  Remove with alcohol every 7 days, then repeat.    Pancreatic cyst  Follow-up MRI due January 2026.  Most recent MRI unchanged from previous.      Pure hypercholesterolemia  Continues Lipitor as previous.  Awaiting labs results and refill will be sent.  - Lipid panel reflex to direct LDL Fasting; Future  - Lipid panel reflex to direct LDL Fasting    Keratitis sicca, bilateral  Ongoing care with ophthalmology.    Patient has been advised of split billing requirements and indicates understanding: Yes        BMI  Estimated body mass index is 26.32 kg/m  as calculated from the following:    Height as of this encounter: 1.673 m (5' 5.87\").    Weight as of this encounter: 73.7 kg (162 lb 6.4 oz).       Counseling  Appropriate preventive services were addressed with this patient via screening, questionnaire, or discussion as appropriate for fall " prevention, nutrition, physical activity, Tobacco-use cessation, social engagement, weight loss and cognition.  Checklist reviewing preventive services available has been given to the patient.  Reviewed patient's diet, addressing concerns and/or questions.   Discussed possible causes of fatigue. Patient reported safety concerns were addressed today.The patient was provided with written information regarding signs of hearing loss.   Information on urinary incontinence and treatment options given to patient.       Patient Instructions   Labs today.  I will update medication when results return.    Trial of the topical Penlac for the fungal nail treatment.        Mammogram in Nov 2025  MRI for follow up pancreas in Jan 2026.               Janes Perkins is a 74 year old, presenting for the following:  Physical (Annual Wellness Visit )        12/30/2024     2:40 PM   Additional Questions   Roomed by Lupe CAMARILLO   Accompanied by Self         HPI    Athlete's foot/fungal nails - using tinactin twice a day   hot feet. Not sweaty.  Itchy feet if   Using oil on nails with some benefit.        Hyperlipidemia Follow-Up    Are you regularly taking any medication or supplement to lower your cholesterol?   Yes- Atorvastatin  Are you having muscle aches or other side effects that you think could be caused by your cholesterol lowering medication?  No    Hypertension Follow-up    Do you check your blood pressure regularly outside of the clinic? Yes   Are you following a low salt diet? No  Are your blood pressures ever more than 140 on the top number (systolic) OR more   than 90 on the bottom number (diastolic), for example 140/90? No    Pancreatic cyst:  Unchanged on 6 months follow-up MRI completed January 2024.  Additional follow-up after 2 years is recommended.    Dry eye  Ongoing care with ophthalmology.      Health Care Directive  Patient does not have a Health Care Directive: Advance Directive received and scanned. Click on  Code in the patient header to view.      12/29/2024   General Health   How would you rate your overall physical health? Good   Feel stress (tense, anxious, or unable to sleep) Not at all         12/29/2024   Nutrition   Diet: Regular (no restrictions)    Low salt       Multiple values from one day are sorted in reverse-chronological order         12/29/2024   Exercise   Days per week of moderate/strenous exercise 4 days   Average minutes spent exercising at this level 50 min   Hiking, walking, weights          12/29/2024   Social Factors   Frequency of gathering with friends or relatives Twice a week   Worry food won't last until get money to buy more No   Food not last or not have enough money for food? No   Do you have housing? (Housing is defined as stable permanent housing and does not include staying ouside in a car, in a tent, in an abandoned building, in an overnight shelter, or couch-surfing.) Yes   Are you worried about losing your housing? No   Lack of transportation? No   Unable to get utilities (heat,electricity)? No   Want help with housing or utility concern? No   (!) FINANCIAL RESOURCE STRAIN CONCERN  - answered in error.      12/29/2024   Fall Risk   Fallen 2 or more times in the past year? No    No   Trouble with walking or balance? No    No       Multiple values from one day are sorted in reverse-chronological order          12/29/2024   Activities of Daily Living- Home Safety   Needs help with the following daily activites None of the above   Safety concerns in the home Throw rugs in the hallway    No grab bars in the bathroom       Multiple values from one day are sorted in reverse-chronological order         12/29/2024   Dental   Dentist two times every year? Yes         12/29/2024   Hearing Screening   Hearing concerns? (!) I NEED TO ASK PEOPLE TO SPEAK UP OR REPEAT THEMSELVES.    (!) IT'S HARD TO FOLLOW A CONVERSATION IN A NOISY RESTAURANT OR CROWDED ROOM.    (!) TROUBLE UNDERSTANDING SOFT OR  WHISPERED SPEECH.    (!) TROUBLE UNDERSTANDING SPEECH ON THE TELEPHONE       Multiple values from one day are sorted in reverse-chronological order         2024   Driving Risk Screening   Patient/family members have concerns about driving No         2024   General Alertness/Fatigue Screening   Have you been more tired than usual lately? (!) YES   Active, sleeping well       2024   Urinary Incontinence Screening   Bothered by leaking urine in past 6 months Yes         2024   TB Screening   Were you born outside of the [place]? No         Today's PHQ-2 Score:       2024     3:32 PM   PHQ-2 (  [employer])   Q1: Little interest or pleasure in doing things 0   Q2: Feeling down, depressed or hopeless 0   PHQ-2 Score 0    Q1: Little interest or pleasure in doing things Not at all   Q2: Feeling down, depressed or hopeless Not at all   PHQ-2 Score 0       Patient-reported           2024   Substance Use   Alcohol more than 3/day or more than 7/wk No   Do you have a current opioid prescription? No   How severe/bad is pain from 1 to 10? 0/10 (No Pain)   Do you use any other substances recreationally? No     Social History     Tobacco Use    Smoking status: Former     Current packs/day: 0.00     Average packs/day: 1 pack/day for 10.0 years (10.0 ttl pk-yrs)     Types: Cigarettes     Start date: 1971     Quit date: 1981     Years since quittin.0     Passive exposure: Never    Smokeless tobacco: Never   Vaping Use    Vaping status: Never Used   Substance Use Topics    Alcohol use: Yes     Alcohol/week: 7.0 standard drinks of alcohol     Types: 7 Glasses of wine per week     Comment: occ - Glass of white wine per night    Drug use: Never           2023   LAST FHS-7 RESULTS   1st degree relative breast or ovarian cancer No   Any relative bilateral breast cancer No   Any male have breast cancer No   Any ONE woman have BOTH breast AND ovarian cancer No   Any woman with breast cancer  before 50yrs No   2 or more relatives with breast AND/OR ovarian cancer No   2 or more relatives with breast AND/OR bowel cancer No        Mammogram Screening - After age 74- determine frequency with patient based on health status, life expectancy and patient goals    ASCVD Risk   The 10-year ASCVD risk score (Easton ALVAREZ, et al., 2019) is: 22.7%    Values used to calculate the score:      Age: 74 years      Sex: Female      Is Non- : No      Diabetic: No      Tobacco smoker: No      Systolic Blood Pressure: 134 mmHg      Is BP treated: Yes      HDL Cholesterol: 75 mg/dL      Total Cholesterol: 179 mg/dL            Reviewed and updated as needed this visit by Provider                    Past Medical History:   Diagnosis Date    Allergic rhinitis 1979    Arthritis     Benign essential hypertension 03/23/2018    Cervical dysplasia with cone in 1979    nl pap ever since     COPD (chronic obstructive pulmonary disease) (H) 20121    Dyspareunia     GERD (gastroesophageal reflux disease) 05/13/2011    Hearing problem 2015    Meniere's disease 2002    Nonsenile cataract     Plantar fasciitis     Stress incontinence     SVT (supraventricular tachycardia) (H)     resolved    Tinnitus 2002     Past Surgical History:   Procedure Laterality Date    ARTHROPLASTY KNEE Left 10/10/2018    Procedure: ARTHROPLASTY KNEE;  Left Total Knee Arthroplasty    ;  Surgeon: James Amaya MD;  Location: [facility] OR    BLADDER SURGERY      CATARACT IOL, RT/LT  2010    CHOLECYSTECTOMY  11/23    COLONOSCOPY N/A 10/11/2021    Procedure: Colonoscopy, With Polypectomy And Biopsy;  Surgeon: Kavita Huizar MD;  Location: [facility] OR    COLONOSCOPY WITH CO2 INSUFFLATION N/A 10/19/2016    Procedure: COLONOSCOPY WITH CO2 INSUFFLATION;  Surgeon: Duane, William Charles, MD;  Location: [facility] OR    COLONOSCOPY WITH CO2 INSUFFLATION N/A 10/11/2021    Procedure: COLONOSCOPY, WITH CO2 INSUFFLATION;  Surgeon: Kavita Huizar MD;   Location: [facility] OR    DAVINCI LAPAROSCOPIC CHOLECYSTECTOMY WITHOUT GRAMS N/A 10/03/2023    Procedure: CHOLECYSTECTOMY, ROBOT-ASSISTED, LAPAROSCOPIC, WITHOUT CHOLANGIOGRAM;  Surgeon: Raulito Laurent MD;  Location: [facility] OR    ENDOSCOPIC ULTRASOUND UPPER GASTROINTESTINAL TRACT (GI) N/A 08/15/2023    Procedure: Endoscopic ultrasound upper gastrointestinal tract (GI);  Surgeon: Guru Jose C Skinner MD;  Location: [facility] GI    GENITOURINARY SURGERY      bladder    Midurethral sling with cystoscopy.      TUBAL LIGATION       OB History    Para Term  AB Living   2 2 2 0 0 2   SAB IAB Ectopic Multiple Live Births   0 0 0 0 2      # Outcome Date GA Lbr Efrain/2nd Weight Sex Type Anes PTL Lv   2 Term      -SEC   ARACELI   1 Term      -SEC   ARACELI     BP Readings from Last 3 Encounters:   24 134/78   24 (!) 140/80   23 134/83    Wt Readings from Last 3 Encounters:   24 73.7 kg (162 lb 6.4 oz)   24 74.5 kg (164 lb 4.8 oz)   23 72.7 kg (160 lb 4.8 oz)                  Current providers sharing in care for this patient include:  Patient Care Team:  Radha Hurtado MD as PCP - General (Family Practice)  Aaron Roy MD as MD (Dermapathology)  [facility] ([place]  Latia Garcia OD as MD (Optometry)  Radha Hurtado MD as Assigned PCP  Guru Jose C Skinner MD as MD (Gastroenterology)  Raulito Laurent MD as MD (Surgery)  Yuniel Cronin MD as MD (Ophthalmology)  Latia Garcia OD as Assigned Surgical Provider    The following health maintenance items are reviewed in Epic and correct as of today:  Health Maintenance   Topic Date Due    BMP  08/15/2024    ANNUAL REVIEW OF HM ORDERS  2024    MEDICARE ANNUAL WELLNESS VISIT  2024    FALL RISK ASSESSMENT  2025    DEXA  2026    GLUCOSE  08/15/2026    COLORECTAL CANCER SCREENING  10/11/2028     "LIPID  12/14/2028    ADVANCE CARE PLANNING  12/15/2028    DTAP/TDAP/TD IMMUNIZATION (4 - Td or Tdap) 11/02/2032    HEPATITIS C SCREENING  Completed    PHQ-2 (once per calendar year)  Completed    INFLUENZA VACCINE  Completed    Pneumococcal Vaccine: 50+ Years  Completed    ZOSTER IMMUNIZATION  Completed    RSV VACCINE  Completed    COVID-19 Vaccine  Completed    HPV IMMUNIZATION  Aged Out    MENINGITIS IMMUNIZATION  Aged Out    RSV MONOCLONAL ANTIBODY  Aged Out    MAMMO SCREENING  Discontinued         Review of Systems  Constitutional, HEENT, cardiovascular, pulmonary, GI, , musculoskeletal, neuro, skin, endocrine and psych systems are negative, except as otherwise noted.     Objective    Exam  /78 (BP Location: Right arm, Patient Position: Sitting, Cuff Size: Adult Regular)   Pulse 52   Temp 98.2  F (36.8  C) (Oral)   Resp 18   Ht 1.673 m (5' 5.87\")   Wt 73.7 kg (162 lb 6.4 oz)   SpO2 97%   BMI 26.32 kg/m     Estimated body mass index is 26.32 kg/m  as calculated from the following:    Height as of this encounter: 1.673 m (5' 5.87\").    Weight as of this encounter: 73.7 kg (162 lb 6.4 oz).    Physical Exam  GENERAL: alert and no distress  EYES: Eyes grossly normal to inspection, PERRL and conjunctivae and sclerae normal  HENT: ear canals and TM's normal, nose and mouth without ulcers or lesions  NECK: no adenopathy, no asymmetry, masses, or scars  RESP: lungs clear to auscultation - no rales, rhonchi or wheezes  CV: regular rate and rhythm, normal S1 S2, no S3 or S4, no murmur, click or rub, no peripheral edema  ABDOMEN: soft, nontender, no hepatosplenomegaly, no masses and bowel sounds normal  MS: no gross musculoskeletal defects noted, no edema  SKIN: no suspicious lesions or rashes.  Multiple thickened nails bilateral feet.  NEURO: Normal strength and tone, mentation intact and speech normal  PSYCH: mentation appears normal, affect normal/bright        12/30/2024   Mini Cog   Clock Draw Score 2 " Normal   3 Item Recall 3 objects recalled   Mini Cog Total Score 5              Signed Electronically by: Radha Hurtado MD          This chart was documented by provider using a voice activated software called Dragon in addition to manual typing. There may be vocabulary errors or other grammatical errors due to this.

## 2024-12-31 PROBLEM — K86.2 PANCREATIC CYST: Status: ACTIVE | Noted: 2024-12-31

## 2024-12-31 LAB
ALBUMIN SERPL BCG-MCNC: 4.3 G/DL (ref 3.5–5.2)
ALP SERPL-CCNC: 78 U/L (ref 40–150)
ALT SERPL W P-5'-P-CCNC: 35 U/L (ref 0–50)
ANION GAP SERPL CALCULATED.3IONS-SCNC: 9 MMOL/L (ref 7–15)
AST SERPL W P-5'-P-CCNC: 38 U/L (ref 0–45)
BILIRUB SERPL-MCNC: 0.2 MG/DL
BUN SERPL-MCNC: 19.1 MG/DL (ref 8–23)
CALCIUM SERPL-MCNC: 9 MG/DL (ref 8.8–10.4)
CHLORIDE SERPL-SCNC: 97 MMOL/L (ref 98–107)
CHOLEST SERPL-MCNC: 143 MG/DL
CREAT SERPL-MCNC: 0.74 MG/DL (ref 0.51–0.95)
CREAT UR-MCNC: 63.7 MG/DL
EGFRCR SERPLBLD CKD-EPI 2021: 84 ML/MIN/1.73M2
FASTING STATUS PATIENT QL REPORTED: NO
FASTING STATUS PATIENT QL REPORTED: NO
GLUCOSE SERPL-MCNC: 112 MG/DL (ref 70–99)
HCO3 SERPL-SCNC: 28 MMOL/L (ref 22–29)
HDLC SERPL-MCNC: 81 MG/DL
LDLC SERPL CALC-MCNC: 49 MG/DL
MICROALBUMIN UR-MCNC: <12 MG/L
MICROALBUMIN/CREAT UR: NORMAL MG/G{CREAT}
NONHDLC SERPL-MCNC: 62 MG/DL
POTASSIUM SERPL-SCNC: 4.5 MMOL/L (ref 3.4–5.3)
PROT SERPL-MCNC: 6.7 G/DL (ref 6.4–8.3)
SODIUM SERPL-SCNC: 134 MMOL/L (ref 135–145)
TRIGL SERPL-MCNC: 65 MG/DL

## 2025-01-05 ENCOUNTER — E-VISIT (OUTPATIENT)
Dept: FAMILY MEDICINE | Facility: CLINIC | Age: 75
End: 2025-01-05
Payer: COMMERCIAL

## 2025-01-05 DIAGNOSIS — J01.90 ACUTE SINUSITIS WITH SYMPTOMS > 10 DAYS: Primary | ICD-10-CM

## 2025-01-05 DIAGNOSIS — J98.01 COUGH DUE TO BRONCHOSPASM: ICD-10-CM

## 2025-01-06 RX ORDER — METHYLPREDNISOLONE 4 MG/1
TABLET ORAL
Qty: 21 TABLET | Refills: 0 | Status: SHIPPED | OUTPATIENT
Start: 2025-01-06

## 2025-01-27 ENCOUNTER — MYC MEDICAL ADVICE (OUTPATIENT)
Dept: OPTOMETRY | Facility: CLINIC | Age: 75
End: 2025-01-27

## 2025-01-27 DIAGNOSIS — H10.13 ALLERGIC CONJUNCTIVITIS, BILATERAL: Primary | ICD-10-CM

## 2025-01-27 NOTE — ED NOTES
CT called.   Patient Instructions after a Colonoscopy      The anesthetics, sedatives or narcotics which were given to you today will be acting in your body for the next 24 hours, so you might feel a little sleepy or groggy.  This feeling should slowly wear off. Carefully read and follow the instructions.     You received sedation today:  - Do not drive or operate any machinery or power tools of any kind.   - No alcoholic beverages today, not even beer or wine.  - Do not make any important decisions or sign any legal documents.  - No over the counter medications that contain alcohol or that may cause drowsiness.  - Do not make any important decisions or sign any legal documents.  - Make sure you have someone with you for first 24 hours.    While it is common to experience mild to moderate abdominal distention, gas, or belching after your procedure, if any of these symptoms occur following discharge from the GI Lab or within one week of having your procedure, call the [facility] to be advised whether a visit to your nearest Urgent Care or Emergency Department is indicated.  Take this paper with you if you go.     - If you develop an allergic reaction to the medications that were given during your procedure such as difficulty breathing, rash, hives, severe nausea, vomiting or lightheadedness.  - If you experience chest pain, shortness of breath, severe abdominal pain, fevers and chills.  -If you develop signs and symptoms of bleeding such as blood in your spit, if your stools turn black, tarry, or bloody  - If you have not urinated within 8 hours following your procedure.  - If your IV site becomes painful, red, inflamed, or looks infected.    If you received a biopsy/polypectomy/sphincterotomy the following instructions apply below:    __ Do not use Aspirin containing products, non-steroidal medications or anti-coagulants for one week following your procedure. (Examples of these types of medications are: Advil,  Arthrotec, Aleve, Coumadin, Ecotrin, Heparin, Ibuprofen, Indocin, Motrin, Naprosyn, Nuprin, Plavix, Vioxx, and Voltarin, or their generic forms.  This list is not all-inclusive.  Check with your physician or pharmacist before resuming medications.)   __ Eat a soft diet today.  Avoid foods that are poorly digested for the next 24 hours.  These foods would include: nuts, beans, lettuce, red meats, and fried foods. Start with liquids and advance your diet as tolerated, gradually work up to eating solids.   __ Do not have a Barium Study or Enema for one week.    Your physician recommends the additional following instructions:    -You have a contact number available for emergencies. The signs and symptoms of potential delayed complications were discussed with you. You may return to normal activities tomorrow.  -Resume your previous diet.  -Continue your present medications.   -We are waiting for your pathology results.  -Your physician has recommended a repeat colonoscopy (date to be determined after pending pathology results are reviewed) for surveillance based on pathology results.  -The findings and recommendations have been discussed with you.  -The findings and recommendations were discussed with your family.  - Please see Medication Reconciliation Form for new medication/medications prescribed.       If you experience any problems or have any questions following discharge from the GI Lab, please call:        Nurse Signature                                                                        Date___________________                                                                            Patient/Responsible Party Signature                                        Date___________________

## 2025-01-28 RX ORDER — FLUOROMETHOLONE 1 MG/ML
SUSPENSION/ DROPS OPHTHALMIC
Qty: 5 ML | Refills: 0 | Status: SHIPPED | OUTPATIENT
Start: 2025-01-28 | End: 2025-02-18

## 2025-02-10 ENCOUNTER — E-VISIT (OUTPATIENT)
Dept: FAMILY MEDICINE | Facility: CLINIC | Age: 75
End: 2025-02-10
Payer: COMMERCIAL

## 2025-02-10 DIAGNOSIS — R05.2 SUBACUTE COUGH: Primary | ICD-10-CM

## 2025-02-10 DIAGNOSIS — R05.8 POST-VIRAL COUGH SYNDROME: ICD-10-CM

## 2025-02-10 RX ORDER — BENZONATATE 100 MG/1
100-200 CAPSULE ORAL 3 TIMES DAILY PRN
Qty: 30 CAPSULE | Refills: 0 | Status: SHIPPED | OUTPATIENT
Start: 2025-02-10

## 2025-02-11 ENCOUNTER — HOSPITAL ENCOUNTER (OUTPATIENT)
Dept: GENERAL RADIOLOGY | Facility: CLINIC | Age: 75
Discharge: HOME | End: 2025-02-11
Attending: FAMILY MEDICINE
Payer: COMMERCIAL

## 2025-02-11 DIAGNOSIS — R05.2 SUBACUTE COUGH: ICD-10-CM

## 2025-02-11 PROCEDURE — 71046 X-RAY EXAM CHEST 2 VIEWS: CPT

## 2025-02-11 RX ORDER — PREDNISONE 20 MG/1
TABLET ORAL
Qty: 20 TABLET | Refills: 0 | Status: SHIPPED | OUTPATIENT
Start: 2025-02-11

## 2025-02-12 NOTE — ADDENDUM NOTE
Addended by: DANIEL VALENTIN on: 2/11/2025 07:11 PM     Modules accepted: Orders, Level of Service

## 2025-02-13 ENCOUNTER — PATIENT OUTREACH (OUTPATIENT)
Dept: CARE COORDINATION | Facility: CLINIC | Age: 75
End: 2025-02-13
Payer: COMMERCIAL

## 2025-02-19 ENCOUNTER — OFFICE VISIT (OUTPATIENT)
Dept: OPHTHALMOLOGY | Facility: CLINIC | Age: 75
End: 2025-02-19
Attending: OPHTHALMOLOGY
Payer: COMMERCIAL

## 2025-02-19 DIAGNOSIS — H53.2 DOUBLE VISION: Primary | ICD-10-CM

## 2025-02-19 DIAGNOSIS — H53.10 SUBJECTIVE VISUAL DISTURBANCE: ICD-10-CM

## 2025-02-19 PROCEDURE — 92060 SENSORIMOTOR EXAMINATION: CPT | Performed by: OPHTHALMOLOGY

## 2025-02-19 PROCEDURE — G0463 HOSPITAL OUTPT CLINIC VISIT: HCPCS | Performed by: OPHTHALMOLOGY

## 2025-02-19 PROCEDURE — 92060 SENSORIMOTOR EXAMINATION: CPT

## 2025-02-19 ASSESSMENT — REFRACTION_WEARINGRX
OS_ADD: +2.50
OS_HPRISM: 1.5 BO
OD_VPRISM: 1.5 BD
OD_CYLINDER: +1.50
OS_AXIS: 180
OD_HPRISM: 1.5 BO
SPECS_TYPE: PAL
OS_VPRISM: 1.5 BD
OD_AXIS: 005
OS_CYLINDER: +4.00
OS_SPHERE: -1.00
OD_SPHERE: -0.50
OD_ADD: +2.50

## 2025-02-19 ASSESSMENT — VISUAL ACUITY
METHOD: SNELLEN - LINEAR
OS_CC+: -1
CORRECTION_TYPE: GLASSES
OS_PH_CC+: -3
OD_CC+: -1
OS_CC: 20/30
OS_PH_CC: 20/20
OD_CC: 20/20

## 2025-02-19 ASSESSMENT — CONF VISUAL FIELD
OD_INFERIOR_TEMPORAL_RESTRICTION: 0
OS_SUPERIOR_NASAL_RESTRICTION: 0
OD_INFERIOR_NASAL_RESTRICTION: 0
OD_SUPERIOR_NASAL_RESTRICTION: 0
OD_NORMAL: 1
OS_INFERIOR_NASAL_RESTRICTION: 0
OS_NORMAL: 1
OS_INFERIOR_TEMPORAL_RESTRICTION: 0
OS_SUPERIOR_TEMPORAL_RESTRICTION: 0
OD_SUPERIOR_TEMPORAL_RESTRICTION: 0
METHOD: COUNTING FINGERS

## 2025-02-19 ASSESSMENT — TONOMETRY
OS_IOP_MMHG: 21
OD_IOP_MMHG: 17
IOP_METHOD: ICARE

## 2025-02-19 ASSESSMENT — PACHYMETRY
OS_CT(UM): 610
OD_CT(UM): 621

## 2025-02-19 ASSESSMENT — EXTERNAL EXAM - RIGHT EYE: OD_EXAM: NORMAL

## 2025-02-19 ASSESSMENT — EXTERNAL EXAM - LEFT EYE: OS_EXAM: NORMAL

## 2025-02-19 NOTE — PROGRESS NOTES
1. 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    She has decided that she would like to pursue strabismus surgery after growing tired of ground in prism glasses and also noting that she occasionally has breakthrough diplopia.    Discussed the risks, benefits, and alternatives of strabismus surgery today (see below) and patient wishes to proceed.    No contacts for 2 months after strabismus surgery.  Patient needs to have prism free glasses to immediately on the day of surgery.    Initial visit HPI 1/31/2024:    Maddie has a history of irregular astigmatism of both eyes who was referred for strabismus evaluation.     She first noticed intermittent binocular diplopia 3 years ago, has a horizontal and vertical component, at first noticed at night only. This has slightly worsened gradually over the past couple years, to where she now notices diplopia in the afternoons sometimes, but every day at night. The diplopia doesn't seem to worsen with any particular gaze or head position.     No history of trauma. No history of congenital or childhood strabismus, eye patching,  or eye muscle surgery per patient. No eye pain. No other neurologic symptoms. She has a history of chronic headaches that have not worsened recently, one mild-moderate headache once every couple weeks.     She was given prism glasses by Dr. Garcia last year. She typically wears contacts during the day for the astigmatism, then switches to prism glasses at night when the diplopia comes on.     She wears a contact lens in the left eye for more clear vision.      Independent historians:  Patient    Review of outside clinical notes:    Visit with Dr. Castro 2/3/22:  Maddie Lala is a 71 year old female with the following diagnoses:   1. 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 Irregular astigmatism of both eyes    3. Diplopia    4.  Pseudophakia of both eyes      Diplopia at night (when out of contact lenses).  Has only covered right eye and finds the symptom to resolve  Discussed, recommend covering each eye individually.  If only present when using both eyes together, she will call for orthoptist liliam  C/D asymmetry left eye > right eye  Not previously noted  No history of Ocular hypertension  No family history or history of trauma  Thick pachmetry in both eyes   Baseline OCT Nerve fiber layer with asymmetric disc size, left eye > right eye.  Nerve fiber layer within normal limits   Patient disposition:   Return in about 1 year (around 2/3/2023) for DFE, OCT NFL.    Past medical history:    Patient Active Problem List   Diagnosis    Urinary incontinence    Cataract    CARDIOVASCULAR SCREENING; LDL GOAL LESS THAN 160    Tendonitis of shoulder, right    Chronic cough    Brachial neuritis or radiculitis    Seasonal allergic rhinitis    Chronic allergic conjunctivitis    GERD (gastroesophageal reflux disease)    Latent tuberculosis    Meniere's disease    Menopause    Cervical nerve root impingement    Atrophic vaginitis    Hyponatremia    Neck pain    Muscle spasms of neck    Benign essential hypertension    AK (actinic keratosis)    Multiple melanocytic nevi    Status post knee surgery    Inflamed seborrheic keratosis    Solar lentiginosis    Tinea pedis of both feet    Dog bite of face, initial encounter    Calculus of gallbladder without cholecystitis without obstruction    Keratitis sicca, bilateral    History of arthroplasty of left knee    Pancreatic cyst       Patient has a current medication list which includes the following prescription(s): atorvastatin, calcium-vitamin d-vitamin k, ciclopirox, cyclosporine, diltiazem er, estradiol, fluocinonide, fluticasone, ibuprofen, lisinopril, omeprazole, prednisone, albuterol, benzonatate, and carboxymethylcellulose pf..     Family history / social history:  Patient's family history includes  Alzheimer Disease in her maternal grandmother; C.A.D. in her mother; Coronary Artery Disease in her mother; Heart Disease in her brother, brother, maternal grandfather, and mother; Macular Degeneration in her mother; Osteoporosis in her mother; Respiratory in her father; Skin Cancer in her father.     Patient  reports that she quit smoking about 44 years ago. Her smoking use included cigarettes. She started smoking about 54 years ago. She has a 10 pack-year smoking history. She has never been exposed to tobacco smoke. She has never used smokeless tobacco. She reports current alcohol use of about 7.0 standard drinks of alcohol per week. She reports that she does not use drugs.     Interval History since last visit 1/31/2024:  She is noticing intermittent diplopia while wearing her prism glasses when she is tired that began to occur 4 months ago. The images are diagonal. This is happening once a week. If she closes one eye, the double vision goes away. Her vision is otherwise stable. She is interested in talking about strabismus surgery today because she does not want to wear glasses all of the time. She has contacts that she wears that do not have prism. She has periods of time when she does not need prism to not see double.    Exam:  Corrected distance visual acuity was 20/20 -1 in the right eye and 20/20 -3 in the left eye. Intraocular pressure was 17 in the right eye and 21 in the left eye using ICare.  Color vision 11/11 right eye and 11/11 left eye.  Pupils equal and reactive with no rAPD.  Please see epic chart for complete exam    Tests ordered and interpreted today:  Sensorimotor exam shows a stable right hypertropia and small angle comitant esotropia.    We discussed in detail the risks, benefits, and alternatives of eye muscle correction surgery including the very rare risk of death or serious morbidity from a general anesthesia complication and the rare risk of severe vision loss in the operative eye(s)  secondary to retinal detachment or endophthalmitis.  We discussed more likely sub-optimal outcomes including the unanticipated need for additional strabismus surgery.  Finally the patient was aware that prisms glasses may be required to optimize single vision following surgery.    After a thorough discussion of these risks, the patient decided to proceed with strabismus surgery.  The surgical plan is as follows:     1. Bilateral eye muscle correction.     Right superior rectus recession (target 5 prism diopters) and left lateral rectus resection (target 10 prism diopters)    Follow-up 1 week after strabismus surgery.    Plan to operate at: [facility]  Prism free glasses for adjustment: patient will get her prism free glasses from home         Myrtle Rabago, MS3  [facility] [employer]    35 minutes were spent on the date of the encounter by me doing chart review, history and exam, documentation, and further activities as noted above    Complete documentation of historical and exam elements from today's encounter can be found in the full encounter summary report (not reduplicated in this progress note).  I personally re-obtained the chief complaint(s) and history of present illness.  I confirmed and edited as necessary the review of systems, past medical/surgical history, family history, social history, and examination findings as documented by others; and I examined the patient myself.  I personally reviewed the relevant tests, images, and reports as documented above.  I formulated and edited as necessary the assessment and plan and discussed the findings and management plan with the patient and family     A medical student was involved in the care of the patient. I was present with the medical student who participated in the service and in the documentation of the note. I have  verified the history and personally performed the physical exam and medical decision making. I extensively reviewed and edited when  necessary the assessment and plan. I agree with the assessment and plan of care as documented in the note    Yuniel Cronin MD

## 2025-02-24 PROBLEM — H53.2 DOUBLE VISION: Status: ACTIVE | Noted: 2025-02-19

## 2025-03-10 ENCOUNTER — OFFICE VISIT (OUTPATIENT)
Dept: FAMILY MEDICINE | Facility: CLINIC | Age: 75
End: 2025-03-10
Attending: FAMILY MEDICINE
Payer: COMMERCIAL

## 2025-03-10 VITALS
SYSTOLIC BLOOD PRESSURE: 130 MMHG | TEMPERATURE: 97.9 F | HEIGHT: 66 IN | HEART RATE: 80 BPM | RESPIRATION RATE: 14 BRPM | WEIGHT: 166 LBS | BODY MASS INDEX: 26.68 KG/M2 | OXYGEN SATURATION: 96 % | DIASTOLIC BLOOD PRESSURE: 73 MMHG

## 2025-03-10 DIAGNOSIS — Z23 COVID-19 VACCINE ADMINISTERED: ICD-10-CM

## 2025-03-10 DIAGNOSIS — R49.0 HOARSENESS: ICD-10-CM

## 2025-03-10 DIAGNOSIS — R05.3 CHRONIC COUGH: Primary | ICD-10-CM

## 2025-03-10 DIAGNOSIS — K21.9 GASTROESOPHAGEAL REFLUX DISEASE WITHOUT ESOPHAGITIS: ICD-10-CM

## 2025-03-10 PROCEDURE — G2211 COMPLEX E/M VISIT ADD ON: HCPCS | Performed by: FAMILY MEDICINE

## 2025-03-10 PROCEDURE — 1126F AMNT PAIN NOTED NONE PRSNT: CPT | Performed by: FAMILY MEDICINE

## 2025-03-10 PROCEDURE — 3078F DIAST BP <80 MM HG: CPT | Performed by: FAMILY MEDICINE

## 2025-03-10 PROCEDURE — 90480 ADMN SARSCOV2 VAC 1/ONLY CMP: CPT | Performed by: FAMILY MEDICINE

## 2025-03-10 PROCEDURE — 91320 SARSCV2 VAC 30MCG TRS-SUC IM: CPT | Performed by: FAMILY MEDICINE

## 2025-03-10 PROCEDURE — 3075F SYST BP GE 130 - 139MM HG: CPT | Performed by: FAMILY MEDICINE

## 2025-03-10 PROCEDURE — 99214 OFFICE O/P EST MOD 30 MIN: CPT | Performed by: FAMILY MEDICINE

## 2025-03-10 RX ORDER — PANTOPRAZOLE SODIUM 40 MG/1
40 TABLET, DELAYED RELEASE ORAL 2 TIMES DAILY
Qty: 60 TABLET | Refills: 1 | Status: SHIPPED | OUTPATIENT
Start: 2025-03-10

## 2025-03-10 ASSESSMENT — ENCOUNTER SYMPTOMS: COUGH: 1

## 2025-03-10 ASSESSMENT — PAIN SCALES - GENERAL: PAINLEVEL_OUTOF10: NO PAIN (0)

## 2025-03-10 NOTE — PATIENT INSTRUCTIONS
Discontinue the omeprazole.    Begin pantoprazole twice a day before a meals.  If after 2-3 weeks, symptoms have resolved you can cancel ENT appointment and continue twice a day for 1 month then go to once daily.     ENT referral - schedule now for evaluation.      If cough persists with the change of reflux medications, notify me in 2 weeks.  We can consider change to Losartan from Lisinopril at that time.   never used

## 2025-03-10 NOTE — PROGRESS NOTES
Assessment & Plan     Chronic cough  Hoarseness  Symptoms initially started after an upper respiratory infection.  They have improved/changed with antibiotic and 2 rounds of prednisone but have failed to entirely resolve.  She has used 4 to 6 weeks of fluticasone-salmeterol without significant benefit.  Albuterol fails to result in benefit.  She does take lisinopril but has been on this for a long period of time and the symptoms began after upper respiratory infection which makes me believe is not related to lisinopril but that is possibly contributing to symptoms.  Inadequately controlled reflux is also a potential cause for her current symptoms.  At this time we are going to change her PPI to pantoprazole and increase to twice daily for a month.  If this entirely resolves her symptoms will go down to once daily and potentially revert back to omeprazole in the future.  I did also given referral to ENT which she will go ahead and get scheduled.  If the change in PPI results in full resolution of symptoms she can cancel ENT appointment and we will continue to pursue reflux treatment.  She will also reach out to me in a couple of weeks with an update on the response to pantoprazole.  If failure to have significant change in symptoms during that time, we could also change from lisinopril to losartan to remove the possible ACE inhibitor cough from the differential as well.  - pantoprazole (PROTONIX) 40 MG EC tablet; Take 1 tablet (40 mg) by mouth 2 times daily. 30-60 min prior to a meal.  - Adult ENT  Referral; Future    Gastroesophageal reflux disease without esophagitis  Transition from omeprazole to pantoprazole for at least the short-term and monitor heartburn/cough symptoms as noted above.  - pantoprazole (PROTONIX) 40 MG EC tablet; Take 1 tablet (40 mg) by mouth 2 times daily. 30-60 min prior to a meal.    COVID-19 vaccine administered  Booster COVID-vaccine today  - COVID-19 12+  (PFIZER)            Patient Instructions   Discontinue the omeprazole.    Begin pantoprazole twice a day before a meals.  If after 2-3 weeks, symptoms have resolved you can cancel ENT appointment and continue twice a day for 1 month then go to once daily.     ENT referral - schedule now for evaluation.      If cough persists with the change of reflux medications, notify me in 2 weeks.  We can consider change to Losartan from Lisinopril at that time.    The longitudinal plan of care for the diagnosis(es)/condition(s) as documented were addressed during this visit. Due to the added complexity in care, I will continue to support Maddie in the subsequent management and with ongoing continuity of care.        Subjective   Maddie is a 75 year old, presenting for the following health issues:  Cough (Ongoing for 3 months )      3/10/2025     6:51 AM   Additional Questions   Roomed by Joshua   Accompanied by self         3/10/2025     6:51 AM   Patient Reported Additional Medications   Patient reports taking the following new medications no     Cough    History of Present Illness       Reason for visit:  Cough for 3 months    She eats 2-3 servings of fruits and vegetables daily.She consumes 0 sweetened beverage(s) daily.She exercises with enough effort to increase her heart rate 30 to 60 minutes per day.  She exercises with enough effort to increase her heart rate 4 days per week.   She is taking medications regularly.          Concern - ongoing cough   Onset: 3 months  - started with upper respiratory infection in December - treated with augmentin/prednisone.  Description: Dry in chest area, hacking, talking and singing is hard  Intensity: mild  Progression of Symptoms:  intermittent  Accompanying Signs & Symptoms: Dry in chest area, hacking, talking and singing is hard  Previous history of similar problem: No  Precipitating factors:        Worsened by: comes and goes  Alleviating factors:        Improved by: comes and  "goes  Therapies tried and outcome: prednisone   Prednisone helpful (deeper cough prior to prednisone)  but lingering symptoms.  No production.  Mild drainage sinuses.    Cough worse after activity not with activity.    Hoarseness today - coughed more last night.    Evening cough more prevalent.  Occasionally during middle of night.    Has used albuterol without significant benefit.  Was on Advair diskus for 6 week without benefit.      GERD:  burping a lot.  Unchanged from previous.       Review of Systems  Constitutional, HEENT, cardiovascular, pulmonary, gi and gu systems are negative, except as otherwise noted.      Objective    /73 (BP Location: Right arm, Patient Position: Sitting, Cuff Size: Adult Regular)   Pulse 80   Temp 97.9  F (36.6  C) (Temporal)   Resp 14   Ht 1.676 m (5' 6\")   Wt 75.3 kg (166 lb)   SpO2 96%   BMI 26.79 kg/m    Body mass index is 26.79 kg/m . Second BP:     Physical Exam   GENERAL: alert, no distress, and frequent cough noted throughout her visit  HENT: normal cephalic/atraumatic, ear canals and TM's normal, nose and mouth without ulcers or lesions, nasal mucosa edematous , rhinorrhea clear, oropharynx clear, and oral mucous membranes moist  NECK: no adenopathy, no asymmetry, masses, or scars  RESP: lungs clear to auscultation - no rales, rhonchi or wheezes  CV: regular rate and rhythm, normal S1 S2, no S3 or S4, no murmur, click or rub, no peripheral edema  MS: no gross musculoskeletal defects noted, no edema  BACK: no CVA tenderness, no paralumbar tenderness    Narrative & Impression   EXAM: XR CHEST 2 VIEWS  LOCATION: [facility]  DATE: 2/11/2025     INDICATION:  Subacute cough  COMPARISON: 01/21/2010                                                                      IMPRESSION: Negative chest.           Signed Electronically by: Radha Hurtado MD        This chart was documented by provider using a voice activated software called " Dragon in addition to manual typing. There may be vocabulary errors or other grammatical errors due to this.

## 2025-03-10 NOTE — PROGRESS NOTES
Prior to immunization administration, verified patients identity using patient s name and date of birth. Please see Immunization Activity for additional information.     Screening Questionnaire for Adult Immunization    Per [facility]     Are you sick today?   No   Do you have allergies to medications, food, a vaccine component or latex?   No   Have you ever had a serious reaction after receiving a vaccination?   No   Do you have a long-term health problem with heart, lung, kidney, or metabolic disease (e.g., diabetes), asthma, a blood disorder, no spleen, complement component deficiency, a cochlear implant, or a spinal fluid leak?  Are you on long-term aspirin therapy?   No   Do you have cancer, leukemia, HIV/AIDS, or any other immune system problem?   No   Do you have a parent, brother, or sister with an immune system problem?   No   In the past 3 months, have you taken medications that affect  your immune system, such as prednisone, other steroids, or anticancer drugs; drugs for the treatment of rheumatoid arthritis, Crohn s disease, or psoriasis; or have you had radiation treatments?   No   Have you had a seizure, or a brain or other nervous system problem?   No   During the past year, have you received a transfusion of blood or blood    products, or been given immune (gamma) globulin or antiviral drug?   No   For women: Are you pregnant or is there a chance you could become       pregnant during the next month?   No   Have you received any vaccinations in the past 4 weeks?   No     Immunization questionnaire answers were all negative.      Patient instructed to remain in clinic for 15 minutes afterwards, and to report any adverse reactions.     Screening performed by Sadie Sheikh MA on 3/10/2025 at 7:34 AM.

## 2025-03-18 ENCOUNTER — MYC REFILL (OUTPATIENT)
Dept: FAMILY MEDICINE | Facility: CLINIC | Age: 75
End: 2025-03-18
Payer: COMMERCIAL

## 2025-03-18 DIAGNOSIS — E78.00 PURE HYPERCHOLESTEROLEMIA: ICD-10-CM

## 2025-03-19 RX ORDER — ATORVASTATIN CALCIUM 20 MG/1
20 TABLET, FILM COATED ORAL DAILY
Qty: 90 TABLET | Refills: 3 | OUTPATIENT
Start: 2025-03-19

## 2025-03-21 ENCOUNTER — TELEPHONE (OUTPATIENT)
Dept: FAMILY MEDICINE | Facility: CLINIC | Age: 75
End: 2025-03-21
Payer: COMMERCIAL

## 2025-03-21 NOTE — TELEPHONE ENCOUNTER
Attempted to call Pt to reschedule Pre op no answer LVM same day OK per PCP       Snehal VENTURA Visit Facilitator

## 2025-03-24 ENCOUNTER — OFFICE VISIT (OUTPATIENT)
Dept: FAMILY MEDICINE | Facility: CLINIC | Age: 75
End: 2025-03-24
Payer: COMMERCIAL

## 2025-03-24 VITALS
OXYGEN SATURATION: 98 % | RESPIRATION RATE: 15 BRPM | HEIGHT: 66 IN | DIASTOLIC BLOOD PRESSURE: 75 MMHG | BODY MASS INDEX: 26.73 KG/M2 | SYSTOLIC BLOOD PRESSURE: 135 MMHG | WEIGHT: 166.3 LBS | HEART RATE: 78 BPM | TEMPERATURE: 98.1 F

## 2025-03-24 DIAGNOSIS — Z01.818 PREOP GENERAL PHYSICAL EXAM: ICD-10-CM

## 2025-03-24 DIAGNOSIS — I10 BENIGN ESSENTIAL HYPERTENSION: Primary | ICD-10-CM

## 2025-03-24 DIAGNOSIS — R05.3 CHRONIC COUGH: ICD-10-CM

## 2025-03-24 DIAGNOSIS — H53.2 DOUBLE VISION: ICD-10-CM

## 2025-03-24 LAB
ANION GAP SERPL CALCULATED.3IONS-SCNC: 10 MMOL/L (ref 7–15)
BUN SERPL-MCNC: 11.9 MG/DL (ref 8–23)
CALCIUM SERPL-MCNC: 9.6 MG/DL (ref 8.8–10.4)
CHLORIDE SERPL-SCNC: 96 MMOL/L (ref 98–107)
CREAT SERPL-MCNC: 0.77 MG/DL (ref 0.51–0.95)
EGFRCR SERPLBLD CKD-EPI 2021: 80 ML/MIN/1.73M2
GLUCOSE SERPL-MCNC: 103 MG/DL (ref 70–99)
HCO3 SERPL-SCNC: 27 MMOL/L (ref 22–29)
POTASSIUM SERPL-SCNC: 4.9 MMOL/L (ref 3.4–5.3)
SODIUM SERPL-SCNC: 133 MMOL/L (ref 135–145)

## 2025-03-24 PROCEDURE — 3078F DIAST BP <80 MM HG: CPT | Performed by: INTERNAL MEDICINE

## 2025-03-24 PROCEDURE — 36415 COLL VENOUS BLD VENIPUNCTURE: CPT | Performed by: INTERNAL MEDICINE

## 2025-03-24 PROCEDURE — 3075F SYST BP GE 130 - 139MM HG: CPT | Performed by: INTERNAL MEDICINE

## 2025-03-24 PROCEDURE — 99214 OFFICE O/P EST MOD 30 MIN: CPT | Performed by: INTERNAL MEDICINE

## 2025-03-24 PROCEDURE — 80048 BASIC METABOLIC PNL TOTAL CA: CPT | Performed by: INTERNAL MEDICINE

## 2025-03-24 NOTE — PROGRESS NOTES
Preoperative Evaluation  [address] [address]  Phone: [phone]  Primary Provider: Radha Hurtado MD  Pre-op Performing Provider: Boby Tesfaye MD  Mar 24, 2025             3/22/2025   Surgical Information   What procedure is being done? Bilatrral eye muscle correction   Facility or Hospital where procedure/surgery will be performed: [facility]   Who is doing the procedure / surgery? Dr. Yuniel Cronin   Date of surgery / procedure: 4/14:2025   Time of surgery / procedure: Not given   Where do you plan to recover after surgery? at home with family     Fax number for surgical facility: Note does not need to be faxed, will be available electronically in Epic.    Assessment & Plan     The proposed surgical procedure is considered INTERMEDIATE risk.    Preop general physical exam  She has no history of any CAD or CVA  No history of any exertional chest pain or shortness of breath  She is not taking any anticoagulants  She does take some ibuprofen and I advised her to stop this for 1 week before procedure  Her exercise tolerance is at least 9 METS  Medically optimized for the procedure  No further testing is warranted  - Basic metabolic panel  (Ca, Cl, CO2, Creat, Gluc, K, Na, BUN); Future  - Basic metabolic panel  (Ca, Cl, CO2, Creat, Gluc, K, Na, BUN)    Benign essential hypertension  She is on diltiazem and lisinopril  She is having some chronic cough which I think could be potentially from lisinopril  I told her to hold this for the next few weeks and see how it goes and monitor her blood pressure at home  - Basic metabolic panel  (Ca, Cl, CO2, Creat, Gluc, K, Na, BUN); Future  - Basic metabolic panel  (Ca, Cl, CO2, Creat, Gluc, K, Na, BUN)    Double vision  She is going to have bilateral eye muscle  correction    Chronic cough  She has been having ongoing cough for the last few weeks  She was placed on  Protonix and this helped her transiently for 1 week but after that the cough came back  It is mainly dry cough  She has been on lisinopril for the last 3 years  It is possible that this dry cough is from lisinopril  Advised her to hold lisinopril for the next few weeks and see if the cough resolves or not and at the same time monitor her blood pressure  If the cough resolves we can completely take her off lisinopril and then consider adding a second antihypertensive something like amlodipine  Of note she is already scheduled to see ENT later this month for a chronic cough            - No identified additional risk factors other than previously addressed    Antiplatelet or Anticoagulation Medication Instructions   - We reviewed the medication list and the patient is not on an antiplatelet or anticoagulation medications.    Additional Medication Instructions  Take all scheduled medications on the day of surgery EXCEPT for modifications listed below:  Lisinopril  Recommendation  Approval given to proceed with proposed procedure, without further diagnostic evaluation.    Janes Perkins is a 75 year old, presenting for the following:  Pre-Op Exam          3/24/2025     7:41 AM   Additional Questions   Roomed by Lucy     HPI: Here for preop        3/22/2025   Pre-Op Questionnaire   Have you ever had a heart attack or stroke? No   Have you ever had surgery on your heart or blood vessels, such as a stent placement, a coronary artery bypass, or surgery on an artery in your head, neck, heart, or legs? No   Do you have chest pain with activity? No   Do you have a history of heart failure? No   Do you currently have a cold, bronchitis or symptoms of other infection? No   Do you have a cough, shortness of breath, or wheezing? (!) YES    Do you or anyone in your family have previous history of blood clots? No   Do you or does anyone in your family have a serious bleeding problem such as prolonged bleeding following surgeries  or cuts? No   Have you ever had problems with anemia or been told to take iron pills? No   Have you had any abnormal blood loss such as black, tarry or bloody stools, or abnormal vaginal bleeding? No   Have you ever had a blood transfusion? No   Are you willing to have a blood transfusion if it is medically needed before, during, or after your surgery? Yes   Have you or any of your relatives ever had problems with anesthesia? No   Do you have sleep apnea, excessive snoring or daytime drowsiness? No   Do you have any artifical heart valves or other implanted medical devices like a pacemaker, defibrillator, or continuous glucose monitor? No   Do you have artificial joints? (!) YES   Are you allergic to latex? No     Health Care Directive  Patient does not have a Health Care Directive: Discussed advance care planning with patient; however, patient declined at this time.  Status of Chronic Conditions:  See problem list for active medical problems.  Problems all longstanding and stable, except as noted/documented.  See ROS for pertinent symptoms related to these conditions.    Patient Active Problem List    Diagnosis Date Noted    Double vision 02/19/2025     Priority: Medium    Pancreatic cyst 12/31/2024     Priority: Medium     Will be due June 2023.  Unchanged on follow-up scan in January 2024.  Recommendation for 2-year follow-up.  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     Priority: Medium    Tinea pedis of both feet 01/30/2020     Priority: Medium    Status post knee surgery 10/10/2018     Priority: Medium    AK  (actinic keratosis) 07/12/2018     Priority: Medium    Multiple melanocytic nevi 07/12/2018     Priority: Medium    Benign essential hypertension 03/23/2018     Priority: Medium    Neck pain 02/18/2016     Priority: Medium    Muscle spasms of neck 02/18/2016     Priority: Medium    Hyponatremia 01/29/2015     Priority: Medium    Atrophic vaginitis 11/22/2013     Priority: Medium    Cervical nerve root impingement 03/28/2012     Priority: Medium    Seasonal allergic rhinitis 05/13/2011     Priority: Medium    Chronic allergic conjunctivitis 05/13/2011     Priority: Medium    GERD (gastroesophageal reflux disease) 05/13/2011     Priority: Medium    Latent tuberculosis 05/13/2011     Priority: Medium    Meniere's disease 05/13/2011     Priority: Medium    Menopause 05/13/2011     Priority: Medium    Tendonitis of shoulder, right 02/01/2011     Priority: Medium    Chronic cough 02/01/2011     Priority: Medium    Brachial neuritis or radiculitis 02/01/2011     Priority: Medium     Problem list name updated by automated process. Provider to review      CARDIOVASCULAR SCREENING; LDL GOAL LESS THAN 160 10/31/2010     Priority: Medium    Urinary incontinence 01/21/2010     Priority: Medium    Cataract 01/21/2010     Priority: Medium     Utility update for deleted IMO code  Im[employer] Update utility        Past Medical History:   Diagnosis Date    Allergic rhinitis 1979    Arthritis     Benign essential hypertension 03/23/2018    Cervical dysplasia with cone in 1979    nl pap ever since     COPD (chronic obstructive pulmonary disease) (H) 20121    Dyspareunia     GERD (gastroesophageal reflux disease) 05/13/2011    Hearing problem 2015    Meniere's disease 2002    Nonsenile cataract     Plantar fasciitis     Stress incontinence     SVT (supraventricular tachycardia)     resolved    Tinnitus 2002     Past Surgical History:   Procedure Laterality Date    ARTHROPLASTY KNEE Left 10/10/2018    Procedure: ARTHROPLASTY KNEE;  Left Total  Knee Arthroplasty    ;  Surgeon: James Amaya MD;  Location: [facility] OR    BLADDER SURGERY      CATARACT IOL, RT/LT  2010    CHOLECYSTECTOMY  11/23    COLONOSCOPY N/A 10/11/2021    Procedure: Colonoscopy, With Polypectomy And Biopsy;  Surgeon: Kavita Huizar MD;  Location: [facility] OR    COLONOSCOPY WITH CO2 INSUFFLATION N/A 10/19/2016    Procedure: COLONOSCOPY WITH CO2 INSUFFLATION;  Surgeon: Duane, William Charles, MD;  Location: [facility] OR    COLONOSCOPY WITH CO2 INSUFFLATION N/A 10/11/2021    Procedure: COLONOSCOPY, WITH CO2 INSUFFLATION;  Surgeon: Kavita Huizar MD;  Location: [facility] OR    DAVINCI LAPAROSCOPIC CHOLECYSTECTOMY WITHOUT GRAMS N/A 10/03/2023    Procedure: CHOLECYSTECTOMY, ROBOT-ASSISTED, LAPAROSCOPIC, WITHOUT CHOLANGIOGRAM;  Surgeon: Raulito Laurent MD;  Location: [facility] OR    ENDOSCOPIC ULTRASOUND UPPER GASTROINTESTINAL TRACT (GI) N/A 08/15/2023    Procedure: Endoscopic ultrasound upper gastrointestinal tract (GI);  Surgeon: Guru Jose C Skinner MD;  [facility]: [facility]    GENITOURINARY SURGERY      bladder    Midurethral sling with cystoscopy.  2010    TUBAL LIGATION       Current Outpatient Medications   Medication Sig Dispense Refill    albuterol (PROAIR HFA/PROVENTIL HFA/VENTOLIN HFA) 108 (90 Base) MCG/ACT inhaler INHALE 2 PUFFS BY MOUTH EVERY 6 HOURS AS NEEDED FOR SHORTNESS OF BREATH/DYSPNEA AND AS NEEDED BEFORE EXERCISE (Patient taking differently: Inhale 1 puff into the lungs every 6 hours as needed. INHALE 2 PUFFS BY MOUTH EVERY 6 HOURS AS NEEDED FOR SHORTNESS OF BREATH/DYSPNEA AND AS NEEDED BEFORE EXERCISE) 18 g 1    atorvastatin (LIPITOR) 20 MG tablet Take 1 tablet (20 mg) by mouth daily. 90 tablet 3    CALCIUM + D OR Take 1 tablet by mouth 2 times daily 1 tablet in am and 2 tablet in pm      carboxymethylcellulose PF (REFRESH LIQUIGEL) 1 % ophthalmic gel 1 drop 3 times daily      ciclopirox (PENLAC) 8 % external solution Apply to adjacent skin and affected nails daily.  Remove  "with alcohol every 7 days, then repeat. 6.6 mL 11    cycloSPORINE (RESTASIS) 0.05 % ophthalmic emulsion Place 1 drop into both eyes 2 times daily. 60 each 11    diltiazem ER (DILT-XR) 180 MG 24 hr capsule Take 1 capsule (180 mg) by mouth every morning. 90 capsule 1    estradiol (VAGIFEM) 10 MCG TABS vaginal tablet Place 1 tablet (10 mcg) vaginally twice a week. 24 tablet 3    fluocinonide (LIDEX) 0.05 % external solution APPLY TO SCALP NIGHTLY FOR UP TO 1 WEEK THEN 2-3 TIMES WEEKLY AS NEEDED 60 mL 0    fluticasone (FLONASE) 50 MCG/ACT nasal spray Spray 1 spray into both nostrils as needed. USE ONE TO TWO SPRAYS IN EACH NOSTRIL EVERY DAY 48 g 1    Ibuprofen (ADVIL PO) Take 800 mg by mouth every 4 hours as needed for moderate pain      lisinopril (ZESTRIL) 20 MG tablet Take 1 tablet (20 mg) by mouth daily. 90 tablet 1    pantoprazole (PROTONIX) 40 MG EC tablet Take 1 tablet (40 mg) by mouth 2 times daily. 30-60 min prior to a meal. 60 tablet 1       Allergies   Allergen Reactions    Sulfa Antibiotics Rash        Social History     Tobacco Use    Smoking status: Former     Current packs/day: 0.00     Average packs/day: 1 pack/day for 10.0 years (10.0 ttl pk-yrs)     Types: Cigarettes     Start date: 1971     Quit date: 1981     Years since quittin.2     Passive exposure: Never    Smokeless tobacco: Never   Substance Use Topics    Alcohol use: Yes     Alcohol/week: 7.0 standard drinks of alcohol     Types: 7 Glasses of wine per week     Comment: occ - Glass of white wine per night       History   Drug Use Unknown             Review of Systems  Constitutional, HEENT, cardiovascular, pulmonary, gi and gu systems are negative, except as otherwise noted.    Objective    /75 (BP Location: Right arm, Patient Position: Sitting, Cuff Size: Adult Regular)   Pulse 78   Temp 98.1  F (36.7  C) (Oral)   Resp 15   Ht 1.676 m (5' 6\")   Wt 75.4 kg (166 lb 4.8 oz)   SpO2 98%   BMI 26.84 kg/m     Estimated body " "mass index is 26.84 kg/m  as calculated from the following:    Height as of this encounter: 1.676 m (5' 6\").    Weight as of this encounter: 75.4 kg (166 lb 4.8 oz).  Physical Exam  GENERAL: alert and no distress  EYES: Eyes grossly normal to inspection, PERRL and conjunctivae and sclerae normal  HENT: ear canals and TM's normal, nose and mouth without ulcers or lesions  NECK: no adenopathy, no asymmetry, masses, or scars  RESP: lungs clear to auscultation - no rales, rhonchi or wheezes  CV: regular rate and rhythm, normal S1 S2, no S3 or S4, no murmur, click or rub, no peripheral edema  ABDOMEN: soft, nontender, no hepatosplenomegaly, no masses and bowel sounds normal  MS: no gross musculoskeletal defects noted, no edema  SKIN: no suspicious lesions or rashes  NEURO: Normal strength and tone, mentation intact and speech normal  PSYCH: mentation appears normal, affect normal/bright    Recent Labs   Lab Test 12/30/24  1613   *   POTASSIUM 4.5   CR 0.74        Diagnostics  Labs pending at this time.  Results will be reviewed when available.   No EKG required, no history of coronary heart disease, significant arrhythmia, peripheral arterial disease or other structural heart disease.    Revised Cardiac Risk Index (RCRI)  The patient has the following serious cardiovascular risks for perioperative complications:   - No serious cardiac risks = 0 points     RCRI Interpretation: 0 points: Class I (very low risk - 0.4% complication rate)         Signed Electronically by: Boby Tesfaye MD  A copy of this evaluation report is provided to the requesting physician.         "

## 2025-03-24 NOTE — H&P (VIEW-ONLY)
Preoperative Evaluation  [address] [address]  Phone: [phone]  Primary Provider: Radha Hurtado MD  Pre-op Performing Provider: Boby Tesfaye MD  Mar 24, 2025             3/22/2025   Surgical Information   What procedure is being done? Bilatrral eye muscle correction   Facility or Hospital where procedure/surgery will be performed: [facility]   Who is doing the procedure / surgery? Dr. Yuniel Cronin   Date of surgery / procedure: 4/14:2025   Time of surgery / procedure: Not given   Where do you plan to recover after surgery? at home with family     Fax number for surgical facility: Note does not need to be faxed, will be available electronically in Epic.    Assessment & Plan     The proposed surgical procedure is considered INTERMEDIATE risk.    Preop general physical exam  She has no history of any CAD or CVA  No history of any exertional chest pain or shortness of breath  She is not taking any anticoagulants  She does take some ibuprofen and I advised her to stop this for 1 week before procedure  Her exercise tolerance is at least 9 METS  Medically optimized for the procedure  No further testing is warranted  - Basic metabolic panel  (Ca, Cl, CO2, Creat, Gluc, K, Na, BUN); Future  - Basic metabolic panel  (Ca, Cl, CO2, Creat, Gluc, K, Na, BUN)    Benign essential hypertension  She is on diltiazem and lisinopril  She is having some chronic cough which I think could be potentially from lisinopril  I told her to hold this for the next few weeks and see how it goes and monitor her blood pressure at home  - Basic metabolic panel  (Ca, Cl, CO2, Creat, Gluc, K, Na, BUN); Future  - Basic metabolic panel  (Ca, Cl, CO2, Creat, Gluc, K, Na, BUN)    Double vision  She is going to have bilateral eye muscle  correction    Chronic cough  She has been having ongoing cough for the last few weeks  She was placed on  Protonix and this helped her transiently for 1 week but after that the cough came back  It is mainly dry cough  She has been on lisinopril for the last 3 years  It is possible that this dry cough is from lisinopril  Advised her to hold lisinopril for the next few weeks and see if the cough resolves or not and at the same time monitor her blood pressure  If the cough resolves we can completely take her off lisinopril and then consider adding a second antihypertensive something like amlodipine  Of note she is already scheduled to see ENT later this month for a chronic cough            - No identified additional risk factors other than previously addressed    Antiplatelet or Anticoagulation Medication Instructions   - We reviewed the medication list and the patient is not on an antiplatelet or anticoagulation medications.    Additional Medication Instructions  Take all scheduled medications on the day of surgery EXCEPT for modifications listed below:  Lisinopril  Recommendation  Approval given to proceed with proposed procedure, without further diagnostic evaluation.    Janes Perkins is a 75 year old, presenting for the following:  Pre-Op Exam          3/24/2025     7:41 AM   Additional Questions   Roomed by Lucy     HPI: Here for preop        3/22/2025   Pre-Op Questionnaire   Have you ever had a heart attack or stroke? No   Have you ever had surgery on your heart or blood vessels, such as a stent placement, a coronary artery bypass, or surgery on an artery in your head, neck, heart, or legs? No   Do you have chest pain with activity? No   Do you have a history of heart failure? No   Do you currently have a cold, bronchitis or symptoms of other infection? No   Do you have a cough, shortness of breath, or wheezing? (!) YES    Do you or anyone in your family have previous history of blood clots? No   Do you or does anyone in your family have a serious bleeding problem such as prolonged bleeding following surgeries  or cuts? No   Have you ever had problems with anemia or been told to take iron pills? No   Have you had any abnormal blood loss such as black, tarry or bloody stools, or abnormal vaginal bleeding? No   Have you ever had a blood transfusion? No   Are you willing to have a blood transfusion if it is medically needed before, during, or after your surgery? Yes   Have you or any of your relatives ever had problems with anesthesia? No   Do you have sleep apnea, excessive snoring or daytime drowsiness? No   Do you have any artifical heart valves or other implanted medical devices like a pacemaker, defibrillator, or continuous glucose monitor? No   Do you have artificial joints? (!) YES   Are you allergic to latex? No     Health Care Directive  Patient does not have a Health Care Directive: Discussed advance care planning with patient; however, patient declined at this time.  Status of Chronic Conditions:  See problem list for active medical problems.  Problems all longstanding and stable, except as noted/documented.  See ROS for pertinent symptoms related to these conditions.    Patient Active Problem List    Diagnosis Date Noted    Double vision 02/19/2025     Priority: Medium    Pancreatic cyst 12/31/2024     Priority: Medium     Will be due June 2023.  Unchanged on follow-up scan in January 2024.  Recommendation for 2-year follow-up.  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     Priority: Medium    Tinea pedis of both feet 01/30/2020     Priority: Medium    Status post knee surgery 10/10/2018     Priority: Medium    AK  (actinic keratosis) 07/12/2018     Priority: Medium    Multiple melanocytic nevi 07/12/2018     Priority: Medium    Benign essential hypertension 03/23/2018     Priority: Medium    Neck pain 02/18/2016     Priority: Medium    Muscle spasms of neck 02/18/2016     Priority: Medium    Hyponatremia 01/29/2015     Priority: Medium    Atrophic vaginitis 11/22/2013     Priority: Medium    Cervical nerve root impingement 03/28/2012     Priority: Medium    Seasonal allergic rhinitis 05/13/2011     Priority: Medium    Chronic allergic conjunctivitis 05/13/2011     Priority: Medium    GERD (gastroesophageal reflux disease) 05/13/2011     Priority: Medium    Latent tuberculosis 05/13/2011     Priority: Medium    Meniere's disease 05/13/2011     Priority: Medium    Menopause 05/13/2011     Priority: Medium    Tendonitis of shoulder, right 02/01/2011     Priority: Medium    Chronic cough 02/01/2011     Priority: Medium    Brachial neuritis or radiculitis 02/01/2011     Priority: Medium     Problem list name updated by automated process. Provider to review      CARDIOVASCULAR SCREENING; LDL GOAL LESS THAN 160 10/31/2010     Priority: Medium    Urinary incontinence 01/21/2010     Priority: Medium    Cataract 01/21/2010     Priority: Medium     Utility update for deleted IMO code  Im[employer] Update utility        Past Medical History:   Diagnosis Date    Allergic rhinitis 1979    Arthritis     Benign essential hypertension 03/23/2018    Cervical dysplasia with cone in 1979    nl pap ever since     COPD (chronic obstructive pulmonary disease) (H) 20121    Dyspareunia     GERD (gastroesophageal reflux disease) 05/13/2011    Hearing problem 2015    Meniere's disease 2002    Nonsenile cataract     Plantar fasciitis     Stress incontinence     SVT (supraventricular tachycardia)     resolved    Tinnitus 2002     Past Surgical History:   Procedure Laterality Date    ARTHROPLASTY KNEE Left 10/10/2018    Procedure: ARTHROPLASTY KNEE;  Left Total  Knee Arthroplasty    ;  Surgeon: James Amaya MD;  Location: [facility] OR    BLADDER SURGERY      CATARACT IOL, RT/LT  2010    CHOLECYSTECTOMY  11/23    COLONOSCOPY N/A 10/11/2021    Procedure: Colonoscopy, With Polypectomy And Biopsy;  Surgeon: Kavita Huizar MD;  Location: [facility] OR    COLONOSCOPY WITH CO2 INSUFFLATION N/A 10/19/2016    Procedure: COLONOSCOPY WITH CO2 INSUFFLATION;  Surgeon: Duane, William Charles, MD;  Location: [facility] OR    COLONOSCOPY WITH CO2 INSUFFLATION N/A 10/11/2021    Procedure: COLONOSCOPY, WITH CO2 INSUFFLATION;  Surgeon: Kavita Huizar MD;  Location: [facility] OR    DAVINCI LAPAROSCOPIC CHOLECYSTECTOMY WITHOUT GRAMS N/A 10/03/2023    Procedure: CHOLECYSTECTOMY, ROBOT-ASSISTED, LAPAROSCOPIC, WITHOUT CHOLANGIOGRAM;  Surgeon: Raulito Laurent MD;  Location: [facility] OR    ENDOSCOPIC ULTRASOUND UPPER GASTROINTESTINAL TRACT (GI) N/A 08/15/2023    Procedure: Endoscopic ultrasound upper gastrointestinal tract (GI);  Surgeon: Guru Jose C Skinner MD;  [facility]    GENITOURINARY SURGERY      bladder    Midurethral sling with cystoscopy.  2010    TUBAL LIGATION       Current Outpatient Medications   Medication Sig Dispense Refill    albuterol (PROAIR HFA/PROVENTIL HFA/VENTOLIN HFA) 108 (90 Base) MCG/ACT inhaler INHALE 2 PUFFS BY MOUTH EVERY 6 HOURS AS NEEDED FOR SHORTNESS OF BREATH/DYSPNEA AND AS NEEDED BEFORE EXERCISE (Patient taking differently: Inhale 1 puff into the lungs every 6 hours as needed. INHALE 2 PUFFS BY MOUTH EVERY 6 HOURS AS NEEDED FOR SHORTNESS OF BREATH/DYSPNEA AND AS NEEDED BEFORE EXERCISE) 18 g 1    atorvastatin (LIPITOR) 20 MG tablet Take 1 tablet (20 mg) by mouth daily. 90 tablet 3    CALCIUM + D OR Take 1 tablet by mouth 2 times daily 1 tablet in am and 2 tablet in pm      carboxymethylcellulose PF (REFRESH LIQUIGEL) 1 % ophthalmic gel 1 drop 3 times daily      ciclopirox (PENLAC) 8 % external solution Apply to adjacent skin and affected nails daily.  Remove  "with alcohol every 7 days, then repeat. 6.6 mL 11    cycloSPORINE (RESTASIS) 0.05 % ophthalmic emulsion Place 1 drop into both eyes 2 times daily. 60 each 11    diltiazem ER (DILT-XR) 180 MG 24 hr capsule Take 1 capsule (180 mg) by mouth every morning. 90 capsule 1    estradiol (VAGIFEM) 10 MCG TABS vaginal tablet Place 1 tablet (10 mcg) vaginally twice a week. 24 tablet 3    fluocinonide (LIDEX) 0.05 % external solution APPLY TO SCALP NIGHTLY FOR UP TO 1 WEEK THEN 2-3 TIMES WEEKLY AS NEEDED 60 mL 0    fluticasone (FLONASE) 50 MCG/ACT nasal spray Spray 1 spray into both nostrils as needed. USE ONE TO TWO SPRAYS IN EACH NOSTRIL EVERY DAY 48 g 1    Ibuprofen (ADVIL PO) Take 800 mg by mouth every 4 hours as needed for moderate pain      lisinopril (ZESTRIL) 20 MG tablet Take 1 tablet (20 mg) by mouth daily. 90 tablet 1    pantoprazole (PROTONIX) 40 MG EC tablet Take 1 tablet (40 mg) by mouth 2 times daily. 30-60 min prior to a meal. 60 tablet 1       Allergies   Allergen Reactions    Sulfa Antibiotics Rash        Social History     Tobacco Use    Smoking status: Former     Current packs/day: 0.00     Average packs/day: 1 pack/day for 10.0 years (10.0 ttl pk-yrs)     Types: Cigarettes     Start date: 1971     Quit date: 1981     Years since quittin.2     Passive exposure: Never    Smokeless tobacco: Never   Substance Use Topics    Alcohol use: Yes     Alcohol/week: 7.0 standard drinks of alcohol     Types: 7 Glasses of wine per week     Comment: occ - Glass of white wine per night       History   Drug Use Unknown             Review of Systems  Constitutional, HEENT, cardiovascular, pulmonary, gi and gu systems are negative, except as otherwise noted.    Objective    /75 (BP Location: Right arm, Patient Position: Sitting, Cuff Size: Adult Regular)   Pulse 78   Temp 98.1  F (36.7  C) (Oral)   Resp 15   Ht 1.676 m (5' 6\")   Wt 75.4 kg (166 lb 4.8 oz)   SpO2 98%   BMI 26.84 kg/m     Estimated body " "mass index is 26.84 kg/m  as calculated from the following:    Height as of this encounter: 1.676 m (5' 6\").    Weight as of this encounter: 75.4 kg (166 lb 4.8 oz).  Physical Exam  GENERAL: alert and no distress  EYES: Eyes grossly normal to inspection, PERRL and conjunctivae and sclerae normal  HENT: ear canals and TM's normal, nose and mouth without ulcers or lesions  NECK: no adenopathy, no asymmetry, masses, or scars  RESP: lungs clear to auscultation - no rales, rhonchi or wheezes  CV: regular rate and rhythm, normal S1 S2, no S3 or S4, no murmur, click or rub, no peripheral edema  ABDOMEN: soft, nontender, no hepatosplenomegaly, no masses and bowel sounds normal  MS: no gross musculoskeletal defects noted, no edema  SKIN: no suspicious lesions or rashes  NEURO: Normal strength and tone, mentation intact and speech normal  PSYCH: mentation appears normal, affect normal/bright    Recent Labs   Lab Test 12/30/24  1613   *   POTASSIUM 4.5   CR 0.74        Diagnostics  Labs pending at this time.  Results will be reviewed when available.   No EKG required, no history of coronary heart disease, significant arrhythmia, peripheral arterial disease or other structural heart disease.    Revised Cardiac Risk Index (RCRI)  The patient has the following serious cardiovascular risks for perioperative complications:   - No serious cardiac risks = 0 points     RCRI Interpretation: 0 points: Class I (very low risk - 0.4% complication rate)         Signed Electronically by: Boby Tesfaye MD  A copy of this evaluation report is provided to the requesting physician.         "

## 2025-04-14 ENCOUNTER — HOSPITAL ENCOUNTER (OUTPATIENT)
Facility: AMBULATORY SURGERY CENTER | Age: 75
Discharge: HOME | End: 2025-04-14
Attending: OPHTHALMOLOGY
Payer: COMMERCIAL

## 2025-04-14 ENCOUNTER — ANESTHESIA (OUTPATIENT)
Dept: SURGERY | Facility: AMBULATORY SURGERY CENTER | Age: 75
End: 2025-04-14
Payer: COMMERCIAL

## 2025-04-14 ENCOUNTER — ANESTHESIA EVENT (OUTPATIENT)
Dept: SURGERY | Facility: AMBULATORY SURGERY CENTER | Age: 75
End: 2025-04-14
Payer: COMMERCIAL

## 2025-04-14 VITALS
TEMPERATURE: 97.2 F | BODY MASS INDEX: 25.43 KG/M2 | SYSTOLIC BLOOD PRESSURE: 127 MMHG | OXYGEN SATURATION: 98 % | DIASTOLIC BLOOD PRESSURE: 74 MMHG | HEART RATE: 78 BPM | HEIGHT: 67 IN | WEIGHT: 162 LBS | RESPIRATION RATE: 16 BRPM

## 2025-04-14 DIAGNOSIS — Z98.890 STATUS POST EYE SURGERY: Primary | ICD-10-CM

## 2025-04-14 PROCEDURE — 67311 REVISE EYE MUSCLE: CPT | Mod: LT

## 2025-04-14 PROCEDURE — 67311 REVISE EYE MUSCLE: CPT | Mod: LT | Performed by: OPHTHALMOLOGY

## 2025-04-14 PROCEDURE — 67314 REVISE EYE MUSCLE: CPT | Mod: RT | Performed by: OPHTHALMOLOGY

## 2025-04-14 PROCEDURE — 67314 REVISE EYE MUSCLE: CPT | Mod: RT

## 2025-04-14 RX ORDER — FENTANYL CITRATE 50 UG/ML
INJECTION, SOLUTION INTRAMUSCULAR; INTRAVENOUS PRN
Status: DISCONTINUED | OUTPATIENT
Start: 2025-04-14 | End: 2025-04-14

## 2025-04-14 RX ORDER — ONDANSETRON 2 MG/ML
INJECTION INTRAMUSCULAR; INTRAVENOUS PRN
Status: DISCONTINUED | OUTPATIENT
Start: 2025-04-14 | End: 2025-04-14

## 2025-04-14 RX ORDER — DEXAMETHASONE SODIUM PHOSPHATE 4 MG/ML
INJECTION, SOLUTION INTRA-ARTICULAR; INTRALESIONAL; INTRAMUSCULAR; INTRAVENOUS; SOFT TISSUE PRN
Status: DISCONTINUED | OUTPATIENT
Start: 2025-04-14 | End: 2025-04-14

## 2025-04-14 RX ORDER — BALANCED SALT SOLUTION 6.4; .75; .48; .3; 3.9; 1.7 MG/ML; MG/ML; MG/ML; MG/ML; MG/ML; MG/ML
SOLUTION OPHTHALMIC PRN
Status: DISCONTINUED | OUTPATIENT
Start: 2025-04-14 | End: 2025-04-14 | Stop reason: HOSPADM

## 2025-04-14 RX ORDER — ONDANSETRON 2 MG/ML
4 INJECTION INTRAMUSCULAR; INTRAVENOUS EVERY 30 MIN PRN
Status: DISCONTINUED | OUTPATIENT
Start: 2025-04-14 | End: 2025-04-15 | Stop reason: HOSPADM

## 2025-04-14 RX ORDER — SODIUM CHLORIDE, SODIUM LACTATE, POTASSIUM CHLORIDE, CALCIUM CHLORIDE 600; 310; 30; 20 MG/100ML; MG/100ML; MG/100ML; MG/100ML
INJECTION, SOLUTION INTRAVENOUS CONTINUOUS
Status: DISCONTINUED | OUTPATIENT
Start: 2025-04-14 | End: 2025-04-14 | Stop reason: HOSPADM

## 2025-04-14 RX ORDER — FENTANYL CITRATE 50 UG/ML
50 INJECTION, SOLUTION INTRAMUSCULAR; INTRAVENOUS EVERY 5 MIN PRN
Status: DISCONTINUED | OUTPATIENT
Start: 2025-04-14 | End: 2025-04-15 | Stop reason: HOSPADM

## 2025-04-14 RX ORDER — FENTANYL CITRATE 50 UG/ML
25 INJECTION, SOLUTION INTRAMUSCULAR; INTRAVENOUS EVERY 5 MIN PRN
Status: DISCONTINUED | OUTPATIENT
Start: 2025-04-14 | End: 2025-04-15 | Stop reason: HOSPADM

## 2025-04-14 RX ORDER — PREDNISOLONE ACETATE 10 MG/ML
SUSPENSION/ DROPS OPHTHALMIC
Qty: 10 ML | Refills: 0 | Status: SHIPPED | OUTPATIENT
Start: 2025-04-14

## 2025-04-14 RX ORDER — PROPOFOL 10 MG/ML
INJECTION, EMULSION INTRAVENOUS PRN
Status: DISCONTINUED | OUTPATIENT
Start: 2025-04-14 | End: 2025-04-14

## 2025-04-14 RX ORDER — OXYCODONE HYDROCHLORIDE 5 MG/1
10 TABLET ORAL
Status: DISCONTINUED | OUTPATIENT
Start: 2025-04-14 | End: 2025-04-15 | Stop reason: HOSPADM

## 2025-04-14 RX ORDER — DEXAMETHASONE SODIUM PHOSPHATE 10 MG/ML
4 INJECTION, SOLUTION INTRAMUSCULAR; INTRAVENOUS
Status: DISCONTINUED | OUTPATIENT
Start: 2025-04-14 | End: 2025-04-15 | Stop reason: HOSPADM

## 2025-04-14 RX ORDER — KETOROLAC TROMETHAMINE 30 MG/ML
15 INJECTION, SOLUTION INTRAMUSCULAR; INTRAVENOUS ONCE
Status: COMPLETED | OUTPATIENT
Start: 2025-04-14 | End: 2025-04-14

## 2025-04-14 RX ORDER — OXYCODONE HYDROCHLORIDE 5 MG/1
5 TABLET ORAL
Status: DISCONTINUED | OUTPATIENT
Start: 2025-04-14 | End: 2025-04-15 | Stop reason: HOSPADM

## 2025-04-14 RX ORDER — ACETAMINOPHEN 325 MG/1
975 TABLET ORAL ONCE
Status: COMPLETED | OUTPATIENT
Start: 2025-04-14 | End: 2025-04-14

## 2025-04-14 RX ORDER — ONDANSETRON 4 MG/1
4 TABLET, ORALLY DISINTEGRATING ORAL EVERY 30 MIN PRN
Status: DISCONTINUED | OUTPATIENT
Start: 2025-04-14 | End: 2025-04-15 | Stop reason: HOSPADM

## 2025-04-14 RX ORDER — HYDROMORPHONE HYDROCHLORIDE 1 MG/ML
0.2 INJECTION, SOLUTION INTRAMUSCULAR; INTRAVENOUS; SUBCUTANEOUS EVERY 5 MIN PRN
Status: DISCONTINUED | OUTPATIENT
Start: 2025-04-14 | End: 2025-04-15 | Stop reason: HOSPADM

## 2025-04-14 RX ORDER — KETOROLAC TROMETHAMINE 30 MG/ML
15 INJECTION, SOLUTION INTRAMUSCULAR; INTRAVENOUS
Status: DISCONTINUED | OUTPATIENT
Start: 2025-04-14 | End: 2025-04-15 | Stop reason: HOSPADM

## 2025-04-14 RX ORDER — NALOXONE HYDROCHLORIDE 0.4 MG/ML
0.1 INJECTION, SOLUTION INTRAMUSCULAR; INTRAVENOUS; SUBCUTANEOUS
Status: DISCONTINUED | OUTPATIENT
Start: 2025-04-14 | End: 2025-04-15 | Stop reason: HOSPADM

## 2025-04-14 RX ORDER — LIDOCAINE HYDROCHLORIDE 20 MG/ML
INJECTION, SOLUTION INFILTRATION; PERINEURAL PRN
Status: DISCONTINUED | OUTPATIENT
Start: 2025-04-14 | End: 2025-04-14

## 2025-04-14 RX ORDER — SODIUM CHLORIDE, SODIUM LACTATE, POTASSIUM CHLORIDE, CALCIUM CHLORIDE 600; 310; 30; 20 MG/100ML; MG/100ML; MG/100ML; MG/100ML
INJECTION, SOLUTION INTRAVENOUS CONTINUOUS
Status: DISCONTINUED | OUTPATIENT
Start: 2025-04-14 | End: 2025-04-15 | Stop reason: HOSPADM

## 2025-04-14 RX ORDER — LIDOCAINE 40 MG/G
CREAM TOPICAL
Status: DISCONTINUED | OUTPATIENT
Start: 2025-04-14 | End: 2025-04-14 | Stop reason: HOSPADM

## 2025-04-14 RX ORDER — HYDROMORPHONE HYDROCHLORIDE 1 MG/ML
0.4 INJECTION, SOLUTION INTRAMUSCULAR; INTRAVENOUS; SUBCUTANEOUS EVERY 5 MIN PRN
Status: DISCONTINUED | OUTPATIENT
Start: 2025-04-14 | End: 2025-04-15 | Stop reason: HOSPADM

## 2025-04-14 RX ORDER — OXYMETAZOLINE HYDROCHLORIDE 0.05 G/100ML
SPRAY NASAL PRN
Status: DISCONTINUED | OUTPATIENT
Start: 2025-04-14 | End: 2025-04-14 | Stop reason: HOSPADM

## 2025-04-14 RX ORDER — GLYCOPYRROLATE 0.2 MG/ML
INJECTION, SOLUTION INTRAMUSCULAR; INTRAVENOUS PRN
Status: DISCONTINUED | OUTPATIENT
Start: 2025-04-14 | End: 2025-04-14

## 2025-04-14 RX ADMIN — PROPOFOL 200 MCG/KG/MIN: 10 INJECTION, EMULSION INTRAVENOUS at 08:38

## 2025-04-14 RX ADMIN — ACETAMINOPHEN 975 MG: 325 TABLET ORAL at 10:05

## 2025-04-14 RX ADMIN — ONDANSETRON 4 MG: 2 INJECTION INTRAMUSCULAR; INTRAVENOUS at 09:21

## 2025-04-14 RX ADMIN — FENTANYL CITRATE 50 MCG: 50 INJECTION, SOLUTION INTRAMUSCULAR; INTRAVENOUS at 08:33

## 2025-04-14 RX ADMIN — DEXAMETHASONE SODIUM PHOSPHATE 4 MG: 4 INJECTION, SOLUTION INTRA-ARTICULAR; INTRALESIONAL; INTRAMUSCULAR; INTRAVENOUS; SOFT TISSUE at 08:58

## 2025-04-14 RX ADMIN — GLYCOPYRROLATE 0.2 MG: 0.2 INJECTION, SOLUTION INTRAMUSCULAR; INTRAVENOUS at 08:58

## 2025-04-14 RX ADMIN — LIDOCAINE HYDROCHLORIDE 60 MG: 20 INJECTION, SOLUTION INFILTRATION; PERINEURAL at 08:37

## 2025-04-14 RX ADMIN — FENTANYL CITRATE 50 MCG: 50 INJECTION, SOLUTION INTRAMUSCULAR; INTRAVENOUS at 08:47

## 2025-04-14 RX ADMIN — PROPOFOL 50 MG: 10 INJECTION, EMULSION INTRAVENOUS at 09:07

## 2025-04-14 RX ADMIN — KETOROLAC TROMETHAMINE 15 MG: 30 INJECTION, SOLUTION INTRAMUSCULAR; INTRAVENOUS at 10:08

## 2025-04-14 RX ADMIN — PROPOFOL 250 MG: 10 INJECTION, EMULSION INTRAVENOUS at 08:38

## 2025-04-14 RX ADMIN — PROPOFOL 50 MG: 10 INJECTION, EMULSION INTRAVENOUS at 09:27

## 2025-04-14 RX ADMIN — SODIUM CHLORIDE, SODIUM LACTATE, POTASSIUM CHLORIDE, CALCIUM CHLORIDE: 600; 310; 30; 20 INJECTION, SOLUTION INTRAVENOUS at 08:11

## 2025-04-14 ASSESSMENT — LIFESTYLE VARIABLES: TOBACCO_USE: 1

## 2025-04-14 ASSESSMENT — COPD QUESTIONNAIRES
COPD: 1
CAT_SEVERITY: MILD

## 2025-04-14 NOTE — OP NOTE
"ATTENDING SURGEON:  Yuniel Cronin MD    DATE OF PROCEDURE:  April 14, 2025    FIRST SURGICAL ASSISTANT:  None    ANESTHESIA:  General anesthesia    PREOPERATIVE DIAGNOSIS (ES):  Decompensated right cranial nerve 4 palsy   Right hypertropia   Alternating esotropia   Binocular diplopia     POSTOPERATIVE DIAGNOSIS (ES):  Same    NAME OF OPERATION:  Right superior rectus recession 2.5 mm  Left lateral rectus resection 4.0 mm    INDICATIONS FOR PROCEDURE:    The patient is a pleasant 75 year old with a history of intermittent binocular diplopia over the past > 3 years.  Her diplopia is becoming more frequent and now disruptive to her function. She has failed ground in prism glasses. She is eager for strabismus surgery to allow her single binocular vision without prism.    I warned the patient about the risks, benefits, and alternatives of eye muscle surgery including a relatively small risk for severe infection, retinal detachment, and permanent vision loss of the eye.  I also discussed the possibility of postoperative double vision.  I discussed the possibility that due to postoperative double vision or a postoperative recurrent strabismus, the patient may require additional strabismus procedures in the future.  Understanding these risks, the patient decided to proceed with strabismus surgery.      DESCRIPTION OF PROCEDURE:    After the risks, benefits, and alternatives of eye muscle surgery were discussed with the patient and the patient's questions were answered both in clinic and on the day of surgery, written informed consent was obtained and witnessed in the preoperative holding area on the day of surgery.  The word \"yes\" was written over both eyes indicating that the patient consented to eye muscle correction in both eyes.  An intravenous line was placed and light intravenous sedation was given.  The patient was transported to the operating room where after appropriate monitors were placed, the patient " underwent induction of general anesthesia without complication.      Both eyes were prepped and draped in the usual sterile fashion for ophthalmic surgery and a lid speculum was placed in the right eye.  Forced duction testing in this eye revealed no restriction.  A trapezoidal superior conjunctival flap was created using conjunctival forceps and Shalom scissors.  This was reflected backwards to expose the right superior rectus muscle which was isolated using a Kevin muscle hook.  The muscle was freed from Tenon's capsule with blunt dissection using a Shalom scissors and cotton swab.  A double armed 6-0 Vicryl suture was then woven across the width of the muscle near it's insertion of the globe and tied to the edges.  The muscle was disinserted from the globe using Shalom scissors.  Both arms of the 6-0 Vicryl suture were then passed partial thickness through the sclera at a point measured to be 2.5 mm posterior to the physiologic muscle insertion using a caliper.  At this point, the ends of the suture were tied together.  The needles were cut off and the ends were cut short.  The conjunctival flap was then re-opposed in the surgical bed and held in place using two 6-0 plain gut sutures.  The lid speculum was removed from the operative eye.     A lid speculum was placed in the left eye.  Forced duction testing in this eye revealed no restriction.  A trapezoidal temporal conjunctival flap was created using conjunctival forceps and Shalom scissors.  This was reflected backwards to expose the left lateral rectus muscle which was isolated using a [place] muscle hook.  The muscle was freed from Tenon's capsule with blunt dissection using a Shalom scissors and cotton swab.  A double armed 6-0 Vicryl suture was then woven across the width of the muscle at a point measured to be 4.0 mm posterior to the insertion and tied to the edges.  A hemostat straight clamp was then clamped perpendicular to the muscle just  anterior to the suture and the distal 3.5 mm muscle segment was excised with a Shalom scissors and 0.3 forceps.  Both arms of the 6-0 Vicryl suture were then passed partial thickness through the sclera in a crossing swords technique at the physiologic insertion site.  At this point, the ends of the suture were tied together tightly advancing the muscle and completing a resection effect of 4.0 mm.  The needles were cut off and the ends were cut short.  The conjunctival flap was then re-opposed in the surgical bed and held in place using two 6-0 plain gut sutures.  The lid speculum was removed from the operative eye.     Maxitrol ointment was placed in both eyes.  The patient was awakened from general anesthesia without complication and discharged to the recovery room in stable condition.       SPECIMENS REMOVED:  None.      ESTIMATED BLOOD LOSS:  1 mL or less.    INTRAOPERATIVE FLUIDS:  As per anesthesia records.      SPONGE/INSTRUMENT/NEEDLE COUNTS:  All sponge, instrument, and needle counts were correct times two.    CONDITION ON DISCHARGE FROM OPERATING ROOM:  Stable.      COMPLICATIONS:  None.     I was present for the entire procedure

## 2025-04-14 NOTE — ANESTHESIA PROCEDURE NOTES
Airway       Patient location during procedure: OR  Staff -        Performed By: CRNAIndications and Patient Condition       Indications for airway management: quique-procedural       Induction type:intravenous       Mask difficulty assessment: 0 - not attempted    Final Airway Details       Final airway type: supraglottic airway    Supraglottic Airway Details        Type: LMA       Brand: LMA Unique       LMA size: 4    Post intubation assessment        Placement verified by: capnometry and chest rise        Number of attempts at approach: 1       Secured with: tape       Ease of procedure: easy       Dentition: Intact and Unchanged

## 2025-04-14 NOTE — ANESTHESIA PREPROCEDURE EVALUATION
Anesthesia Pre-Procedure Evaluation    Patient: Maddie Lala   MRN: [id number] : 1950        Procedure : Procedure(s):  bilateral eye muscle correction          Past Medical History:   Diagnosis Date    Allergic rhinitis     Arthritis     Benign essential hypertension 2018    Cervical dysplasia with cone in     nl pap ever since     COPD (chronic obstructive pulmonary disease) (H)     Dyspareunia     GERD (gastroesophageal reflux disease) 2011    Hearing problem     Meniere's disease     Nonsenile cataract     Plantar fasciitis     Stress incontinence     SVT (supraventricular tachycardia)     resolved    Tinnitus       Past Surgical History:   Procedure Laterality Date    ARTHROPLASTY KNEE Left 10/10/2018    Procedure: ARTHROPLASTY KNEE;  Left Total Knee Arthroplasty    ;  Surgeon: James Amaya MD;  Location: [facility] OR    BLADDER SURGERY      CATARACT IOL, RT/LT      CHOLECYSTECTOMY      COLONOSCOPY N/A 10/11/2021    Procedure: Colonoscopy, With Polypectomy And Biopsy;  Surgeon: Kavita Huizar MD;  Location: [facility] OR    COLONOSCOPY WITH CO2 INSUFFLATION N/A 10/19/2016    Procedure: COLONOSCOPY WITH CO2 INSUFFLATION;  Surgeon: Duane, William Charles, MD;  Location: [facility] OR    COLONOSCOPY WITH CO2 INSUFFLATION N/A 10/11/2021    Procedure: COLONOSCOPY, WITH CO2 INSUFFLATION;  Surgeon: Kavita Huizar MD;  Location: [facility] OR    DAVINCI LAPAROSCOPIC CHOLECYSTECTOMY WITHOUT GRAMS N/A 10/03/2023    Procedure: CHOLECYSTECTOMY, ROBOT-ASSISTED, LAPAROSCOPIC, WITHOUT CHOLANGIOGRAM;  Surgeon: Raulito Laurent MD;  Location: [facility] OR    ENDOSCOPIC ULTRASOUND UPPER GASTROINTESTINAL TRACT (GI) N/A 08/15/2023    Procedure: Endoscopic ultrasound upper gastrointestinal tract (GI);  Surgeon: Guru Jose C Skinner MD;  Location: [facility] GI    GENITOURINARY SURGERY      bladder    Midurethral sling with cystoscopy.  2010    TUBAL LIGATION        Allergies   Allergen  Reactions    Sulfa Antibiotics Rash      Social History     Tobacco Use    Smoking status: Former     Current packs/day: 0.00     Average packs/day: 1 pack/day for 10.0 years (10.0 ttl pk-yrs)     Types: Cigarettes     Start date: 1971     Quit date: 1981     Years since quittin.3     Passive exposure: Never    Smokeless tobacco: Never   Substance Use Topics    Alcohol use: Yes     Alcohol/week: 7.0 standard drinks of alcohol     Types: 7 Glasses of wine per week     Comment: occ - Glass of white wine per night      Wt Readings from Last 1 Encounters:   25 73.5 kg (162 lb)        Anesthesia Evaluation   Pt has had prior anesthetic. Type: General.    No history of anesthetic complications       ROS/MED HX  ENT/Pulmonary:     (+)                tobacco use, Past use,        mild,  COPD,              Neurologic:  - neg neurologic ROS     Cardiovascular:     (+)  hypertension- -   -  - -                                      METS/Exercise Tolerance:     Hematologic:  - neg hematologic  ROS     Musculoskeletal:  - neg musculoskeletal ROS     GI/Hepatic:     (+) GERD, Asymptomatic on medication,                  Renal/Genitourinary:       Endo:       Psychiatric/Substance Use:  - neg psychiatric ROS     Infectious Disease:       Malignancy:       Other:            Physical Exam    Airway  airway exam normal      Mallampati: II   TM distance: > 3 FB   Neck ROM: full   Mouth opening: > 3 cm    Respiratory Devices and Support         Dental       (+) Modest Abnormalities - crowns, retainers, 1 or 2 missing teeth      Cardiovascular   cardiovascular exam normal          Pulmonary   pulmonary exam normal                OUTSIDE LABS:  CBC:   Lab Results   Component Value Date    WBC 4.8 2023    WBC 5.0 2022    HGB 12.3 2023    HGB 11.8 2022    HCT 35.6 2023    HCT 35.0 2022     2023     2022     BMP:   Lab Results   Component Value Date      "(L) 03/24/2025     (L) 12/30/2024    POTASSIUM 4.9 03/24/2025    POTASSIUM 4.5 12/30/2024    CHLORIDE 96 (L) 03/24/2025    CHLORIDE 97 (L) 12/30/2024    CO2 27 03/24/2025    CO2 28 12/30/2024    BUN 11.9 03/24/2025    BUN 19.1 12/30/2024    CR 0.77 03/24/2025    CR 0.74 12/30/2024     (H) 03/24/2025     (H) 12/30/2024     COAGS:   Lab Results   Component Value Date    INR 0.95 09/27/2018     POC:   Lab Results   Component Value Date    BGM 97 10/10/2018     HEPATIC:   Lab Results   Component Value Date    ALBUMIN 4.3 12/30/2024    PROTTOTAL 6.7 12/30/2024    ALT 35 12/30/2024    AST 38 12/30/2024     (H) 06/07/2023    ALKPHOS 78 12/30/2024    BILITOTAL 0.2 12/30/2024     OTHER:   Lab Results   Component Value Date    ANGELITA 9.6 03/24/2025    PHOS 3.4 01/21/2015    TSH 2.78 11/16/2017    CRP <2.9 11/21/2022    SED 9 11/21/2022       Anesthesia Plan    ASA Status:  3    NPO Status:  NPO Appropriate    Anesthesia Type: General.     - Airway: LMA   Induction: Intravenous.   Maintenance: TIVA.        Consents    Anesthesia Plan(s) and associated risks, benefits, and realistic alternatives discussed. Questions answered and patient/representative(s) expressed understanding.     - Discussed: Risks, Benefits and Alternatives for BOTH SEDATION and the PROCEDURE were discussed     - Discussed with:  Patient            Postoperative Care    Pain management: Oral pain medications, Multi-modal analgesia.   PONV prophylaxis: Ondansetron (or other 5HT-3), Dexamethasone or Solumedrol     Comments:               Dain Portillo MD, MD    Clinically Significant Risk Factors Present on Admission                   # Hypertension: Noted on problem list           # Overweight: Estimated body mass index is 25.37 kg/m  as calculated from the following:    Height as of this encounter: 1.702 m (5' 7\").    Weight as of this encounter: 73.5 kg (162 lb).                "

## 2025-04-14 NOTE — ANESTHESIA POSTPROCEDURE EVALUATION
Patient: Maddie Lala    Procedure: Procedure(s):  bilateral eye muscle correction       Anesthesia Type:  General    Note:  Disposition: Outpatient   Postop Pain Control: Uneventful            Sign Out: Well controlled pain   PONV: No   Neuro/Psych: Uneventful            Sign Out: Acceptable/Baseline neuro status   Airway/Respiratory: Uneventful            Sign Out: Acceptable/Baseline resp. status   CV/Hemodynamics: Uneventful            Sign Out: Acceptable CV status; No obvious hypovolemia; No obvious fluid overload   Other NRE: NONE   DID A NON-ROUTINE EVENT OCCUR? No           Last vitals:  Vitals Value Taken Time   /89 04/14/25 1015   Temp 36.2  C (97.2  F) 04/14/25 1015   Pulse 83 04/14/25 1015   Resp 12 04/14/25 1015   SpO2 98 % 04/14/25 1015       Electronically Signed By: Dain Portillo MD, MD  April 14, 2025  10:38 AM   no

## 2025-04-14 NOTE — DISCHARGE INSTRUCTIONS
[facility]  Home Care Following Anesthesia  For 24 hours after surgery:  Get plenty of rest.  A responsible adult must stay with you for at least 24 hours after you leave the surgery center.  Do not drive or use heavy equipment.  If you have weakness or tingling, don't drive or use heavy equipment until this feeling goes away.   Do not drink alcohol.   Avoid strenuous or risky activities.  Ask for help when climbing stairs.  You may feel lightheaded.  IF so, sit for a few minutes before standing.  Have someone help you get up.   If you have nausea (feel sick to your stomach): Drink only clear liquids such as apple juice, ginger ale, broth or 7-Up.  Rest may also help.  Be sure to drink enough fluids.  Move to a regular diet as you feel able.   You may have a slight fever.  Call the doctor if your fever is over 100 F (37.7 C) (taken under the tongue) or lasts longer than 24 hours.  You may have a dry mouth, a sore throat, muscle aches or trouble sleeping. These should go away after 24 hours.  Do not make important or legal decisions.   It is recommended to avoid smoking.               Tips for taking pain medications  To get the best pain relief possible, remember these points:  Take pain medications as directed, before pain becomes severe.  Pain medication can upset your stomach: taking it with food may help.  Constipation is a common side effect of pain medication. Drink plenty of  fluids.  Eat foods high in fiber. Take a stool softener if recommended by your doctor or pharmacist.  Do not drink alcohol, drive or operate machinery while taking pain medications.  Ask about other ways to control pain, such as with heat, ice or relaxation.    Tylenol/Acetaminophen Consumption    If you feel your pain relief is insufficient, you may take Tylenol/Acetaminophen in addition to your narcotic pain medication.   Be careful not to exceed 4,000 mg of Tylenol/Acetaminophen in a 24 hour  period from all sources.  If you are taking extra strength Tylenol/acetaminophen (500 mg), the maximum dose is 8 tablets in 24 hours.  If you are taking regular strength acetaminophen (325 mg), the maximum dose is 12 tablets in 24 hours.    Tylenol 975 mg given at 10:05 AM. Ok to take more after 4:05 PM.   Toradol 15 mg given at  10:08 AM. Do not take any NSAIDs for 4 hours. OK after 2:08 PM.  (Ibuprofen, Advil, Motrin, Naproxen, Aleve).     Call a doctor for any of the following:  Signs of infection (fever, growing tenderness at the surgery site, a large amount of drainage or bleeding, severe pain, foul-smelling drainage, redness, swelling).  It has been over 8 to 10 hours since surgery and you are still not able to urinate (pass water).  Headache for over 24 hours.  Signs of Covid-19 infection (temperature over 100 degrees, shortness of breath, cough, loss of taste/smell, generalized body aches, persistent headache, chills, sore throat, nausea/vomiting/diarrhea)    Your doctor is:  Dr. Yuniel Cronin, Ophthalmology: [phone]               After hours and weekends call the hospital @ [phone] and ask for the resident on call for:  Ophthalmology  For emergency care, call the:  [place] [facility]:  [phone] (TTY for hearing impaired: [phone])

## 2025-04-14 NOTE — BRIEF OP NOTE
[facility]    Brief Operative Note     Pre-operative diagnosis: Strabismus [H50.9]  Post-operative diagnosis Same as pre-operative diagnosis    Procedure(s):  bilateral eye muscle correction    Surgeons and Role:     * Yuniel Cronin MD - Primary     * Alex Crews MD - Resident - Assisting     * Kishore Weiss MD - Fellow - Assisting    Anesthesia: General   Estimated Blood Loss: Less than 1 ml    Drains:   None  Specimens:  * No specimens in log *  Findings:   See full operative report.  Complications:             None.  Implants:  * No implants in log *     History of mildly elevated glucose.  Continue reduced carb diet and exercise.  Patient is due for labs.  She states she will get these done shortly  Orders:    Comprehensive metabolic panel; Future    Hemoglobin A1C; Future

## 2025-04-14 NOTE — ANESTHESIA CARE TRANSFER NOTE
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    Anesthesia Type:   General     Note:    Oropharynx: spontaneously breathing  Level of Consciousness: awake  Oxygen Supplementation: face mask  Level of Supplemental Oxygen (L/min / FiO2): 6  Independent Airway: airway patency satisfactory and stable  Dentition: dentition unchanged  Vital Signs Stable: post-procedure vital signs reviewed and stable  Report to RN Given: handoff report given  Patient transferred to: PACU    Handoff Report: Identifed the Patient, Identified the Reponsible Provider, Reviewed the pertinent medical history, Discussed the surgical course, Reviewed Intra-OP anesthesia mangement and issues during anesthesia, Set expectations for post-procedure period and Allowed opportunity for questions and acknowledgement of understanding      Vitals:  Vitals Value Taken Time   /69 04/14/25 0939   Temp 36.5  C (97.7  F) 04/14/25 0939   Pulse 88 04/14/25 0941   Resp 13 04/14/25 0941   SpO2 94 % 04/14/25 0941   Vitals shown include unfiled device data.    Electronically Signed By: QUEENIE Currie CRNA  April 14, 2025  9:42 AM

## 2025-04-21 NOTE — PROGRESS NOTES
1. 1 week post-op status post strabismus surgery:     -Surgery: 4/14/25    PREOPERATIVE DIAGNOSIS (ES):  Decompensated right cranial nerve 4 palsy   Right hypertropia   Alternating esotropia   Binocular diplopia      POSTOPERATIVE DIAGNOSIS (ES):  Same     NAME OF OPERATION:  Right superior rectus recession 2.5 mm  Left lateral rectus resection 4.0 mm    - Alignment: esotropia improved  - Diplopia: patient still has. Minimal change to her right hypertropia with strabismus surgery despite operating for 6 prism diopters of right hypertropia.  Fit patient with Fresnel today 5 up left lens.  - Healing up appropriately  - Maxitrol ophthalmic ointment: stop  - Start Predforte 1% four times a day in the operative eye(s) and decrease by one drop daily every week.  Course will complete in 1 month.    Return to clinic in 3 months or sooner as needed.       Complete documentation of historical and exam elements from today's encounter can be found in the full encounter summary report (not reduplicated in this progress note).  I personally obtained the chief complaint(s) and history of present illness.  I confirmed and edited as necessary the review of systems, past medical/surgical history, family history, social history, and examination findings as documented by others; and I examined the patient myself.  I personally reviewed the relevant tests, images, and reports as documented above.  I formulated and edited as necessary the assessment and plan and discussed the findings and management plan with the patient and family   Yuniel Cronin MD

## 2025-04-23 ENCOUNTER — OFFICE VISIT (OUTPATIENT)
Dept: OPHTHALMOLOGY | Facility: CLINIC | Age: 75
End: 2025-04-23
Attending: OPHTHALMOLOGY
Payer: COMMERCIAL

## 2025-04-23 DIAGNOSIS — H53.2 DOUBLE VISION: Primary | ICD-10-CM

## 2025-04-23 PROCEDURE — V2718 FRESNELL PRISM PRESS-ON LENS: HCPCS

## 2025-04-23 PROCEDURE — G0463 HOSPITAL OUTPT CLINIC VISIT: HCPCS | Performed by: OPHTHALMOLOGY

## 2025-04-23 ASSESSMENT — VISUAL ACUITY
OD_CC+: -3
METHOD: SNELLEN - LINEAR
CORRECTION_TYPE: GLASSES
OD_CC: 20/20
OS_CC: 20/25

## 2025-04-23 ASSESSMENT — TONOMETRY
IOP_METHOD: ICARE
OD_IOP_MMHG: 14
OS_IOP_MMHG: 18

## 2025-04-23 ASSESSMENT — REFRACTION_WEARINGRX
OD_SPHERE: PLANO
OS_ADD: +2.50
OD_CYLINDER: +1.00
OD_ADD: +2.50
SPECS_TYPE: PAL
OS_CYLINDER: +2.25
OS_SPHERE: -0.50
OD_AXIS: 174
OS_AXIS: 010

## 2025-04-23 ASSESSMENT — PACHYMETRY
OS_CT(UM): 610
OD_CT(UM): 621

## 2025-04-23 ASSESSMENT — CONF VISUAL FIELD
OS_INFERIOR_NASAL_RESTRICTION: 0
METHOD: COUNTING FINGERS
OS_INFERIOR_TEMPORAL_RESTRICTION: 0
OD_INFERIOR_TEMPORAL_RESTRICTION: 0
OD_SUPERIOR_NASAL_RESTRICTION: 0
OD_INFERIOR_NASAL_RESTRICTION: 0
OS_NORMAL: 1
OD_SUPERIOR_TEMPORAL_RESTRICTION: 0
OS_SUPERIOR_TEMPORAL_RESTRICTION: 0
OS_SUPERIOR_NASAL_RESTRICTION: 0
OD_NORMAL: 1

## 2025-04-28 ENCOUNTER — E-VISIT (OUTPATIENT)
Dept: FAMILY MEDICINE | Facility: CLINIC | Age: 75
End: 2025-04-28
Payer: COMMERCIAL

## 2025-04-28 DIAGNOSIS — E78.00 PURE HYPERCHOLESTEROLEMIA: ICD-10-CM

## 2025-04-28 DIAGNOSIS — I10 BENIGN ESSENTIAL HYPERTENSION: Primary | ICD-10-CM

## 2025-04-29 RX ORDER — LOSARTAN POTASSIUM 25 MG/1
25 TABLET ORAL DAILY
Qty: 90 TABLET | Refills: 0 | Status: SHIPPED | OUTPATIENT
Start: 2025-04-29

## 2025-04-29 RX ORDER — ATORVASTATIN CALCIUM 10 MG/1
10 TABLET, FILM COATED ORAL DAILY
Qty: 90 TABLET | Refills: 2 | Status: SHIPPED | OUTPATIENT
Start: 2025-04-29

## 2025-05-08 ENCOUNTER — TELEPHONE (OUTPATIENT)
Dept: PHARMACY | Facility: OTHER | Age: 75
End: 2025-05-08
Payer: COMMERCIAL

## 2025-05-08 NOTE — TELEPHONE ENCOUNTER
MT Recruitment: [facility] insurance     Referral outreach attempt #1 on May 8, 2025      Outcome: left voicemail- Call back number [phone]    Candi Novak  [facility]

## 2025-05-22 ENCOUNTER — TELEPHONE (OUTPATIENT)
Dept: PHARMACY | Facility: OTHER | Age: 75
End: 2025-05-22
Payer: COMMERCIAL

## 2025-05-22 NOTE — TELEPHONE ENCOUNTER
MT Recruitment: [facility] insurance     Referral outreach attempt #2 on May 22, 2025      Outcome: left voicemail- Call back number [phone]    Candi Novak  [facility]

## 2025-06-03 ENCOUNTER — MYC MEDICAL ADVICE (OUTPATIENT)
Dept: OTOLARYNGOLOGY | Facility: CLINIC | Age: 75
End: 2025-06-03
Payer: COMMERCIAL

## 2025-06-09 DIAGNOSIS — L21.9 DERMATITIS, SEBORRHEIC: ICD-10-CM

## 2025-06-10 RX ORDER — FLUOCINONIDE TOPICAL SOLUTION USP, 0.05% 0.5 MG/ML
SOLUTION TOPICAL
Qty: 60 ML | Refills: 0 | Status: SHIPPED | OUTPATIENT
Start: 2025-06-10

## 2025-06-18 ENCOUNTER — E-VISIT (OUTPATIENT)
Dept: FAMILY MEDICINE | Facility: CLINIC | Age: 75
End: 2025-06-18
Payer: COMMERCIAL

## 2025-06-18 DIAGNOSIS — I10 BENIGN ESSENTIAL HYPERTENSION: ICD-10-CM

## 2025-06-18 RX ORDER — LOSARTAN POTASSIUM 25 MG/1
25 TABLET ORAL DAILY
Qty: 90 TABLET | Refills: 1 | Status: SHIPPED | OUTPATIENT
Start: 2025-06-18

## 2025-06-18 RX ORDER — OMEPRAZOLE 20 MG/1
20 CAPSULE, DELAYED RELEASE ORAL DAILY
COMMUNITY
Start: 2025-06-18

## 2025-06-18 RX ORDER — DILTIAZEM HYDROCHLORIDE 180 MG/1
180 CAPSULE, EXTENDED RELEASE ORAL EVERY MORNING
Qty: 90 CAPSULE | Refills: 1 | Status: SHIPPED | OUTPATIENT
Start: 2025-06-18

## 2025-07-09 ENCOUNTER — VIRTUAL VISIT (OUTPATIENT)
Dept: PHARMACY | Facility: CLINIC | Age: 75
End: 2025-07-09
Payer: COMMERCIAL

## 2025-07-09 DIAGNOSIS — R52 PAIN: ICD-10-CM

## 2025-07-09 DIAGNOSIS — K21.9 GASTROESOPHAGEAL REFLUX DISEASE, UNSPECIFIED WHETHER ESOPHAGITIS PRESENT: ICD-10-CM

## 2025-07-09 DIAGNOSIS — Z78.9 TAKES DIETARY SUPPLEMENTS: ICD-10-CM

## 2025-07-09 DIAGNOSIS — R05.3 CHRONIC COUGH: ICD-10-CM

## 2025-07-09 DIAGNOSIS — J30.2 SEASONAL ALLERGIC RHINITIS, UNSPECIFIED TRIGGER: ICD-10-CM

## 2025-07-09 DIAGNOSIS — B35.1 ONYCHOMYCOSIS: ICD-10-CM

## 2025-07-09 DIAGNOSIS — Z78.0 MENOPAUSE: ICD-10-CM

## 2025-07-09 DIAGNOSIS — L30.9 DERMATITIS: ICD-10-CM

## 2025-07-09 DIAGNOSIS — H16.223 KERATITIS SICCA, BILATERAL: ICD-10-CM

## 2025-07-09 DIAGNOSIS — I10 BENIGN ESSENTIAL HYPERTENSION: Primary | ICD-10-CM

## 2025-07-09 DIAGNOSIS — Z13.6 CARDIOVASCULAR SCREENING; LDL GOAL LESS THAN 160: ICD-10-CM

## 2025-07-09 PROCEDURE — 99605 MTMS BY PHARM NP 15 MIN: CPT | Mod: 93 | Performed by: PHARMACIST

## 2025-07-09 PROCEDURE — 99607 MTMS BY PHARM ADDL 15 MIN: CPT | Mod: 93 | Performed by: PHARMACIST

## 2025-07-09 RX ORDER — GLUCOSAMINE/D3/BOSWELLIA SERRA 1500MG-400
1 TABLET ORAL DAILY
COMMUNITY

## 2025-07-09 RX ORDER — CHLORAL HYDRATE 500 MG
1 CAPSULE ORAL 2 TIMES DAILY
COMMUNITY
Start: 2020-07-08

## 2025-07-09 RX ORDER — IBUPROFEN 200 MG
600 TABLET ORAL EVERY 4 HOURS PRN
COMMUNITY

## 2025-07-09 RX ORDER — PHENOL 1.4 %
1 AEROSOL, SPRAY (ML) MUCOUS MEMBRANE DAILY
COMMUNITY
Start: 2000-01-01

## 2025-07-09 NOTE — PATIENT INSTRUCTIONS
"Recommendations from today's MTM visit:                                                    Today we reviewed what your medicines are for, how to know if they are working, that your medicines are safe and how to make your medicine regimen as easy as possible.      Recommend updated lipid panel lab   Recommend tapering off of omeprazole by taking 20mg evry other day. Use TUMs or famotidine for breakthrough acid reflux symptoms.  Recommend trying acetaminophen as your first line for pain. If need to use ibuprofen try a lower dose  Recommend using Lidex 2-3 times weekly as prescribed.   Recommend taking fish oil 2 times daily for eye health and inflammation.   Recommend stopping Psyllium fiber capsule.     Follow-up: after cholesterol labs are back    It was great speaking with you today.  I value your experience and would be very thankful for your time in providing feedback in our clinic survey. In the next few days, you may receive an email or text message from [employer] with a link to a survey related to your  clinical pharmacist.\"     To schedule another MTM appointment, please call the clinic directly or you may call the MTM scheduling line at [phone] or toll-free at [phone].     My Clinical Pharmacist's contact information:                                                      Please feel free to contact me with any questions or concerns you have.      Charla Solis, Pharm.D, [facility]  Medication Therapy Management Pharmacist      "

## 2025-07-09 NOTE — PROGRESS NOTES
Medication Therapy Management (MTM) Encounter    ASSESSMENT:                            Medication Adherence/Access: No issues identified.    Hypertension   Stable.    Hyperlipidemia   Patient would benefit from getting cholesterol labs checked. If elevated off of atorvastatin could consider rosuvastatin    Bronchitis/chronic cough   Stable.     GERD  Patient may benefit from coming off of omeprazole and using tums or famotidine instead due to bone loss concerns.     Pain   Patient would benefit from using tylenol as first line therapy instead of ibuprofen due to age and potential risk of kidney damage.     Menopause  Will follow up with .     Allergies  Stable     Dry Eye   Stable     Dermatitis  Patient would benefit from using Lidex 2-3 times a week to prevent dermatitis.     Onychomycosis  Stable     Supplements   Patient would benefit from increasing fish oil dose for inflammation and eye health. Patient can stop psyllium capsule to reduce pill burden.     PLAN:                            Recommend updated lipid panel   Recommend tapering off of omeprazole by taking 20mg evry other day. Use TUMs or famotidine for breakthrough acid reflux symptoms.  Recommend trying acetaminophen as your first line for pain. If need to use ibuprofen try a lower dose  Recommend using Lidex 2-3 times weekly as prescribed.   Recommend taking fish oil 2 times daily for eye health and inflammation.   Recommend stopping Psyllium fiber capsule.     Follow-up: after cholesterol labs are back    SUBJECTIVE/OBJECTIVE:                          Maddie Lala is a 75 year old female called for an initial visit. She was referred to me from health plan.      Reason for visit: medication review.    Allergies/ADRs: Reviewed in chart  Past Medical History: Reviewed in chart  Tobacco: She reports that she quit smoking about 44 years ago. Her smoking use included cigarettes. She started smoking about 54 years ago. She has a 10 pack-year  smoking history. She has never been exposed to tobacco smoke. She has never used smokeless tobacco.  Alcohol: 7-9 beverages / week  THC/CBD: No  Medication Adherence/Access: Patient uses pill box(es).  Per patient, misses medication 0 time(s) per week. Does occasionally miss calcium at noon    Hypertension   Diltiazem ER 180mg daily in the am  Losartan 25mg daily at night    Patient reports no current medication side effects  Patient does not self-monitor blood pressure consistently. 120-130/78-82mmHg.      Was on lisinopril for 3-4 years and then developed a cough so lisinopril was discontinued.  Amlodipine caused fatigue       Hyperlipidemia     Stopped taking atorvastatin because she was getting back pain. Dose was lowered and the back pain improved some but still had back pain so she stopped it.     The 10-year ASCVD risk score (Easton ALVAREZ, et al., 2019) is: 27.3%    Values used to calculate the score:      Age: 75 years      Sex: Female      Is Non- : No      Diabetic: No      Tobacco smoker: No      Systolic Blood Pressure: 140 mmHg      Is BP treated: Yes      HDL Cholesterol: 81 mg/dL      Total Cholesterol: 143 mg/dL     Bronchitis, Chronic Cough  Albuterol 108mcg 2 puffs every 6 hours and as needed and before exercise-has not used for a year. Only uses when she gets bronchitis    Has not had any issues with her cough recently. It was recommended that she take 2 tablespoonfuls of apple cider vinegar and her cough went away.        GERD    Omeprazole 20 mg once daily   Patient feels that current regimen is effective. Did try going off and then started having more epigastric pain so she restarted.  Was thought that her cough was due to acid reflux.  Different foods give her a problem and knows which ones to avoid and eats smaller meals    Did try pantoprazole and didn't feel like this was helpful          Pain   Ibuprofen 600mg every 4 hours as needed.    Uses for headache or back ache.  Uses 3 times a week. Has used acetaminophen in the past and it is somewhat effective.       Menopause  Estradiol 10mcg vaginally twice weekly     Doesn't feel this is working all that well and plans to talk with Dr. Hurtado about this at the next appointment.    Allergies  Fluticasone 50mcg nasal spray 1-2 sprays in each nostril daily     Using daily. This is effective.     Dry eye    Carboxymethylcellulose PF 1% ophthalmic gel 1 drop  3 times daily   Cyclosporin 0.05% ophthalmic emulsion 1 drop in each eye twice daily     Dermatitis  Fluocinonide 0.05% external solution apply to scalp 2-3 times weekly     Uses when she has a flare and when the flare is resolved, stops using it.    Onchomycosis  Ciclopirox 8% external solution on nails and surrounding areas every 7 days     This was started in December. Feels this has been helpful    Supplements  Calcium  Citrate 250mg  vitamin D 20mcg (per 2 tablets) 1 tablet three times a day  Biotin 22104klk once daily-for fingernails  Psyllium husk 1 capsule daily   Fish oil 1000mg (250mg of EPA + 250mg DHA) 3 times weekly-for eyes   Multivitamin daily (250mg of calcium)  Wondering what dose of fish oil she should be taking for her eyes.       Today's Vitals: There were no vitals taken for this visit.  ----------------    I spent 45 minutes with this patient today. All changes were made via collaborative practice agreement with Radha Hurtado A copy of the visit note was provided to the patient's provider(s).    A summary of these recommendations was sent via [employer].    Charla Solis, Pharm.D, [facility]  Medication Therapy Management Pharmacist    Telemedicine Visit Details  The patient's medications can be safely assessed via a telemedicine encounter.  Type of service:  Telephone visit  Originating Location (pt. Location): Home    Distant Location (provider location):  On-site  Start Time: 10:00AM  End Time: 10:45AM     Medication Therapy Recommendations  Dermatitis   1  Current Medication: fluocinonide (LIDEX) 0.05 % external solution   Current Medication Sig: APPLY TO SCALP NIGHTLY FOR UP TO 1 WEEK THEN 2-3 TIMES WEEKLY AS NEEDED   Rationale: Does not understand instructions - Adherence - Adherence   Recommendation: Provide Education   Status: Patient Agreed - Adherence/Education   Identified Date: 7/9/2025 Completed Date: 7/9/2025         GERD (gastroesophageal reflux disease)   1 Current Medication: omeprazole (PRILOSEC) 20 MG DR capsule   Current Medication Sig: Take 1 capsule (20 mg) by mouth daily.   Rationale: No medical indication at this time - Unnecessary medication therapy - Indication   Recommendation: Discontinue Medication   Status: Accepted per CPA   Identified Date: 7/9/2025 Completed Date: 7/9/2025         Pain   1 Current Medication: Ibuprofen (ADVIL PO) (Discontinued)   Current Medication Sig: Take 800 mg by mouth every 4 hours as needed for moderate pain   Rationale: Unsafe medication for the patient - Adverse medication event - Safety   Recommendation: Change Medication - Acetaminophen 8 Hour 650 MG Tbcr   Status: Accepted - no CPA Needed   Identified Date: 7/9/2025 Completed Date: 7/9/2025         Takes dietary supplements   1 Current Medication: fish oil-omega-3 fatty acids 1000 MG capsule   Current Medication Sig: Take 1 g by mouth 2 times daily.   Rationale: Dose too low - Dosage too low - Effectiveness   Recommendation: Increase Dose - Fish Oil Omega-3 1000 MG Caps - take 2 capsules daily   Status: Patient Agreed - Adherence/Education   Identified Date: 7/9/2025 Completed Date: 7/9/2025         2 Current Medication: psyllium (METAMUCIL/KONSYL) capsule   Current Medication Sig: Take 1 capsule by mouth daily.   Rationale: No medical indication at this time - Unnecessary medication therapy - Indication   Recommendation: Discontinue Medication   Status: Accepted per CPA   Identified Date: 7/9/2025 Completed Date: 7/9/2025

## 2025-07-09 NOTE — LETTER
July 9, 2025  Maddie Lala  [address] [facility] [id number]    Dear Ms. Lala, EBENEZER [facility] [place]     Thank you for talking with me on Jul 9, 2025 about your health and medications. As a follow-up to our conversation, I have included two documents:      Your Recommended To-Do List has steps you should take to get the best results from your medications.  Your Medication List will help you keep track of your medications and how to take them.    If you want to talk about these documents, please call Charla Solis RPH at phone: [phone], Monday-Friday 8-4:30pm.    I look forward to working with you and your doctors to make sure your medications work well for you.    Sincerely,  Charla Solis RPH  [facility] Pharmacist, [facility]

## 2025-07-09 NOTE — LETTER
"Recommended To-Do List      Prepared on: Jul 9, 2025       You can get the best results from your medications by completing the items on this \"To-Do List.\"      Bring your To-Do List when you go to your doctor. And, share it with your family or caregivers.    My To-Do List:  What we talked about: What I should do:   The importance of taking your medication as intended    Education: Use your Lidex 2-3 times per week           What we talked about: What I should do:   A medication that you may no longer need    Taper off of omeprazole (PriLOSEC)          What we talked about: What I should do:   An issue with your medication    Change the medication you are taking from ADVIL PO to Acetaminophen 8 Hour          What we talked about: What I should do:   Your medication dosage being too low    Increase your dosage of fish oil-omega-3 fatty acids          What we talked about: What I should do:   A medication that you may no longer need    Stop taking psyllium (METAMUCIL/KONSYL)          What we talked about: What I should do:                     "

## 2025-07-09 NOTE — LETTER
_  Medication List        Prepared on: Jul 9, 2025     Bring your Medication List when you go to the doctor, hospital, or   emergency room. And, share it with your family or caregivers.     Note any changes to how you take your medications.  Cross out medications when you no longer use them.    Medication How I take it Why I use it Prescriber   albuterol (PROAIR HFA/PROVENTIL HFA/VENTOLIN HFA) 108 (90 Base) MCG/ACT inhaler INHALE 2 PUFFS BY MOUTH EVERY 6 HOURS AS NEEDED FOR SHORTNESS OF BREATH/DYSPNEA AND AS NEEDED BEFORE EXERCISE Cough due to bronchospasm Radha Hurtado MD   Biotin 43699 MCG TABS Take 1 tablet by mouth daily.  General Health  Patient Reported   CALCIUM + D OR Take 1 tablet by mouth 2 times daily 1 tablet in am and 2 tablet in pm  General Health  Patient Reported   carboxymethylcellulose PF (REFRESH LIQUIGEL) 1 % ophthalmic gel 1 drop 3 times daily  Dry Eyes Patient Reported   ciclopirox (PENLAC) 8 % external solution Apply to adjacent skin and affected nails daily.  Remove with alcohol every 7 days, then repeat. Fungal Nail Infection Radha Hurtado MD   cycloSPORINE (RESTASIS) 0.05 % ophthalmic emulsion Place 1 drop into both eyes 2 times daily. Dry eye; Keratitis Sicca, Bilateral Latia Joanne Garcia, OD   diltiazem ER (DILT-XR) 180 MG 24 hr capsule Take 1 capsule (180 mg) by mouth every morning. Benign Essential Hypertension Radha Hurtado MD   estradiol (VAGIFEM) 10 MCG TABS vaginal tablet Place 1 tablet (10 mcg) vaginally twice a week. Vaginal Atrophy Radha Hurtado MD   fish oil-omega-3 fatty acids 1000 MG capsule Take 1 g by mouth 2 times daily.  General Health  Patient Reported   fluocinonide (LIDEX) 0.05 % external solution APPLY TO SCALP NIGHTLY FOR UP TO 1 WEEK THEN 2-3 TIMES WEEKLY AS NEEDED Dermatitis, Seborrheic Radha Hurtado MD   fluticasone (FLONASE) 50 MCG/ACT nasal spray Spray 1 spray into both nostrils as needed. USE ONE TO TWO SPRAYS IN EACH NOSTRIL EVERY DAY Chronic  Allergic Conjunctivitis Radha Hurtado MD   ibuprofen (ADVIL/MOTRIN) 200 MG tablet Take 600 mg by mouth every 4 hours as needed for pain.  Pain Patient Reported   losartan (COZAAR) 25 MG tablet Take 1 tablet (25 mg) by mouth daily. Benign Essential Hypertension Radha Hurtado MD   Misc Natural Products (APPLE CIDER VINEGAR DIET PO) Take 2 Tablespoonful by mouth daily.  GERD Patient Reported   Multiple Vitamins-Minerals (MULTIVITAMIN ADULTS 50+) TABS Take 1 tablet by mouth daily.  General Health  Patient Reported   omeprazole (PRILOSEC) 20 MG DR capsule Take 1 capsule (20 mg) by mouth daily.  General Health  Radha Hurtado MD         Add new medications, over-the-counter drugs, herbals, vitamins, or  minerals in the blank rows below.    Medication How I take it Why I use it Prescriber                                      Allergies:      - Sulfa Antibiotics - Rash        Side effects I have had:      Not on File        Other Information:              My notes and questions:

## 2025-07-14 DIAGNOSIS — H10.45 CHRONIC ALLERGIC CONJUNCTIVITIS: ICD-10-CM

## 2025-07-14 RX ORDER — FLUTICASONE PROPIONATE 50 MCG
SPRAY, SUSPENSION (ML) NASAL
Qty: 48 G | Refills: 1 | Status: SHIPPED | OUTPATIENT
Start: 2025-07-14

## 2025-07-16 ENCOUNTER — LAB (OUTPATIENT)
Dept: LAB | Facility: CLINIC | Age: 75
End: 2025-07-16
Payer: COMMERCIAL

## 2025-07-16 DIAGNOSIS — Z13.6 CARDIOVASCULAR SCREENING; LDL GOAL LESS THAN 160: ICD-10-CM

## 2025-07-16 LAB
CHOLEST SERPL-MCNC: 165 MG/DL
FASTING STATUS PATIENT QL REPORTED: YES
HDLC SERPL-MCNC: 76 MG/DL
LDLC SERPL CALC-MCNC: 80 MG/DL
NONHDLC SERPL-MCNC: 89 MG/DL
TRIGL SERPL-MCNC: 46 MG/DL

## 2025-07-16 PROCEDURE — 80061 LIPID PANEL: CPT

## 2025-07-16 PROCEDURE — 36415 COLL VENOUS BLD VENIPUNCTURE: CPT

## 2025-07-21 ENCOUNTER — E-VISIT (OUTPATIENT)
Dept: FAMILY MEDICINE | Facility: CLINIC | Age: 75
End: 2025-07-21
Payer: COMMERCIAL

## 2025-07-21 DIAGNOSIS — M54.50 CHRONIC MIDLINE LOW BACK PAIN WITHOUT SCIATICA: ICD-10-CM

## 2025-07-21 DIAGNOSIS — E78.00 PURE HYPERCHOLESTEROLEMIA: Primary | ICD-10-CM

## 2025-07-21 DIAGNOSIS — G89.29 CHRONIC BILATERAL THORACIC BACK PAIN: ICD-10-CM

## 2025-07-21 DIAGNOSIS — G89.29 CHRONIC MIDLINE LOW BACK PAIN WITHOUT SCIATICA: ICD-10-CM

## 2025-07-21 DIAGNOSIS — M54.6 CHRONIC BILATERAL THORACIC BACK PAIN: ICD-10-CM

## 2025-07-22 RX ORDER — ATORVASTATIN CALCIUM 10 MG/1
10 TABLET, FILM COATED ORAL DAILY
Qty: 90 TABLET | Refills: 2 | Status: SHIPPED | OUTPATIENT
Start: 2025-07-22

## 2025-07-23 ENCOUNTER — MYC REFILL (OUTPATIENT)
Dept: FAMILY MEDICINE | Facility: CLINIC | Age: 75
End: 2025-07-23
Payer: COMMERCIAL

## 2025-07-23 DIAGNOSIS — I10 BENIGN ESSENTIAL HYPERTENSION: ICD-10-CM

## 2025-07-23 RX ORDER — LOSARTAN POTASSIUM 25 MG/1
25 TABLET ORAL DAILY
Qty: 90 TABLET | Refills: 1 | OUTPATIENT
Start: 2025-07-23

## 2025-07-24 ENCOUNTER — OFFICE VISIT (OUTPATIENT)
Dept: OPHTHALMOLOGY | Facility: CLINIC | Age: 75
End: 2025-07-24
Attending: OPHTHALMOLOGY
Payer: COMMERCIAL

## 2025-07-24 DIAGNOSIS — H53.2 DOUBLE VISION: Primary | ICD-10-CM

## 2025-07-24 DIAGNOSIS — H53.10 SUBJECTIVE VISUAL DISTURBANCE: ICD-10-CM

## 2025-07-24 PROCEDURE — V2718 FRESNELL PRISM PRESS-ON LENS: HCPCS

## 2025-07-24 PROCEDURE — G0463 HOSPITAL OUTPT CLINIC VISIT: HCPCS | Performed by: OPHTHALMOLOGY

## 2025-07-24 PROCEDURE — 92015 DETERMINE REFRACTIVE STATE: CPT

## 2025-07-24 PROCEDURE — 92060 SENSORIMOTOR EXAMINATION: CPT | Performed by: OPHTHALMOLOGY

## 2025-07-24 PROCEDURE — 92060 SENSORIMOTOR EXAMINATION: CPT

## 2025-07-24 ASSESSMENT — VISUAL ACUITY
OD_CC: 20/20
OS_CC+: -2
OS_CC: 20/25
CORRECTION_TYPE: GLASSES
METHOD: SNELLEN - LINEAR

## 2025-07-24 ASSESSMENT — CONF VISUAL FIELD
METHOD: COUNTING FINGERS
OS_SUPERIOR_NASAL_RESTRICTION: 0
OD_INFERIOR_TEMPORAL_RESTRICTION: 0
OS_NORMAL: 1
OD_SUPERIOR_NASAL_RESTRICTION: 0
OD_INFERIOR_NASAL_RESTRICTION: 0
OD_NORMAL: 1
OS_SUPERIOR_TEMPORAL_RESTRICTION: 0
OS_INFERIOR_NASAL_RESTRICTION: 0
OD_SUPERIOR_TEMPORAL_RESTRICTION: 0
OS_INFERIOR_TEMPORAL_RESTRICTION: 0

## 2025-07-24 ASSESSMENT — REFRACTION_MANIFEST
OS_CYLINDER: +2.75
OD_AXIS: 175
OS_ADD: +2.50
OD_ADD: +2.50
OD_CYLINDER: +1.50
OD_SPHERE: -0.50
OS_SPHERE: -1.25
OS_AXIS: 180

## 2025-07-24 ASSESSMENT — REFRACTION_WEARINGRX
OS_AXIS: 010
OS_CYLINDER: +2.25
OS_ADD: +2.50
OD_CYLINDER: +1.00
SPECS_TYPE: PAL
OD_AXIS: 174
OS_SPHERE: -0.50
OD_SPHERE: PLANO
OD_ADD: +2.50

## 2025-07-24 ASSESSMENT — TONOMETRY
OD_IOP_MMHG: 17
OS_IOP_MMHG: 16
IOP_METHOD: ICARE

## 2025-07-24 ASSESSMENT — REFRACTION_FINALRX
OS_VPRISM: 2
OD_VPRISM: 2

## 2025-07-24 ASSESSMENT — EXTERNAL EXAM - RIGHT EYE: OD_EXAM: NORMAL

## 2025-07-24 ASSESSMENT — PACHYMETRY
OD_CT(UM): 621
OS_CT(UM): 610

## 2025-07-24 ASSESSMENT — EXTERNAL EXAM - LEFT EYE: OS_EXAM: NORMAL

## 2025-07-24 NOTE — PROGRESS NOTES
1. 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  Her pattern of right hypertropia greater in up gaze than down gaze strongly indicates a congenital cranial nerve 4 palsy although there may also be a component of sagging eye syndrome.    Status post strabismus surgery. Esotropia resolved but only mild improvement to her right hypertropia unfortunately despite appropriate dosing of her strabismus surgery. Today we discussed additional strabismus surgery using adjustable suture technique versus observation as patient does seem to tolerate contact lenses without prism ok during the day.  She wishes to wear ground in prism glasses most of the time and occasional contact lenses without prism.    Follow-up as needed in the future if patient wishes to consider strabismus surgery again using an adjustable suture technique.    -Surgery: 4/14/25    PREOPERATIVE DIAGNOSIS (ES):  Decompensated right cranial nerve 4 palsy   Right hypertropia   Alternating esotropia   Binocular diplopia      POSTOPERATIVE DIAGNOSIS (ES):  Same     NAME OF OPERATION:  Right superior rectus recession 2.5 mm  Left lateral rectus resection 4.0 mm    Esotropia has resolved     Interim history and exam with me since last visit on 4/23/25:   The patient is here for follow-up from prior strabismus surgery. She feels happy with her horizontal alignment, and there is some residual vertical misalignment remaining. She has not tried her old prism glasses to see if this helps. The patient is overall happy with the surgical outcome and does not feel like she needs to have another surgery performed.     Please see epic chart for complete exam

## 2025-08-04 ENCOUNTER — THERAPY VISIT (OUTPATIENT)
Dept: PHYSICAL THERAPY | Facility: CLINIC | Age: 75
End: 2025-08-04
Attending: FAMILY MEDICINE
Payer: COMMERCIAL

## 2025-08-04 DIAGNOSIS — G89.29 CHRONIC BILATERAL THORACIC BACK PAIN: Primary | ICD-10-CM

## 2025-08-04 DIAGNOSIS — M54.6 CHRONIC BILATERAL THORACIC BACK PAIN: Primary | ICD-10-CM

## 2025-08-04 PROCEDURE — 97110 THERAPEUTIC EXERCISES: CPT | Mod: GP | Performed by: PHYSICAL THERAPIST

## 2025-08-14 ENCOUNTER — THERAPY VISIT (OUTPATIENT)
Dept: PHYSICAL THERAPY | Facility: CLINIC | Age: 75
End: 2025-08-14
Attending: FAMILY MEDICINE
Payer: COMMERCIAL

## 2025-08-14 DIAGNOSIS — G89.29 CHRONIC BILATERAL THORACIC BACK PAIN: Primary | ICD-10-CM

## 2025-08-14 DIAGNOSIS — M54.6 CHRONIC BILATERAL THORACIC BACK PAIN: Primary | ICD-10-CM

## 2025-08-14 PROCEDURE — 97110 THERAPEUTIC EXERCISES: CPT | Mod: GP | Performed by: PHYSICAL THERAPIST

## 2025-08-21 ENCOUNTER — THERAPY VISIT (OUTPATIENT)
Dept: PHYSICAL THERAPY | Facility: CLINIC | Age: 75
End: 2025-08-21
Attending: FAMILY MEDICINE
Payer: COMMERCIAL

## 2025-08-21 DIAGNOSIS — G89.29 CHRONIC BILATERAL THORACIC BACK PAIN: Primary | ICD-10-CM

## 2025-08-21 DIAGNOSIS — M54.50 CHRONIC MIDLINE LOW BACK PAIN WITHOUT SCIATICA: ICD-10-CM

## 2025-08-21 DIAGNOSIS — M54.6 CHRONIC BILATERAL THORACIC BACK PAIN: Primary | ICD-10-CM

## 2025-08-21 DIAGNOSIS — G89.29 CHRONIC MIDLINE LOW BACK PAIN WITHOUT SCIATICA: ICD-10-CM

## 2025-08-21 PROCEDURE — 97530 THERAPEUTIC ACTIVITIES: CPT | Mod: GP | Performed by: PHYSICAL THERAPIST

## 2025-08-21 PROCEDURE — 97110 THERAPEUTIC EXERCISES: CPT | Mod: GP | Performed by: PHYSICAL THERAPIST

## (undated) DEVICE — DEVICE SUTURE PASSER 14GA WECK EFX EFXSP2

## (undated) DEVICE — SU MONOCRYL 3-0 PS-1 27" Y936H

## (undated) DEVICE — SOL NACL 0.9% IRRIG 1000ML BOTTLE 2F7124

## (undated) DEVICE — GLOVE PROTEXIS BLUE W/NEU-THERA 8.0  2D73EB80

## (undated) DEVICE — SU VICRYL 0 CT-1 36" J946H

## (undated) DEVICE — NDL INSUFFLATION 13GA 120MM C2201

## (undated) DEVICE — ESU GROUND PAD ADULT W/CORD E7507

## (undated) DEVICE — HOOD FLYTE W/PEELAWAY 408-800-100

## (undated) DEVICE — SUCTION IRR SYSTEM W/O TIP INTERPULSE HANDPIECE 0210-100-000

## (undated) DEVICE — DAVINCI XI CLIP APPLIER LG HEM-O-CLIP 8MM 470230

## (undated) DEVICE — GLOVE BIOGEL PI MICRO INDICATOR UNDERGLOVE SZ 7.5 48975

## (undated) DEVICE — DRAPE STERI TOWEL LG 1010

## (undated) DEVICE — SU VICRYL 6-0 S-14DA 18" UND J670G

## (undated) DEVICE — SPONGE LAP 18X18" X8435

## (undated) DEVICE — DAVINCI XI SEAL UNIVERSAL 5-8MM 470361

## (undated) DEVICE — Device

## (undated) DEVICE — GOWN XLG DISP 9545

## (undated) DEVICE — DAVINCI XI GRASPER ENDOWRIST PROGRASP 470093

## (undated) DEVICE — LINEN TOWEL PACK X5 5464

## (undated) DEVICE — PACK MINOR EYE CUSTOM ASC SEY15MEUMH

## (undated) DEVICE — GLOVE PROTEXIS W/NEU-THERA 8.0  2D73TE80

## (undated) DEVICE — SOL WATER IRRIG 1000ML BOTTLE 2F7114

## (undated) DEVICE — SOL WATER IRRIG 500ML BOTTLE 2F7113

## (undated) DEVICE — PREP CHLORAPREP 26ML TINTED ORANGE  260815

## (undated) DEVICE — SU MONOCRYL 4-0 PS-2 27" UND Y426H

## (undated) DEVICE — BLADE SAW SAGITTAL STRK 19.5X90X1.27MM 2108-109-000S11

## (undated) DEVICE — DAVINCI XI FCP BIPOLAR FENESTRATED 470205

## (undated) DEVICE — STRAP KNEE/BODY 31143004

## (undated) DEVICE — SOL NACL 0.9% IRRIG 3000ML BAG 2B7477

## (undated) DEVICE — BASIN SET MAJOR

## (undated) DEVICE — DRSG STERI STRIP 1/2X4" R1547

## (undated) DEVICE — LINEN ORTHO PACK 5446

## (undated) DEVICE — GOWN IMPERVIOUS SPECIALTY XLG/XLONG 32474

## (undated) DEVICE — SU DERMABOND ADVANCED .7ML DNX12

## (undated) DEVICE — ESU CORD BIPOLAR GREEN 10-4000

## (undated) DEVICE — GLOVE PROTEXIS POWDER FREE 7.5 ORTHOPEDIC 2D73ET75

## (undated) DEVICE — DAVINCI XI DRAPE COLUMN 470341

## (undated) DEVICE — DRSG GAUZE 4X4" 3033

## (undated) DEVICE — EYE PREP BETADINE 5% SOLUTION 30ML 0065-0411-30

## (undated) DEVICE — PACK LAP CHOLE CUSTOM ASC

## (undated) DEVICE — GLOVE BIOGEL PI MICRO SZ 7.5 48575

## (undated) DEVICE — DAVINCI XI CAUTERY HOOK 470183

## (undated) DEVICE — SUCTION MANIFOLD DORNOCH ULTRA CART UL-CL500

## (undated) DEVICE — BONE CLEANING TIP INTERPULSE  0210-010-000

## (undated) DEVICE — SU DERMABOND ADVANCED .7ML DNX6

## (undated) DEVICE — SYR 50ML SLIP TIP W/O NDL 309654

## (undated) DEVICE — CLIP ENDO HEMO-LOC PURPLE LG 544240

## (undated) DEVICE — SPECIMEN CONTAINER W/10% BUFFERED FORMALIN 120ML 591201

## (undated) DEVICE — SOL WATER IRRIG 1000ML BOTTLE 07139-09

## (undated) DEVICE — BONE CEMENT MIXEVAC HI VAC W/CARTRIDGE 0306-563-000

## (undated) DEVICE — DAVINCI XI DRAPE ARM 470015

## (undated) DEVICE — DAVINCI HOT SHEARS TIP COVER  400180

## (undated) DEVICE — EYE MARKING PAD 581057

## (undated) DEVICE — ESU HOLSTER PLASTIC DISP E2400

## (undated) DEVICE — SU VICRYL 2-0 CT-1 27" UND J259H

## (undated) DEVICE — DRAPE MAYO STAND 23X54 8337

## (undated) DEVICE — ENDO POUCH UNIVERSAL RETRIEVAL SYSTEM INZII 5MM CD003

## (undated) DEVICE — DRSG AQUACEL AG 3.5X9.75" HYDROFIBER 412011

## (undated) RX ORDER — ONDANSETRON 2 MG/ML
INJECTION INTRAMUSCULAR; INTRAVENOUS
Status: DISPENSED
Start: 2025-04-14

## (undated) RX ORDER — HYDROMORPHONE HYDROCHLORIDE 1 MG/ML
INJECTION, SOLUTION INTRAMUSCULAR; INTRAVENOUS; SUBCUTANEOUS
Status: DISPENSED
Start: 2023-10-03

## (undated) RX ORDER — ONDANSETRON 2 MG/ML
INJECTION INTRAMUSCULAR; INTRAVENOUS
Status: DISPENSED
Start: 2023-10-03

## (undated) RX ORDER — DEXAMETHASONE SODIUM PHOSPHATE 4 MG/ML
INJECTION, SOLUTION INTRA-ARTICULAR; INTRALESIONAL; INTRAMUSCULAR; INTRAVENOUS; SOFT TISSUE
Status: DISPENSED
Start: 2025-04-14

## (undated) RX ORDER — BUPIVACAINE HYDROCHLORIDE 2.5 MG/ML
INJECTION, SOLUTION EPIDURAL; INFILTRATION; INTRACAUDAL
Status: DISPENSED
Start: 2023-10-03

## (undated) RX ORDER — LIDOCAINE HYDROCHLORIDE 20 MG/ML
INJECTION, SOLUTION EPIDURAL; INFILTRATION; INTRACAUDAL; PERINEURAL
Status: DISPENSED
Start: 2018-10-10

## (undated) RX ORDER — REGADENOSON 0.08 MG/ML
INJECTION, SOLUTION INTRAVENOUS
Status: DISPENSED
Start: 2023-09-27

## (undated) RX ORDER — INDOCYANINE GREEN AND WATER 25 MG
KIT INJECTION
Status: DISPENSED
Start: 2023-10-03

## (undated) RX ORDER — CEFAZOLIN SODIUM 2 G/50ML
SOLUTION INTRAVENOUS
Status: DISPENSED
Start: 2023-10-03

## (undated) RX ORDER — OXYCODONE HYDROCHLORIDE 5 MG/1
TABLET ORAL
Status: DISPENSED
Start: 2023-10-03

## (undated) RX ORDER — ONDANSETRON 2 MG/ML
INJECTION INTRAMUSCULAR; INTRAVENOUS
Status: DISPENSED
Start: 2018-10-10

## (undated) RX ORDER — PROPOFOL 10 MG/ML
INJECTION, EMULSION INTRAVENOUS
Status: DISPENSED
Start: 2018-10-10

## (undated) RX ORDER — LIDOCAINE HYDROCHLORIDE AND EPINEPHRINE 10; 10 MG/ML; UG/ML
INJECTION, SOLUTION INFILTRATION; PERINEURAL
Status: DISPENSED
Start: 2023-10-03

## (undated) RX ORDER — CEFAZOLIN SODIUM 2 G/100ML
INJECTION, SOLUTION INTRAVENOUS
Status: DISPENSED
Start: 2018-10-10

## (undated) RX ORDER — FENTANYL CITRATE 50 UG/ML
INJECTION, SOLUTION INTRAMUSCULAR; INTRAVENOUS
Status: DISPENSED
Start: 2018-10-10

## (undated) RX ORDER — FENTANYL CITRATE 50 UG/ML
INJECTION, SOLUTION INTRAMUSCULAR; INTRAVENOUS
Status: DISPENSED
Start: 2023-10-03

## (undated) RX ORDER — FENTANYL CITRATE-0.9 % NACL/PF 10 MCG/ML
PLASTIC BAG, INJECTION (ML) INTRAVENOUS
Status: DISPENSED
Start: 2023-10-03

## (undated) RX ORDER — CELECOXIB 200 MG/1
CAPSULE ORAL
Status: DISPENSED
Start: 2018-10-10

## (undated) RX ORDER — CEFAZOLIN SODIUM 1 G/3ML
INJECTION, POWDER, FOR SOLUTION INTRAMUSCULAR; INTRAVENOUS
Status: DISPENSED
Start: 2018-10-10

## (undated) RX ORDER — PROPOFOL 10 MG/ML
INJECTION, EMULSION INTRAVENOUS
Status: DISPENSED
Start: 2023-10-03

## (undated) RX ORDER — GABAPENTIN 100 MG/1
CAPSULE ORAL
Status: DISPENSED
Start: 2018-10-10

## (undated) RX ORDER — HYDROMORPHONE HYDROCHLORIDE 1 MG/ML
INJECTION, SOLUTION INTRAMUSCULAR; INTRAVENOUS; SUBCUTANEOUS
Status: DISPENSED
Start: 2018-10-10

## (undated) RX ORDER — ACETAMINOPHEN 325 MG/1
TABLET ORAL
Status: DISPENSED
Start: 2023-10-03

## (undated) RX ORDER — DEXAMETHASONE SODIUM PHOSPHATE 4 MG/ML
INJECTION, SOLUTION INTRA-ARTICULAR; INTRALESIONAL; INTRAMUSCULAR; INTRAVENOUS; SOFT TISSUE
Status: DISPENSED
Start: 2018-10-10

## (undated) RX ORDER — ACETAMINOPHEN 325 MG/1
TABLET ORAL
Status: DISPENSED
Start: 2025-04-14

## (undated) RX ORDER — ACETAMINOPHEN 325 MG/1
TABLET ORAL
Status: DISPENSED
Start: 2018-10-10

## (undated) RX ORDER — OXYCODONE HYDROCHLORIDE 5 MG/1
TABLET ORAL
Status: DISPENSED
Start: 2018-10-10

## (undated) RX ORDER — PROPOFOL 10 MG/ML
INJECTION, EMULSION INTRAVENOUS
Status: DISPENSED
Start: 2025-04-14

## (undated) RX ORDER — PHENYLEPHRINE HCL IN 0.9% NACL 1 MG/10 ML
SYRINGE (ML) INTRAVENOUS
Status: DISPENSED
Start: 2018-10-10

## (undated) RX ORDER — FENTANYL CITRATE 50 UG/ML
INJECTION, SOLUTION INTRAMUSCULAR; INTRAVENOUS
Status: DISPENSED
Start: 2025-04-14

## (undated) RX ORDER — FENTANYL CITRATE 50 UG/ML
INJECTION, SOLUTION INTRAMUSCULAR; INTRAVENOUS
Status: DISPENSED
Start: 2021-10-11

## (undated) RX ORDER — GLYCOPYRROLATE 0.2 MG/ML
INJECTION INTRAMUSCULAR; INTRAVENOUS
Status: DISPENSED
Start: 2023-10-03

## (undated) RX ORDER — KETOROLAC TROMETHAMINE 30 MG/ML
INJECTION, SOLUTION INTRAMUSCULAR; INTRAVENOUS
Status: DISPENSED
Start: 2025-04-14

## (undated) RX ORDER — EPHEDRINE SULFATE 50 MG/ML
INJECTION, SOLUTION INTRAMUSCULAR; INTRAVENOUS; SUBCUTANEOUS
Status: DISPENSED
Start: 2023-10-03

## (undated) RX ORDER — ONDANSETRON 4 MG/1
TABLET, ORALLY DISINTEGRATING ORAL
Status: DISPENSED
Start: 2018-10-10

## (undated) RX ORDER — GLYCOPYRROLATE 0.2 MG/ML
INJECTION, SOLUTION INTRAMUSCULAR; INTRAVENOUS
Status: DISPENSED
Start: 2025-04-14

## (undated) RX ORDER — KETOROLAC TROMETHAMINE 30 MG/ML
INJECTION, SOLUTION INTRAMUSCULAR; INTRAVENOUS
Status: DISPENSED
Start: 2018-10-10